# Patient Record
Sex: MALE | Race: WHITE | NOT HISPANIC OR LATINO | Employment: OTHER | URBAN - METROPOLITAN AREA
[De-identification: names, ages, dates, MRNs, and addresses within clinical notes are randomized per-mention and may not be internally consistent; named-entity substitution may affect disease eponyms.]

---

## 2018-01-31 ENCOUNTER — TRANSCRIBE ORDERS (OUTPATIENT)
Dept: LAB | Facility: CLINIC | Age: 83
End: 2018-01-31

## 2018-01-31 ENCOUNTER — APPOINTMENT (OUTPATIENT)
Dept: LAB | Facility: CLINIC | Age: 83
End: 2018-01-31
Payer: COMMERCIAL

## 2018-01-31 DIAGNOSIS — E11.9 TYPE 2 DIABETES MELLITUS WITHOUT COMPLICATION, WITHOUT LONG-TERM CURRENT USE OF INSULIN (HCC): ICD-10-CM

## 2018-01-31 DIAGNOSIS — I10 ESSENTIAL HYPERTENSION, MALIGNANT: ICD-10-CM

## 2018-01-31 DIAGNOSIS — E78.5 HYPERLIPIDEMIA, UNSPECIFIED HYPERLIPIDEMIA TYPE: ICD-10-CM

## 2018-01-31 DIAGNOSIS — E11.9 TYPE 2 DIABETES MELLITUS WITHOUT COMPLICATION, WITHOUT LONG-TERM CURRENT USE OF INSULIN (HCC): Primary | ICD-10-CM

## 2018-01-31 LAB
ALBUMIN SERPL BCP-MCNC: 4.1 G/DL (ref 3.5–5)
ALP SERPL-CCNC: 101 U/L (ref 46–116)
ALT SERPL W P-5'-P-CCNC: 30 U/L (ref 12–78)
ANION GAP SERPL CALCULATED.3IONS-SCNC: 7 MMOL/L (ref 4–13)
AST SERPL W P-5'-P-CCNC: 24 U/L (ref 5–45)
BILIRUB SERPL-MCNC: 0.8 MG/DL (ref 0.2–1)
BUN SERPL-MCNC: 21 MG/DL (ref 5–25)
CALCIUM SERPL-MCNC: 9.3 MG/DL (ref 8.3–10.1)
CHLORIDE SERPL-SCNC: 102 MMOL/L (ref 100–108)
CHOLEST SERPL-MCNC: 116 MG/DL (ref 50–200)
CO2 SERPL-SCNC: 26 MMOL/L (ref 21–32)
CREAT SERPL-MCNC: 1.28 MG/DL (ref 0.6–1.3)
CREAT UR-MCNC: 174 MG/DL
EST. AVERAGE GLUCOSE BLD GHB EST-MCNC: 157 MG/DL
GFR SERPL CREATININE-BSD FRML MDRD: 53 ML/MIN/1.73SQ M
GLUCOSE P FAST SERPL-MCNC: 107 MG/DL (ref 65–99)
HBA1C MFR BLD: 7.1 % (ref 4.2–6.3)
HDLC SERPL-MCNC: 60 MG/DL (ref 40–60)
LDLC SERPL CALC-MCNC: 40 MG/DL (ref 0–100)
MICROALBUMIN UR-MCNC: 79.2 MG/L (ref 0–20)
MICROALBUMIN/CREAT 24H UR: 46 MG/G CREATININE (ref 0–30)
POTASSIUM SERPL-SCNC: 4.1 MMOL/L (ref 3.5–5.3)
PROT SERPL-MCNC: 7.5 G/DL (ref 6.4–8.2)
SODIUM SERPL-SCNC: 135 MMOL/L (ref 136–145)
TRIGL SERPL-MCNC: 81 MG/DL

## 2018-01-31 PROCEDURE — 80061 LIPID PANEL: CPT

## 2018-01-31 PROCEDURE — 82043 UR ALBUMIN QUANTITATIVE: CPT

## 2018-01-31 PROCEDURE — 80053 COMPREHEN METABOLIC PANEL: CPT

## 2018-01-31 PROCEDURE — 83036 HEMOGLOBIN GLYCOSYLATED A1C: CPT

## 2018-01-31 PROCEDURE — 36415 COLL VENOUS BLD VENIPUNCTURE: CPT

## 2018-01-31 PROCEDURE — 82570 ASSAY OF URINE CREATININE: CPT

## 2019-10-07 ENCOUNTER — TELEPHONE (OUTPATIENT)
Dept: NEPHROLOGY | Facility: CLINIC | Age: 84
End: 2019-10-07

## 2019-10-07 NOTE — TELEPHONE ENCOUNTER
Called to remind him of his appt for 10/8  He said that he needs to cancel and is not interested in rescheduling  He stated that he is going back to the old place that he was at before  I informed him to give us a call if anything were to change or if he wanted to reschedule

## 2020-10-09 ENCOUNTER — LAB (OUTPATIENT)
Dept: LAB | Facility: CLINIC | Age: 85
End: 2020-10-09
Payer: COMMERCIAL

## 2020-10-09 ENCOUNTER — TRANSCRIBE ORDERS (OUTPATIENT)
Dept: LAB | Facility: CLINIC | Age: 85
End: 2020-10-09

## 2020-10-09 DIAGNOSIS — I50.33 DIASTOLIC CHF, ACUTE ON CHRONIC (HCC): Primary | ICD-10-CM

## 2020-10-09 DIAGNOSIS — I50.33 DIASTOLIC CHF, ACUTE ON CHRONIC (HCC): ICD-10-CM

## 2020-10-09 LAB
ANION GAP SERPL CALCULATED.3IONS-SCNC: 6 MMOL/L (ref 4–13)
BUN SERPL-MCNC: 24 MG/DL (ref 5–25)
CALCIUM SERPL-MCNC: 8.8 MG/DL (ref 8.3–10.1)
CHLORIDE SERPL-SCNC: 104 MMOL/L (ref 100–108)
CO2 SERPL-SCNC: 28 MMOL/L (ref 21–32)
CREAT SERPL-MCNC: 1.48 MG/DL (ref 0.6–1.3)
GFR SERPL CREATININE-BSD FRML MDRD: 43 ML/MIN/1.73SQ M
GLUCOSE P FAST SERPL-MCNC: 143 MG/DL (ref 65–99)
POTASSIUM SERPL-SCNC: 3.6 MMOL/L (ref 3.5–5.3)
SODIUM SERPL-SCNC: 138 MMOL/L (ref 136–145)

## 2020-10-09 PROCEDURE — 80048 BASIC METABOLIC PNL TOTAL CA: CPT

## 2020-10-09 PROCEDURE — 36415 COLL VENOUS BLD VENIPUNCTURE: CPT

## 2021-01-30 DIAGNOSIS — Z23 ENCOUNTER FOR IMMUNIZATION: ICD-10-CM

## 2021-02-12 ENCOUNTER — TRANSCRIBE ORDERS (OUTPATIENT)
Dept: LAB | Facility: CLINIC | Age: 86
End: 2021-02-12

## 2021-02-12 ENCOUNTER — LAB (OUTPATIENT)
Dept: LAB | Facility: CLINIC | Age: 86
End: 2021-02-12
Payer: COMMERCIAL

## 2021-02-12 DIAGNOSIS — E78.00 PURE HYPERCHOLESTEROLEMIA: ICD-10-CM

## 2021-02-12 DIAGNOSIS — E78.00 HYPERCHOLESTEROLEMIA: ICD-10-CM

## 2021-02-12 DIAGNOSIS — I50.33 ACUTE ON CHRONIC DIASTOLIC HEART FAILURE (HCC): Primary | ICD-10-CM

## 2021-02-12 DIAGNOSIS — I10 HYPERTENSION, UNSPECIFIED TYPE: ICD-10-CM

## 2021-02-12 DIAGNOSIS — I48.91 ATRIAL FIBRILLATION, UNSPECIFIED TYPE (HCC): ICD-10-CM

## 2021-02-12 DIAGNOSIS — I50.33 ACUTE ON CHRONIC DIASTOLIC HEART FAILURE (HCC): ICD-10-CM

## 2021-02-12 LAB
ANION GAP SERPL CALCULATED.3IONS-SCNC: 5 MMOL/L (ref 4–13)
BUN SERPL-MCNC: 22 MG/DL (ref 5–25)
CALCIUM SERPL-MCNC: 9 MG/DL (ref 8.3–10.1)
CHLORIDE SERPL-SCNC: 107 MMOL/L (ref 100–108)
CO2 SERPL-SCNC: 26 MMOL/L (ref 21–32)
CREAT SERPL-MCNC: 1.58 MG/DL (ref 0.6–1.3)
GFR SERPL CREATININE-BSD FRML MDRD: 40 ML/MIN/1.73SQ M
GLUCOSE SERPL-MCNC: 182 MG/DL (ref 65–140)
POTASSIUM SERPL-SCNC: 3.8 MMOL/L (ref 3.5–5.3)
SODIUM SERPL-SCNC: 138 MMOL/L (ref 136–145)

## 2021-02-12 PROCEDURE — 36415 COLL VENOUS BLD VENIPUNCTURE: CPT

## 2021-02-12 PROCEDURE — 80048 BASIC METABOLIC PNL TOTAL CA: CPT

## 2021-02-13 ENCOUNTER — IMMUNIZATIONS (OUTPATIENT)
Dept: FAMILY MEDICINE CLINIC | Facility: HOSPITAL | Age: 86
End: 2021-02-13

## 2021-02-13 DIAGNOSIS — Z23 ENCOUNTER FOR IMMUNIZATION: Primary | ICD-10-CM

## 2021-02-13 PROCEDURE — 91300 SARS-COV-2 / COVID-19 MRNA VACCINE (PFIZER-BIONTECH) 30 MCG: CPT

## 2021-02-13 PROCEDURE — 0001A SARS-COV-2 / COVID-19 MRNA VACCINE (PFIZER-BIONTECH) 30 MCG: CPT

## 2021-03-06 ENCOUNTER — IMMUNIZATIONS (OUTPATIENT)
Dept: FAMILY MEDICINE CLINIC | Facility: HOSPITAL | Age: 86
End: 2021-03-06

## 2021-03-06 DIAGNOSIS — Z23 ENCOUNTER FOR IMMUNIZATION: Primary | ICD-10-CM

## 2021-03-06 PROCEDURE — 91300 SARS-COV-2 / COVID-19 MRNA VACCINE (PFIZER-BIONTECH) 30 MCG: CPT

## 2021-03-06 PROCEDURE — 0002A SARS-COV-2 / COVID-19 MRNA VACCINE (PFIZER-BIONTECH) 30 MCG: CPT

## 2022-02-11 ENCOUNTER — APPOINTMENT (EMERGENCY)
Dept: CT IMAGING | Facility: HOSPITAL | Age: 87
DRG: 811 | End: 2022-02-11
Payer: COMMERCIAL

## 2022-02-11 ENCOUNTER — APPOINTMENT (EMERGENCY)
Dept: RADIOLOGY | Facility: HOSPITAL | Age: 87
DRG: 811 | End: 2022-02-11
Payer: COMMERCIAL

## 2022-02-11 ENCOUNTER — HOSPITAL ENCOUNTER (INPATIENT)
Facility: HOSPITAL | Age: 87
LOS: 8 days | Discharge: HOME HEALTH | DRG: 811 | End: 2022-02-19
Attending: SURGERY | Admitting: INTERNAL MEDICINE
Payer: COMMERCIAL

## 2022-02-11 ENCOUNTER — APPOINTMENT (OUTPATIENT)
Dept: CT IMAGING | Facility: HOSPITAL | Age: 87
DRG: 811 | End: 2022-02-11
Payer: COMMERCIAL

## 2022-02-11 DIAGNOSIS — E11.9 TYPE 2 DIABETES MELLITUS, WITHOUT LONG-TERM CURRENT USE OF INSULIN (HCC): ICD-10-CM
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
## 2022-02-11 DIAGNOSIS — R55 SYNCOPE AND COLLAPSE: ICD-10-CM

## 2022-02-11 DIAGNOSIS — K63.89 COLONIC MASS: ICD-10-CM

## 2022-02-11 DIAGNOSIS — N18.9 CHRONIC KIDNEY DISEASE: ICD-10-CM

## 2022-02-11 DIAGNOSIS — D50.8 OTHER IRON DEFICIENCY ANEMIA: ICD-10-CM

## 2022-02-11 DIAGNOSIS — K29.01 ACUTE GASTRITIS WITH HEMORRHAGE: ICD-10-CM

## 2022-02-11 DIAGNOSIS — D64.9 ANEMIA, UNSPECIFIED TYPE: ICD-10-CM

## 2022-02-11 PROBLEM — S01.112A LACERATION OF LEFT EYEBROW: Status: ACTIVE | Noted: 2022-02-11

## 2022-02-11 PROBLEM — I50.32 CHRONIC DIASTOLIC HEART FAILURE (HCC): Status: ACTIVE | Noted: 2022-02-11

## 2022-02-11 PROBLEM — I48.91 ATRIAL FIBRILLATION (HCC): Status: ACTIVE | Noted: 2022-02-11

## 2022-02-11 LAB
2HR DELTA HS TROPONIN: 0 NG/L
4HR DELTA HS TROPONIN: -2 NG/L
ABO GROUP BLD: NORMAL
ABO GROUP BLD: NORMAL
ANION GAP SERPL CALCULATED.3IONS-SCNC: 10 MMOL/L (ref 4–13)
ATRIAL RATE: 72 BPM
BASE EXCESS BLDA CALC-SCNC: 0 MMOL/L (ref -2–3)
BLD GP AB SCN SERPL QL: NEGATIVE
BUN SERPL-MCNC: 43 MG/DL (ref 5–25)
CALCIUM SERPL-MCNC: 8.6 MG/DL (ref 8.3–10.1)
CARDIAC TROPONIN I PNL SERPL HS: 33 NG/L
CARDIAC TROPONIN I PNL SERPL HS: 35 NG/L
CARDIAC TROPONIN I PNL SERPL HS: 35 NG/L
CHLORIDE SERPL-SCNC: 98 MMOL/L (ref 100–108)
CO2 SERPL-SCNC: 26 MMOL/L (ref 21–32)
CREAT SERPL-MCNC: 1.79 MG/DL (ref 0.6–1.3)
ERYTHROCYTE [DISTWIDTH] IN BLOOD BY AUTOMATED COUNT: 19.9 % (ref 11.6–15.1)
EST. AVERAGE GLUCOSE BLD GHB EST-MCNC: 186 MG/DL
FERRITIN SERPL-MCNC: 5 NG/ML (ref 8–388)
GFR SERPL CREATININE-BSD FRML MDRD: 34 ML/MIN/1.73SQ M
GLUCOSE SERPL-MCNC: 163 MG/DL (ref 65–140)
GLUCOSE SERPL-MCNC: 165 MG/DL (ref 65–140)
HBA1C MFR BLD: 8.1 %
HCO3 BLDA-SCNC: 25.3 MMOL/L (ref 24–30)
HCT VFR BLD AUTO: 19.3 % (ref 36.5–49.3)
HCT VFR BLD AUTO: 20.6 % (ref 36.5–49.3)
HCT VFR BLD AUTO: 22.8 % (ref 36.5–49.3)
HCT VFR BLD AUTO: 23.3 % (ref 36.5–49.3)
HCT VFR BLD CALC: 22 % (ref 36.5–49.3)
HGB BLD-MCNC: 5.4 G/DL (ref 12–17)
HGB BLD-MCNC: 5.9 G/DL (ref 12–17)
HGB BLD-MCNC: 6.6 G/DL (ref 12–17)
HGB BLD-MCNC: 6.9 G/DL (ref 12–17)
HGB BLDA-MCNC: 7.5 G/DL (ref 12–17)
IRON SATN MFR SERPL: 2 % (ref 20–50)
IRON SERPL-MCNC: 11 UG/DL (ref 65–175)
LACTATE SERPL-SCNC: 1.2 MMOL/L (ref 0.5–2)
MCH RBC QN AUTO: 17.2 PG (ref 26.8–34.3)
MCHC RBC AUTO-ENTMCNC: 28.6 G/DL (ref 31.4–37.4)
MCV RBC AUTO: 60 FL (ref 82–98)
PCO2 BLD: 27 MMOL/L (ref 21–32)
PCO2 BLD: 41 MM HG (ref 42–50)
PH BLD: 7.4 [PH] (ref 7.3–7.4)
PLATELET # BLD AUTO: 415 THOUSANDS/UL (ref 149–390)
PLATELET # BLD AUTO: 450 THOUSANDS/UL (ref 149–390)
PMV BLD AUTO: 10 FL (ref 8.9–12.7)
PMV BLD AUTO: 10.7 FL (ref 8.9–12.7)
PO2 BLD: 27 MM HG (ref 35–45)
POTASSIUM BLD-SCNC: 4 MMOL/L (ref 3.5–5.3)
POTASSIUM SERPL-SCNC: 3.9 MMOL/L (ref 3.5–5.3)
QRS AXIS: -71 DEGREES
QRSD INTERVAL: 102 MS
QT INTERVAL: 440 MS
QTC INTERVAL: 481 MS
RBC # BLD AUTO: 3.43 MILLION/UL (ref 3.88–5.62)
RH BLD: POSITIVE
RH BLD: POSITIVE
SAO2 % BLD FROM PO2: 50 % (ref 60–85)
SODIUM BLD-SCNC: 135 MMOL/L (ref 136–145)
SODIUM SERPL-SCNC: 134 MMOL/L (ref 136–145)
SPECIMEN EXPIRATION DATE: NORMAL
SPECIMEN SOURCE: ABNORMAL
T WAVE AXIS: 31 DEGREES
TIBC SERPL-MCNC: 581 UG/DL (ref 250–450)
VENTRICULAR RATE: 72 BPM
WBC # BLD AUTO: 6.62 THOUSAND/UL (ref 4.31–10.16)

## 2022-02-11 PROCEDURE — 82728 ASSAY OF FERRITIN: CPT | Performed by: CHIROPRACTOR

## 2022-02-11 PROCEDURE — 83540 ASSAY OF IRON: CPT | Performed by: CHIROPRACTOR

## 2022-02-11 PROCEDURE — 85027 COMPLETE CBC AUTOMATED: CPT | Performed by: PHYSICIAN ASSISTANT

## 2022-02-11 PROCEDURE — 93308 TTE F-UP OR LMTD: CPT | Performed by: SURGERY

## 2022-02-11 PROCEDURE — NC001 PR NO CHARGE: Performed by: PHYSICIAN ASSISTANT

## 2022-02-11 PROCEDURE — 86920 COMPATIBILITY TEST SPIN: CPT

## 2022-02-11 PROCEDURE — NC001 PR NO CHARGE: Performed by: SURGERY

## 2022-02-11 PROCEDURE — G1004 CDSM NDSC: HCPCS

## 2022-02-11 PROCEDURE — 80048 BASIC METABOLIC PNL TOTAL CA: CPT | Performed by: PHYSICIAN ASSISTANT

## 2022-02-11 PROCEDURE — 90715 TDAP VACCINE 7 YRS/> IM: CPT | Performed by: PHYSICIAN ASSISTANT

## 2022-02-11 PROCEDURE — 83036 HEMOGLOBIN GLYCOSYLATED A1C: CPT | Performed by: INTERNAL MEDICINE

## 2022-02-11 PROCEDURE — 83605 ASSAY OF LACTIC ACID: CPT | Performed by: PHYSICIAN ASSISTANT

## 2022-02-11 PROCEDURE — 99285 EMERGENCY DEPT VISIT HI MDM: CPT

## 2022-02-11 PROCEDURE — 85018 HEMOGLOBIN: CPT

## 2022-02-11 PROCEDURE — 36430 TRANSFUSION BLD/BLD COMPNT: CPT

## 2022-02-11 PROCEDURE — 85018 HEMOGLOBIN: CPT | Performed by: INTERNAL MEDICINE

## 2022-02-11 PROCEDURE — 84484 ASSAY OF TROPONIN QUANT: CPT | Performed by: PHYSICIAN ASSISTANT

## 2022-02-11 PROCEDURE — NC001 PR NO CHARGE: Performed by: EMERGENCY MEDICINE

## 2022-02-11 PROCEDURE — 93005 ELECTROCARDIOGRAM TRACING: CPT

## 2022-02-11 PROCEDURE — 82803 BLOOD GASES ANY COMBINATION: CPT

## 2022-02-11 PROCEDURE — 85014 HEMATOCRIT: CPT

## 2022-02-11 PROCEDURE — 71045 X-RAY EXAM CHEST 1 VIEW: CPT

## 2022-02-11 PROCEDURE — 76705 ECHO EXAM OF ABDOMEN: CPT | Performed by: SURGERY

## 2022-02-11 PROCEDURE — 71250 CT THORAX DX C-: CPT

## 2022-02-11 PROCEDURE — 90471 IMMUNIZATION ADMIN: CPT

## 2022-02-11 PROCEDURE — 86901 BLOOD TYPING SEROLOGIC RH(D): CPT | Performed by: INTERNAL MEDICINE

## 2022-02-11 PROCEDURE — 99284 EMERGENCY DEPT VISIT MOD MDM: CPT | Performed by: SURGERY

## 2022-02-11 PROCEDURE — 86900 BLOOD TYPING SEROLOGIC ABO: CPT | Performed by: INTERNAL MEDICINE

## 2022-02-11 PROCEDURE — P9016 RBC LEUKOCYTES REDUCED: HCPCS

## 2022-02-11 PROCEDURE — 93010 ELECTROCARDIOGRAM REPORT: CPT | Performed by: INTERNAL MEDICINE

## 2022-02-11 PROCEDURE — 84295 ASSAY OF SERUM SODIUM: CPT

## 2022-02-11 PROCEDURE — 12011 RPR F/E/E/N/L/M 2.5 CM/<: CPT | Performed by: SURGERY

## 2022-02-11 PROCEDURE — 0HQ1XZZ REPAIR FACE SKIN, EXTERNAL APPROACH: ICD-10-PCS | Performed by: SURGERY

## 2022-02-11 PROCEDURE — 83550 IRON BINDING TEST: CPT | Performed by: CHIROPRACTOR

## 2022-02-11 PROCEDURE — 85014 HEMATOCRIT: CPT | Performed by: INTERNAL MEDICINE

## 2022-02-11 PROCEDURE — 99223 1ST HOSP IP/OBS HIGH 75: CPT | Performed by: INTERNAL MEDICINE

## 2022-02-11 PROCEDURE — 36415 COLL VENOUS BLD VENIPUNCTURE: CPT | Performed by: PHYSICIAN ASSISTANT

## 2022-02-11 PROCEDURE — 86850 RBC ANTIBODY SCREEN: CPT | Performed by: INTERNAL MEDICINE

## 2022-02-11 PROCEDURE — C9113 INJ PANTOPRAZOLE SODIUM, VIA: HCPCS | Performed by: INTERNAL MEDICINE

## 2022-02-11 PROCEDURE — 30233N1 TRANSFUSION OF NONAUTOLOGOUS RED BLOOD CELLS INTO PERIPHERAL VEIN, PERCUTANEOUS APPROACH: ICD-10-PCS | Performed by: INTERNAL MEDICINE

## 2022-02-11 PROCEDURE — 72125 CT NECK SPINE W/O DYE: CPT

## 2022-02-11 PROCEDURE — 70450 CT HEAD/BRAIN W/O DYE: CPT

## 2022-02-11 PROCEDURE — 85049 AUTOMATED PLATELET COUNT: CPT | Performed by: CHIROPRACTOR

## 2022-02-11 PROCEDURE — 84132 ASSAY OF SERUM POTASSIUM: CPT

## 2022-02-11 PROCEDURE — 82947 ASSAY GLUCOSE BLOOD QUANT: CPT

## 2022-02-11 RX ORDER — TORSEMIDE 20 MG/1
40 TABLET ORAL
Status: DISCONTINUED | OUTPATIENT
Start: 2022-02-11 | End: 2022-02-11

## 2022-02-11 RX ORDER — PRAVASTATIN SODIUM 40 MG
40 TABLET ORAL DAILY
COMMUNITY
End: 2022-04-11 | Stop reason: SDUPTHER

## 2022-02-11 RX ORDER — DOXAZOSIN MESYLATE 4 MG/1
8 TABLET ORAL
Status: DISCONTINUED | OUTPATIENT
Start: 2022-02-11 | End: 2022-02-19 | Stop reason: HOSPADM

## 2022-02-11 RX ORDER — LOSARTAN POTASSIUM 50 MG/1
50 TABLET ORAL DAILY
Status: DISCONTINUED | OUTPATIENT
Start: 2022-02-11 | End: 2022-02-11

## 2022-02-11 RX ORDER — DOXAZOSIN 8 MG/1
8 TABLET ORAL
COMMUNITY
End: 2022-04-11 | Stop reason: SDUPTHER

## 2022-02-11 RX ORDER — LOSARTAN POTASSIUM 50 MG/1
50 TABLET ORAL DAILY
Status: DISCONTINUED | OUTPATIENT
Start: 2022-02-12 | End: 2022-02-11

## 2022-02-11 RX ORDER — SODIUM CHLORIDE, SODIUM GLUCONATE, SODIUM ACETATE, POTASSIUM CHLORIDE, MAGNESIUM CHLORIDE, SODIUM PHOSPHATE, DIBASIC, AND POTASSIUM PHOSPHATE .53; .5; .37; .037; .03; .012; .00082 G/100ML; G/100ML; G/100ML; G/100ML; G/100ML; G/100ML; G/100ML
75 INJECTION, SOLUTION INTRAVENOUS CONTINUOUS
Status: DISCONTINUED | OUTPATIENT
Start: 2022-02-11 | End: 2022-02-11

## 2022-02-11 RX ORDER — AMLODIPINE BESYLATE 5 MG/1
10 TABLET ORAL DAILY
Status: DISCONTINUED | OUTPATIENT
Start: 2022-02-11 | End: 2022-02-11

## 2022-02-11 RX ORDER — LOSARTAN POTASSIUM 50 MG/1
50 TABLET ORAL DAILY
COMMUNITY
End: 2022-03-18 | Stop reason: SDUPTHER

## 2022-02-11 RX ORDER — AMIODARONE HYDROCHLORIDE 200 MG/1
100 TABLET ORAL
Status: DISCONTINUED | OUTPATIENT
Start: 2022-02-11 | End: 2022-02-19 | Stop reason: HOSPADM

## 2022-02-11 RX ORDER — TORSEMIDE 20 MG/1
40 TABLET ORAL 2 TIMES DAILY
COMMUNITY
End: 2022-02-22

## 2022-02-11 RX ORDER — PRAVASTATIN SODIUM 40 MG
40 TABLET ORAL
Status: DISCONTINUED | OUTPATIENT
Start: 2022-02-11 | End: 2022-02-19 | Stop reason: HOSPADM

## 2022-02-11 RX ORDER — HEPARIN SODIUM 5000 [USP'U]/ML
5000 INJECTION, SOLUTION INTRAVENOUS; SUBCUTANEOUS EVERY 8 HOURS SCHEDULED
Status: DISCONTINUED | OUTPATIENT
Start: 2022-02-11 | End: 2022-02-11

## 2022-02-11 RX ORDER — PANTOPRAZOLE SODIUM 40 MG/1
40 INJECTION, POWDER, FOR SOLUTION INTRAVENOUS EVERY 12 HOURS SCHEDULED
Status: DISCONTINUED | OUTPATIENT
Start: 2022-02-11 | End: 2022-02-18

## 2022-02-11 RX ORDER — AMLODIPINE BESYLATE 10 MG/1
10 TABLET ORAL DAILY
COMMUNITY
End: 2022-02-19 | Stop reason: HOSPADM

## 2022-02-11 RX ORDER — AMIODARONE HYDROCHLORIDE 100 MG/1
100 TABLET ORAL DAILY
COMMUNITY
End: 2022-03-18 | Stop reason: SDUPTHER

## 2022-02-11 RX ORDER — AMLODIPINE BESYLATE 10 MG/1
10 TABLET ORAL DAILY
Status: DISCONTINUED | OUTPATIENT
Start: 2022-02-12 | End: 2022-02-17

## 2022-02-11 RX ADMIN — TETANUS TOXOID, REDUCED DIPHTHERIA TOXOID AND ACELLULAR PERTUSSIS VACCINE, ADSORBED 0.5 ML: 5; 2.5; 8; 8; 2.5 SUSPENSION INTRAMUSCULAR at 01:59

## 2022-02-11 RX ADMIN — IRON SUCROSE 100 MG: 20 INJECTION, SOLUTION INTRAVENOUS at 04:38

## 2022-02-11 RX ADMIN — DOXAZOSIN 8 MG: 4 TABLET ORAL at 21:02

## 2022-02-11 RX ADMIN — METOPROLOL TARTRATE 25 MG: 25 TABLET, FILM COATED ORAL at 21:02

## 2022-02-11 RX ADMIN — AMIODARONE HYDROCHLORIDE 100 MG: 200 TABLET ORAL at 09:09

## 2022-02-11 RX ADMIN — PANTOPRAZOLE SODIUM 40 MG: 40 INJECTION, POWDER, FOR SOLUTION INTRAVENOUS at 16:19

## 2022-02-11 RX ADMIN — METOPROLOL TARTRATE 25 MG: 25 TABLET, FILM COATED ORAL at 09:09

## 2022-02-11 RX ADMIN — SODIUM CHLORIDE, SODIUM GLUCONATE, SODIUM ACETATE, POTASSIUM CHLORIDE, MAGNESIUM CHLORIDE, SODIUM PHOSPHATE, DIBASIC, AND POTASSIUM PHOSPHATE 75 ML/HR: .53; .5; .37; .037; .03; .012; .00082 INJECTION, SOLUTION INTRAVENOUS at 09:08

## 2022-02-11 RX ADMIN — PANTOPRAZOLE SODIUM 40 MG: 40 INJECTION, POWDER, FOR SOLUTION INTRAVENOUS at 23:17

## 2022-02-11 RX ADMIN — IRON SUCROSE 100 MG: 20 INJECTION, SOLUTION INTRAVENOUS at 11:30

## 2022-02-11 RX ADMIN — SODIUM CHLORIDE, SODIUM GLUCONATE, SODIUM ACETATE, POTASSIUM CHLORIDE, MAGNESIUM CHLORIDE, SODIUM PHOSPHATE, DIBASIC, AND POTASSIUM PHOSPHATE 75 ML/HR: .53; .5; .37; .037; .03; .012; .00082 INJECTION, SOLUTION INTRAVENOUS at 03:19

## 2022-02-11 RX ADMIN — PRAVASTATIN SODIUM 40 MG: 40 TABLET ORAL at 21:04

## 2022-02-11 NOTE — H&P
Joseph 1935, 82 y o  male MRN: [id number]  Unit/Bed#: [facility]ERY[facility] Walker Encounter: [id number]  Primary Care Provider: No primary care provider on file  Date and time admitted to hospital: 2/11/2022 12:05 AM    * Syncope and collapse  Assessment & Plan  Patient reports he fell around midnight, but has no memory about the event  He does report some POLK with limited ambulation at home more recently   Hemoglobin 5 9 with microcytic hypochromic anemia which could be likely etiology  Imaging negative for acute intracranial process or C-spine fracture    Continue work up as appropriate throughout admission    Anemia  Assessment & Plan  Patient was found to have a microcytic anemia on admission, without acute blood loss  Patient denies any hematuria hematochezia or blood noted in BMs  Patient currently is refusing transfusion, and transfusion workup both from the trauma team and myself however is amenable to Venofer  * I verified refusal of transfusion with the patient on 3 separate occasions    Iron studies ordered from initial blood specimen still pending  FOBT ordered    Administered 100 of Venofer x1 approximately 4:00 a m  until ferritin levels are verified  Isolyte currently running at 75 mL per hour    Atrial fibrillation [facility])  Assessment & Plan  Patient has a known history of AFib on Eliquis 5 b i d   At home according to friend not yet verified by any written records    Heparin for thromboprophylaxis currently held pending FOBT  Status post cardioversion March of 2021   EKG demonstrated junctional rhythm without tachycardia    Will continue home medications    Chronic diastolic heart failure (HCC)  Assessment & Plan  Wt Readings from Last 3 Encounters:   02/11/22 91 1 kg (200 lb 13 4 oz)       Reports some SOB which does limit some of his activities  According to friend this is longstanding and worsening   Sates he follows with cardiology a regular basis Dr Travis Dinero at [facility] is cardiologist, records currently unavailable through [facility]  Please ask medical records to assist ASAP      Continue home meds      Laceration of left eyebrow  Assessment & Plan  Small laceration left eyebrow repaired bedside without incident  Local wound care and repeat observations      VTE Prophylaxis: Heparin  held after 1x pending FOBT  / sequential compression device   Code Status: DNR/DNI  POLST: POLST form is not discussed and not completed at this time  Anticipated Length of Stay:  Patient will be admitted on an Observation basis with an anticipated length of stay of  more than 2 midnights  Justification for Hospital Stay:  Medical evaluation and management    Chief Complaint:   Syncopal episode    History of Present Illness:    Delores Looney is a 82 y o  male who is pleasant, oriented, and conversant presents with a PMH  significant for AFib, HTN, diastolic HF who stated he became dizzy in the bathroom earlier today and fell  The patient states he lives in a [address] [place] [address] Street with a friend and the friend summoned EMS to transport him to the hospital   He sustained a minor laceration above his left eye  Patient does not recall or remember any events surrounding the fall  Of significance, he does note a relatively recent increase in SOB with activity  He also is having POLK more recently with ADLs  States he follows with cardiology on a regular basis, takes all medications prescribed however does not know what they are and does not carry medication list   The patient is fully oriented, he denies any pain, headache, shortness of breath at rest, abdominal pain nausea vomiting or diarrhea  *The patient does not have a home medication list with him  No medication list in chart  No list was obtainable through Care Everywhere    I phoned his contact at home, and she provided a vebal list however was unable to provide a pharmacy or any written list  Home meds except for Eliquis were ordered based upon this conversation however will need to be verified by day team when pharmacy information is obtained  *According to girlfriend, Patient's PCP is Dr Irwin Gomez, affiliated with [facility]  Dr Rosangela Hanna is his cardiologist also affiliated with [facility]    Review of Systems:    Review of Systems   Constitutional: Negative for chills, diaphoresis and fever  Respiratory: Negative for cough and shortness of breath  Cardiovascular: Negative for leg swelling  Gastrointestinal: Negative for diarrhea, nausea and vomiting  Neurological: Positive for syncope  Negative for speech difficulty and headaches  Psychiatric/Behavioral: Negative for confusion  Past Medical and Surgical History:     Past Medical History:   Diagnosis Date    A-fib ([facility] )     Diastolic heart failure ([facility] )     HTN (hypertension)        Past Surgical History:   Procedure Laterality Date    EXPLORATORY LAPAROTOMY      with gastric ulcer repair       Meds/Allergies:    Prior to Admission medications    Not on File     I have been unable to obtain / verify an up to date medication list despite all reasonable attempts  Allergies: Not on File    Social History:     Marital Status: /Civil Union   Occupation:  Retired   Patient Pre-hospital Living Situation:  With friend  condominium  Patient Pre-hospital Level of Mobility:  No assistive devices  Patient Pre-hospital Diet Restrictions:  None  Substance Use History: Former smoker, quit 30 years ago  Social History     Substance and Sexual Activity   Alcohol Use None     Social History     Tobacco Use   Smoking Status Not on file   Smokeless Tobacco Not on file     Social History     Substance and Sexual Activity   Drug Use Not on file       Family History:    No family history on file      Physical Exam:     Vitals:   Blood Pressure: 142/61 (02/11/22 0546)  Pulse: 84 (02/11/22 0546)  Temperature: (!) 97 2 °F (36 2 °C) (02/11/22 0007)  Temp Source: Tympanic (02/11/22 0007)  Respirations: 18 (02/11/22 0546)  Weight - Scale: 91 1 kg (200 lb 13 4 oz) (02/11/22 0007)  SpO2: 97 % (02/11/22 0546)    Physical Exam  Constitutional:       General: He is not in acute distress  Appearance: He is not ill-appearing or toxic-appearing  HENT:      Head: Normocephalic  Comments: Left eyebrow laceration     Nose: No rhinorrhea  Eyes:      General: No scleral icterus  Extraocular Movements: Extraocular movements intact  Cardiovascular:      Rate and Rhythm: Normal rate and regular rhythm  Heart sounds: No murmur heard  Pulmonary:      Effort: Pulmonary effort is normal  No respiratory distress  Breath sounds: No wheezing or rales  Abdominal:      Tenderness: There is no abdominal tenderness  There is no guarding  Musculoskeletal:      Right lower leg: No edema  Left lower leg: No edema  Skin:     General: Skin is warm and dry  Neurological:      General: No focal deficit present  Mental Status: He is oriented to person, place, and time  Psychiatric:         Mood and Affect: Mood normal          Thought Content: Thought content normal          Additional Data:     Lab Results: I have personally reviewed pertinent reports  Results from last 7 days   Lab Units 02/11/22  0310 02/11/22 0048 02/11/22 0048   WBC Thousand/uL  --   --  6 62   HEMOGLOBIN g/dL  --   --  5 9*   HEMATOCRIT %  --   --  20 6*   PLATELETS Thousands/uL 415*   < > 450*    < > = values in this interval not displayed  Results from last 7 days   Lab Units 02/11/22  0048 02/11/22  0014   POTASSIUM mmol/L 3 9  --    CHLORIDE mmol/L 98*  --    CO2 mmol/L 26  --    CO2, I-STAT mmol/L  --  27   BUN mg/dL 43*  --    CREATININE mg/dL 1 79*  --    CALCIUM mg/dL 8 6  --    GLUCOSE, ISTAT mg/dl  --  165*           Imaging: I have personally reviewed pertinent reports        TRAUMA - CT head wo contrast    Addendum Date: 2/11/2022 Addendum: ADDENDUM: I personally discussed this study with Sana Gutiérrez on 2/11/2022 at 1:31 AM      Result Date: 2/11/2022  Narrative: CT BRAIN - WITHOUT CONTRAST INDICATION:   TRAUMA  COMPARISON:  None  TECHNIQUE:  CT examination of the brain was performed  In addition to axial images, sagittal and coronal 2D reformatted images were created and submitted for interpretation  Radiation dose length product (DLP) for this visit:  865 mGy-cm   This examination, like all CT scans performed in the [facility], was performed utilizing techniques to minimize radiation dose exposure, including the use of iterative reconstruction and automated exposure control  IMAGE QUALITY:  Diagnostic  FINDINGS: PARENCHYMA: Decreased attenuation is noted in periventricular and subcortical white matter demonstrating an appearance that is statistically most likely to represent moderate microangiopathic change  No CT signs of acute infarction  No intracranial mass, mass effect or midline shift  No acute parenchymal hemorrhage  VENTRICLES AND EXTRA-AXIAL SPACES:  Normal for the patient's age  VISUALIZED ORBITS AND PARANASAL SINUSES:  Unremarkable  CALVARIUM AND EXTRACRANIAL SOFT TISSUES:  Normal      Impression: No acute intracranial abnormality  I personally discussed this study with Dr Ramón Hawkins on 2/11/2022 at 1:02 AM  Workstation performed: [id number]     TRAUMA - CT spine cervical wo contrast    Addendum Date: 2/11/2022 Addendum:    I personally discussed this study with Sana Gutiérrez on 2/11/2022 at 1:31 AM      Result Date: 2/11/2022  Narrative: CT CERVICAL SPINE - WITHOUT CONTRAST INDICATION:   TRAUMA  COMPARISON:  None  TECHNIQUE:  CT examination of the cervical spine was performed without intravenous contrast   Contiguous axial images were obtained  Sagittal and coronal reconstructions were performed  Radiation dose length product (DLP) for this visit:  328 mGy-cm     This examination, like all CT scans performed in the [facility], was performed utilizing techniques to minimize radiation dose exposure, including the use of iterative reconstruction and automated exposure control  IMAGE QUALITY:  Diagnostic  FINDINGS: ALIGNMENT:  There is straightening of normal cervical lordosis  No subluxation or compression deformity  VERTEBRAL BODIES:  No fracture  DEGENERATIVE CHANGES:  Moderate multilevel cervical degenerative changes are noted  No critical central canal stenosis  PREVERTEBRAL AND PARASPINAL SOFT TISSUES:  Unremarkable  THORACIC INLET:  Normal      Impression: Images are motion affected  No displaced cervical spine fracture or gross traumatic malalignment  I personally discussed this study with Dr Abbi Obrien on 2/11/2022 at 1:02 AM   Workstation performed: [id number]       EKG, Pathology, and Other Studies Reviewed on Admission:   · EKG:  Junctional rhythm without tachycardia    Epic / Care Everywhere Records Reviewed: Yes     ** Please Note: This note has been constructed using a voice recognition system   **

## 2022-02-11 NOTE — ASSESSMENT & PLAN NOTE
Patient was found to have a microcytic anemia on admission, without acute blood loss  Patient denies any hematuria hematochezia or blood noted in BMs  Patient currently is refusing transfusion, and transfusion workup both from the trauma team and myself however is amenable to Venofer      * I verified refusal of transfusion with the patient on 3 separate occasions    Iron studies ordered from initial blood specimen still pending  FOBT ordered    Administered 100 of Venofer x1 approximately 4:00 a m  until ferritin levels are verified  Isolyte currently running at 75 mL per hour

## 2022-02-11 NOTE — ASSESSMENT & PLAN NOTE
- Microcytic anemia without acute blood loss  - Patient denies hematuria, hematochezia, or bloody bowel movements  - Admit to medical service for anemia workup and monitoring

## 2022-02-11 NOTE — ASSESSMENT & PLAN NOTE
Wt Readings from Last 3 Encounters:   02/11/22 91 1 kg (200 lb 13 4 oz)       Reports some SOB which does limit some of his activities  According to friend this is longstanding and worsening   Sates he follows with cardiology a regular basis   Dr Melba Borrego at [facility] is cardiologist, records currently unavailable through [facility]     Please ask medical records to assist ASAP      Continue home meds

## 2022-02-11 NOTE — PROGRESS NOTES
[facility]  Progress Note Marianela Pascual 1935, 82 y o  male MRN: [id number]  Unit/Bed#: [facility] Walker Encounter: [id number]  Primary Care Provider: No primary care provider on file  Date and time admitted to hospital: 2/11/2022 12:05 AM    * Syncope and collapse  Assessment & Plan  - patient reports some dizziness prior to fall and then has no memory of event  - Recent malaise with POLK can only walk to bathroom prior to getting short of breath  - suspect secondary to anemia with hgb 5 9   - no acute traumatic injuries found on exam, no additional injuries found on tertiary exam  -Trauma will sign off please reconsult as needed    Laceration of left eyebrow  Assessment & Plan  - left eyebrow laceration  - s/p bedside repair   - Local wound care    Anemia  Assessment & Plan  - Microcytic anemia without acute blood loss  - Patient denies hematuria, hematochezia, or bloody bowel movements  - Admitted to medical service for anemia workup and monitoring  - patient is a [employer] and does not accept blood products    Chronic diastolic heart failure (HCC)  Assessment & Plan  Wt Readings from Last 3 Encounters:   02/11/22 91 1 kg (200 lb 13 4 oz)     - With POLK affecting his ADLs   - Continue home medications        TERTIARY TRAUMA SURVEY NOTE    Prophylaxis: Sequential compression device (Venodyne)     Disposition: plans per medical team, trauma team sign off    Code status:  Level 3 - DNAR and DNI    Consultants: trauma      SUBJECTIVE:     Transfer from: home  Mechanism of Injury:Fall    Chief Complaint:  No complaints    HPI/Last 24 hour events:  Reports he feels fine this morning  Denies pain  Denies dizziness, nausea, vomiting, shortness of breath      Active medications:           Current Facility-Administered Medications:     amiodarone tablet 100 mg, 100 mg, Oral, Daily With Breakfast    [START ON 2/12/2022] amLODIPine (NORVASC) tablet 10 mg, 10 mg, Oral, Daily    doxazosin (CARDURA) tablet 8 mg, 8 mg, Oral, HS    metoprolol tartrate (LOPRESSOR) tablet 25 mg, 25 mg, Oral, Q12H CHUY    multi-electrolyte (PLASMALYTE-A/ISOLYTE-S PH 7 4) IV solution, 75 mL/hr, Intravenous, Continuous, 75 mL/hr at 02/11/22 0319    pravastatin (PRAVACHOL) tablet 40 mg, 40 mg, Oral, Daily With Dinner  No current outpatient medications on file  OBJECTIVE:     Vitals:   Vitals:    02/11/22 0648   BP: 145/66   Pulse: 73   Resp: 18   Temp:    SpO2: 96%       Physical Exam:   GENERAL APPEARANCE:  No acute distress, resting comfortably supine in bed  NEURO:  AAOx3, GCS 15, no focal neuro deficits  HEENT:  PERRLA, EOMI, mucous membranes moist  CV:  RRR, S1, S2 without murmur or gallop  LUNGS:  Clear to auscultation bilaterally without wheezes rales or rhonchi  GI:  Soft, obese, nontender and nondistended  :  Voiding independently  MSK:  Nontender without deformity  SKIN:  Warm, dry, intact, left eyebrow laceration CDI with adhesive in place no erythema or drainage  I/O:   I/O     None          Invasive Devices: Invasive Devices  Report    Peripheral Intravenous Line            Peripheral IV 02/11/22 Left Antecubital <1 day                  Imaging:   TRAUMA - CT head wo contrast    Addendum Date: 2/11/2022    ADDENDUM: I personally discussed this study with Blayne Pearson on 2/11/2022 at 1:31 AM      Result Date: 2/11/2022  Impression: No acute intracranial abnormality  I personally discussed this study with Dr Alana Tarango on 2/11/2022 at 1:02 AM  Workstation performed: [id number]     TRAUMA - CT spine cervical wo contrast    Addendum Date: 2/11/2022     I personally discussed this study with Blayne Pearson on 2/11/2022 at 1:31 AM      Result Date: 2/11/2022  Impression: Images are motion affected  No displaced cervical spine fracture or gross traumatic malalignment    I personally discussed this study with Dr Alana Tarango on 2/11/2022 at 1:02 AM   Workstation performed: [id number]       Labs:   CBC: Lab Results   Component Value Date    WBC 6 62 02/11/2022    HGB 5 9 (LL) 02/11/2022    HCT 20 6 (L) 02/11/2022    MCV 60 (L) 02/11/2022     (H) 02/11/2022    MCH 17 2 (L) 02/11/2022    MCHC 28 6 (L) 02/11/2022    RDW 19 9 (H) 02/11/2022    MPV 10 0 02/11/2022     CMP:   Lab Results   Component Value Date    CL 98 (L) 02/11/2022    CO2 26 02/11/2022    CO2 27 02/11/2022    BUN 43 (H) 02/11/2022    CREATININE 1 79 (H) 02/11/2022    GLUCOSE 165 (H) 02/11/2022    CALCIUM 8 6 02/11/2022    EGFR 34 02/11/2022     Troponin: No results found for: Jil Coombs

## 2022-02-11 NOTE — ASSESSMENT & PLAN NOTE
Most likely syncope secondary to symptomatic anemia  Troponin negative, glucose within normal limits    Negative orthostatics  Imaging negative for acute intracranial process or C-spine fracture  Fall precautions  Treat anemia

## 2022-02-11 NOTE — ASSESSMENT & PLAN NOTE
Home medication:  Eliquis 5 mg b i d , amiodarone 100 mg daily, metoprolol 25 b i d  Status post cardioversion March of 2021   EKG:  AFib with controlled heart rate  Plan:  Continue amiodarone  Continue metoprolol  Continue to hold Eliquis for now  If the family will decline colonoscopy/EGD, might need to discuss about discontinuing Eliquis for high risk of bleeding

## 2022-02-11 NOTE — PROCEDURES
Laceration repair    Date/Time: 2/11/2022 12:59 AM  Performed by: Kelsi Tian PA-C  Authorized by: Kelsi Tian PA-C   Consent: Verbal consent obtained  Consent given by: patient  Patient understanding: patient states understanding of the procedure being performed  Patient identity confirmed: verbally with patient  Time out: Immediately prior to procedure a "time out" was called to verify the correct patient, procedure, equipment, support staff and site/side marked as required    Body area: head/neck  Location details: left eyebrow  Laceration length: 1 cm    Wound Dehiscence:  Superficial Wound Dehiscence: simple closure      Procedure Details:  Irrigation solution: saline  Irrigation method: syringe  Skin closure: glue  Patient tolerance: patient tolerated the procedure well with no immediate complications

## 2022-02-11 NOTE — ASSESSMENT & PLAN NOTE
Wt Readings from Last 3 Encounters:   02/11/22 91 1 kg (200 lb 13 4 oz)     - With POLK affecting his ADLs   - Continue home medications

## 2022-02-11 NOTE — H&P
Joseph 1935, 82 y o  male MRN: [id number]  Unit/Bed#: [facility] Walker Encounter: [id number]  Primary Care Provider: No primary care provider on file  Date and time admitted to hospital: 2/11/2022 12:05 AM    Syncope and collapse  Assessment & Plan  - patient reports some dizziness prior to fall and then has no memory of event  - Recent malaise with POLK can only walk to bathroom prior to getting short of breath  - suspect secondary to anemia with hgb 5 9   - no acute traumatic injuries found on exam  - Admit to medical service for anemia/syncope workup    Laceration of left eyebrow  Assessment & Plan  - left eyebrow laceration  - s/p bedside repair   - Local wound care    Anemia  Assessment & Plan  - Microcytic anemia without acute blood loss  - Patient denies hematuria, hematochezia, or bloody bowel movements  - Admit to medical service for anemia workup and monitoring    Atrial fibrillation (HCC)  Assessment & Plan  - Afib on eliquis  - Previous cardioversion into sinus rhythm in 3/21  - EKG with junctional rhythm without tachycardia  - continue home medications    Chronic diastolic heart failure (HCC)  Assessment & Plan  Wt Readings from Last 3 Encounters:   02/11/22 91 1 kg (200 lb 13 4 oz)     - With POLK affecting his ADLs   - Continue home medications    Trauma Alert: Level B   Model of Arrival: Ambulance    Trauma Team: Attending Lily Frank and PANDA Bejarano 49  Consultants:     None     History of Present Illness     Chief Complaint: Fall  Mechanism:Fall     HPI:    Marian Gordon is a 82 y o  male with PMH afib, HTN, Diastolic heart failure who reports he was in the bathroom this morning when he suddenly got dizzy and fell to the ground  He does not remember any other events leading up to the fall  He reports he has been feeling poorly at home with increased shortness of breath with activity   He states he can only ambulate short distances within his house without shortness of breath  He denies pain, headache, chest pain, palpitations, abdominal pain, nausea, vomiting  Review of Systems   Constitutional: Negative for activity change, appetite change, diaphoresis, fatigue and fever  HENT: Negative for congestion, ear discharge, ear pain, facial swelling, hearing loss, mouth sores, nosebleeds, postnasal drip, rhinorrhea, sinus pressure, sinus pain, sneezing and sore throat  Eyes: Negative for photophobia, pain and redness  Respiratory: Positive for shortness of breath (on exertion)  Negative for cough, chest tightness and wheezing  Cardiovascular: Negative for chest pain and palpitations  Gastrointestinal: Negative for abdominal distention, abdominal pain, blood in stool, constipation, diarrhea, nausea and vomiting  Genitourinary: Negative for dysuria, frequency, hematuria and urgency  Musculoskeletal: Negative for back pain and neck pain  Skin: Negative for wound  Neurological: Positive for syncope  Negative for dizziness, seizures, weakness, numbness and headaches  12-point, complete review of systems was reviewed and negative except as stated above  Historical Information     Past Medical History:   Diagnosis Date    A[facility]fib ([facility] [address] )     Diastolic heart failure (HCC)     HTN (hypertension)      Past Surgical History:   Procedure Laterality Date    EXPLORATORY LAPAROTOMY      with gastric ulcer repair        Social History     Tobacco Use    Smoking status: Not on file    Smokeless tobacco: Not on file   Substance Use Topics    Alcohol use: Not on file    Drug use: Not on file     There is no immunization history for the selected administration types on file for this patient  Last Tetanus: unknown  Family History: Non-contributory    1  Before the illness or injury that brought you to the Emergency, did you need someone to help you on a regular basis? 0=No   2   Since the illness or injury that brought you to the Emergency, have you needed more help than usual to take care of yourself? 0=No   3  Have you been hospitalized for one or more nights during the past 6 months (excluding a stay in the Emergency Department)? 0=No   4  In general, do you see well? 0=Yes   5  In general, do you have serious problems with your memory? 0=No   6  Do you take more than three different medications everyday? 1=Yes   TOTAL   1     Did you order a geriatric consult if the score was 2 or greater?: n/a     Meds/Allergies   all current active meds have been reviewed  Allergies have not been reviewed; Not on File    Objective   Initial Vitals:   Temperature: (!) 97 2 °F (36 2 °C) (02/11/22 0007)  Pulse: 75 (02/11/22 0007)  Respirations: 20 (02/11/22 0007)  Blood Pressure: 135/66 (02/11/22 0007)    Primary Survey:   Airway:        Status: patent;        Pre-hospital Interventions: none        Hospital Interventions: none  Breathing:        Pre-hospital Interventions: none       Effort: normal       Right breath sounds: normal       Left breath sounds: normal  Circulation:        Rhythm: regular       Rate: regular   Right Pulses Left Pulses    R radial: 2+  R femoral: 2+  R pedal: 2+  R carotid: 2+  R popliteal: 2+ L radial: 2+  L femoral: 2+  L pedal: 2+  L carotid: 2+  L popliteal: 2+   Disability:        GCS: Eye: 4; Verbal: 5 Motor: 6 Total: 15       Right Pupil: round;  reactive         Left Pupil:  round;  reactive      R Motor Strength L Motor Strength    R : 5/5  R dorsiflex: 5/5  R plantarflex: 5/5 L : 5/5  L dorsiflex: 5/5  L plantarflex: 5/5        Sensory:  No sensory deficit  Exposure:       Completed: Yes      Secondary Survey:  Physical Exam  Vitals and nursing note reviewed  Constitutional:       General: He is not in acute distress  Appearance: Normal appearance  He is obese  He is not ill-appearing or toxic-appearing  HENT:      Head: Normocephalic          Right Ear: Tympanic membrane normal       Left Ear: Tympanic membrane normal  Nose: Nose normal  No congestion or rhinorrhea  Mouth/Throat:      Mouth: Mucous membranes are moist       Pharynx: Oropharynx is clear  No oropharyngeal exudate  Eyes:      Extraocular Movements: Extraocular movements intact  Conjunctiva/sclera:      Right eye: Right conjunctiva is injected  Left eye: Left conjunctiva is injected  Pupils: Pupils are equal, round, and reactive to light  Cardiovascular:      Rate and Rhythm: Normal rate and regular rhythm  Heart sounds: No murmur heard  No friction rub  No gallop  Pulmonary:      Effort: Pulmonary effort is normal       Breath sounds: No wheezing, rhonchi or rales  Abdominal:      General: Abdomen is flat  There is no distension  Palpations: Abdomen is soft  Tenderness: There is no abdominal tenderness  There is no guarding or rebound  Musculoskeletal:      Right shoulder: Normal       Left shoulder: Normal       Right upper arm: Normal       Left upper arm: Normal       Right elbow: Normal       Left elbow: Normal       Right forearm: Normal       Left forearm: Normal       Right wrist: Normal       Left wrist: Normal       Right hand: Normal       Left hand: Normal       Cervical back: No deformity or tenderness  Thoracic back: No deformity or tenderness  Lumbar back: Normal  No swelling, deformity or tenderness  Right hip: Normal       Left hip: Normal       Right upper leg: Normal       Left upper leg: Normal       Right knee: Normal       Left knee: Normal       Right lower leg: Normal       Left lower leg: Normal       Right ankle: Normal       Left ankle: Normal       Right foot: Normal       Left foot: Normal    Skin:     General: Skin is warm and dry  Capillary Refill: Capillary refill takes less than 2 seconds  Comments: 1cm left lower eyebrow laceration without active bleeding   Neurological:      General: No focal deficit present        Mental Status: He is alert and oriented to person, place, and time  Sensory: No sensory deficit  Motor: No weakness           Invasive Devices  Report    Peripheral Intravenous Line            Peripheral IV 02/11/22 Left Antecubital <1 day              Lab Results: BMP/CMP:   Lab Results   Component Value Date    SODIUM 134 (L) 02/11/2022    K 3 9 02/11/2022    CL 98 (L) 02/11/2022    CO2 26 02/11/2022    CO2 27 02/11/2022    BUN 43 (H) 02/11/2022    CREATININE 1 79 (H) 02/11/2022    GLUCOSE 165 (H) 02/11/2022    CALCIUM 8 6 02/11/2022    EGFR 34 02/11/2022   , CBC:   Lab Results   Component Value Date    WBC 6 62 02/11/2022    HGB 5 9 (LL) 02/11/2022    HCT 20 6 (L) 02/11/2022    MCV 60 (L) 02/11/2022     (H) 02/11/2022    MCH 17 2 (L) 02/11/2022    MCHC 28 6 (L) 02/11/2022    RDW 19 9 (H) 02/11/2022    MPV 10 7 02/11/2022   , Coagulation: No results found for: PT, INR, APTT, Lactate: No results found for: LACTATE and Troponin: No results found for: TROPONINI    Imaging Results: I have personally reviewed pertinent films in PACS  Chest Xray(s): negative for acute traumatic findings   FAST exam(s): negative for acute traumatic findings   CT Scan(s): negative for acute traumatic findings   Additional Xray(s): N/A     Other Studies: none    Code Status: No Order

## 2022-02-11 NOTE — PROCEDURES
POC FAST US    Date/Time: 2/11/2022 12:41 AM  Performed by: Mago Chou PA-C  Authorized by: Mago Chou PA-C     Patient location:  Trauma  Procedure details:     Exam Type:  Diagnostic    Indications: blunt abdominal trauma and blunt chest trauma      Assess for:  Intra-abdominal fluid and pericardial effusion    Technique: FAST      Views obtained:  Heart - Pericardial sac, LUQ - Splenorenal space, Suprapubic - Pouch of Genaro and RUQ - Fontana's Pouch    Image quality: diagnostic      Image availability:  Images available in PACS and video obtained  FAST Findings:     RUQ (Hepatorenal) free fluid: absent      LUQ (Splenorenal) free fluid: absent      Suprapubic free fluid: absent      Cardiac wall motion: identified      Pericardial effusion: absent    Interpretation:     Impressions: negative

## 2022-02-11 NOTE — CONSULTS
Consultation - Neuropsychology/Psychology Department  Barron Sacks 82 y o  male MRN: [id number]  Unit/Bed#: [facility] Walker Encounter: [id number]        Reason for Consultation:  Barron Sacks is a 82y o  year old male who was referred for a Neuropsychological Exam to assess cognitive functioning and comment on capacity to make informed medical decisions  History of Present Illness  Fall at home;     Physician Requesting Consult: Artie Olivier MD    PROBLEM LIST:  Patient Active Problem List   Diagnosis    Syncope and collapse    Chronic diastolic heart failure (HCC)    Atrial fibrillation (HCC)    Laceration of left eyebrow    Anemia         Historical Information   Past Medical History:   Diagnosis Date    [facility] [address] )     Di[facility] heart failure ([facility] )     HTN (hypertension)      Past Surgical History:   Procedure Laterality Date    EXPLORATORY LAPAROTOMY      with gastric ulcer repair     Social History   Social History     Substance and Sexual Activity   Alcohol Use None     Social History     Substance and Sexual Activity   Drug Use Not on file     Social History     Tobacco Use   Smoking Status Not on file   Smokeless Tobacco Not on file     Family History: No family history on file      Meds/Allergies   current meds:   Current Facility-Administered Medications   Medication Dose Route Frequency    amiodarone tablet 100 mg  100 mg Oral Daily With Breakfast    [START ON 2/12/2022] amLODIPine (NORVASC) tablet 10 mg  10 mg Oral Daily    doxazosin (CARDURA) tablet 8 mg  8 mg Oral HS    [START ON 2/12/2022] iron sucrose (VENOFER) 300 mg in sodium chloride 0 9 % 250 mL IVPB  300 mg Intravenous Daily    metoprolol tartrate (LOPRESSOR) tablet 25 mg  25 mg Oral Q12H [facility]    pantoprazole (PROTONIX) injection 40 mg  40 mg Intravenous Q12H [facility]    pravastatin (PRAVACHOL) tablet 40 mg  40 mg Oral Daily With Dinner       No Known Allergies      Family and Social Support:   No data recorded    Behavioral Observations: Alert, UNABLE to accurately state the city and name of hospital; affect appeared pleasant and patient deneid depressed mood and anxiety;     Cognitive Examination    General Cognitive Functioning MMSE = Impaired 19/28; Attention/Concentration Auditory Selective Attention = Impaired; Auditory Vigilance = Within Normal Limits; Information Processing Speed = Within Normal Limits    Frontal Systems/Executive Functioning Mental Flexibility/Cognitive Control = Impaired; Working Memory = Impaired Abstract Reasoning = Impaired; Generative Ability = Impaired, Commonsense Reasoning and Judgement = Impaired    Language Functioning Confrontation naming = Within Normal Limits, Phonemic Fluency = Impaired; Semantic Retrieval = Impaired; Comprehension of Complex Ideational Material = Impaired;  Praxis = Within Normal Limits; Repetition = Within Normal Limits; Basic Reading = Within Normal Limits; Following Commands = Within Normal Limits    Memory Functioning Three word recall = Impaired    Visuo-Spatial Abilities Not Assessed    Functional Knowledge  Health & Safety Knowledge = Impaired;     Summary/Impression:  Results of Neuropsychological exam revealed diffuse cognitive dysfunction and on a measure assessing awareness of personal health status and ability to evaluate health problems, handle medical emergencies and take safety precautions, patient performed in the IMPAIRED range of functioning  At this time, patient does not appear to have capacity to make informed medical decisions  Unspecified Neurocognitive Disorder

## 2022-02-11 NOTE — QUICK NOTE
Cervical Collar Clearance: The patient had a CT scan of the cervical spine demonstrating no acute injury  On exam, the patient had no midline point tenderness or paresthesias/numbness/weakness in the extremities  The patient had full range of motion (was then able to flex, extend, and rotate head laterally) without pain  There were no distracting injuries and the patient was not intoxicated  The patient's cervical spine was cleared radiologically and clinically  Cervical collar removed at this time       Berny Rivas PA-C  2/11/2022 1:31 AM

## 2022-02-11 NOTE — ASSESSMENT & PLAN NOTE
Patient has a known history of AFib on Eliquis 5 b i d   At home according to friend not yet verified by any written records    Heparin for thromboprophylaxis currently  Status post cardioversion March of 2021   EKG demonstrated junctional rhythm without tachycardia    Will continue home medications

## 2022-02-11 NOTE — ASSESSMENT & PLAN NOTE
- patient reports some dizziness prior to fall and then has no memory of event  - Recent malaise with POLK can only walk to bathroom prior to getting short of breath  - suspect secondary to anemia with hgb 5 9   - no acute traumatic injuries found on exam  - Admit to medical service for anemia/syncope workup

## 2022-02-11 NOTE — ASSESSMENT & PLAN NOTE
Recent Labs     02/11/22  2232 02/12/22  0131 02/12/22  0434   HGB 6 9* 7 1* 7 3*     Patient was found to have a microcytic anemia on admission, without acute blood loss  Patient denied any active bleeding, but he had no bowel movement in the past to 3 days  Iron panel showed iron deficiency anemia  Patient is status post 2 units PRBC  Per neuro cognitive assessment patient does not appear to have capacity to make medical decisions  Spoke with his POA, his wife at bedside, explained to her that the patient needs colonoscopy/EGD to rule out active bleeding otherwise the patient will not be able to take Eliquis  Told her that without Eliquis he is at high risk of stroke  Wife understood as well she explained to her  and they both agreed with colonoscopy/EGD if needed         Plan:  Continue iron infusions  Monitor hemoglobin and transfuse if less than 7  Will place GI consult for colonoscopy/EGD

## 2022-02-11 NOTE — ASSESSMENT & PLAN NOTE
Small laceration left eyebrow repaired bedside without incident  Local wound care and repeat observations

## 2022-02-11 NOTE — ASSESSMENT & PLAN NOTE
- Afib on eliquis  - Previous cardioversion into sinus rhythm in 3/21  - EKG with junctional rhythm without tachycardia  - continue home medications

## 2022-02-11 NOTE — ASSESSMENT & PLAN NOTE
Wt Readings from Last 3 Encounters:   02/11/22 91 1 kg (200 lb 13 4 oz)     Patient follows with Cardiology as outpatient([address] [facility])  Last echo 2020 showed LVEF 50%, severe concentric hypertrophy, aortic sclerosis without stenosis, moderate mitral regurgitation  Per notes, patient was on torsemide 20 mg b i d  as home medication  Patient's torsemide currently on hold due to ANDREIA and suspicion for prerenal   Intake 1 3 L output 1 3 L yesterday  Plan:  Continue metoprolol 25 mg b i d  Continue to hold torsemide  Reassess tomorrow    Strict intake output  Monitor weight

## 2022-02-11 NOTE — ASSESSMENT & PLAN NOTE
Patient reports he fell around midnight, but has no memory about the event      He does report some POLK with limited ambulation at home more recently   Hemoglobin 5 9 with microcytic hypochromic anemia which could be likely etiology  Imaging negative for acute intracranial process or C-spine fracture    Continue work up as appropriate throughout admission

## 2022-02-11 NOTE — ASSESSMENT & PLAN NOTE
- Microcytic anemia without acute blood loss  - Patient denies hematuria, hematochezia, or bloody bowel movements  - Admitted to medical service for anemia workup and monitoring  - patient is a [employer] and does not accept blood products

## 2022-02-11 NOTE — QUICK NOTE
Updated Omaira Vásquez regarding patient information, she is POA of patient, all question answered, she reported that Mr Edgar Morton has good appetite but poor oral intake and had recent increase of torsemide, she did not notice any blood in stool/ tarry stool, abdominal pain, or nausea and vomiting of any coffee ground emesis   She is aware that patient is refusing certain test and consults

## 2022-02-11 NOTE — ASSESSMENT & PLAN NOTE
- patient reports some dizziness prior to fall and then has no memory of event  - Recent malaise with POLK can only walk to bathroom prior to getting short of breath  - suspect secondary to anemia with hgb 5 9   - no acute traumatic injuries found on exam, no additional injuries found on tertiary exam  -Trauma will sign off please reconsult as needed

## 2022-02-11 NOTE — ED NOTES
Spoke with spouse at this time     Radha Ramachandran [facility]  02/11/22 4219

## 2022-02-12 PROBLEM — E11.9 TYPE 2 DIABETES MELLITUS, WITHOUT LONG-TERM CURRENT USE OF INSULIN (HCC): Status: ACTIVE | Noted: 2022-02-12

## 2022-02-12 PROBLEM — N18.9 CHRONIC KIDNEY DISEASE: Status: ACTIVE | Noted: 2022-02-12

## 2022-02-12 LAB
ABO GROUP BLD BPU: NORMAL
ABO GROUP BLD BPU: NORMAL
ANION GAP SERPL CALCULATED.3IONS-SCNC: 10 MMOL/L (ref 4–13)
BPU ID: NORMAL
BPU ID: NORMAL
BUN SERPL-MCNC: 29 MG/DL (ref 5–25)
CALCIUM SERPL-MCNC: 8.5 MG/DL (ref 8.3–10.1)
CHLORIDE SERPL-SCNC: 99 MMOL/L (ref 100–108)
CO2 SERPL-SCNC: 24 MMOL/L (ref 21–32)
CREAT SERPL-MCNC: 1.43 MG/DL (ref 0.6–1.3)
CROSSMATCH: NORMAL
CROSSMATCH: NORMAL
ERYTHROCYTE [DISTWIDTH] IN BLOOD BY AUTOMATED COUNT: 23.6 % (ref 11.6–15.1)
GFR SERPL CREATININE-BSD FRML MDRD: 44 ML/MIN/1.73SQ M
GLUCOSE SERPL-MCNC: 161 MG/DL (ref 65–140)
HCT VFR BLD AUTO: 23.9 % (ref 36.5–49.3)
HCT VFR BLD AUTO: 25.1 % (ref 36.5–49.3)
HGB BLD-MCNC: 7.1 G/DL (ref 12–17)
HGB BLD-MCNC: 7.3 G/DL (ref 12–17)
MCH RBC QN AUTO: 18.7 PG (ref 26.8–34.3)
MCHC RBC AUTO-ENTMCNC: 29.1 G/DL (ref 31.4–37.4)
MCV RBC AUTO: 64 FL (ref 82–98)
PLATELET # BLD AUTO: 383 THOUSANDS/UL (ref 149–390)
PMV BLD AUTO: 10.8 FL (ref 8.9–12.7)
POTASSIUM SERPL-SCNC: 3.8 MMOL/L (ref 3.5–5.3)
RBC # BLD AUTO: 3.9 MILLION/UL (ref 3.88–5.62)
SODIUM SERPL-SCNC: 133 MMOL/L (ref 136–145)
UNIT DISPENSE STATUS: NORMAL
UNIT DISPENSE STATUS: NORMAL
UNIT PRODUCT CODE: NORMAL
UNIT PRODUCT CODE: NORMAL
UNIT PRODUCT VOLUME: 350 ML
UNIT PRODUCT VOLUME: 350 ML
UNIT RH: NORMAL
UNIT RH: NORMAL
WBC # BLD AUTO: 7.38 THOUSAND/UL (ref 4.31–10.16)

## 2022-02-12 PROCEDURE — 85014 HEMATOCRIT: CPT | Performed by: INTERNAL MEDICINE

## 2022-02-12 PROCEDURE — 99222 1ST HOSP IP/OBS MODERATE 55: CPT | Performed by: NURSE PRACTITIONER

## 2022-02-12 PROCEDURE — 99232 SBSQ HOSP IP/OBS MODERATE 35: CPT | Performed by: INTERNAL MEDICINE

## 2022-02-12 PROCEDURE — 85018 HEMOGLOBIN: CPT | Performed by: INTERNAL MEDICINE

## 2022-02-12 PROCEDURE — 85027 COMPLETE CBC AUTOMATED: CPT | Performed by: INTERNAL MEDICINE

## 2022-02-12 PROCEDURE — C9113 INJ PANTOPRAZOLE SODIUM, VIA: HCPCS | Performed by: INTERNAL MEDICINE

## 2022-02-12 PROCEDURE — 80048 BASIC METABOLIC PNL TOTAL CA: CPT | Performed by: INTERNAL MEDICINE

## 2022-02-12 RX ORDER — DOCUSATE SODIUM 100 MG/1
100 CAPSULE, LIQUID FILLED ORAL 2 TIMES DAILY
Status: DISCONTINUED | OUTPATIENT
Start: 2022-02-12 | End: 2022-02-17

## 2022-02-12 RX ORDER — POLYETHYLENE GLYCOL 3350 17 G/17G
17 POWDER, FOR SOLUTION ORAL DAILY
Status: DISCONTINUED | OUTPATIENT
Start: 2022-02-12 | End: 2022-02-17

## 2022-02-12 RX ADMIN — DOCUSATE SODIUM 100 MG: 100 CAPSULE ORAL at 10:40

## 2022-02-12 RX ADMIN — IRON SUCROSE 300 MG: 20 INJECTION, SOLUTION INTRAVENOUS at 09:13

## 2022-02-12 RX ADMIN — PANTOPRAZOLE SODIUM 40 MG: 40 INJECTION, POWDER, FOR SOLUTION INTRAVENOUS at 09:13

## 2022-02-12 RX ADMIN — PRAVASTATIN SODIUM 40 MG: 40 TABLET ORAL at 15:53

## 2022-02-12 RX ADMIN — AMIODARONE HYDROCHLORIDE 100 MG: 200 TABLET ORAL at 09:10

## 2022-02-12 RX ADMIN — AMLODIPINE BESYLATE 10 MG: 10 TABLET ORAL at 09:12

## 2022-02-12 RX ADMIN — POLYETHYLENE GLYCOL 3350 17 G: 17 POWDER, FOR SOLUTION ORAL at 10:40

## 2022-02-12 RX ADMIN — DOCUSATE SODIUM 100 MG: 100 CAPSULE ORAL at 15:53

## 2022-02-12 RX ADMIN — METOPROLOL TARTRATE 25 MG: 25 TABLET, FILM COATED ORAL at 09:12

## 2022-02-12 NOTE — ASSESSMENT & PLAN NOTE
Lab Results   Component Value Date    HGBA1C 8 1 (H) 02/11/2022       No results for input(s): POCGLU in the last 72 hours  Blood Sugar Average: Last 72 hrs:   not on any medication  Blood sugar in the hospital acceptable  Will monitor  Is to follow-up with PCP  Hemoglobin A1c is acceptable for his age

## 2022-02-12 NOTE — UTILIZATION REVIEW
Initial Clinical Review  OBSERVATION  2/11/22 @0221 CONVERTED TO INPATIENT ADMISSION 2/11/22 @1332 DUE TO CONTINUED STAY REQUIRED TO EVALUATE AND TREAT PATIENT WITH syncope and collapse, anemia with lab monitoring,further workup,  POSSIBLE GI CONSULT  Admission: Date/Time/Statement:   Admission Orders (From admission, onward)     Ordered        02/11/22 1332  Inpatient Admission  Once            02/11/22 0221  Place in Observation  Once                      Orders Placed This Encounter   Procedures    Inpatient Admission     Standing Status:   Standing     Number of Occurrences:   1     Order Specific Question:   Level of Care     Answer:   Med Surg [16]     Comments:   tele     Order Specific Question:   Estimated length of stay     Answer:   More than 2 Midnights     Order Specific Question:   Certification     Answer:   I certify that inpatient services are medically necessary for this patient for a duration of greater than two midnights  See H&P and MD Progress Notes for additional information about the patient's course of treatment  ED Arrival Information     Expected Arrival Acuity    - 2/11/2022 00:05 Emergent         Means of arrival Escorted by Service Admission type    Ambulance [facility] Emergency         Arrival complaint            Chief Complaint   Patient presents with    Fall     fall, + headstrike, + thinner (eliquis), lac        Initial Presentation: 82 y o  male with a PMH  significant for AFib on Eliquis, HTN, diastolic HF who presents as trauma B to ED from home via EMS after became dizzy , fell with head strike, no memory of event  On exam, pt alert, oriented, has minor laceration above L eye closed in ED with glue, GCS =15, no active bleeding Pt reports increased POLK with ADL's lately  CT head and c spine show no acute trauma  Labs - Hgb 5 9  Pt given IVF and IV Venofer as refusing blood transfusion and transfusion workup   Pt admitted as OBS to [facility] with syncope and collapse, microcytic anemia  Plan - continue IVF, f/u ferritin levels, IVF, , FOBT  Pt converted to IP 2/11 afternoon, continues with dizziness  Plan- medsurg telemetry  IV venofer x 3 days, hold AC/AP for now, offered GI consult but pt refusing at this time  Neuropsych-diffuse cognitive dysfunction and on a measure assessing awareness of personal health status and ability to evaluate health problems, handle medical emergencies and take safety precautions, patient performed in the IMPAIRED range of functioning  At this time, patient does not appear to have capacity to make informed medical decision  Date: 2/12   Day 2:   Pt  transfused 2 U PRBC 2/11   Hgb 7 3 this am Iron panel showed iron deficiency anemia  No BM x 3 days, unable to obtain FOBT  Pt needs colonoscopy/EGD to rule out active bleeding with Eliquis on hold    Pt and wife both agreed with colonoscopy/EGD if needed  GI consult placed  Continue iron infusions, monitoring H/H, transfuse hgb<7  Orthostatics neg   Torsemide on hold with elevated creat 1 79 on admission  Creat down to 1 43 today from baseline 11 1-1 7, continue to monitor creat           ED Triage Vitals   Temperature Pulse Respirations Blood Pressure SpO2   02/11/22 0007 02/11/22 0007 02/11/22 0007 02/11/22 0007 02/11/22 0007   (!) 97 2 °F (36 2 °C) 75 20 135/66 98 %      Temp Source Heart Rate Source Patient Position - Orthostatic VS BP Location FiO2 (%)   02/11/22 0007 02/11/22 0007 02/11/22 0007 02/11/22 0100 --   Tympanic Monitor Lying Right arm       Pain Score       02/11/22 0030       No Pain          Wt Readings from Last 1 Encounters:   02/11/22 90 7 kg (200 lb)     Additional Vital Signs:   Date/Time Temp Pulse Resp BP MAP (mmHg) SpO2   02/12/22 0706 98 3 °F (36 8 °C) 89 18 146/69 99 91 %   02/11/22 2325 97 8 °F (36 6 °C) 93 18 147/69 98 93 %   02/11/22 2315 98 6 °F (37 °C) 90 18 144/70 -- 93 %   02/11/22 2147 98 4 °F (36 9 °C) 90 18 138/60 -- 94 %   02/11/22 2108 98 4 °F (36 9 °C) 92 18 130/67 -- 92 %   02/11/22 2040 98 6 °F (37 °C) 86 18 136/60 -- 92 %   02/11/22 2035 98 5 °F (36 9 °C) 87 20 144/64 -- 91 %   02/11/22 1915 -- -- -- -- -- --   02/11/22 1908 98 4 °F (36 9 °C) 88 18 130/67 93 93 %   02/11/22 1842 -- 91 16 153/67 96 95 %   02/11/22 1705 98 4 °F (36 9 °C) 80 18 168/70 -- 98 %   02/11/22 1653 98 4 °F (36 9 °C) 75 18 137/65 -- --   02/11/22 1359 -- 92 18 154/70 -- --standing orthostatic VS   02/11/22 1357 -- 88 18 146/68 -- 98 % sitting orthostatic VS   02/11/22 1356 -- 91 18 152/70 -- 98 %lying orthostatic VS   02/11/22 1153 98 1 °F (36 7 °C) 90 18 148/68 -- 98 %   02/11/22 0856 97 9 °F (36 6 °C) 91 18 157/70 -- 97 %   02/11/22 0648 -- 73 18 145/66 -- 96 %   02/11/22 0546 -- 84 18 142/61 -- 97 %   02/11/22 0422 -- 83 18 129/63 -- 95 %   02/11/22 0245 -- 74 18 134/63 -- 95 %   02/11/22 0145 -- 79 18 120/60 -- 96 %   02/11/22 0130 -- 78 18 117/58 -- 95 %     Date and Time Eye Opening Best Verbal Response Best Motor Response [place] Coma Scale Score   02/12/22 0958 4 5 6 15   02/11/22 1915 4 5 6 15   02/11/22 1153 4 5 6 15   02/11/22 0915 4 5 6 15   02/11/22 0503 4 5 6 15   02/11/22 0245 4 5 6 15   02/11/22 0230 4 5 6 15   02/11/22 0215 4 5 6 15   02/11/22 0200 4 5 6 15   02/11/22 0145 4 5 6 15   02/11/22 0130 4 5 6 15   02/11/22 0115 4 5 6 15   02/11/22 0100 4 5 6 15   02/11/22 0045 4 5 6 15   02/11/22 0030 4 5 6 15   02/11/22 0015 4 5 6 15   02/11/22 0007 4 5 6 15     Pertinent Labs/Diagnostic Test Results:   2/11   XR mult trauma- CXR- No acute cardiopulmonary disease within limitations of supine imaging    Question 9 mm nodule adjacent to left heart border  Follow-up upright dual energy PA and lateral views when clinically feasible or CT chest advised for further evaluation  CT head-No acute intracranial abnormality  CT C spine-Images are motion affected    No displaced cervical spine fracture or gross traumatic malalignment  CT chest-1    Mildly prominent left lateral pericardial fat likely corresponds to suspected pulmonary nodule on recent plain film  No pulmonary nodule identified  2   Mild CHF with small bibasilar pleural effusions  3   Hepatomegaly  ECG-Atrial fibrillation  Left anterior fascicular block  Cannot rule out Anterior infarct , age undetermined        Results from last 7 days   Lab Units 02/12/22  0434 02/12/22  0131 02/11/22  2232 02/11/22  1922 02/11/22  1130 02/11/22  0310 02/11/22 0048 02/11/22 0048   WBC Thousand/uL 7 38  --   --   --   --   --   --  6 62   HEMOGLOBIN g/dL 7 3* 7 1* 6 9* 6 6* 5 4*  --    < > 5 9*   HEMATOCRIT % 25 1* 23 9* 22 8* 23 3* 19 3*  --    < > 20 6*   PLATELETS Thousands/uL 383  --   --   --   --  415*  --  450*    < > = values in this interval not displayed           Results from last 7 days   Lab Units 02/12/22  0434 02/11/22 0048 02/11/22  0014   SODIUM mmol/L 133* 134*  --    POTASSIUM mmol/L 3 8 3 9  --    CHLORIDE mmol/L 99* 98*  --    CO2 mmol/L 24 26  --    CO2, I-STAT mmol/L  --   --  27   ANION GAP mmol/L 10 10  --    BUN mg/dL 29* 43*  --    CREATININE mg/dL 1 43* 1 79*  --    EGFR ml/min/1 73sq m 44 34  --    CALCIUM mg/dL 8 5 8 6  --              Results from last 7 days   Lab Units 02/12/22 0434 02/11/22 0048   GLUCOSE RANDOM mg/dL 161* 163*         Results from last 7 days   Lab Units 02/11/22 0310   HEMOGLOBIN A1C % 8 1*   EAG mg/dl 186       Results from last 7 days   Lab Units 02/11/22  0014   PH, LIBRADO I-STAT  7 398   PCO2, LIBRADO ISTAT mm HG 41 0*   PO2, LIBRADO ISTAT mm HG 27 0*   HCO3, LIBRADO ISTAT mmol/L 25 3   I STAT BASE EXC mmol/L 0   I STAT O2 SAT % 50*         Results from last 7 days   Lab Units 02/11/22  0552 02/11/22 0310 02/11/22 0048   HS TNI 0HR ng/L  --   --  35   HS TNI 2HR ng/L  --  35  --    HSTNI D2 ng/L  --  0  --    HS TNI 4HR ng/L 33  --   --    HSTNI D4 ng/L -2  --   --                      Results from last 7 days   Lab Units 02/11/22  0048   LACTIC ACID mmol/L 1 2 Results from last 7 days   Lab Units 02/11/22  0048   FERRITIN ng/mL 5*         ED Treatment:   Medication Administration from 02/10/2022 2352 to 02/11/2022 1904       Date/Time Order Dose Route Action     02/11/2022 0159 tetanus-diphtheria-acellular pertussis (BOOSTRIX) IM injection 0 5 mL 0 5 mL Intramuscular Given     02/11/2022 0859 multi-electrolyte (PLASMALYTE-A/ISOLYTE-S PH 7 4) IV solution 0 mL/hr Intravenous Stopped     02/11/2022 0319 multi-electrolyte (PLASMALYTE-A/ISOLYTE-S PH 7 4) IV solution 75 mL/hr Intravenous New Bag     02/11/2022 0546 iron sucrose (VENOFER) 100 mg in sodium chloride 0 9 % 100 mL IVPB 0 mg Intravenous Stopped     02/11/2022 0438 iron sucrose (VENOFER) 100 mg in sodium chloride 0 9 % 100 mL IVPB 100 mg Intravenous New Bag     02/11/2022 0909 metoprolol tartrate (LOPRESSOR) tablet 25 mg 25 mg Oral Given     02/11/2022 0909 amiodarone tablet 100 mg 100 mg Oral Given     02/11/2022 1353 multi-electrolyte (PLASMALYTE-A/ISOLYTE-S PH 7 4) IV solution 0 mL/hr Intravenous Stopped     02/11/2022 0908 multi-electrolyte (PLASMALYTE-A/ISOLYTE-S PH 7 4) IV solution 75 mL/hr Intravenous New Bag     02/11/2022 1153 iron sucrose (VENOFER) 100 mg in sodium chloride 0 9 % 100 mL IVPB 0 mg Intravenous Stopped     02/11/2022 1130 iron sucrose (VENOFER) 100 mg in sodium chloride 0 9 % 100 mL IVPB 100 mg Intravenous New Bag     02/11/2022 1619 pantoprazole (PROTONIX) injection 40 mg 40 mg Intravenous Given        Past Medical History:   Diagnosis Date    A-fib ([facility] [address] )     Diastolic heart failure (HCC)     HTN (hypertension)      Present on Admission:  **None**      Admitting Diagnosis: Syncope and collapse [R55]  Head injury [S09 90XA]  Fall, initial encounter [W19  XXXA]  Anemia, unspecified type [D64 9]  Age/Sex: 82 y o  male  Admission Orders:  Scheduled Medications:  amiodarone, 100 mg, Oral, Daily With Breakfast  amLODIPine, 10 mg, Oral, Daily  docusate sodium, 100 mg, Oral, BID  doxazosin, 8 mg, Oral, HS  iron sucrose, 300 mg, Intravenous, Daily  metoprolol tartrate, 25 mg, Oral, Q12H CHUY  pantoprazole, 40 mg, Intravenous, Q12H CHUY  polyethylene glycol, 17 g, Oral, Daily  pravastatin, 40 mg, Oral, Daily With Dinner      Continuous IV Infusions:     PRN Meds:     SCD's   Up w/ assist   ortho BP x1  FOBT 1-3        IP CONSULT TO NEUROPSYCHOLOGY    Network Utilization Review Department  ATTENTION: Please call with any questions or concerns to [phone] and carefully listen to the prompts so that you are directed to the right person  All voicemails are confidential   [facility] all requests for admission clinical reviews, approved or denied determinations and any other requests to dedicated fax number below belonging to the campus where the patient is receiving treatment   List of dedicated fax numbers for the Facilities:  [address] DENIALS (Administrative/Medical Necessity) [phone]   [address] (Maternity/NICU/Pediatrics) [phone]   [address]  [address] [facility] [place] [address] [address] [place] [address] [place] [address] [facility] [phone]

## 2022-02-12 NOTE — CONSULTS
Consultation - [facility] Gastroenterology   Nela Castañeda 82 y o  male MRN: [id number]  Unit/Bed#: S -01 Encounter: [id number]        Inpatient consult to gastroenterology  Consult performed by: JESUS Cordoba  Consult ordered by: Griselda Ac MD          Reason for Consult / Principal Problem:     Iron deficiency anemia      ASSESSMENT AND PLAN:      1  Iron deficiency anemia in the setting of anticoagulation with Eliquis for history of atrial fibrillation, previous exploratory laparotomy with gastric ulcer repair in 1977, and colon polyps w/ post colonoscopy bleeding >10 years ago  Patient presented after falling in his bathroom and sustaining a minor laceration over his left eye, he was found to have a hemoglobin of 5 9 with unknown baseline  Pt originally from [place], doesn't follow regularly with a PCP per his wife  Troponin and orthostatics in the ED were negative, CT head, spine, chest unremarkable except for mild pulmonary edema/CHF  He has been hemodynamically stable and not requiring any oxygen  He was ordered 2 units of blood and started on Venofer infusions  Hgb stable at 7 1 this AM, per nursing had brown stool yesterday  Iron deficiency anemia may be secondary to occult GI bleeding from AVM, PUD, or lesion      -continue PPI b i d   -Eliquis has been on hold, last dose prior to admission  -monitor CBC  -transfuse blood products as needed  -obtain occult stool  -okay for diet today  Will place on clear liquid diet tomorrow  -Originally planned for EGD tomorrow, however pt now refusing EGD and only wants colonoscopy  Will readdress w/ pt tomorrow  Consent will have to be obtained by wife/TERA Louise at [phone] due to impaired medical decision making capabilities per neuropsych eval  However, wife does not want to go against 's wishes  Thank you for the consultation    Case will be discussed with   Rossy Angela    ______________________________________________________________________    HPI:  Warden Valentino is an 72-year-old male with history of AFib on Eliquis, hypertension, CHF, exploratory laparotomy with gastric ulcer repair, and colon polyps who presented to [address] [place] [address] on 2/11 due to fall after he became dizzy in his bathroom sustaining a minor laceration over his left eye  In the ED, he underwent trauma workup and was found to have significant anemia with Hgb 5 9 with unknown baseline and low iron levels  He was started on Venofer infusions and ordered 2 units of blood  GI workup discussed due to patient being on Eliquis, however he initially refused  Neuro psych consultation was obtained and patient was determined to not have capacity to make informed medical decisions  Patient's wife and POA agreeable to patient undergoing GI workup if needed  Patient reports history of gastric ulcer requiring laparoscopic surgery in 1977, denies any resection of his stomach  His last colonoscopy was greater than 10 years ago and he recalls having to go to the hospital afterwards due to post polypectomy bleeding  Since that time, he did not follow up for any repeated colonoscopies due to fear of bleeding  Reports rare NSAID use with Advil  Denies alcohol use or tobacco use  Denies any heartburn, nausea/vomiting, abdominal pain, unintended weight loss, diarrhea, or melena  He has occasional constipation requiring stool softener and intermittent rectal bleeding with wiping  Denies any family history of colon cancer  REVIEW OF SYSTEMS:    CONSTITUTIONAL: Denies any fever, chills, rigors, and weight loss  HEENT: No earache or tinnitus  Denies hearing loss or visual disturbances  CARDIOVASCULAR: No chest pain or palpitations  RESPIRATORY: Denies any cough, hemoptysis, shortness of breath or dyspnea on exertion  GASTROINTESTINAL: As noted in the History of Present Illness     GENITOURINARY: No problems with urination  Denies any hematuria or dysuria  NEUROLOGIC: No dizziness or vertigo, denies headaches  MUSCULOSKELETAL: Denies any muscle or joint pain  SKIN: Denies skin rashes or itching  ENDOCRINE: Denies excessive thirst  Denies intolerance to heat or cold  PSYCHOSOCIAL: Denies depression or anxiety  Denies any recent memory loss  Historical Information   Past Medical History:   Diagnosis Date    A-fib ([address] )     Diastolic heart failure (HCC)     HTN (hypertension)      Past Surgical History:   Procedure Laterality Date    EXPLORATORY LAPAROTOMY      with gastric ulcer repair     Social History   Social History     Substance and Sexual Activity   Alcohol Use None     Social History     Substance and Sexual Activity   Drug Use Not on file     Social History     Tobacco Use   Smoking Status Not on file   Smokeless Tobacco Not on file     No family history on file      Meds/Allergies     Medications Prior to Admission   Medication    amiodarone 100 mg tablet    amLODIPine (Norvasc) 10 mg tablet    apixaban (Eliquis) 5 mg    doxazosin (CARDURA) 8 MG tablet    losartan (COZAAR) 50 mg tablet    metoprolol tartrate (LOPRESSOR) 25 mg tablet    pravastatin (PRAVACHOL) 40 mg tablet    torsemide (DEMADEX) 20 mg tablet     Current Facility-Administered Medications   Medication Dose Route Frequency    amiodarone tablet 100 mg  100 mg Oral Daily With Breakfast    amLODIPine (NORVASC) tablet 10 mg  10 mg Oral Daily    docusate sodium (COLACE) capsule 100 mg  100 mg Oral BID    doxazosin (CARDURA) tablet 8 mg  8 mg Oral HS    iron sucrose (VENOFER) 300 mg in sodium chloride 0 9 % 250 mL IVPB  300 mg Intravenous Daily    metoprolol tartrate (LOPRESSOR) tablet 25 mg  25 mg Oral Q12H [facility]    pantoprazole (PROTONIX) injection 40 mg  40 mg Intravenous Q12H [facility]    polyethylene glycol (MIRALAX) packet 17 g  17 g Oral Daily    pravastatin (PRAVACHOL) tablet 40 mg  40 mg Oral Daily With oNoise No Known Allergies        Objective     Blood pressure 133/63, pulse 87, temperature 98 6 °F (37 °C), temperature source Oral, resp  rate 20, height 5' 6" (1 676 m), weight 90 7 kg (200 lb), SpO2 90 %  Body mass index is 32 28 kg/m²  Intake/Output Summary (Last 24 hours) at 2/12/2022 1600  Last data filed at 2/12/2022 1554  Gross per 24 hour   Intake 650 ml   Output 1675 ml   Net -1025 ml         PHYSICAL EXAM:      General Appearance:   Alert, cooperative, no distress   HEENT:   Normocephalic, atraumatic, anicteric  Neck:  Supple, symmetrical, trachea midline   Lungs:   Clear to auscultation bilaterally; no rales, rhonchi or wheezing; respirations unlabored    Heart[de-identified]   Regular rate and rhythm; no murmur, rub, or gallop  Abdomen:   Soft, non-tender, non-distended; normal bowel sounds; no masses, no organomegaly    Genitalia:   Deferred    Rectal:   Deferred    Extremities:  No cyanosis, clubbing or edema    Pulses:  2+ and symmetric all extremities    Skin:  No jaundice, rashes, or lesions    Lymph nodes:  No palpable cervical lymphadenopathy        Lab Results:   Admission on 02/11/2022   Component Date Value    Sodium 02/11/2022 134*    Potassium 02/11/2022 3 9     Chloride 02/11/2022 98*    CO2 02/11/2022 26     ANION GAP 02/11/2022 10     BUN 02/11/2022 43*    Creatinine 02/11/2022 1 79*    Glucose 02/11/2022 163*    Calcium 02/11/2022 8 6     eGFR 02/11/2022 34     WBC 02/11/2022 6 62     RBC 02/11/2022 3 43*    Hemoglobin 02/11/2022 5 9*    Hematocrit 02/11/2022 20 6*    MCV 02/11/2022 60*    MCH 02/11/2022 17 2*    MCHC 02/11/2022 28 6*    RDW 02/11/2022 19 9*    Platelets 80/61/7092 450*    MPV 02/11/2022 10 7     LACTIC ACID 02/11/2022 1 2     hs TnI 0hr 02/11/2022 35     ph, Nader ISTAT 02/11/2022 7  398     pCO2, Nader i-STAT 02/11/2022 41 0*    pO2, Nader i-STAT 02/11/2022 27 0*    BE, i-STAT 02/11/2022 0     HCO3, Nader i-STAT 02/11/2022 25 3     CO2, i-STAT 02/11/2022 27     O2 Sat, i-STAT 02/11/2022 50*    SODIUM, I-STAT 02/11/2022 135*    Potassium, i-STAT 02/11/2022 4 0     Hct, i-STAT 02/11/2022 22*    Hgb, i-STAT 02/11/2022 7 5*    Glucose, i-STAT 02/11/2022 165*    Specimen Type 02/11/2022 VENOUS     hs TnI 2hr 02/11/2022 35     Delta 2hr hsTnI 02/11/2022 0     hs TnI 4hr 02/11/2022 33     Delta 4hr hsTnI 02/11/2022 -2     Platelets 88/97/6648 415*    MPV 02/11/2022 10 0     Hemoglobin A1C 02/11/2022 8 1*    EAG 02/11/2022 186     Iron Saturation 02/11/2022 2*    TIBC 02/11/2022 581*    Iron 02/11/2022 11*    Ferritin 02/11/2022 5*    Hemoglobin 02/11/2022 5 4*    Hematocrit 02/11/2022 19 3*    ABO Grouping 02/11/2022 O     Rh Factor 02/11/2022 Positive     Antibody Screen 02/11/2022 Negative     Specimen Expiration Date 02/11/2022 52590803     Unit Product Code 02/12/2022 [id number]     Unit Number 02/12/2022 [id number]     Unit ABO 02/12/2022 O     Unit RH 02/12/2022 POS     Crossmatch 02/12/2022 Compatible     Unit Dispense Status 02/12/2022 Presumed Trans     Unit Product Volume 02/12/2022 350     ABO Grouping 02/11/2022 O     Rh Factor 02/11/2022 Positive     Ventricular Rate 02/11/2022 72     Atrial Rate 02/11/2022 72     QRSD Interval 02/11/2022 102     QT Interval 02/11/2022 440     QTC Interval 02/11/2022 481     QRS Axis 02/11/2022 -71     T Wave Axis 02/11/2022 31     Hemoglobin 02/11/2022 6 6*    Hematocrit 02/11/2022 23 3*    Unit Product Code 02/12/2022 [id number]     Unit Number 02/12/2022 [id number]     Unit ABO 02/12/2022 O     Unit RH 02/12/2022 POS     Crossmatch 02/12/2022 Compatible     Unit Dispense Status 02/12/2022 Presumed Trans     Unit Product Volume 02/12/2022 350     Hemoglobin 02/11/2022 6 9*    Hematocrit 02/11/2022 22 8*    Hemoglobin 02/12/2022 7 1*    Hematocrit 02/12/2022 23 9*    Sodium 02/12/2022 133*    Potassium 02/12/2022 3 8     Chloride 02/12/2022 99*    CO2 02/12/2022 24     ANION GAP 02/12/2022 10     BUN 02/12/2022 29*    Creatinine 02/12/2022 1 43*    Glucose 02/12/2022 161*    Calcium 02/12/2022 8 5     eGFR 02/12/2022 44     WBC 02/12/2022 7 38     RBC 02/12/2022 3 90     Hemoglobin 02/12/2022 7 3*    Hematocrit 02/12/2022 25 1*    MCV 02/12/2022 64*    MCH 02/12/2022 18 7*    MCHC 02/12/2022 29 1*    RDW 02/12/2022 23 6*    Platelets 75/13/4870 383     MPV 02/12/2022 10 8        Imaging Studies: I have personally reviewed pertinent imaging studies

## 2022-02-12 NOTE — ASSESSMENT & PLAN NOTE
Recent Labs     02/11/22  0048 02/12/22  0434   CREATININE 1 79* 1 43*   EGFR 34 44     Estimated Creatinine Clearance: 40 6 mL/min (A) (by C-G formula based on SCr of 1 43 mg/dL (H))       Baseline creatinine appears to be between 1 1 and 1 7  Avoid nephrotoxins, hypotension  Monitor

## 2022-02-12 NOTE — PLAN OF CARE
Problem: MOBILITY - ADULT  Goal: Maintain or return to baseline ADL function  Description: INTERVENTIONS:  -  Assess patient's ability to carry out ADLs; assess patient's baseline for ADL function and identify physical deficits which impact ability to perform ADLs (bathing, care of mouth/teeth, toileting, grooming, dressing, etc )  - Assess/evaluate cause of self-care deficits   - Assess range of motion  - Assess patient's mobility; develop plan if impaired  - Assess patient's need for assistive devices and provide as appropriate  - Encourage maximum independence but intervene and supervise when necessary  - Involve family in performance of ADLs  - Assess for home care needs following discharge   - Consider OT consult to assist with ADL evaluation and planning for discharge  - Provide patient education as appropriate  Outcome: Progressing  Goal: Maintains/Returns to pre admission functional level  Description: INTERVENTIONS:  - Perform BMAT or MOVE assessment daily    - Set and communicate daily mobility goal to care team and patient/family/caregiver  - Collaborate with rehabilitation services on mobility goals if consulted  - Perform Range of Motion 3 times a day  - Reposition patient every 2 hours    - Dangle patient 3 times a day  - Stand patient 3 times a day  - Ambulate patient 3 times a day  - Out of bed to chair 3 times a day   - Out of bed for meals 3 times a day  - Out of bed for toileting  - Record patient progress and toleration of activity level   Outcome: Progressing     Problem: Potential for Falls  Goal: Patient will remain free of falls  Description: INTERVENTIONS:  - Educate patient/family on patient safety including physical limitations  - Instruct patient to call for assistance with activity   - Consult OT/PT to assist with strengthening/mobility   - Keep Call bell within reach  - Keep bed low and locked with side rails adjusted as appropriate  - Keep care items and personal belongings within reach  - Initiate and maintain comfort rounds  - Make Fall Risk Sign visible to staff  - Offer Toileting every 2 Hours, in advance of need  - Initiate/Maintain bed  alarm  - Obtain necessary fall risk management equipment:   - Apply yellow socks and bracelet for high fall risk patients  - Consider moving patient to room near nurses station  Outcome: Progressing     Problem: Prexisting or High Potential for Compromised Skin Integrity  Goal: Skin integrity is maintained or improved  Description: INTERVENTIONS:  - Identify patients at risk for skin breakdown  - Assess and monitor skin integrity  - Assess and monitor nutrition and hydration status  - Monitor labs   - Assess for incontinence   - Turn and reposition patient  - Assist with mobility/ambulation  - Relieve pressure over bony prominences  - Avoid friction and shearing  - Provide appropriate hygiene as needed including keeping skin clean and dry  - Evaluate need for skin moisturizer/barrier cream  - Collaborate with interdisciplinary team   - Patient/family teaching  - Consider wound care consult   Outcome: Progressing     Problem: HEMATOLOGIC - ADULT  Goal: Maintains hematologic stability  Description: INTERVENTIONS  - Assess for signs and symptoms of bleeding or hemorrhage  - Monitor labs  - Administer supportive blood products/factors as ordered and appropriate  Outcome: Progressing     Problem: PAIN - ADULT  Goal: Verbalizes/displays adequate comfort level or baseline comfort level  Description: Interventions:  - Encourage patient to monitor pain and request assistance  - Assess pain using appropriate pain scale  - Administer analgesics based on type and severity of pain and evaluate response  - Implement non-pharmacological measures as appropriate and evaluate response  - Consider cultural and social influences on pain and pain management  - Notify physician/advanced practitioner if interventions unsuccessful or patient reports new pain  Outcome: Progressing     Problem: INFECTION - ADULT  Goal: Absence or prevention of progression during hospitalization  Description: INTERVENTIONS:  - Assess and monitor for signs and symptoms of infection  - Monitor lab/diagnostic results  - Monitor all insertion sites, i e  indwelling lines, tubes, and drains  - Monitor endotracheal if appropriate and nasal secretions for changes in amount and color  - [place] appropriate cooling/warming therapies per order  - Administer medications as ordered  - Instruct and encourage patient and family to use good hand hygiene technique  - Identify and instruct in appropriate isolation precautions for identified infection/condition  Outcome: Progressing     Problem: SAFETY ADULT  Goal: Patient will remain free of falls  Description: INTERVENTIONS:  - Educate patient/family on patient safety including physical limitations  - Instruct patient to call for assistance with activity   - Consult OT/PT to assist with strengthening/mobility   - Keep Call bell within reach  - Keep bed low and locked with side rails adjusted as appropriate  - Keep care items and personal belongings within reach  - Initiate and maintain comfort rounds  - Make Fall Risk Sign visible to staff  - Offer Toileting every 2 Hours, in advance of need  - Initiate/Maintain bed alarm  - Obtain necessary fall risk management equipment:   - Apply yellow socks and bracelet for high fall risk patients  - Consider moving patient to room near nurses station  Outcome: Progressing     Problem: DISCHARGE PLANNING  Goal: Discharge to home or other facility with appropriate resources  Description: INTERVENTIONS:  - Identify barriers to discharge w/patient and caregiver  - Arrange for needed discharge resources and transportation as appropriate  - Identify discharge learning needs (meds, wound care, etc )  - Arrange for interpretive services to assist at discharge as needed  - Refer to Case Management Department for coordinating discharge planning if the patient needs post-hospital services based on physician/advanced practitioner order or complex needs related to functional status, cognitive ability, or social support system  Outcome: Progressing

## 2022-02-12 NOTE — PROGRESS NOTES
[facility]  Progress Note Shelley Fernandez 1935, 82 y o  male MRN: [id number]  Unit/Bed#: S -01 Encounter: [id number]  Primary Care Provider: No primary care provider on file  Date and time admitted to hospital: 2/11/2022 12:05 AM    Anemia  Assessment & Plan  Recent Labs     02/11/22  2232 02/12/22  0131 02/12/22  0434   HGB 6 9* 7 1* 7 3*     Patient was found to have a microcytic anemia on admission, without acute blood loss  Patient denied any active bleeding, but he had no bowel movement in the past to 3 days  Iron panel showed iron deficiency anemia  Patient is status post 2 units PRBC  Per neuro cognitive assessment patient does not appear to have capacity to make medical decisions  Spoke with his POA, his wife at bedside, explained to her that the patient needs colonoscopy/EGD to rule out active bleeding otherwise the patient will not be able to take Eliquis  Told her that without Eliquis he is at high risk of stroke  Wife understood as well she explained to her  and they both agreed with colonoscopy/EGD if needed  Plan:  Continue iron infusions  Monitor hemoglobin and transfuse if less than 7  Will place GI consult for colonoscopy/EGD    * Syncope and collapse  Assessment & Plan  Most likely syncope secondary to symptomatic anemia  Troponin negative, glucose within normal limits  Negative orthostatics  Imaging negative for acute intracranial process or C-spine fracture  Fall precautions  Treat anemia          Atrial fibrillation [facility])  Assessment & Plan  Home medication:  Eliquis 5 mg b i d , amiodarone 100 mg daily, metoprolol 25 b i d  Status post cardioversion March of 2021   EKG:  AFib with controlled heart rate  Plan:  Continue amiodarone  Continue metoprolol  Continue to hold Eliquis for now  If the family will decline colonoscopy/EGD, might need to discuss about discontinuing Eliquis for high risk of bleeding         Chronic diastolic heart failure ([address] )  Assessment & Plan  Wt Readings from Last 3 Encounters:   02/11/22 91 1 kg (200 lb 13 4 oz)     Patient follows with Cardiology as outpatient([facility])  Last echo 2020 showed LVEF 50%, severe concentric hypertrophy, aortic sclerosis without stenosis, moderate mitral regurgitation  Per notes, patient was on torsemide 20 mg b i d  as home medication  Patient's torsemide currently on hold due to ANDREIA and suspicion for prerenal   Intake 1 3 L output 1 3 L yesterday  Plan:  Continue metoprolol 25 mg b i d  Continue to hold torsemide  Reassess tomorrow  Strict intake output  Monitor weight        Type 2 diabetes mellitus, without long-term current use of insulin (HCC)  Assessment & Plan  Lab Results   Component Value Date    HGBA1C 8 1 (H) 02/11/2022       No results for input(s): POCGLU in the last 72 hours  Blood Sugar Average: Last 72 hrs:   not on any medication  Blood sugar in the hospital acceptable  Will monitor  Is to follow-up with PCP  Hemoglobin A1c is acceptable for his age  Chronic kidney disease  Assessment & Plan  Recent Labs     02/11/22  0048 02/12/22  0434   CREATININE 1 79* 1 43*   EGFR 34 44     Estimated Creatinine Clearance: 40 6 mL/min (A) (by C-G formula based on SCr of 1 43 mg/dL (H))  Baseline creatinine appears to be between 1 1 and 1 7  Avoid nephrotoxins, hypotension  Monitor      Laceration of left eyebrow  Assessment & Plan  Small laceration left eyebrow repaired bedside without incident  Local wound care and repeat observations      VTE Pharmacologic Prophylaxis:   Moderate Risk (Score 3-4) - Pharmacological DVT Prophylaxis Contraindicated  Sequential Compression Devices Ordered  Patient Centered Rounds: I performed bedside rounds with nursing staff today  Discussions with Specialists or Other Care Team Provider:     Education and Discussions with Family / Patient: Updated  (wife) at bedside      Current Length of Stay: 1 day(s)  Current Patient Status: Inpatient   Discharge Plan: To be determined    Code Status: Level 3 - DNAR and DNI    Subjective:   Patient resting in his recliner no distress  Mentioned that he had no bowel movement in the past 2- 3 days but otherwise had no complaints  Objective:     Vitals:   Temp (24hrs), Av 4 °F (36 9 °C), Min:97 8 °F (36 6 °C), Max:98 6 °F (37 °C)    Temp:  [97 8 °F (36 6 °C)-98 6 °F (37 °C)] 98 3 °F (36 8 °C)  HR:  [75-93] 89  Resp:  [16-20] 18  BP: (130-168)/(60-70) 146/69  SpO2:  [91 %-98 %] 91 %  Body mass index is 32 28 kg/m²  Input and Output Summary (last 24 hours): Intake/Output Summary (Last 24 hours) at 2022 1242  Last data filed at 2022 0913  Gross per 24 hour   Intake 1006 25 ml   Output 1175 ml   Net -168 75 ml       Physical Exam:   Physical Exam  Vitals reviewed  Constitutional:       General: He is not in acute distress  Appearance: He is obese  He is not diaphoretic  Eyes:      General:         Right eye: No discharge  Left eye: No discharge  Cardiovascular:      Rate and Rhythm: Normal rate  Rhythm irregular  Pulmonary:      Effort: No respiratory distress  Breath sounds: No wheezing or rales  Abdominal:      General: There is distension  Palpations: Abdomen is soft  Tenderness: There is no abdominal tenderness  There is no guarding or rebound  Musculoskeletal:      Right lower leg: No edema  Left lower leg: No edema  Skin:     General: Skin is warm  Findings: Bruising (around the left eye ) present  Neurological:      Mental Status: He is alert  Psychiatric:         Mood and Affect: Mood normal          Behavior: Behavior normal          Thought Content:  Thought content normal          Judgment: Judgment normal           Additional Data:     Labs:  Results from last 7 days   Lab Units 22  0434   WBC Thousand/uL 7 38   HEMOGLOBIN g/dL 7 3*   HEMATOCRIT % 25 1*   PLATELETS Thousands/uL 383 Results from last 7 days   Lab Units 02/12/22  0434   SODIUM mmol/L 133*   POTASSIUM mmol/L 3 8   CHLORIDE mmol/L 99*   CO2 mmol/L 24   BUN mg/dL 29*   CREATININE mg/dL 1 43*   ANION GAP mmol/L 10   CALCIUM mg/dL 8 5   GLUCOSE RANDOM mg/dL 161*             Results from last 7 days   Lab Units 02/11/22  0310   HEMOGLOBIN A1C % 8 1*     Results from last 7 days   Lab Units 02/11/22  0048   LACTIC ACID mmol/L 1 2       Lines/Drains:  Invasive Devices  Report    Peripheral Intravenous Line            Peripheral IV 02/11/22 Left Antecubital 1 day                  Telemetry:  Telemetry Orders (From admission, onward)             24 Hour Telemetry Monitoring  Continuous x 24 Hours (Telem)        References:    Telemetry Guidelines   Question:  Reason for 24 Hour Telemetry  Answer:  Syncope suspected to be cardiac in origin                 Telemetry Reviewed: Atrial fibrillation  HR averaging 60  Indication for Continued Telemetry Use: No indication for continued use  Will discontinue                Imaging: Reviewed radiology reports from this admission including: chest xray    Recent Cultures (last 7 days):         Last 24 Hours Medication List:   Current Facility-Administered Medications   Medication Dose Route Frequency Provider Last Rate    amiodarone  100 mg Oral Daily With Breakfast An Sorenson MD      amLODIPine  10 mg Oral Daily Danish Quintana MD      docusate sodium  100 mg Oral BID Jarad Botello MD      doxazosin  8 mg Oral HS An Sorenson MD      iron sucrose  300 mg Intravenous Daily Danish Quintana  mg (02/12/22 0913)    metoprolol tartrate  25 mg Oral Q12H [facility] nA Sorenson MD      pantoprazole  40 mg Intravenous Q12H Eliezer Angulo MD      polyethylene glycol  17 g Oral Daily Jarad Botello MD      pravastatin  40 mg Oral Daily With Yue Bowie MD          Today, Patient Was Seen By: Jarad Botello MD    **Please Note: This note may have been constructed using a voice recognition system  **

## 2022-02-12 NOTE — PLAN OF CARE
Problem: MOBILITY - ADULT  Goal: Maintain or return to baseline ADL function  Description: INTERVENTIONS:  -  Assess patient's ability to carry out ADLs; assess patient's baseline for ADL function and identify physical deficits which impact ability to perform ADLs (bathing, care of mouth/teeth, toileting, grooming, dressing, etc )  - Assess/evaluate cause of self-care deficits   - Assess range of motion  - Assess patient's mobility; develop plan if impaired  - Assess patient's need for assistive devices and provide as appropriate  - Encourage maximum independence but intervene and supervise when necessary  - Involve family in performance of ADLs  - Assess for home care needs following discharge   - Consider OT consult to assist with ADL evaluation and planning for discharge  - Provide patient education as appropriate  Outcome: Progressing  Goal: Maintains/Returns to pre admission functional level  Description: INTERVENTIONS:  - Perform BMAT or MOVE assessment daily    - Set and communicate daily mobility goal to care team and patient/family/caregiver  - Collaborate with rehabilitation services on mobility goals if consulted  - Perform Range of Motion  times a day  - Reposition patient every  hours    - Dangle patient  times a day  - Stand patient  times a day  - Ambulate patient  times a day  - Out of bed to chair  times a day   - Out of bed for meals  times a day  - Out of bed for toileting  - Record patient progress and toleration of activity level   Outcome: Progressing     Problem: Potential for Falls  Goal: Patient will remain free of falls  Description: INTERVENTIONS:  - Educate patient/family on patient safety including physical limitations  - Instruct patient to call for assistance with activity   - Consult OT/PT to assist with strengthening/mobility   - Keep Call bell within reach  - Keep bed low and locked with side rails adjusted as appropriate  - Keep care items and personal belongings within reach  - Initiate and maintain comfort rounds  - Make Fall Risk Sign visible to staff  - Offer Toileting every  Hours, in advance of need  - Initiate/Maintain alarm  - Obtain necessary fall risk management equipment:   - Apply yellow socks and bracelet for high fall risk patients  - Consider moving patient to room near nurses station  Outcome: Progressing     Problem: Prexisting or High Potential for Compromised Skin Integrity  Goal: Skin integrity is maintained or improved  Description: INTERVENTIONS:  - Identify patients at risk for skin breakdown  - Assess and monitor skin integrity  - Assess and monitor nutrition and hydration status  - Monitor labs   - Assess for incontinence   - Turn and reposition patient  - Assist with mobility/ambulation  - Relieve pressure over bony prominences  - Avoid friction and shearing  - Provide appropriate hygiene as needed including keeping skin clean and dry  - Evaluate need for skin moisturizer/barrier cream  - Collaborate with interdisciplinary team   - Patient/family teaching  - Consider wound care consult   Outcome: Progressing     Problem: HEMATOLOGIC - ADULT  Goal: Maintains hematologic stability  Description: INTERVENTIONS  - Assess for signs and symptoms of bleeding or hemorrhage  - Monitor labs  - Administer supportive blood products/factors as ordered and appropriate  Outcome: Progressing     Problem: PAIN - ADULT  Goal: Verbalizes/displays adequate comfort level or baseline comfort level  Description: Interventions:  - Encourage patient to monitor pain and request assistance  - Assess pain using appropriate pain scale  - Administer analgesics based on type and severity of pain and evaluate response  - Implement non-pharmacological measures as appropriate and evaluate response  - Consider cultural and social influences on pain and pain management  - Notify physician/advanced practitioner if interventions unsuccessful or patient reports new pain  Outcome: Progressing Problem: INFECTION - ADULT  Goal: Absence or prevention of progression during hospitalization  Description: INTERVENTIONS:  - Assess and monitor for signs and symptoms of infection  - Monitor lab/diagnostic results  - Monitor all insertion sites, i e  indwelling lines, tubes, and drains  - Monitor endotracheal if appropriate and nasal secretions for changes in amount and color  - [place] appropriate cooling/warming therapies per order  - Administer medications as ordered  - Instruct and encourage patient and family to use good hand hygiene technique  - Identify and instruct in appropriate isolation precautions for identified infection/condition  Outcome: Progressing     Problem: SAFETY ADULT  Goal: Patient will remain free of falls  Description: INTERVENTIONS:  - Educate patient/family on patient safety including physical limitations  - Instruct patient to call for assistance with activity   - Consult OT/PT to assist with strengthening/mobility   - Keep Call bell within reach  - Keep bed low and locked with side rails adjusted as appropriate  - Keep care items and personal belongings within reach  - Initiate and maintain comfort rounds  - Make Fall Risk Sign visible to staff  - Offer Toileting every  Hours, in advance of need  - Initiate/Maintain alarm  - Obtain necessary fall risk management equipment:   - Apply yellow socks and bracelet for high fall risk patients  - Consider moving patient to room near nurses station  Outcome: Progressing     Problem: DISCHARGE PLANNING  Goal: Discharge to home or other facility with appropriate resources  Description: INTERVENTIONS:  - Identify barriers to discharge w/patient and caregiver  - Arrange for needed discharge resources and transportation as appropriate  - Identify discharge learning needs (meds, wound care, etc )  - Arrange for interpretive services to assist at discharge as needed  - Refer to Case Management Department for coordinating discharge planning if the patient needs post-hospital services based on physician/advanced practitioner order or complex needs related to functional status, cognitive ability, or social support system  Outcome: Progressing

## 2022-02-13 LAB
ALBUMIN SERPL BCP-MCNC: 3.2 G/DL (ref 3.5–5)
ALP SERPL-CCNC: 200 U/L (ref 46–116)
ALT SERPL W P-5'-P-CCNC: 21 U/L (ref 12–78)
ANION GAP SERPL CALCULATED.3IONS-SCNC: 8 MMOL/L (ref 4–13)
AST SERPL W P-5'-P-CCNC: 17 U/L (ref 5–45)
BASOPHILS # BLD AUTO: 0.03 THOUSANDS/ΜL (ref 0–0.1)
BASOPHILS NFR BLD AUTO: 0 % (ref 0–1)
BILIRUB SERPL-MCNC: 1.06 MG/DL (ref 0.2–1)
BUN SERPL-MCNC: 26 MG/DL (ref 5–25)
CALCIUM ALBUM COR SERPL-MCNC: 9.5 MG/DL (ref 8.3–10.1)
CALCIUM SERPL-MCNC: 8.9 MG/DL (ref 8.3–10.1)
CHLORIDE SERPL-SCNC: 99 MMOL/L (ref 100–108)
CO2 SERPL-SCNC: 25 MMOL/L (ref 21–32)
CREAT SERPL-MCNC: 1.4 MG/DL (ref 0.6–1.3)
EOSINOPHIL # BLD AUTO: 0.05 THOUSAND/ΜL (ref 0–0.61)
EOSINOPHIL NFR BLD AUTO: 1 % (ref 0–6)
ERYTHROCYTE [DISTWIDTH] IN BLOOD BY AUTOMATED COUNT: 23.9 % (ref 11.6–15.1)
GFR SERPL CREATININE-BSD FRML MDRD: 45 ML/MIN/1.73SQ M
GLUCOSE SERPL-MCNC: 125 MG/DL (ref 65–140)
GLUCOSE SERPL-MCNC: 127 MG/DL (ref 65–140)
GLUCOSE SERPL-MCNC: 147 MG/DL (ref 65–140)
GLUCOSE SERPL-MCNC: 160 MG/DL (ref 65–140)
HCT VFR BLD AUTO: 24.6 % (ref 36.5–49.3)
HCT VFR BLD AUTO: 25.4 % (ref 36.5–49.3)
HGB BLD-MCNC: 7.1 G/DL (ref 12–17)
HGB BLD-MCNC: 7.4 G/DL (ref 12–17)
IMM GRANULOCYTES # BLD AUTO: 0.05 THOUSAND/UL (ref 0–0.2)
IMM GRANULOCYTES NFR BLD AUTO: 1 % (ref 0–2)
LYMPHOCYTES # BLD AUTO: 0.76 THOUSANDS/ΜL (ref 0.6–4.47)
LYMPHOCYTES NFR BLD AUTO: 8 % (ref 14–44)
MAGNESIUM SERPL-MCNC: 2.4 MG/DL (ref 1.6–2.6)
MCH RBC QN AUTO: 18.7 PG (ref 26.8–34.3)
MCHC RBC AUTO-ENTMCNC: 28.9 G/DL (ref 31.4–37.4)
MCV RBC AUTO: 65 FL (ref 82–98)
MONOCYTES # BLD AUTO: 1.23 THOUSAND/ΜL (ref 0.17–1.22)
MONOCYTES NFR BLD AUTO: 13 % (ref 4–12)
NEUTROPHILS # BLD AUTO: 7.11 THOUSANDS/ΜL (ref 1.85–7.62)
NEUTS SEG NFR BLD AUTO: 77 % (ref 43–75)
NRBC BLD AUTO-RTO: 0 /100 WBCS
PLATELET # BLD AUTO: 369 THOUSANDS/UL (ref 149–390)
PMV BLD AUTO: 10.6 FL (ref 8.9–12.7)
POTASSIUM SERPL-SCNC: 4 MMOL/L (ref 3.5–5.3)
PROT SERPL-MCNC: 6.3 G/DL (ref 6.4–8.2)
RBC # BLD AUTO: 3.8 MILLION/UL (ref 3.88–5.62)
SODIUM SERPL-SCNC: 132 MMOL/L (ref 136–145)
WBC # BLD AUTO: 9.23 THOUSAND/UL (ref 4.31–10.16)

## 2022-02-13 PROCEDURE — 80053 COMPREHEN METABOLIC PANEL: CPT | Performed by: INTERNAL MEDICINE

## 2022-02-13 PROCEDURE — 83735 ASSAY OF MAGNESIUM: CPT | Performed by: INTERNAL MEDICINE

## 2022-02-13 PROCEDURE — 85025 COMPLETE CBC W/AUTO DIFF WBC: CPT | Performed by: INTERNAL MEDICINE

## 2022-02-13 PROCEDURE — 85014 HEMATOCRIT: CPT | Performed by: INTERNAL MEDICINE

## 2022-02-13 PROCEDURE — C9113 INJ PANTOPRAZOLE SODIUM, VIA: HCPCS | Performed by: INTERNAL MEDICINE

## 2022-02-13 PROCEDURE — 99232 SBSQ HOSP IP/OBS MODERATE 35: CPT | Performed by: INTERNAL MEDICINE

## 2022-02-13 PROCEDURE — 82948 REAGENT STRIP/BLOOD GLUCOSE: CPT

## 2022-02-13 PROCEDURE — 85018 HEMOGLOBIN: CPT | Performed by: INTERNAL MEDICINE

## 2022-02-13 RX ADMIN — IRON SUCROSE 300 MG: 20 INJECTION, SOLUTION INTRAVENOUS at 08:38

## 2022-02-13 RX ADMIN — AMLODIPINE BESYLATE 10 MG: 10 TABLET ORAL at 08:33

## 2022-02-13 RX ADMIN — DOXAZOSIN 8 MG: 4 TABLET ORAL at 21:40

## 2022-02-13 RX ADMIN — PRAVASTATIN SODIUM 40 MG: 40 TABLET ORAL at 16:32

## 2022-02-13 RX ADMIN — DOCUSATE SODIUM 100 MG: 100 CAPSULE ORAL at 08:41

## 2022-02-13 RX ADMIN — INSULIN LISPRO 1 UNITS: 100 INJECTION, SOLUTION INTRAVENOUS; SUBCUTANEOUS at 21:43

## 2022-02-13 RX ADMIN — DOXAZOSIN 8 MG: 4 TABLET ORAL at 00:45

## 2022-02-13 RX ADMIN — PANTOPRAZOLE SODIUM 40 MG: 40 INJECTION, POWDER, FOR SOLUTION INTRAVENOUS at 00:45

## 2022-02-13 RX ADMIN — DOCUSATE SODIUM 100 MG: 100 CAPSULE ORAL at 16:32

## 2022-02-13 RX ADMIN — AMIODARONE HYDROCHLORIDE 100 MG: 200 TABLET ORAL at 08:32

## 2022-02-13 RX ADMIN — METOPROLOL TARTRATE 25 MG: 25 TABLET, FILM COATED ORAL at 00:45

## 2022-02-13 RX ADMIN — METOPROLOL TARTRATE 25 MG: 25 TABLET, FILM COATED ORAL at 21:40

## 2022-02-13 RX ADMIN — POLYETHYLENE GLYCOL 3350 17 G: 17 POWDER, FOR SOLUTION ORAL at 08:34

## 2022-02-13 RX ADMIN — PANTOPRAZOLE SODIUM 40 MG: 40 INJECTION, POWDER, FOR SOLUTION INTRAVENOUS at 21:41

## 2022-02-13 RX ADMIN — METOPROLOL TARTRATE 25 MG: 25 TABLET, FILM COATED ORAL at 08:32

## 2022-02-13 RX ADMIN — PANTOPRAZOLE SODIUM 40 MG: 40 INJECTION, POWDER, FOR SOLUTION INTRAVENOUS at 08:32

## 2022-02-13 NOTE — PLAN OF CARE
Problem: MOBILITY - ADULT  Goal: Maintain or return to baseline ADL function  Description: INTERVENTIONS:  -  Assess patient's ability to carry out ADLs; assess patient's baseline for ADL function and identify physical deficits which impact ability to perform ADLs (bathing, care of mouth/teeth, toileting, grooming, dressing, etc )  - Assess/evaluate cause of self-care deficits   - Assess range of motion  - Assess patient's mobility; develop plan if impaired  - Assess patient's need for assistive devices and provide as appropriate  - Encourage maximum independence but intervene and supervise when necessary  - Involve family in performance of ADLs  - Assess for home care needs following discharge   - Consider OT consult to assist with ADL evaluation and planning for discharge  - Provide patient education as appropriate  Outcome: Progressing  Goal: Maintains/Returns to pre admission functional level  Description: INTERVENTIONS:  - Perform BMAT or MOVE assessment daily    - Set and communicate daily mobility goal to care team and patient/family/caregiver  - Collaborate with rehabilitation services on mobility goals if consulted  - Perform Range of Motion  times a day  - Reposition patient every  hours    - Dangle patient  times a day  - Stand patient  times a day  - Ambulate patient  times a day  - Out of bed to chair  times a day   - Out of bed for meals  times a day  - Out of bed for toileting  - Record patient progress and toleration of activity level   Outcome: Progressing     Problem: Potential for Falls  Goal: Patient will remain free of falls  Description: INTERVENTIONS:  - Educate patient/family on patient safety including physical limitations  - Instruct patient to call for assistance with activity   - Consult OT/PT to assist with strengthening/mobility   - Keep Call bell within reach  - Keep bed low and locked with side rails adjusted as appropriate  - Keep care items and personal belongings within reach  - Initiate and maintain comfort rounds  - Make Fall Risk Sign visible to staff  - Offer Toileting every  Hours, in advance of need  - Initiate/Maintain alarm  - Obtain necessary fall risk management equipment:   - Apply yellow socks and bracelet for high fall risk patients  - Consider moving patient to room near nurses station  Outcome: Progressing     Problem: Prexisting or High Potential for Compromised Skin Integrity  Goal: Skin integrity is maintained or improved  Description: INTERVENTIONS:  - Identify patients at risk for skin breakdown  - Assess and monitor skin integrity  - Assess and monitor nutrition and hydration status  - Monitor labs   - Assess for incontinence   - Turn and reposition patient  - Assist with mobility/ambulation  - Relieve pressure over bony prominences  - Avoid friction and shearing  - Provide appropriate hygiene as needed including keeping skin clean and dry  - Evaluate need for skin moisturizer/barrier cream  - Collaborate with interdisciplinary team   - Patient/family teaching  - Consider wound care consult   Outcome: Progressing     Problem: HEMATOLOGIC - ADULT  Goal: Maintains hematologic stability  Description: INTERVENTIONS  - Assess for signs and symptoms of bleeding or hemorrhage  - Monitor labs  - Administer supportive blood products/factors as ordered and appropriate  Outcome: Progressing     Problem: PAIN - ADULT  Goal: Verbalizes/displays adequate comfort level or baseline comfort level  Description: Interventions:  - Encourage patient to monitor pain and request assistance  - Assess pain using appropriate pain scale  - Administer analgesics based on type and severity of pain and evaluate response  - Implement non-pharmacological measures as appropriate and evaluate response  - Consider cultural and social influences on pain and pain management  - Notify physician/advanced practitioner if interventions unsuccessful or patient reports new pain  Outcome: Progressing Problem: INFECTION - ADULT  Goal: Absence or prevention of progression during hospitalization  Description: INTERVENTIONS:  - Assess and monitor for signs and symptoms of infection  - Monitor lab/diagnostic results  - Monitor all insertion sites, i e  indwelling lines, tubes, and drains  - Monitor endotracheal if appropriate and nasal secretions for changes in amount and color  - [place] appropriate cooling/warming therapies per order  - Administer medications as ordered  - Instruct and encourage patient and family to use good hand hygiene technique  - Identify and instruct in appropriate isolation precautions for identified infection/condition  Outcome: Progressing     Problem: SAFETY ADULT  Goal: Patient will remain free of falls  Description: INTERVENTIONS:  - Educate patient/family on patient safety including physical limitations  - Instruct patient to call for assistance with activity   - Consult OT/PT to assist with strengthening/mobility   - Keep Call bell within reach  - Keep bed low and locked with side rails adjusted as appropriate  - Keep care items and personal belongings within reach  - Initiate and maintain comfort rounds  - Make Fall Risk Sign visible to staff  - Offer Toileting every  Hours, in advance of need  - Initiate/Maintain alarm  - Obtain necessary fall risk management equipment: - Apply yellow socks and bracelet for high fall risk patients  - Consider moving patient to room near nurses station  Outcome: Progressing     Problem: DISCHARGE PLANNING  Goal: Discharge to home or other facility with appropriate resources  Description: INTERVENTIONS:  - Identify barriers to discharge w/patient and caregiver  - Arrange for needed discharge resources and transportation as appropriate  - Identify discharge learning needs (meds, wound care, etc )  - Arrange for interpretive services to assist at discharge as needed  - Refer to Case Management Department for coordinating discharge planning if the patient needs post-hospital services based on physician/advanced practitioner order or complex needs related to functional status, cognitive ability, or social support system  Outcome: Progressing     Problem: Nutrition/Hydration-ADULT  Goal: Nutrient/Hydration intake appropriate for improving, restoring or maintaining nutritional needs  Description: Monitor and assess patient's nutrition/hydration status for malnutrition  Collaborate with interdisciplinary team and initiate plan and interventions as ordered  Monitor patient's weight and dietary intake as ordered or per policy  Utilize nutrition screening tool and intervene as necessary  Determine patient's food preferences and provide high-protein, high-caloric foods as appropriate       INTERVENTIONS:  - Monitor oral intake, urinary output, labs, and treatment plans  - Assess nutrition and hydration status and recommend course of action  - Evaluate amount of meals eaten  - Assist patient with eating if necessary   - Allow adequate time for meals  - Recommend/ encourage appropriate diets, oral nutritional supplements, and vitamin/mineral supplements  - Order, calculate, and assess calorie counts as needed  - Recommend, monitor, and adjust tube feedings and TPN/PPN based on assessed needs  - Assess need for intravenous fluids  - Provide specific nutrition/hydration education as appropriate  - Include patient/family/caregiver in decisions related to nutrition  Outcome: Progressing

## 2022-02-13 NOTE — PROGRESS NOTES
[facility]  Progress Note Armilli Bean 1935, 82 y o  male MRN: [id number]  Unit/Bed#: S -01 Encounter: [id number]  Primary Care Provider: No primary care provider on file  Date and time admitted to hospital: 2/11/2022 12:05 AM    * Syncope and collapse  Assessment & Plan  Most likely syncope secondary to symptomatic anemia  Troponin negative, glucose within normal limits  Negative orthostatics  Imaging negative for acute intracranial process or C-spine fracture  Fall precautions    Plan:   Treat anemia          Anemia  Assessment & Plan  Recent Labs     02/12/22  0131 02/12/22  0434 02/13/22  0443   HGB 7 1* 7 3* 7 1*     Patient was found to have a microcytic anemia on admission, without acute blood loss  Patient denied any active bleeding, but he had no bowel movement in the past to 3 days  Iron panel showed iron deficiency anemia  Patient is status post 2 units PRBC  Per neuro cognitive assessment patient does not appear to have capacity to make medical decisions  Spoke with his POA, his wife at bedside, explained to her that the patient needs colonoscopy/EGD to rule out active bleeding otherwise the patient will not be able to take Eliquis  Told her that without Eliquis he is at high risk of stroke  Wife understood as well she explained to her  and they both agreed with colonoscopy/EGD if needed  Plan:  · Continue iron infusions 3/3 days completed  · Monitor hemoglobin and transfuse if less than 7  · CBC in am  · Awaiting GI recommendation    Type 2 diabetes mellitus, without long-term current use of insulin (HCC)  Assessment & Plan  Lab Results   Component Value Date    HGBA1C 8 1 (H) 02/11/2022       No results for input(s): POCGLU in the last 72 hours  Blood Sugar Average: Last 72 hrs:   not on any medication       sliding scale insulin  Will likely discharge on metformin 500 mg extended release at a lower dose based on EGFR 45, recommended A1c 7 5-8% based on his age      Chronic kidney disease  Assessment & Plan  Recent Labs     02/11/22  0048 02/12/22  0434 02/13/22  0443   CREATININE 1 79* 1 43* 1 40*   EGFR 34 44 45     Estimated Creatinine Clearance: 41 4 mL/min (A) (by C-G formula based on SCr of 1 4 mg/dL (H))  · Baseline creatinine appears to be between (1 3 -1 5) difficult to find a real baseline since patient rarely get blood work  · Avoid nephrotoxins, hypotension  · Holding torsemide and losartan      Laceration of left eyebrow  Assessment & Plan  Small laceration left eyebrow repaired bedside without incident  Local wound care and repeat observations    Atrial fibrillation [facility])  Assessment & Plan  Home medication:  Eliquis 5 mg b i d , amiodarone 100 mg daily, metoprolol 25 b i d  Status post cardioversion March of 2021   EKG:  AFib with controlled heart rate  Plan:  · Continue amiodarone  · Continue metoprolol  · Continue to hold Eliquis for now  Chronic diastolic heart failure (HCC)  Assessment & Plan  Wt Readings from Last 3 Encounters:   02/11/22 90 7 kg (200 lb)     Patient follows with Cardiology as outpatient([facility])  Last echo 2020 showed LVEF 50%, severe concentric hypertrophy, aortic sclerosis without stenosis, moderate mitral regurgitation  Per notes, patient was on torsemide 20 mg b i d  as home medication  Patient's torsemide currently on hold due to ANDREIA and suspicion for prerenal   Intake 1 3 L output 1 3 L yesterday  Plan:  Continue metoprolol 25 mg b i d  Continue to hold torsemide  Reassess tomorrow, will likely resume torsemide 20 mg daily 1st you for switching back to twice a day  Strict intake output  Monitor weight          VTE Pharmacologic Prophylaxis:   Moderate Risk (Score 3-4) - Pharmacological DVT Prophylaxis Contraindicated  Sequential Compression Devices Ordered  Patient Centered Rounds: I performed bedside rounds with nursing staff today    Discussions with Specialists or Other Care Team Provider:     Education and Discussions with Family / Patient: Updated  (wife) via phone  Xiao Carranza called over the phone    Current Length of Stay: 2 day(s)  Current Patient Status: Inpatient   Discharge Plan: To be determined    Code Status: Level 3 - DNAR and DNI    Subjective:   Patient reported had a bowel movement today, denies any chest pain, any bleeding  Discussed the results of A1c, discussed that will start on insulin while in the hospital    Objective:     Vitals:   Temp (24hrs), Av 1 °F (37 3 °C), Min:98 6 °F (37 °C), Max:100 °F (37 8 °C)    Temp:  [98 6 °F (37 °C)-100 °F (37 8 °C)] 100 °F (37 8 °C)  HR:  [87-99] 99  Resp:  [16-20] 16  BP: (133-137)/(60-67) 137/67  SpO2:  [90 %-96 %] 91 %  Body mass index is 32 28 kg/m²  Input and Output Summary (last 24 hours): Intake/Output Summary (Last 24 hours) at 2022 1101  Last data filed at 2022 0701  Gross per 24 hour   Intake --   Output 1500 ml   Net -1500 ml       Physical Exam:   Physical Exam  Vitals reviewed  Constitutional:       General: He is not in acute distress  Appearance: He is obese  He is not diaphoretic  HENT:      Head: Normocephalic  Mouth/Throat:      Mouth: Mucous membranes are moist    Eyes:      General:         Right eye: No discharge  Left eye: No discharge  Extraocular Movements: Extraocular movements intact  Conjunctiva/sclera: Conjunctivae normal       Pupils: Pupils are equal, round, and reactive to light  Cardiovascular:      Rate and Rhythm: Normal rate  Rhythm irregular  Pulses: Normal pulses  Heart sounds: Normal heart sounds  Pulmonary:      Effort: Pulmonary effort is normal  No respiratory distress  Breath sounds: Normal breath sounds  No wheezing or rales  Abdominal:      General: Abdomen is flat  Bowel sounds are normal  There is no distension  Palpations: Abdomen is soft  Tenderness: There is no abdominal tenderness  There is no guarding or rebound  Musculoskeletal:         General: Normal range of motion  Cervical back: Normal range of motion  Right lower leg: No edema  Left lower leg: No edema  Skin:     General: Skin is warm  Capillary Refill: Capillary refill takes less than 2 seconds  Findings: Bruising (around the left eye ) present  Neurological:      General: No focal deficit present  Mental Status: He is alert  Mental status is at baseline  Comments: Patient knows the place, he knows his in the [facility], he knows in [place],, the time, the year, and month   Psychiatric:         Mood and Affect: Mood normal          Behavior: Behavior normal          Thought Content:  Thought content normal          Judgment: Judgment normal           Additional Data:     Labs:  Results from last 7 days   Lab Units 02/13/22  0443   WBC Thousand/uL 9 23   HEMOGLOBIN g/dL 7 1*   HEMATOCRIT % 24 6*   PLATELETS Thousands/uL 369   NEUTROS PCT % 77*   LYMPHS PCT % 8*   MONOS PCT % 13*   EOS PCT % 1     Results from last 7 days   Lab Units 02/13/22  0443   SODIUM mmol/L 132*   POTASSIUM mmol/L 4 0   CHLORIDE mmol/L 99*   CO2 mmol/L 25   BUN mg/dL 26*   CREATININE mg/dL 1 40*   ANION GAP mmol/L 8   CALCIUM mg/dL 8 9   ALBUMIN g/dL 3 2*   TOTAL BILIRUBIN mg/dL 1 06*   ALK PHOS U/L 200*   ALT U/L 21   AST U/L 17   GLUCOSE RANDOM mg/dL 127             Results from last 7 days   Lab Units 02/11/22  0310   HEMOGLOBIN A1C % 8 1*     Results from last 7 days   Lab Units 02/11/22  0048   LACTIC ACID mmol/L 1 2       Lines/Drains:  Invasive Devices  Report    Peripheral Intravenous Line            Peripheral IV 02/11/22 Left Antecubital 2 days                  Telemetry:  Telemetry Orders (From admission, onward)             24 Hour Telemetry Monitoring  Continuous x 24 Hours (Telem)        References:    Telemetry Guidelines   Question:  Reason for 24 Hour Telemetry  Answer: Syncope suspected to be cardiac in origin                 Telemetry Reviewed: Atrial fibrillation  HR averaging 60  Indication for Continued Telemetry Use: No indication for continued use  Will discontinue  Imaging: Reviewed radiology reports from this admission including: chest xray    Recent Cultures (last 7 days):         Last 24 Hours Medication List:   Current Facility-Administered Medications   Medication Dose Route Frequency Provider Last Rate    amiodarone  100 mg Oral Daily With Breakfast Qian Turk MD      amLODIPine  10 mg Oral Daily Gemini Chavez MD      docusate sodium  100 mg Oral BID Ainsley Cogan, MD      doxazosin  8 mg Oral HS Qian Turk MD      insulin lispro  1-5 Units Subcutaneous TID [facility] Gemini Chavez MD      insulin lispro  1-5 Units Subcutaneous HS Gemini Chavez MD      metoprolol tartrate  25 mg Oral Q12H [address] Qain Turk MD      pantoprazole  40 mg Intravenous Q12H Eliezer Angulo MD      polyethylene glycol  17 g Oral Daily Ainsley Cogan, MD      pravastatin  40 mg Oral Daily With Diana Shepherd MD          Today, Patient Was Seen By: Gemini Chavez MD    **Please Note: This note may have been constructed using a voice recognition system  **

## 2022-02-13 NOTE — ASSESSMENT & PLAN NOTE
Recent Labs     02/12/22  0131 02/12/22  0434 02/13/22  0443   HGB 7 1* 7 3* 7 1*     Patient was found to have a microcytic anemia on admission, without acute blood loss  Patient denied any active bleeding, but he had no bowel movement in the past to 3 days  Iron panel showed iron deficiency anemia  Patient is status post 2 units PRBC  Per neuro cognitive assessment patient does not appear to have capacity to make medical decisions  Spoke with his POA, his wife at bedside, explained to her that the patient needs colonoscopy/EGD to rule out active bleeding otherwise the patient will not be able to take Eliquis  Told her that without Eliquis he is at high risk of stroke  Wife understood as well she explained to her  and they both agreed with colonoscopy/EGD if needed         Plan:  · Continue iron infusions 3/3 days completed  · Monitor hemoglobin and transfuse if less than 7  · CBC in am  · Awaiting GI recommendation

## 2022-02-13 NOTE — ASSESSMENT & PLAN NOTE
Home medication:  Eliquis 5 mg b i d , amiodarone 100 mg daily, metoprolol 25 b i d  Status post cardioversion March of 2021   EKG:  AFib with controlled heart rate  Plan:  · Continue amiodarone  · Continue metoprolol  · Continue to hold Eliquis for now

## 2022-02-13 NOTE — ASSESSMENT & PLAN NOTE
Wt Readings from Last 3 Encounters:   02/11/22 90 7 kg (200 lb)     Patient follows with Cardiology as outpatient([facility])  Last echo 2020 showed LVEF 50%, severe concentric hypertrophy, aortic sclerosis without stenosis, moderate mitral regurgitation  Per notes, patient was on torsemide 20 mg b i d  as home medication  Patient's torsemide currently on hold due to ANDREIA and suspicion for prerenal   Intake 1 3 L output 1 3 L yesterday  Plan:  Continue metoprolol 25 mg b i d  Continue to hold torsemide    Reassess tomorrow, will likely resume torsemide 20 mg daily 1st you for switching back to twice a day  Strict intake output  Monitor weight

## 2022-02-13 NOTE — ASSESSMENT & PLAN NOTE
Recent Labs     02/11/22  0048 02/12/22  0434 02/13/22  0443   CREATININE 1 79* 1 43* 1 40*   EGFR 34 44 45     Estimated Creatinine Clearance: 41 4 mL/min (A) (by C-G formula based on SCr of 1 4 mg/dL (H))       · Baseline creatinine appears to be between (1 3 -1 5) difficult to find a real baseline since patient rarely get blood work  · Avoid nephrotoxins, hypotension  · Holding torsemide and losartan

## 2022-02-13 NOTE — ASSESSMENT & PLAN NOTE
Lab Results   Component Value Date    HGBA1C 8 1 (H) 02/11/2022       No results for input(s): POCGLU in the last 72 hours  Blood Sugar Average: Last 72 hrs:   not on any medication       sliding scale insulin  Will likely discharge on metformin 500 mg extended release at a lower dose based on EGFR 45, recommended A1c 7 5-8% based on his age

## 2022-02-13 NOTE — ED PROVIDER NOTES
Emergency Department Airway Evaluation and Management Form    History  Obtained from: EMS, the patient  Patient has no known allergies  Chief Complaint   Patient presents with    Fall     fall, + headstrike, + thinner (eliquis), lac      HPI     83 y/o male anticoagulated on eliquis, presents after an episode of syncope with headstrike  Reports feeling weak for the last several weeks  Past Medical History:   Diagnosis Date    A-fib ([address] )     Diastolic heart failure (HCC)     HTN (hypertension)      Past Surgical History:   Procedure Laterality Date    EXPLORATORY LAPAROTOMY      with gastric ulcer repair     No family history on file  Social History     Tobacco Use    Smoking status: Not on file    Smokeless tobacco: Not on file   Substance Use Topics    Alcohol use: Not on file    Drug use: Not on file     I have reviewed and agree with the history as documented  Review of Systems   + for generalized weakness  Please see trauma team note for full ROS    Physical Exam  /67 (BP Location: Right arm)   Pulse 99   Temp 100 °F (37 8 °C) (Oral)   Resp 16   Ht 5' 6" (1 676 m) Comment: 02/12/21 documentation from chart review  Wt 90 7 kg (200 lb)   SpO2 91%   BMI 32 28 kg/m²     Physical Exam   Airway intact with no emergent airway intervention required     Please see trauma team note for full physical exam      ED Medications  Medications   metoprolol tartrate (LOPRESSOR) tablet 25 mg (25 mg Oral Given 2/13/22 0832)   pravastatin (PRAVACHOL) tablet 40 mg (40 mg Oral Given 2/12/22 9473)   doxazosin (CARDURA) tablet 8 mg (8 mg Oral Given 2/13/22 0045)   amiodarone tablet 100 mg (100 mg Oral Given 2/13/22 0832)   amLODIPine (NORVASC) tablet 10 mg (10 mg Oral Given 2/13/22 0833)   pantoprazole (PROTONIX) injection 40 mg (40 mg Intravenous Given 2/13/22 0832)   iron sucrose (VENOFER) 300 mg in sodium chloride 0 9 % 250 mL IVPB (300 mg Intravenous New Bag 2/13/22 0838)   polyethylene glycol (MIRALAX) packet 17 g (17 g Oral Given 2/13/22 0834)   docusate sodium (COLACE) capsule 100 mg (100 mg Oral Given 2/13/22 0841)   insulin lispro (HumaLOG) 100 units/mL subcutaneous injection 1-6 Units (1 Units Subcutaneous Not Given 2/13/22 0912)   insulin lispro (HumaLOG) 100 units/mL subcutaneous injection 1-5 Units (has no administration in time range)   tetanus-diphtheria-acellular pertussis (BOOSTRIX) IM injection 0 5 mL (0 5 mL Intramuscular Given 2/11/22 0159)   iron sucrose (VENOFER) 100 mg in sodium chloride 0 9 % 100 mL IVPB (0 mg Intravenous Stopped 2/11/22 0546)   iron sucrose (VENOFER) 100 mg in sodium chloride 0 9 % 100 mL IVPB (0 mg Intravenous Stopped 2/11/22 1153)       Intubation  Procedures    Notes  I performed a limited evaluation of this patient solely to ensure that the airway was intact prior to trauma surgery evaluation per trauma center ATLS protocol  My examination was focused solely on the airway exam, after which the patient was managed independently by the trauma team  Please see their documentation for full history, ROS, physical exam, and medical decision making         Final Diagnosis  Final diagnoses:   Fall, initial encounter   Syncope and collapse   Anemia, unspecified type   Other iron deficiency anemia       ED Provider  Electronically Signed by     Harry Hogan MD  02/13/22 5388

## 2022-02-13 NOTE — ASSESSMENT & PLAN NOTE
Most likely syncope secondary to symptomatic anemia  Troponin negative, glucose within normal limits    Negative orthostatics  Imaging negative for acute intracranial process or C-spine fracture  Fall precautions    Plan:   Treat anemia

## 2022-02-14 LAB
ANION GAP SERPL CALCULATED.3IONS-SCNC: 9 MMOL/L (ref 4–13)
BUN SERPL-MCNC: 22 MG/DL (ref 5–25)
CALCIUM SERPL-MCNC: 8.8 MG/DL (ref 8.3–10.1)
CHLORIDE SERPL-SCNC: 98 MMOL/L (ref 100–108)
CO2 SERPL-SCNC: 23 MMOL/L (ref 21–32)
CREAT SERPL-MCNC: 1.31 MG/DL (ref 0.6–1.3)
ERYTHROCYTE [DISTWIDTH] IN BLOOD BY AUTOMATED COUNT: 25.2 % (ref 11.6–15.1)
GFR SERPL CREATININE-BSD FRML MDRD: 49 ML/MIN/1.73SQ M
GLUCOSE SERPL-MCNC: 124 MG/DL (ref 65–140)
GLUCOSE SERPL-MCNC: 127 MG/DL (ref 65–140)
GLUCOSE SERPL-MCNC: 143 MG/DL (ref 65–140)
GLUCOSE SERPL-MCNC: 165 MG/DL (ref 65–140)
GLUCOSE SERPL-MCNC: 210 MG/DL (ref 65–140)
HCT VFR BLD AUTO: 25.5 % (ref 36.5–49.3)
HGB BLD-MCNC: 7.2 G/DL (ref 12–17)
MCH RBC QN AUTO: 19.1 PG (ref 26.8–34.3)
MCHC RBC AUTO-ENTMCNC: 28.2 G/DL (ref 31.4–37.4)
MCV RBC AUTO: 68 FL (ref 82–98)
PLATELET # BLD AUTO: 342 THOUSANDS/UL (ref 149–390)
PMV BLD AUTO: 10.2 FL (ref 8.9–12.7)
POTASSIUM SERPL-SCNC: 4.2 MMOL/L (ref 3.5–5.3)
RBC # BLD AUTO: 3.76 MILLION/UL (ref 3.88–5.62)
SODIUM SERPL-SCNC: 130 MMOL/L (ref 136–145)
WBC # BLD AUTO: 8.29 THOUSAND/UL (ref 4.31–10.16)

## 2022-02-14 PROCEDURE — 97163 PT EVAL HIGH COMPLEX 45 MIN: CPT

## 2022-02-14 PROCEDURE — 82948 REAGENT STRIP/BLOOD GLUCOSE: CPT

## 2022-02-14 PROCEDURE — C9113 INJ PANTOPRAZOLE SODIUM, VIA: HCPCS | Performed by: INTERNAL MEDICINE

## 2022-02-14 PROCEDURE — 85027 COMPLETE CBC AUTOMATED: CPT | Performed by: INTERNAL MEDICINE

## 2022-02-14 PROCEDURE — 97167 OT EVAL HIGH COMPLEX 60 MIN: CPT

## 2022-02-14 PROCEDURE — 99232 SBSQ HOSP IP/OBS MODERATE 35: CPT | Performed by: INTERNAL MEDICINE

## 2022-02-14 PROCEDURE — 80048 BASIC METABOLIC PNL TOTAL CA: CPT | Performed by: INTERNAL MEDICINE

## 2022-02-14 RX ORDER — FUROSEMIDE 10 MG/ML
40 INJECTION INTRAMUSCULAR; INTRAVENOUS ONCE
Status: COMPLETED | OUTPATIENT
Start: 2022-02-14 | End: 2022-02-14

## 2022-02-14 RX ORDER — POLYETHYLENE GLYCOL 3350 17 G/17G
238 POWDER, FOR SOLUTION ORAL ONCE
Status: COMPLETED | OUTPATIENT
Start: 2022-02-14 | End: 2022-02-14

## 2022-02-14 RX ORDER — FUROSEMIDE 10 MG/ML
20 INJECTION INTRAMUSCULAR; INTRAVENOUS ONCE
Status: DISCONTINUED | OUTPATIENT
Start: 2022-02-14 | End: 2022-02-14

## 2022-02-14 RX ORDER — TORSEMIDE 20 MG/1
20 TABLET ORAL 2 TIMES DAILY
Status: DISCONTINUED | OUTPATIENT
Start: 2022-02-14 | End: 2022-02-17

## 2022-02-14 RX ADMIN — METOPROLOL TARTRATE 25 MG: 25 TABLET, FILM COATED ORAL at 22:09

## 2022-02-14 RX ADMIN — PANTOPRAZOLE SODIUM 40 MG: 40 INJECTION, POWDER, FOR SOLUTION INTRAVENOUS at 09:25

## 2022-02-14 RX ADMIN — DOXAZOSIN 8 MG: 4 TABLET ORAL at 22:09

## 2022-02-14 RX ADMIN — POLYETHYLENE GLYCOL 3350 17 G: 17 POWDER, FOR SOLUTION ORAL at 09:25

## 2022-02-14 RX ADMIN — PANTOPRAZOLE SODIUM 40 MG: 40 INJECTION, POWDER, FOR SOLUTION INTRAVENOUS at 22:10

## 2022-02-14 RX ADMIN — DOCUSATE SODIUM 100 MG: 100 CAPSULE ORAL at 09:25

## 2022-02-14 RX ADMIN — BISACODYL 10 MG: 5 TABLET, COATED ORAL at 14:05

## 2022-02-14 RX ADMIN — INSULIN LISPRO 1 UNITS: 100 INJECTION, SOLUTION INTRAVENOUS; SUBCUTANEOUS at 11:48

## 2022-02-14 RX ADMIN — TORSEMIDE 20 MG: 20 TABLET ORAL at 22:10

## 2022-02-14 RX ADMIN — POLYETHYLENE GLYCOL 3350 238 G: 17 POWDER, FOR SOLUTION ORAL at 17:21

## 2022-02-14 RX ADMIN — AMIODARONE HYDROCHLORIDE 100 MG: 200 TABLET ORAL at 09:25

## 2022-02-14 RX ADMIN — PRAVASTATIN SODIUM 40 MG: 40 TABLET ORAL at 17:21

## 2022-02-14 RX ADMIN — METOPROLOL TARTRATE 25 MG: 25 TABLET, FILM COATED ORAL at 09:25

## 2022-02-14 RX ADMIN — INSULIN LISPRO 1 UNITS: 100 INJECTION, SOLUTION INTRAVENOUS; SUBCUTANEOUS at 22:10

## 2022-02-14 RX ADMIN — DOCUSATE SODIUM 100 MG: 100 CAPSULE ORAL at 17:21

## 2022-02-14 RX ADMIN — FUROSEMIDE 40 MG: 10 INJECTION, SOLUTION INTRAMUSCULAR; INTRAVENOUS at 12:22

## 2022-02-14 RX ADMIN — AMLODIPINE BESYLATE 10 MG: 10 TABLET ORAL at 09:24

## 2022-02-14 NOTE — PHYSICAL THERAPY NOTE
PHYSICAL THERAPY EVALUATION  NAME:  Rell Sender  DATE: 02/14/22    AGE:   82 y o  Mrn:   [id number]  ADMIT DX:  Syncope and collapse [R55]  Head injury [S09 90XA]  Anemia, unspecified type [D64 9]  Problem List:   Patient Active Problem List   Diagnosis    Syncope and collapse    Chronic diastolic heart failure (HCC)    Atrial fibrillation (HCC)    Laceration of left eyebrow    Anemia    Chronic kidney disease    Type 2 diabetes mellitus, without long-term current use of insulin ([facility] )         Length Of Stay: 3  Performed at least 2 patient identifiers during session: Name and Birthday  PHYSICAL THERAPY EVALUATION :    02/14/22 1640   PT Last Visit   PT Visit Date 02/14/22   Note Type   Note type Evaluation   Pain Assessment   Pain Assessment Tool 0-10   Pain Score   (0 at rest, fluctuates/increases w/ weight bearing)   Pain Location/Orientation   (R>L foot metatarsal base--plantar aspect of foot)   Effect of Pain on Daily Activities limits WB activity tolerance   Patient's Stated Pain Goal No pain   Hospital Pain Intervention(s) Repositioned; Ambulation/increased activity; Emotional support   Restrictions/Precautions   Weight Bearing Precautions Per Order No   Other Precautions Cognitive; Chair Alarm; Bed Alarm; Fall Risk;Pain   Home Living   Type of Home House  (condo)   Home Layout One level  (0 MELIDA)   Home Equipment   (none prior to admission)   Additional Comments no DME prior to admission   Prior Function   Level of [place] Independent with ADLs and functional mobility   Lives With Significant other   Receives Help From Family   ADL Assistance Independent   IADLs Independent   Falls in the last 6 months 1 to 4  (1 per pt )   Vocational Retired  ( )   Comments sleeps in a recliner for years due to a "bad back"   General   Additional Pertinent History nursing states concern for OOB mobility due to increased SOB and recently placed on NCO2      Family/Caregiver Present No   Cognition Overall Cognitive Status Impaired   Arousal/Participation Alert   Attention Attends with cues to redirect   Orientation Level Oriented to person;Oriented to place; Disoriented to time;Disoriented to situation   Memory Decreased recall of precautions;Decreased short term memory;Decreased recall of recent events   Following Commands Follows one step commands with increased time or repetition   RUE Strength   RUE Overall Strength Within Functional Limits - able to perform ADL tasks with strength   RLE Assessment   RLE Assessment WFL   Strength RLE   R Hip Flexion 4-/5   R Knee Extension 4-/5   R Ankle Dorsiflexion 4-/5   Strength LLE   L Hip Flexion 4-/5   L Knee Extension 4-/5   L Ankle Dorsiflexion 4-/5   Vision-Basic Assessment   Current Vision Wears glasses for distance only  (" I wear them when I want to" (however they are bifocals))   Coordination   Movements are Fluid and Coordinated 0   Coordination and Movement Description frequent B foot > hand movements at rest   Light Touch   RLE Light Touch Grossly intact   LLE Light Touch Impaired   LLE Light Touch Comments great toe   Bed Mobility   Supine to Sit 4  Minimal assistance  (partial roll for R egress)   Additional items Assist x 1; Increased time required;Verbal cues; Bedrails;HOB elevated   Transfers   Sit to Stand 5  Supervision   Additional items Assist x 1; Increased time required;Armrests; Verbal cues   Stand to Sit 5  Supervision   Additional items Assist x 1; Increased time required;Verbal cues;Armrests   Ambulation/Elevation   Gait pattern Antalgic;Decreased R stance; Excessively slow; Step to   Gait Assistance 4  Minimal assist   Additional items Assist x 1;Verbal cues   Assistive Device None  (reaching for bed railing)   Distance 1' sidelstepping toward head of bed   Stair Management Assistance Not tested   Balance   Static Sitting Good   Static Standing Fair -   Ambulatory Poor +   Endurance Deficit   Endurance Deficit Yes   Endurance Deficit Description  with just sitting EOB  Spo2 96% on 2l/m O2   Activity Tolerance   Activity Tolerance Patient limited by fatigue;Patient limited by pain   Medical Staff Made Aware spoke to [facility] [place] OT earlier in the day   Nurse Made Aware spoke to Sunday RN prior to session( who requested pt not to get to chair); notified RN after session due to pt's tachycardia, increased WOB despite Spo2 @ 91% once back to bed and on 2l/m O2   Assessment   Prognosis Fair   Problem List Decreased range of motion;Decreased strength;Decreased endurance; Impaired balance;Decreased mobility;Obesity; Decreased safety awareness; Impaired judgement;Decreased cognition;Decreased coordination;Pain  (gait deviations)   Assessment Pt is a 82 y o  male seen for PT evaluation s/p admit to [facility] on 2/11/2022 w/ Syncope and collapse  Order placed for PT  Prior to admission: Pt lives w/ w/o in one floor condo (in 55+community), no DME for mobility, no O2 , no MELIDA  Upon evaluation: Pt did not require A for bed mobility nor sit<>stand with UE support of bed rail, but min A for pregait activity     Pt's clinical presentation is currently unstable/unpredictable given the functional mobility deficits above, especially (but not limited to) weakness, gait deviations, decreased functional mobility tolerance and SOB upon exertion, coupled with fall risks including hx of falls, impaired balance and decreased cognition, and combined with medical complications of tachycardia, abnormal renal lab values, abnormal H&H and new onset O2 use  Recommend continued mobility trials w/rolling walker over next few sessions, therex for LE strengthening, balance activites   May benefit from further w/u of etiology abnormal movements/coordination (neurology?)   Barriers to Discharge Decreased caregiver support   Barriers to Discharge Comments Pt reports he is the one assisting his wife normally   Goals   Patient Goals to go home, have less foot pain when I walk   STG Expiration Date 02/24/22   Short Term Goal #1 Pt will: Perform bed mobility tasks with modified I to prepare for transfers and reposition in bed  Perform transfers with modified I to improve ease of transfers and with proper hand placement  Perform ambulation with LRAD for at least 48' with S to increase Indep in home environment, decrease risk for falls and promote proper use of assistive device  Perform modified 30 sec STS and improve baseline score to demonstrate less risk for falls and improve activity tolerance  Perform TUG w/ walker and improve baseline score to demonstrate less risk for falls      PT Treatment Day 0   Plan   Treatment/Interventions Functional transfer training;LE strengthening/ROM; Therapeutic exercise; Endurance training;Patient/family training;Equipment eval/education; Bed mobility;Gait training;Cognitive reorientation;Spoke to nursing   PT Frequency 3-5x/wk   Recommendation   PT Discharge Recommendation Post acute rehabilitation services   Equipment Recommended Walker  (AND SHOES--due to pt having foot pain and shoes may provide increased support)   Walker Package Recommended Wheeled walker   AM-PAC Basic Mobility Inpatient   Turning in Bed Without Bedrails 3   Lying on Back to Sitting on Edge of Flat Bed 1   Moving Bed to Chair 3   Standing Up From Chair 3   Walk in Room 1   Climb 3-5 Stairs 1   Basic Mobility Inpatient Raw Score 12   Basic Mobility Standardized Score 32 23   Highest Level Of Mobility   JH-HLM Goal 4: Move to chair/commode   JH-HLM Highest Level of Mobility 5: Stand (1 or more minutes)   JH-HLM Goal Achieved Yes   End of Consult   Patient Position at End of Consult Supine; All needs within reach;Bed/Chair alarm activated   (Please find full objective findings from PT assessment regarding body systems outlined above)  Pt to benefit from continued skilled PT tx while in hospital and upon DC to address deficits as defined above and maximize level of functional mobility  Co-morbidities affecting pt's physical performance at time of assessment include: A-fib, Diastolic heart failure, and HTN, Exploratory laparotomy    Personal factors affecting pt at time of IE include: unable to perform caregiver support/tasks, advanced age, inability to perform IADLs, inability to perform ADLs, inability to ambulate household distances, inability to navigate community distances and recent fall(s)    The patient's -[facility] Basic Mobility Inpatient Short Form Raw Score is 12, Standardized Score is 32 23  A standardized score less than 40 78 or Raw Score of 16 suggests the patient may benefit from discharge to post-acute rehabilitation services, which DOES coincide with CURRENT above PT recommendations  However please refer to therapist recommendation for discharge planning given other factors that may influence destination  Adapted from [employer] [facility] 6-Clicks Basic Mobility and Daily Activity Scores With Discharge Destination  Physical Therapy, 2021;101:1-9  DOI: [id number]    Portions of the record may have been created with voice recognition software  Occasional wrong word or "sound a like" substitutions may have occurred due to the inherent limitations of voice recognition software    Read the chart carefully and recognize, using context, where substitutions have occurred

## 2022-02-14 NOTE — ASSESSMENT & PLAN NOTE
Home medication:  Eliquis 5 mg b i d , amiodarone 100 mg daily, metoprolol 25 b i d  Status post cardioversion March of 2021   EKG:  AFib with controlled heart rate        Plan:  · Continue amiodarone  · Continue metoprolol  · Continue to hold Eliquis for now, will likely resume once source of bleeding is found out, discussed with the patient that holding Eliquis in increase the risk of stroke, patient and POA is aware

## 2022-02-14 NOTE — ASSESSMENT & PLAN NOTE
Recent Labs     02/13/22  0443 02/13/22  1608 02/14/22  0407   HGB 7 1* 7 4* 7 2*     Patient was found to have a microcytic anemia on admission, without acute blood loss  Patient denied any active bleeding, but he had no bowel movement in the past to 3 days  Iron panel showed iron deficiency anemia  Patient is status post 2 units PRBC    2/14 Per neuro cognitive assessment patient does not appear to have capacity to make medical decisions  I discussed the results with the patient and Negra Jameson  GI recommended EGD and colonoscopy, however patient himself does not want an EGD but agreeable with colonoscopy  Risks and benefits was discussed  Wally Serrano would like to have him have the procedure however does not wish to go against him even though he does not have capacity to make decisions  Kaycee First will follow what Mr Deejay Brady decides  They both decided that they are okay with colonoscopy but not EGD  I notified the GI team that patient does not want EGD only colonoscopy  GI try to talk to the patient however patient was still insistent of not getting EGD     02/15/2022 upon discussion with the patient and POA, they are now agreeable with EGD and colonoscopy, I Casper texted the GI team to let them know  Patient also reported clear stool, and was confirmed with nursing during the night    Plan:  · Completed iron infusions 3/3 days completed, Monitor hemoglobin and transfuse if less than 7, will start the patient on iron pills after colonoscopy  · CBC in am  · Patient is scheduled for colonoscopy today

## 2022-02-14 NOTE — OCCUPATIONAL THERAPY NOTE
Occupational Therapy Evaluation      Gonzalo Mengks    2/14/2022    Patient Active Problem List   Diagnosis    Syncope and collapse    Chronic diastolic heart failure (HCC)    Atrial fibrillation (HCC)    Laceration of left eyebrow    Anemia    Chronic kidney disease    Type 2 diabetes mellitus, without long-term current use of insulin (HCC)       Past Medical History:   Diagnosis Date    A-fib ([address] )     Diastolic heart failure (HCC)     HTN (hypertension)        Past Surgical History:   Procedure Laterality Date    EXPLORATORY LAPAROTOMY      with gastric ulcer repair        02/14/22 1126   OT Last Visit   OT Visit Date 02/14/22   Note Type   Note type Evaluation   Restrictions/Precautions   Weight Bearing Precautions Per Order No   Other Precautions Cognitive; Chair Alarm; Bed Alarm; Fall Risk;Pain   Pain Assessment   Pain Assessment Tool 0-10   Pain Score 9   Pain Location/Orientation Orientation: Bilateral;Location: Foot  (reporting pain in stance)   Effect of Pain on Daily Activities limits functional mobility   Patient's Stated Pain Goal No pain   Hospital Pain Intervention(s) Repositioned; Ambulation/increased activity; Emotional support   Home Living   Type of Home Other (Comment); House  (condo)   Home Layout One level;Performs ADLs on one level; Able to live on main level with bedroom/bathroom; Other (Comment)  (0 MELIDA)   Bathroom Shower/Tub Walk-in shower   Bathroom Toilet Standard   Bathroom Equipment Grab bars in [facility] Other (Comment)  (no AD)   Prior Function   Level of [place] Independent with ADLs and functional mobility   Lives With Significant other   Receives Help From [facility] [address] in the last 6 months 1 to 4  (1 per pt for recent admission)   Vocational Retired  ( )   Comments Pt reports (I) with ADL's/IADL's PTA  Pt reports + driving and no use of AD for mobility   Pt reports "friend" does most of the IADL's for pt  Lifestyle   Autonomy Pt reports (I) with ADL's/IADL's PTA living with "friend" in a 1 story condo    Reciprocal Relationships "friend" (sig other)   Service to Others retired     Intrinsic Gratification going out to eat   Subjective   Subjective "I guess my memory is bad"   ADL   Where Assessed Chair   Eating Assistance 5  Supervision/Setup   Eating Deficit Setup; Beverage management   LB Dressing Assistance 3  Moderate Assistance   LB Dressing Deficit Setup;Steadying;Verbal cueing;Supervision/safety; Increased time to complete; Don/doff R sock; Don/doff L sock   Additional Comments When donning socks in recliner, pt required VC to breathe during activity as pt face becoming exremely red with exertion  Pt responding "Oh yeah"  Pt required VC to pace self during activity as breahting became labored  Pt reporting breathing is normal    Bed Mobility   Additional Comments Pt received OOB in recliner; unable to assess    Transfers   Sit to Stand 4  Minimal assistance   Additional items Assist x 1; Increased time required;Verbal cues;Armrests   Stand to Sit 4  Minimal assistance   Additional items Assist x 1; Increased time required;Verbal cues;Armrests   Functional Mobility   Functional Mobility 4  Minimal assistance  (vs mod A x 1)   Additional Comments Pt to take few steps forward/backward from recliner  Pt with strong LOB anterior/posterior in stance and required + assist to regain balance  Pt with short shuffling steps and required + cueing to sequence steps  Further mobility declined due to report of dizziness     Additional items Hand hold assistance   Balance   Static Sitting Fair +   Dynamic Sitting Fair   Static Standing Poor +   Dynamic Standing Poor   Activity Tolerance   Activity Tolerance Patient limited by fatigue;Patient limited by pain   Medical Staff Made Aware yes, PT Oleksandr updated    Nurse Made Aware yes, RN Jordana Spears ok to see pt and updated on outcomes    RUE Assessment   RUE Assessment WFL  (grossly 4/5 MMT)   LUE Assessment   LUE Assessment WFL  (grossly 4/5 MMT)   Hand Function   Gross Motor Coordination Functional   Fine Motor Coordination Functional   Sensation   Light Touch No apparent deficits   Vision-Basic Assessment   Current Vision Wears glasses for distance only  ("I wear them when I drive")   Vision - Complex Assessment   Ocular Range of Motion WFL  (+ VC)   Head Position WDL   Tracking   (decreased smoothness of horizontal and vertical tracking)   Convergence Unable to test secondary to decreased visual attention  (despite VC pt unable to track stimuli to nose)   Acuity Able to read employee name badEndorse For A Cause without difficulty   Diplopia Assessment Other (Comment)  (pt reports no double vision)   Additional Comments Since fall, pt reports no changes in vision  Pt with visible bruising to L eye  Pt with poor ability to folow commands to track stimulus  Pt staring straight ahead and required VC to "find my finger"  Pt giggling throughout assessment and wiht poor visual attention  Pt able to find stimulus but only able to maintain tracking momentarily and returning to forward glance  Cognition   Overall Cognitive Status Impaired   Arousal/Participation Alert; Cooperative   Attention Attends with cues to redirect   Orientation Level Oriented to person;Oriented to place; Disoriented to time;Disoriented to situation   Memory Decreased recall of precautions;Decreased short term memory;Decreased recall of recent events   Following Commands Follows one step commands with increased time or repetition   Comments Pt pleasant and agreeable to OT session  Pt unable to report on fall saying "I don't remember anything, I must have passed out"  Pt required + VC during session for attention to task  Mini-Cog assessment performed today  Version 1 was given  Patient able to recall 0/3 words  Patient able to draw an abnormal clock with the incorrect time    Total score of test was 0/5 indicating  further screening of cognition is recommended  Pt was seen by Neuropsych for cognitive examination to determine capacity for medical decision making on 2/11  Pt scoring a 19/28 on the MMSE and determined to not be capable of having "capacity to make informed medical decisions"  If pt is to discharge home, recommending increased supervision/social support and a fitness to drive evaluation  Assessment   Limitation Decreased ADL status; Decreased Safe judgement during ADL;Decreased cognition;Decreased endurance;Decreased self-care trans;Decreased high-level ADLs; Visual deficit   Prognosis Good   Assessment Patient is a 82 y o  male seen for OT evaluation s/p admit to [address] [place] [address] on 2/11/2022 w/Syncope and collapse  Comorbidities affecting patient's functional performance at time of assessment include: Afib, anemia, CHF, CKD and diabetes  Orders received for OT evaluation and treatment  Patient identified during session through name and wristband  PTA, patient was independent with functional mobility without assistive device, independent with ADLS, independent with IADLS, living with sig other  in a 1 level home with 0 steps to enter and retired  Personal factors affecting patient at time of initial evaluation include: limited insight into deficits, decreased initiation and engagement, difficulty performing ADLs and difficulty performing IADLs  Patient is alert, oriented to name,, oriented to place,, disoriented to time, and disoriented to situation, and presents with limited ability to recall information after a brief period of time (short term memory) / working memory    The evaluation identifies the following performance deficits: impaired balance, decreased endurance, decreased coordination, increased fall risk, new onset of impairment of functional mobility, decreased ADLS, decreased IADLS, pain, decreased activity tolerance, decreased safety awareness, impaired judgement, SOB upon exertion, decreased cognition, decreased strength and visual deficits, that result in activity limitations  Based on the OT evaluation outcomes, functional performance deficits, and assessment findings, pt has been identified as high complexity, because the patient presents with comorbidites that affect occupational performance and required significant modification of tasks or assistance with consideration of multiple treatment options  The patient's raw score on the AM-PAC Daily Activity inpatient short form is 15, standardized score is 34 69, less than 39 4  Patient to benefit from continued Occupational Therapy treatment to address above deficits and maximize level of independence with ADLs and functional mobility  Occupational Performance areas to address include: grooming , bathing/ shower, dressing, toilet hygiene, transfer to all surfaces and functional ambulation  From OT standpoint, recommendation at time of d/c would be Post acute rehabilitation services  Goals   Patient Goals to go home   LTG Time Frame 10-14   Long Term Goal #1 see goals below    Plan   Treatment Interventions ADL retraining;Functional transfer training; Endurance training;Cognitive reorientation;Patient/family training;Equipment evaluation/education; Compensatory technique education;Continued evaluation; Energy conservation; Activityengagement   Goal Expiration Date 02/24/22   OT Treatment Day 0   OT Frequency 3-5x/wk   Recommendation   OT Discharge Recommendation Post acute rehabilitation services   OT - OK to Discharge Yes  (Once medically cleared)   Additional Comments  At end of session, pt returned to recliner with all needs met and call bell within reach    AM-PAC Daily Activity Inpatient   Lower Body Dressing 2   Bathing 2   Toileting 2   Upper Body Dressing 3   Grooming 3   Eating 3   Daily Activity Raw Score 15   Daily Activity Standardized Score (Calc for Raw Score >=11) 34 69   AM-PAC Applied Cognition Inpatient   Following a Speech/Presentation 2   Understanding Ordinary Conversation 3   Taking Medications 2   Remembering Where Things Are Placed or Put Away 2   Remembering List of 4-5 Errands 1   Taking Care of Complicated Tasks 1   Applied Cognition Raw Score 11   Applied Cognition Standardized Score 27 03     GOALS:    *Goals established to promote patient goal of going home:      *ADL transfers with (S) for inc'd independence with ADLs/purposeful tasks    *LB ADL with Min (A) using AE prn for inc'd independence with self cares    *Increase stand tolerance x2 m for inc'd tolerance with standing purposeful tasks    *Patient will verbalize 3 safety awareness/ principles to prevent falls in the home setting  *Patient will verbalize and demonstrate use of energy conservation/deep breathing techniques and work simplification skills during functional activities with no verbal cues  *Patient will increase OOB/sitting tolerance to 2-4 hours per day to increase participation in self-care and leisure tasks with no s/s of exertion  *Patient will engage in ongoing cognitive assessment to assist with safe discharge planning/recommendations  *Patient will improve functional activity tolerance to 10 minutes of sustained functional tasks to increase participation in basic self-care and decrease assistance level  *Patient will orient self x 4 with minimal verbal cues to increase overall awareness and promote safety with ADL/IADL tasks       Sabrina Cox, OTR/L

## 2022-02-14 NOTE — ASSESSMENT & PLAN NOTE
Most likely syncope secondary to symptomatic anemia  Troponin negative, glucose within normal limits    Negative orthostatics  Imaging negative for acute intracranial process or C-spine fracture  Fall precautions    Plan:   · Treat anemia  · Hemoglobin check at 6:00 p m , transfuse if hemoglobin less than 7  · CBC in the morning  · PT / OT recommending rehab

## 2022-02-14 NOTE — UTILIZATION REVIEW
Initial Clinical Review  OBSERVATION  2/11/22 @0221 CONVERTED TO INPATIENT ADMISSION 2/11/22 @1332 DUE TO CONTINUED STAY REQUIRED TO EVALUATE AND TREAT PATIENT WITH syncope and collapse, anemia with lab monitoring,further workup,  POSSIBLE GI CONSULT  Admission: Date/Time/Statement:   Admission Orders (From admission, onward)     Ordered        02/11/22 1332  Inpatient Admission  Once            02/11/22 0221  Place in Observation  Once                      Orders Placed This Encounter   Procedures    Inpatient Admission     Standing Status:   Standing     Number of Occurrences:   1     Order Specific Question:   Level of Care     Answer:   Med Surg [16]     Comments:   tele     Order Specific Question:   Estimated length of stay     Answer:   More than 2 Midnights     Order Specific Question:   Certification     Answer:   I certify that inpatient services are medically necessary for this patient for a duration of greater than two midnights  See H&P and MD Progress Notes for additional information about the patient's course of treatment  ED Arrival Information     Expected Arrival Acuity    - 2/11/2022 00:05 Emergent         Means of arrival Escorted by Service Admission type    Ambulance [facility] [employer] [facility] Emergency         Arrival complaint            Chief Complaint   Patient presents with    Fall     fall, + headstrike, + thinner (eliquis), lac        Initial Presentation: 82 y o  male with a PMH  significant for AFib on Eliquis, HTN, diastolic HF who presents as trauma B to ED from home via EMS after became dizzy , fell with head strike, no memory of event  On exam, pt alert, oriented, has minor laceration above L eye closed in ED with glue, GCS =15, no active bleeding Pt reports increased POLK with ADL's lately  CT head and c spine show no acute trauma  Labs - Hgb 5 9  Pt given IVF and IV Venofer as refusing blood transfusion and transfusion workup   Pt [facility] with syncope and collapse, microcytic anemia  Plan - continue IVF, f/u ferritin levels, IVF, , FOBT  Pt converted to IP 2/11 afternoon, continues with dizziness  Plan- medsurg telemetry  IV venofer x 3 days, hold AC/AP for now, offered GI consult but pt refusing at this time  Neuropsych-diffuse cognitive dysfunction and on a measure assessing awareness of personal health status and ability to evaluate health problems, handle medical emergencies and take safety precautions, patient performed in the IMPAIRED range of functioning  At this time, patient does not appear to have capacity to make informed medical decision  Date: 2/12   Day 2:   Pt  transfused 2 U PRBC 2/11   Hgb 7 3 this am Iron panel showed iron deficiency anemia  No BM x 3 days, unable to obtain FOBT  Pt needs colonoscopy/EGD to rule out active bleeding with Eliquis on hold    Pt and wife both agreed with colonoscopy/EGD if needed  GI consult placed  Continue iron infusions, monitoring H/H, transfuse hgb<7  Orthostatics neg   Torsemide on hold with elevated creat 1 79 on admission  Creat down to 1 43 today from baseline 11 1-1 7, continue to monitor creat           ED Triage Vitals   Temperature Pulse Respirations Blood Pressure SpO2   02/11/22 0007 02/11/22 0007 02/11/22 0007 02/11/22 0007 02/11/22 0007   (!) 97 2 °F (36 2 °C) 75 20 135/66 98 %      Temp Source Heart Rate Source Patient Position - Orthostatic VS BP Location FiO2 (%)   02/11/22 0007 02/11/22 0007 02/11/22 0007 02/11/22 0100 --   Tympanic Monitor Lying Right arm       Pain Score       02/11/22 0030       No Pain          Wt Readings from Last 1 Encounters:   02/14/22 86 9 kg (191 lb 9 3 oz)     Additional Vital Signs:   Date/Time Temp Pulse Resp BP MAP (mmHg) SpO2   02/12/22 0706 98 3 °F (36 8 °C) 89 18 146/69 99 91 %   02/11/22 2325 97 8 °F (36 6 °C) 93 18 147/69 98 93 %   02/11/22 2315 98 6 °F (37 °C) 90 18 144/70 -- 93 %   02/11/22 2147 98 4 °F (36 9 °C) 90 18 138/60 -- 94 %   02/11/22 1131 98 4 °F (36 9 °C) 92 18 130/67 -- 92 %   02/11/22 2040 98 6 °F (37 °C) 86 18 136/60 -- 92 %   02/11/22 2035 98 5 °F (36 9 °C) 87 20 144/64 -- 91 %   02/11/22 1915 -- -- -- -- -- --   02/11/22 1908 98 4 °F (36 9 °C) 88 18 130/67 93 93 %   02/11/22 1842 -- 91 16 153/67 96 95 %   02/11/22 1705 98 4 °F (36 9 °C) 80 18 168/70 -- 98 %   02/11/22 1653 98 4 °F (36 9 °C) 75 18 137/65 -- --   02/11/22 1359 -- 92 18 154/70 -- --standing orthostatic VS   02/11/22 1357 -- 88 18 146/68 -- 98 % sitting orthostatic VS   02/11/22 1356 -- 91 18 152/70 -- 98 %lying orthostatic VS   02/11/22 1153 98 1 °F (36 7 °C) 90 18 148/68 -- 98 %   02/11/22 0856 97 9 °F (36 6 °C) 91 18 157/70 -- 97 %   02/11/22 0648 -- 73 18 145/66 -- 96 %   02/11/22 0546 -- 84 18 142/61 -- 97 %   02/11/22 0422 -- 83 18 129/63 -- 95 %   02/11/22 0245 -- 74 18 134/63 -- 95 %   02/11/22 0145 -- 79 18 120/60 -- 96 %   02/11/22 0130 -- 78 18 117/58 -- 95 %     Date and Time Eye Opening Best Verbal Response Best Motor Response [place] Coma Scale Score   02/12/22 0958 4 5 6 15   02/11/22 1915 4 5 6 15   02/11/22 1153 4 5 6 15   02/11/22 0915 4 5 6 15   02/11/22 0503 4 5 6 15   02/11/22 0245 4 5 6 15   02/11/22 0230 4 5 6 15   02/11/22 0215 4 5 6 15   02/11/22 0200 4 5 6 15   02/11/22 0145 4 5 6 15   02/11/22 0130 4 5 6 15   02/11/22 0115 4 5 6 15   02/11/22 0100 4 5 6 15   02/11/22 0045 4 5 6 15   02/11/22 0030 4 5 6 15   02/11/22 0015 4 5 6 15   02/11/22 0007 4 5 6 15     Pertinent Labs/Diagnostic Test Results:   2/11   XR mult trauma- CXR- No acute cardiopulmonary disease within limitations of supine imaging    Question 9 mm nodule adjacent to left heart border  Follow-up upright dual energy PA and lateral views when clinically feasible or CT chest advised for further evaluation  CT head-No acute intracranial abnormality  CT C spine-Images are motion affected    No displaced cervical spine fracture or gross traumatic malalignment  CT chest-1    Mildly prominent left lateral pericardial fat likely corresponds to suspected pulmonary nodule on recent plain film  No pulmonary nodule identified  2   Mild CHF with small bibasilar pleural effusions  3   Hepatomegaly  ECG-Atrial fibrillation  Left anterior fascicular block  Cannot rule out Anterior infarct , age undetermined        Results from last 7 days   Lab Units 02/14/22  0407 02/13/22  1608 02/13/22  0443 02/12/22  0434 02/12/22  0131 02/11/22  0310   WBC Thousand/uL 8 29  --  9 23 7 38  --    < >   HEMOGLOBIN g/dL 7 2* 7 4* 7 1* 7 3* 7 1*  --    HEMATOCRIT % 25 5* 25 4* 24 6* 25 1* 23 9*  --    PLATELETS Thousands/uL 342  --  369 383  --    < >   NEUTROS ABS Thousands/µL  --   --  7 11  --   --   --     < > = values in this interval not displayed           Results from last 7 days   Lab Units 02/14/22  0406 02/13/22  0443 02/12/22  0434 02/11/22  0048 02/11/22  0014   SODIUM mmol/L 130* 132* 133* 134*  --    POTASSIUM mmol/L 4 2 4 0 3 8 3 9  --    CHLORIDE mmol/L 98* 99* 99* 98*  --    CO2 mmol/L 23 25 24 26  --    CO2, I-STAT mmol/L  --   --   --   --  27   ANION GAP mmol/L 9 8 10 10  --    BUN mg/dL 22 26* 29* 43*  --    CREATININE mg/dL 1 31* 1 40* 1 43* 1 79*  --    EGFR ml/min/1 73sq m 49 45 44 34  --    CALCIUM mg/dL 8 8 8 9 8 5 8 6  --    MAGNESIUM mg/dL  --  2 4  --   --   --      Results from last 7 days   Lab Units 02/13/22  0443   AST U/L 17   ALT U/L 21   ALK PHOS U/L 200*   TOTAL PROTEIN g/dL 6 3*   ALBUMIN g/dL 3 2*   TOTAL BILIRUBIN mg/dL 1 06*     Results from last 7 days   Lab Units 02/14/22  0708 02/13/22  2126 02/13/22  1613 02/13/22  1114   POC GLUCOSE mg/dl 127 160* 125 147*     Results from last 7 days   Lab Units 02/14/22  0406 02/13/22  0443 02/12/22  0434 02/11/22  0048   GLUCOSE RANDOM mg/dL 124 127 161* 163*         Results from last 7 days   Lab Units 02/11/22  0310   HEMOGLOBIN A1C % 8 1*   EAG mg/dl 186       Results from last 7 days   Lab Units 02/11/22  0014   PH, LIBRADO I-STAT  7 398 PCO2, LIBRADO ISTAT mm HG 41 0*   PO2, LIBRADO ISTAT mm HG 27 0*   HCO3, LIBRADO ISTAT mmol/L 25 3   I STAT BASE EXC mmol/L 0   I STAT O2 SAT % 50*         Results from last 7 days   Lab Units 02/11/22  0552 02/11/22  0310 02/11/22  0048   HS TNI 0HR ng/L  --   --  35   HS TNI 2HR ng/L  --  35  --    HSTNI D2 ng/L  --  0  --    HS TNI 4HR ng/L 33  --   --    HSTNI D4 ng/L -2  --   --                      Results from last 7 days   Lab Units 02/11/22  0048   LACTIC ACID mmol/L 1 2                 Results from last 7 days   Lab Units 02/11/22  0048   FERRITIN ng/mL 5*         ED Treatment:   Medication Administration from 02/10/2022 2352 to 02/11/2022 1904       Date/Time Order Dose Route Action     02/11/2022 0159 tetanus-diphtheria-acellular pertussis (BOOSTRIX) IM injection 0 5 mL 0 5 mL Intramuscular Given     02/11/2022 0859 multi-electrolyte (PLASMALYTE-A/ISOLYTE-S PH 7 4) IV solution 0 mL/hr Intravenous Stopped     02/11/2022 0319 multi-electrolyte (PLASMALYTE-A/ISOLYTE-S PH 7 4) IV solution 75 mL/hr Intravenous New Bag     02/11/2022 0546 iron sucrose (VENOFER) 100 mg in sodium chloride 0 9 % 100 mL IVPB 0 mg Intravenous Stopped     02/11/2022 0438 iron sucrose (VENOFER) 100 mg in sodium chloride 0 9 % 100 mL IVPB 100 mg Intravenous New Bag     02/11/2022 0909 metoprolol tartrate (LOPRESSOR) tablet 25 mg 25 mg Oral Given     02/11/2022 0909 amiodarone tablet 100 mg 100 mg Oral Given     02/11/2022 1353 multi-electrolyte (PLASMALYTE-A/ISOLYTE-S PH 7 4) IV solution 0 mL/hr Intravenous Stopped     02/11/2022 0908 multi-electrolyte (PLASMALYTE-A/ISOLYTE-S PH 7 4) IV solution 75 mL/hr Intravenous New Bag     02/11/2022 1153 iron sucrose (VENOFER) 100 mg in sodium chloride 0 9 % 100 mL IVPB 0 mg Intravenous Stopped     02/11/2022 1130 iron sucrose (VENOFER) 100 mg in sodium chloride 0 9 % 100 mL IVPB 100 mg Intravenous New Bag     02/11/2022 1619 pantoprazole (PROTONIX) injection 40 mg 40 mg Intravenous Given        Past Medical History:   Diagnosis Date    A-fib ([facility] [address] )     Diastolic heart failure (HCC)     HTN (hypertension)      Present on Admission:  **None**      Admitting Diagnosis: Syncope and collapse [R55]  Head injury [S09 90XA]  Anemia, unspecified type [D64 9]  Age/Sex: 82 y o  male  Admission Orders:  Scheduled Medications:  amiodarone, 100 mg, Oral, Daily With Breakfast  amLODIPine, 10 mg, Oral, Daily  docusate sodium, 100 mg, Oral, BID  doxazosin, 8 mg, Oral, HS  insulin lispro, 1-5 Units, Subcutaneous, TID AC  insulin lispro, 1-5 Units, Subcutaneous, HS  metoprolol tartrate, 25 mg, Oral, Q12H CHUY  pantoprazole, 40 mg, Intravenous, Q12H CHUY  polyethylene glycol, 17 g, Oral, Daily  pravastatin, 40 mg, Oral, Daily With Dinner      Continuous IV Infusions:     PRN Meds:     SCD's   Up w/ assist   ortho BP x1  FOBT 1-3        IP CONSULT TO NEUROPSYCHOLOGY  IP CONSULT TO GASTROENTEROLOGY    Network Utilization Review Department  ATTENTION: Please call with any questions or concerns to [phone] and carefully listen to the prompts so that you are directed to the right person  All voicemails are confidential   Irsaema Cruz all requests for admission clinical reviews, approved or denied determinations and any other requests to dedicated fax number below belonging to the campus where the patient is receiving treatment   List of dedicated fax numbers for the Facilities:  [address] DENIALS (Administrative/Medical Necessity) [phone]   [address] (Maternity/NICU/Pediatrics) [phone]   [address] [place] [address] [address] [address] [facility] Amna Moorea Redman [address] [phone]

## 2022-02-14 NOTE — ASSESSMENT & PLAN NOTE
Lab Results   Component Value Date    HGBA1C 8 1 (H) 02/11/2022       Recent Labs     02/13/22  1114 02/13/22  1613 02/13/22  2126 02/14/22  0708   POCGLU 147* 125 160* 127       Blood Sugar Average: Last 72 hrs:   not on any medication       sliding scale insulin, will continue discontinuing insulin coverage  and check if sugar remains stable  Will likely discharge on metformin 500 mg extended release at a lower dose based on EGFR 45, recommended A1c 7 5-8% based on his age

## 2022-02-14 NOTE — PROGRESS NOTES
[facility]  Progress Note Christian Coleman 1935, 82 y o  male MRN: [id number]  Unit/Bed#: S -01 Encounter: [id number]  Primary Care Provider: No primary care provider on file  Date and time admitted to hospital: 2/11/2022 12:05 AM    * Syncope and collapse  Assessment & Plan  Most likely syncope secondary to symptomatic anemia  Troponin negative, glucose within normal limits  Negative orthostatics  Imaging negative for acute intracranial process or C-spine fracture  Fall precautions    Plan:   Treat anemia          Anemia  Assessment & Plan  Recent Labs     02/13/22  0443 02/13/22  1608 02/14/22  0407   HGB 7 1* 7 4* 7 2*     Patient was found to have a microcytic anemia on admission, without acute blood loss  Patient denied any active bleeding, but he had no bowel movement in the past to 3 days  Iron panel showed iron deficiency anemia  Patient is status post 2 units PRBC    2/14 Per neuro cognitive assessment patient does not appear to have capacity to make medical decisions  I discussed the results with the patient and Nando Rosa  GI recommended EGD and colonoscopy, however patient himself does not want an EGD but agreeable with colonoscopy  Risks and benefits was discussed  Juan Thomas would like to have him have the procedure however does not wish to go against him even though he does not have capacity to make decisions  Brynntianna Louise will follow what Mr Malika Angulo decides  They both decided that they are okay with colonoscopy but not EGD  I notified the GI team that patient does not want EGD only colonoscopy  GI try to talk to the patient however patient was still insistent of not getting EGD      Plan:  · Completed iron infusions 3/3 days completed  · Monitor hemoglobin and transfuse if less than 7  · CBC in am  · Awaiting GI recommendation:started on clear liquids, continue IV PPI BID, pending colonoscopy, regarding EGD will readdress in am per GI team     Type 2 diabetes mellitus, without long-term current use of insulin [facility])  Assessment & Plan  Lab Results   Component Value Date    HGBA1C 8 1 (H) 02/11/2022       Recent Labs     02/13/22  1114 02/13/22  1613 02/13/22  2126 02/14/22  0708   POCGLU 147* 125 160* 127       Blood Sugar Average: Last 72 hrs:   not on any medication  sliding scale insulin, will continue discontinuing insulin coverage  and check if sugar remains stable  Will likely discharge on metformin 500 mg extended release at a lower dose based on EGFR 45, recommended A1c 7 5-8% based on his age      Chronic kidney disease  Assessment & Plan  Recent Labs     02/12/22  0434 02/13/22  0443 02/14/22  0406   CREATININE 1 43* 1 40* 1 31*   EGFR 44 45 49     Estimated Creatinine Clearance: 43 3 mL/min (A) (by C-G formula based on SCr of 1 31 mg/dL (H))  · Baseline creatinine appears to be between (1 3 -1 5) difficult to find a real baseline since patient rarely get blood work  · Patient is back to baseline  · Avoid nephrotoxins, hypotension  · Holding torsemide and losartan      Laceration of left eyebrow  Assessment & Plan  Small laceration left eyebrow repaired bedside without incident  Local wound care and repeat observations    Atrial fibrillation [facility])  Assessment & Plan  Home medication:  Eliquis 5 mg b i d , amiodarone 100 mg daily, metoprolol 25 b i d  Status post cardioversion March of 2021   EKG:  AFib with controlled heart rate  Plan:  · Continue amiodarone  · Continue metoprolol  · Continue to hold Eliquis for now  Chronic diastolic heart failure (HCC)  Assessment & Plan  Wt Readings from Last 3 Encounters:   02/14/22 86 9 kg (191 lb 9 3 oz)     Patient follows with Cardiology as outpatient([address])  Last echo 2020 showed LVEF 50%, severe concentric hypertrophy, aortic sclerosis without stenosis, moderate mitral regurgitation    Per notes, patient was on torsemide 20 mg b i d  as home medication  Patient's torsemide currently on hold due to ANDREIA and suspicion for prerenal   Intake 1 3 L output 1 3 L yesterday  Plan:  Continue metoprolol 25 mg b i d  Continue to hold torsemide for possible bowel prep , if no bowel prep today will consider resuming torsemide 20 mg twice a day, at a lower dose  Strict intake output  Monitor weight          VTE Pharmacologic Prophylaxis:   Moderate Risk (Score 3-4) - Pharmacological DVT Prophylaxis Contraindicated  Sequential Compression Devices Ordered  Patient Centered Rounds: I performed bedside rounds with nursing staff today  Discussions with Specialists or Other Care Team Provider:     Education and Discussions with Family / Patient: Will update Tisha Britany in the afternoon, usually visits in the afternoon  Current Length of Stay: 3 day(s)  Current Patient Status: Inpatient   Discharge Plan: To be determined    Code Status: Level 3 - DNAR and DNI    Subjective:   Patient reported no bowel movement today, denies any chest pain shortness of breath or any nausea vomiting, patient insistent of note EGD, but he is agreeable with colonoscopy  I told the patient if he starts having difficulty breathing to let me know since we are holding his torsemide for possible bowel prep today  Do not want him to be too dehydrated    Objective:     Vitals:   Temp (24hrs), Av 5 °F (36 9 °C), Min:98 2 °F (36 8 °C), Max:98 7 °F (37 1 °C)    Temp:  [98 2 °F (36 8 °C)-98 7 °F (37 1 °C)] 98 2 °F (36 8 °C)  HR:  [] 86  Resp:  [16-22] 18  BP: (117-169)/(62-93) 146/73  SpO2:  [88 %-90 %] 90 %  Body mass index is 30 92 kg/m²  Input and Output Summary (last 24 hours): Intake/Output Summary (Last 24 hours) at 2022 0818  Last data filed at 2022 0501  Gross per 24 hour   Intake --   Output 1150 ml   Net -1150 ml       Physical Exam:   Physical Exam  Vitals reviewed  Constitutional:       General: He is not in acute distress  Appearance: He is obese  He is not diaphoretic     HENT:      Head: Normocephalic  Mouth/Throat:      Mouth: Mucous membranes are moist    Eyes:      General:         Right eye: No discharge  Left eye: No discharge  Extraocular Movements: Extraocular movements intact  Conjunctiva/sclera: Conjunctivae normal       Pupils: Pupils are equal, round, and reactive to light  Cardiovascular:      Rate and Rhythm: Normal rate  Rhythm irregular  Pulses: Normal pulses  Heart sounds: Normal heart sounds  Pulmonary:      Effort: Pulmonary effort is normal  No respiratory distress  Breath sounds: Normal breath sounds  No wheezing or rales  Abdominal:      General: Abdomen is flat  Bowel sounds are normal  There is no distension  Palpations: Abdomen is soft  Tenderness: There is no abdominal tenderness  There is no guarding or rebound  Musculoskeletal:         General: Normal range of motion  Cervical back: Normal range of motion  Right lower leg: Edema (Trace) present  Left lower leg: Edema (Trace) present  Skin:     General: Skin is warm  Capillary Refill: Capillary refill takes less than 2 seconds  Findings: Bruising (around the left eye ) present  Neurological:      General: No focal deficit present  Mental Status: He is alert  Mental status is at baseline  Comments: Patient knows the place, he knows his in the hospital [facility], he knows in [place],, the time, the year, and month   Psychiatric:         Mood and Affect: Mood normal          Behavior: Behavior normal          Thought Content:  Thought content normal          Judgment: Judgment normal           Additional Data:     Labs:  Results from last 7 days   Lab Units 02/14/22  0407 02/13/22  1608 02/13/22  0443   WBC Thousand/uL 8 29  --  9 23   HEMOGLOBIN g/dL 7 2*   < > 7 1*   HEMATOCRIT % 25 5*   < > 24 6*   PLATELETS Thousands/uL 342  --  369   NEUTROS PCT %  --   --  77*   LYMPHS PCT %  --   --  8*   MONOS PCT %  -- --  13*   EOS PCT %  --   --  1    < > = values in this interval not displayed  Results from last 7 days   Lab Units 02/14/22  0406 02/13/22  0443 02/13/22  0443   SODIUM mmol/L 130*   < > 132*   POTASSIUM mmol/L 4 2   < > 4 0   CHLORIDE mmol/L 98*   < > 99*   CO2 mmol/L 23   < > 25   BUN mg/dL 22   < > 26*   CREATININE mg/dL 1 31*   < > 1 40*   ANION GAP mmol/L 9   < > 8   CALCIUM mg/dL 8 8   < > 8 9   ALBUMIN g/dL  --   --  3 2*   TOTAL BILIRUBIN mg/dL  --   --  1 06*   ALK PHOS U/L  --   --  200*   ALT U/L  --   --  21   AST U/L  --   --  17   GLUCOSE RANDOM mg/dL 124   < > 127    < > = values in this interval not displayed  Results from last 7 days   Lab Units 02/14/22  0708 02/13/22  2126 02/13/22  1613 02/13/22  1114   POC GLUCOSE mg/dl 127 160* 125 147*     Results from last 7 days   Lab Units 02/11/22  0310   HEMOGLOBIN A1C % 8 1*     Results from last 7 days   Lab Units 02/11/22  0048   LACTIC ACID mmol/L 1 2       Lines/Drains:  Invasive Devices  Report    Peripheral Intravenous Line            Peripheral IV 02/11/22 Left Antecubital 3 days                  Telemetry:  Telemetry Orders (From admission, onward)             24 Hour Telemetry Monitoring  Continuous x 24 Hours (Telem)        References:    Telemetry Guidelines   Question:  Reason for 24 Hour Telemetry  Answer:  Syncope suspected to be cardiac in origin                 Telemetry Reviewed: Atrial fibrillation  HR averaging 60  Indication for Continued Telemetry Use: No indication for continued use  Will discontinue                Imaging: Reviewed radiology reports from this admission including: chest xray    Recent Cultures (last 7 days):         Last 24 Hours Medication List:   Current Facility-Administered Medications   Medication Dose Route Frequency Provider Last Rate    amiodarone  100 mg Oral Daily With Breakfast Elyse Cunha MD      amLODIPine  10 mg Oral Daily Kaela Tam MD      docusate sodium  100 mg Oral BID Tiera Gutierres MD      doxazosin  8 mg Oral HS Yumi Hough MD      insulin lispro  1-5 Units Subcutaneous TID [facility] [place] Dangelo Burrows MD      insulin lispro  1-5 Units Subcutaneous HS Dangelo Burrows MD      metoprolol tartrate  25 mg Oral Q12H [address] Yumi Hough MD      pantoprazole  40 mg Intravenous Q12H Eliezer Angulo MD      polyethylene glycol  17 g Oral Daily Tiera Gutierres MD      pravastatin  40 mg Oral Daily With Mona Kay MD          Today, Patient Was Seen By: Dangelo Burrows MD    **Please Note: This note may have been constructed using a voice recognition system  **

## 2022-02-14 NOTE — PROGRESS NOTES
Progress Note - Ronna Essex 82 y o  male MRN: [id number]    Unit/Bed#: S -01 Encounter: [id number]    Assessment and Plan:     1  Iron deficiency anemia  Patient presented yesterday after a fall with hemoglobin noted to be 5 9  Denies any signs of GI bleeding  Hemoglobin now stable at 7 2  BUN normal   Patient has no complaints at this time  Reports last colonoscopy many years ago     -as noted below, EGD was planned for today however patient is refusing  He was evaluated by neuropsych and deemed unable to make medical decisions however patient's wife does not want to go against his wishes   -discussed in detail with patient the importance of EGD due to history of anemia  He reports he does not have this done due to history of gastric ulcer surgery in the past and does not want anyone touching that area  I discussed that we very commonly do procedures on patients with prior abdominal/gastric surgeries  -I called and spoke with patient's power of  and wife, Olinda Wing  I discussed how I would recommend both EGD and colonoscopy  She reports that she believes her  is able to make medical decisions  I discussed the evaluation performed by a neuropsychology which reports that he is unable to  She reports this is likely due to him being admitted right prior to this evaluation  I discussed it was performed later in the day of his admission  -patient's wife reports she will not go against patient's wishes at this time therefore EGD will not be performed     -patient is still okay with proceeding with colonoscopy  -continue clear liquid diet for today   -will place bowel prep orders for later today    -NPO after midnight     -continue monitor hemoglobin and bowel movements and transfuse as needed per primary team     -patient is on Eliquis with last dose prior to admission 2/11     ----------------------------------------------------------------------------------------------------------------    Subjective:     Patient reports he is doing well  He was scheduled for EGD today however appears to be refusing this  I discussed with patient at bedside  He reports he does not have EGD done due to his prior history of gastric ulcer repair and does not want anything touching that area  I discussed that is very common to do procedures on patients with prior abdominal/gastric surgeries  Per neuropsych note 02/11/2022 patient is not able to make informed medical decisions however patient is wife does not want to go against his wishes  Objective:     Vitals: Blood pressure 146/73, pulse 86, temperature 98 2 °F (36 8 °C), temperature source Oral, resp  rate 18, height 5' 6" (1 676 m), weight 86 9 kg (191 lb 9 3 oz), SpO2 90 %  ,Body mass index is 30 92 kg/m²  Intake/Output Summary (Last 24 hours) at 2/14/2022 1019  Last data filed at 2/14/2022 0501  Gross per 24 hour   Intake --   Output 1150 ml   Net -1150 ml       Physical Exam:     General Appearance: Alert, appears stated age and cooperative  Sitting comfortably in bed  Does not appear in distress     Lungs: Clear to auscultation bilaterally, no rales or rhonchi, no labored breathing/accessory muscle use  Heart: Regular rate and rhythm, S1, S2 normal, no murmur, click, rub or gallop  Abdomen: Soft, non-tender, non-distended; bowel sounds normal; no masses or no organomegaly  Extremities: No cyanosis, clubbing, or edema    Invasive Devices  Report    Peripheral Intravenous Line            Peripheral IV 02/11/22 Left Antecubital 3 days                Lab Results:  Results from last 7 days   Lab Units 02/14/22  0407 02/13/22  1608 02/13/22  0443   WBC Thousand/uL 8 29  --  9 23   HEMOGLOBIN g/dL 7 2*   < > 7 1*   HEMATOCRIT % 25 5*   < > 24 6*   PLATELETS Thousands/uL 342  --  369   NEUTROS PCT %  --   --  77*   LYMPHS PCT %  --   -- 8*   MONOS PCT %  --   --  13*   EOS PCT %  --   --  1    < > = values in this interval not displayed  Results from last 7 days   Lab Units 02/14/22  0406 02/13/22  0443 02/13/22  0443 02/11/22  0048 02/11/22  0014   POTASSIUM mmol/L 4 2   < > 4 0   < >  --    CHLORIDE mmol/L 98*   < > 99*   < >  --    CO2 mmol/L 23   < > 25   < >  --    CO2, I-STAT mmol/L  --   --   --   --  27   BUN mg/dL 22   < > 26*   < >  --    CREATININE mg/dL 1 31*   < > 1 40*   < >  --    CALCIUM mg/dL 8 8   < > 8 9   < >  --    ALK PHOS U/L  --   --  200*  --   --    ALT U/L  --   --  21  --   --    AST U/L  --   --  17  --   --    GLUCOSE, ISTAT mg/dl  --   --   --   --  165*    < > = values in this interval not displayed  Invalid input(s): BILI            Imaging Studies: I have personally reviewed pertinent imaging studies  TRAUMA - CT head wo contrast    Addendum Date: 2/11/2022    ADDENDUM: I personally discussed this study with Jovita Medina on 2/11/2022 at 1:31 AM      Result Date: 2/11/2022  Impression: No acute intracranial abnormality  I personally discussed this study with Dr Beena White on 2/11/2022 at 1:02 AM  Workstation performed: [id number]     CT chest wo contrast    Result Date: 2/11/2022  Impression: 1  Mildly prominent left lateral pericardial fat likely corresponds to suspected pulmonary nodule on recent plain film  No pulmonary nodule identified  2   Mild CHF with small bibasilar pleural effusions  3   Hepatomegaly  Workstation performed: [id number]     TRAUMA - CT spine cervical wo contrast    Addendum Date: 2/11/2022     I personally discussed this study with Jovita Medina on 2/11/2022 at 1:31 AM      Result Date: 2/11/2022  Impression: Images are motion affected  No displaced cervical spine fracture or gross traumatic malalignment    I personally discussed this study with Dr Beena White on 2/11/2022 at 1:02 AM   Workstation performed: [id number]     XR Trauma multiple ([facility] trauma bay ONLY)    Result Date: 2/11/2022  Impression: No acute cardiopulmonary disease within limitations of supine imaging  Question 9 mm nodule adjacent to left heart border  Follow-up upright dual energy PA and lateral views when clinically feasible or CT chest advised for further evaluation  The study was marked in [facility] for immediate notification and follow-up   Workstation performed: [id number]

## 2022-02-14 NOTE — PLAN OF CARE
Problem: OCCUPATIONAL THERAPY ADULT  Goal: Performs self-care activities at highest level of function for planned discharge setting  See evaluation for individualized goals  Description: Treatment Interventions: ADL retraining,Functional transfer training,Endurance training,Cognitive reorientation,Patient/family training,Equipment evaluation/education,Compensatory technique education,Continued evaluation,Energy conservation,Activityengagement          See flowsheet documentation for full assessment, interventions and recommendations  Note: Limitation: Decreased ADL status,Decreased Safe judgement during ADL,Decreased cognition,Decreased endurance,Decreased self-care trans,Decreased high-level ADLs,Visual deficit  Prognosis: Good  Assessment: Patient is a 82 y o  male seen for OT evaluation s/p admit to [address] [place] [address] on 2/11/2022 w/Syncope and collapse  Comorbidities affecting patient's functional performance at time of assessment include: Afib, anemia, CHF, CKD and diabetes  Orders received for OT evaluation and treatment  Patient identified during session through name and wristband  PTA, patient was independent with functional mobility without assistive device, independent with ADLS, independent with IADLS, living with sig other  in a 1 level home with 0 steps to enter and retired  Personal factors affecting patient at time of initial evaluation include: limited insight into deficits, decreased initiation and engagement, difficulty performing ADLs and difficulty performing IADLs  Patient is alert, oriented to name,, oriented to place,, disoriented to time, and disoriented to situation, and presents with limited ability to recall information after a brief period of time (short term memory) / working memory    The evaluation identifies the following performance deficits: impaired balance, decreased endurance, decreased coordination, increased fall risk, new onset of impairment of functional mobility, decreased ADLS, decreased IADLS, pain, decreased activity tolerance, decreased safety awareness, impaired judgement, SOB upon exertion, decreased cognition, decreased strength and visual deficits, that result in activity limitations  Based on the OT evaluation outcomes, functional performance deficits, and assessment findings, pt has been identified as high complexity, because the patient presents with comorbidites that affect occupational performance and required significant modification of tasks or assistance with consideration of multiple treatment options  The patient's raw score on the AM-PAC Daily Activity inpatient short form is 15, standardized score is 34 69, less than 39 4  Patient to benefit from continued Occupational Therapy treatment to address above deficits and maximize level of independence with ADLs and functional mobility  Occupational Performance areas to address include: grooming , bathing/ shower, dressing, toilet hygiene, transfer to all surfaces and functional ambulation  From OT standpoint, recommendation at time of d/c would be Post acute rehabilitation services         OT Discharge Recommendation: Post acute rehabilitation services  OT - OK to Discharge: Yes (Once medically cleared)     Annelise Chen, OT

## 2022-02-14 NOTE — ASSESSMENT & PLAN NOTE
Wt Readings from Last 3 Encounters:   02/14/22 86 9 kg (191 lb 9 3 oz)     Patient follows with Cardiology as outpatient([address] [facility])  Last echo 2020 showed LVEF 50%, severe concentric hypertrophy, aortic sclerosis without stenosis, moderate mitral regurgitation  Per notes, patient was on torsemide 20 mg b i d  as home medication  Patient's torsemide currently on hold due to ANDREIA and suspicion for prerenal   Intake 1 3 L output 1 3 L yesterday  Plan:  Continue metoprolol 25 mg b i d    Continue to hold torsemide for possible bowel prep , if no bowel prep today will consider resuming torsemide 20 mg twice a day, at a lower dose  Strict intake output  Monitor weight

## 2022-02-14 NOTE — ASSESSMENT & PLAN NOTE
Recent Labs     02/13/22  0443 02/13/22  1608 02/14/22  0407   HGB 7 1* 7 4* 7 2*     Patient was found to have a microcytic anemia on admission, without acute blood loss  Patient denied any active bleeding, but he had no bowel movement in the past to 3 days  Iron panel showed iron deficiency anemia  Patient is status post 2 units PRBC    2/14 Per neuro cognitive assessment patient does not appear to have capacity to make medical decisions  I discussed the results with the patient and Landon Rey  GI recommended EGD and colonoscopy, however patient himself does not want an EGD but agreeable with colonoscopy  Risks and benefits was discussed  Zulay Parker would like to have him have the procedure however does not wish to go against him even though he does not have capacity to make decisions  Jim Morton will follow what Mr Alva Salazar decides  They both decided that they are okay with colonoscopy but not EGD  I notified the GI team that patient does not want EGD only colonoscopy  GI try to talk to the patient however patient was still insistent of not getting EGD      Plan:  · Completed iron infusions 3/3 days completed  · Monitor hemoglobin and transfuse if less than 7  · CBC in am  · Awaiting GI recommendation:started on clear liquids, continue IV PPI BID, pending colonoscopy, regarding EGD will readdress in am per GI team

## 2022-02-14 NOTE — ASSESSMENT & PLAN NOTE
Lab Results   Component Value Date    HGBA1C 8 1 (H) 02/11/2022       Recent Labs     02/13/22  1613 02/13/22  2126 02/14/22  0708 02/14/22  1145   POCGLU 125 160* 127 210*       Blood Sugar Average: Last 72 hrs:   not on any medication       sliding scale insulin, will continue discontinuing insulin coverage  and check if sugar remains stable  Will likely discharge on metformin 500 mg extended release at a lower dose based on EGFR 45, recommended A1c 7 5-8% based on his age

## 2022-02-14 NOTE — QUICK NOTE
Was asked to evaluate patient coughing and tachypnea, upon assessment crackles in base and Positive JVD  Will give 1 dose lasix 40 mg IV x1   Will resume torsemide 20 mg BID

## 2022-02-14 NOTE — ASSESSMENT & PLAN NOTE
Wt Readings from Last 3 Encounters:   02/14/22 86 9 kg (191 lb 9 3 oz)     Patient follows with Cardiology as outpatient([facility])  Last echo 2020 showed LVEF 50%, severe concentric hypertrophy, aortic sclerosis without stenosis, moderate mitral regurgitation  Per notes, patient was on torsemide 20 mg b i d  as home medication  Patient's torsemide currently on hold due to ANDREIA and suspicion for prerenal   Intake 1 3 L output 1 3 L yesterday  Plan:  Continue metoprolol 25 mg b i d    Resume torsemide 20 mg twice a day  Strict intake output  Monitor weight

## 2022-02-14 NOTE — ASSESSMENT & PLAN NOTE
Recent Labs     02/12/22  0434 02/13/22  0443 02/14/22  0406   CREATININE 1 43* 1 40* 1 31*   EGFR 44 45 49     Estimated Creatinine Clearance: 43 3 mL/min (A) (by C-G formula based on SCr of 1 31 mg/dL (H))       · Baseline creatinine appears to be between (1 3 -1 5) difficult to find a real baseline since patient rarely get blood work  · Patient is back to baseline  · Avoid nephrotoxins, hypotension  · Holding torsemide and losartan

## 2022-02-14 NOTE — ASSESSMENT & PLAN NOTE
Recent Labs     02/13/22  0443 02/14/22  0406 02/15/22  0641   CREATININE 1 40* 1 31* 1 33*   EGFR 45 49 48     Estimated Creatinine Clearance: 42 7 mL/min (A) (by C-G formula based on SCr of 1 33 mg/dL (H))       · Baseline creatinine appears to be between (1 3 -1 5) difficult to find a real baseline since patient rarely get blood work  · Patient is back to baseline  · Avoid nephrotoxins, hypotension  · Holding losartan

## 2022-02-14 NOTE — PLAN OF CARE
Problem: PHYSICAL THERAPY ADULT  Goal: Performs mobility at highest level of function for planned discharge setting  See evaluation for individualized goals  Description: Treatment/Interventions: Functional transfer training,LE strengthening/ROM,Therapeutic exercise,Endurance training,Patient/family training,Equipment eval/education,Bed mobility,Gait training,Cognitive reorientation,Spoke to nursing  Equipment Recommended: (S) Walker (AND SHOES)       See flowsheet documentation for full assessment, interventions and recommendations  Note: Prognosis: Fair  Problem List: Decreased range of motion,Decreased strength,Decreased endurance,Impaired balance,Decreased mobility,Obesity,Decreased safety awareness,Impaired judgement,Decreased cognition,Decreased coordination,Pain (gait deviations)  Assessment: Pt is a 82 y o  male seen for PT evaluation s/p admit to [facility] on 2/11/2022 w/ Syncope and collapse  Order placed for PT  Prior to admission: Pt lives w/ w/o in one floor condo (in 55+community), no DME for mobility, no O2 , no MELIDA  Upon evaluation: Pt did not require A for bed mobility nor sit<>stand with UE support of bed rail, but min A for pregait activity     Pt's clinical presentation is currently unstable/unpredictable given the functional mobility deficits above, especially (but not limited to) weakness, gait deviations, decreased functional mobility tolerance and SOB upon exertion, coupled with fall risks including hx of falls, impaired balance and decreased cognition, and combined with medical complications of tachycardia, abnormal renal lab values, abnormal H&H and new onset O2 use  Recommend continued mobility trials w/rolling walker over next few sessions, therex for LE strengthening, balance activites   May benefit from further w/u of etiology abnormal movements/coordination (neurology?)  Barriers to Discharge: Decreased caregiver support  Barriers to Discharge Comments: (S) Pt reports he is the one assisting his wife normally     PT Discharge Recommendation: Post acute rehabilitation services          See flowsheet documentation for full assessment

## 2022-02-14 NOTE — DISCHARGE SUMMARY
[facility]  Discharge- Christopher Barnhart 1935, 82 y o  male MRN: [id number]  Unit/Bed#: S -01 Encounter: [id number]  Primary Care Provider: No primary care provider on file  Date and time admitted to hospital: 2/11/2022 12:05 AM    * Cecal ulcerated mass  Assessment & Plan  S/P colonoscopy 2/17/22: Cecum-3-4 cm ulcerated mass status post biopsies  On the ileocecal valve there is a 1 5 cm sessile polyp status post biopsiesMultiple diverticula in the sigmoid colon    Plan:  · GI recommendation: Repeat colonoscopy in 1 year due to a personal history of colon cancer  Consult Colorectal surgery and CT scan of chest, abdomen and pelvis with contrast  · Colorectal recommendation: Outpatient follow up for discussions regarding surgical resection of right sided colon mass, Would hold on further imaging given imaging already obtained and creatinine 1 21 this morning  Will require preoperative medical optimization, Continue to monitor Hb and transfuse as needed for Hb < 7, Rest of care per primary    Anemia  Assessment & Plan  Recent Labs     02/16/22  0409 02/17/22  0340 02/18/22  0509   HGB 8 1* 8 5* 8 5*     Patient was found to have a microcytic anemia on admission, without acute blood loss  Patient denied any active bleeding  Iron panel showed iron deficiency anemia  Patient is status post 3 units PRBC    2/14 Per neuro cognitive assessment patient does not appear to have capacity to make medical decisions  I discussed the results with the [facility]  GI recommended EGD and colonoscopy, however patient himself does not want an EGD but agreeable with colonoscopy  Risks and benefits was discussed  Johnny Tatum would like to have him have the procedure however does not wish to go against him even though he does not have capacity to make decisions  Omaira Vásquez will follow what Mr Edgar Morotn decides  They both decided that they are okay with colonoscopy but not EGD   I notified the GI team that patient does not want EGD only colonoscopy  GI try to talk to the patient however patient was still insistent of not getting EGD     02/15/2022 upon discussion with the patient and POA, they are now agreeable with EGD and colonoscopy, per patient and night nurse, bowel movement was clearing per them  patient was noticed to have a abdominal distension no tenderness, KUB and stat CT abdomen was done  2/15/22 CT abdomen:No bowel obstruction   Mild colonic air distention likely reflective of recent colonoscopy procedure    Colonic diverticulosis  Slightly enlarged small bilateral pleural effusions  Stable cardiomegaly  Stable hepatomegaly  2/15/22 EGD and colonoscopy: Moderate patchy hemorrhagic gastritis  No evidence of active bleeding  Moderate patchy hemorrhagic gastritis  No evidence of active bleeding  Colonoscopy: poor prep    2/17 Colonoscopy    Completed iron infusions 3/3 days completed  Recent Labs     02/17/22  0340 02/18/22  0509 02/19/22  0907   HGB 8 5* 8 5* 8 8*       Plan:  · Monitor hemoglobin and transfuse if less than 7, will start the patient on iron pills, at discharge  · Will resume Eliquis  · Iron deficiency anemia, was recommended to give additional 300 mg IV iron daily x3 to reach (1800mg daily), discussed with the patient and Venu Rosenberg that this will make his bowel movements block, and slightly constipated  · Will start laxative  · CBC within this week  · BMP within this week      Syncope and collapse  Assessment & Plan  POA: Most likely syncope secondary to symptomatic anemia  Troponin negative, glucose within normal limits    Negative orthostatics  Imaging negative for acute intracranial process or C-spine fracture  Fall precautions    Assessment:  Syncope likely secondary to anemia secondary to ulcerated cecal mass, and hemorrhagic gastritis  Plan:   · PT / OT recommending rehab, however patient opted for home PT  · Ambulatory pulse ox unremarkable        Acute gastritis with hemorrhage  Assessment & Plan  See plan above  · Protonix 40 mg b i d  Cognitive dysfunction  Assessment & Plan  2/11: Results of Neuropsychological exam revealed diffuse cognitive dysfunction and on a measure assessing awareness of personal health status and ability to evaluate health problems, handle medical emergencies and take safety precautions, patient performed in the IMPAIRED range of functioning  At this time, patient does not appear to have capacity to make informed medical decisions  Unspecified Neurocognitive Disorder  Patient seems to be answering appropriately person, place, time however forgetful    · 2nd evaluation per neuropsychology , patient has a medical capacity to make medical decision    Type 2 diabetes mellitus, without long-term current use of insulin [facility])  Assessment & Plan  Lab Results   Component Value Date    HGBA1C 8 1 (H) 02/11/2022       Recent Labs     02/17/22  1127 02/17/22  1753 02/17/22  2047 02/18/22  0650   POCGLU 124 136 168* 115       Blood Sugar Average: Last 72 hrs:  · Metformin 500 mg extended release daily  · With A1c Will is A1c less than 8      Chronic kidney disease  Assessment & Plan  Recent Labs     02/16/22  0409 02/17/22  0340 02/18/22  0607   CREATININE 1 30 1 21 1 16   EGFR 50 54 57     Estimated Creatinine Clearance: 47 9 mL/min (by C-G formula based on SCr of 1 16 mg/dL)  · Baseline creatinine appears to be between (1 3 -1 5) difficult to find a real baseline since patient rarely get blood work   · Will resume Ace/Arb  · discontinue amlodipine  · Avoid nephrotoxins, hypotension  · Holding losartan, will likely discontinue      Laceration of left eyebrow  Assessment & Plan  Small laceration left eyebrow repaired bedside without incident  Local wound care and repeat observations    Atrial fibrillation [facility])  Assessment & Plan  Home medication:  Eliquis 5 mg b i d , amiodarone 100 mg daily, metoprolol 25 b i d    Status post cardioversion March of 2021   EKG:  AFib with controlled heart rate  Plan:  · Continue amiodarone  · Continue metoprolol  · Name of a to resume Eliquis with GI    Chronic diastolic heart failure ([address] )  Assessment & Plan  Wt Readings from Last 3 Encounters:   02/18/22 82 8 kg (182 lb 8 7 oz)     Patient follows with Cardiology as outpatient([address])  Last echo 2020 showed LVEF 50%, severe concentric hypertrophy, aortic sclerosis without stenosis, moderate mitral regurgitation  Per notes, patient was on torsemide 20 mg b i d  as home medication    02/18/2022:  Upon confirmation with TERA Duval Book, patient reports to be taking  Torsemide 40 mg twice a day    Plan:  · Continue metoprolol 25 mg b i d  · Will continue torsemide 40 b i d will watch kidney function  · Strict intake output  · Monitor weight      Discharging Resident Physician: Adam Amador MD  Attending: Rosanne Palencia MD  PCP: No primary care provider on file  Admission Date: 2/11/2022  Discharge Date: 02/19/22    Disposition:     Home    Reason for Admission:  Syncope and collapse    Consultations During Hospital Stay:  · Gastroenterology  · Colorectal surgery    Procedures Performed:     · EGD:  Moderate patchy hemorrhagic gastritis  No evidence of active bleeding  No ulcers were seen in the gastric lumen  Large diverticulum noted in the duodenal bulb causing narrowing of the duodenal sweep, no luminal lesions seen  Bile noted within the 2nd portion of the duodenum  · Colonoscopy: See results above      Significant Findings / Test Results:     EGD    Result Date: 2/15/2022  Impression: Moderate patchy hemorrhagic gastritis  No evidence of active bleeding  No ulcers were seen in the gastric lumen  Large diverticulum noted in the duodenal bulb causing narrowing of the duodenal sweep, no luminal lesions seen  Bile noted within the 2nd portion of the duodenum  RECOMMENDATION: There is no recommended follow-up for this procedure   No etiology of anemia identified on the EGD, will proceed with colonoscopy  Would recommend b i d  PPI given erosions noted in the gastric body  Would recommend checking stool for H pylori  Avoid NSAIDs  Rm Daugherty MD     Colonoscopy    Result Date: 2/15/2022  Impression: 1  Incomplete exam secondary to poor prep  Exam terminated in the transverse colon  Midway into the exam, abdomen was noted to be distended therefore colon was slowly decompressed  We subsequently switched to CO2 from air  Vitals remained stable  After decompression, the abdominal exam was improved but still mildly distended  NPO for now  RECOMMENDATION: Would recommend stat upright KUB to rule out perforation  If this is negative, would recommend CT of abdomen and pelvis to rule out partial obstruction  Can consider repeat colonoscopy pending the results of the CT scan and abdominal exam   Rm Daugherty MD     CT abdomen pelvis wo contrast    Result Date: 2/15/2022  Impression: No bowel obstruction  Mild colonic air distention likely reflective of recent colonoscopy procedure  Colonic diverticulosis  Slightly enlarged small bilateral pleural effusions  Stable cardiomegaly  Stable hepatomegaly  Workstation performed: [id number]     XR chest portable    Result Date: 2/14/2022  Impression: No acute cardiopulmonary disease within limitations of supine imaging  Question 9 mm nodule adjacent to left heart border  Follow-up upright dual energy PA and lateral views when clinically feasible or CT chest advised for further evaluation  The study was marked in [facility] for immediate notification and follow-up  Workstation performed: [id number]     XR abdomen 1 view kub    Result Date: 2/16/2022  Impression: Air-filled loops of mildly dilated small bowel and colon  This could represent generalized ileus   CT correlation should BE considered Workstation performed: [id number]     TRAUMA - CT head wo contrast    Addendum Date: 2/11/2022    ADDENDUM: I personally discussed this study with Lisbeth Montilla on 2/11/2022 at 1:31 AM      Result Date: 2/11/2022  Impression: No acute intracranial abnormality  I personally discussed this study with Dr Blossom Ospina on 2/11/2022 at 1:02 AM  Workstation performed: [id number]     CT chest wo contrast    Result Date: 2/11/2022  Impression: 1  Mildly prominent left lateral pericardial fat likely corresponds to suspected pulmonary nodule on recent plain film  No pulmonary nodule identified  2   Mild CHF with small bibasilar pleural effusions  3   Hepatomegaly  Workstation performed: [id number]     TRAUMA - CT spine cervical wo contrast    Addendum Date: 2/11/2022     I personally discussed this study with Lisbeth Montilla on 2/11/2022 at 1:31 AM      Result Date: 2/11/2022  Impression: Images are motion affected  No displaced cervical spine fracture or gross traumatic malalignment  I personally discussed this study with Dr Blossom Ospina on 2/11/2022 at 1:02 AM   Workstation performed: [id number]     XR Trauma multiple ([facility] trauma bay ONLY)    Result Date: 2/11/2022  Impression: No acute cardiopulmonary disease within limitations of supine imaging  Question 9 mm nodule adjacent to left heart border  Follow-up upright dual energy PA and lateral views when clinically feasible or CT chest advised for further evaluation  The study was marked in [facility] for immediate notification and follow-up  Workstation performed: [id number]       No Chest XR results available for this patient  Incidental Findings:   None    Test Results Pending at Discharge (will require follow up):   · none     Outpatient Tests Requested:  · none    Complications:  none    Hospital Course:     Christopher Barnhart is a 82 y o  male patient who originally presented to the hospital on 2/11/2022 due to syncope and collapse  PMH  significant for AFib, HTN, diastolic HF who stated he became dizzy in the bathroom earlier today and fell   The patient states he lives in a one-story condo with a friend and the friend summoned EMS to transport him to the hospital   He sustained a minor laceration above his left eye  Patient does not recall or remember any events surrounding the fall  Of significance, he does note a relatively recent increase in SOB with activity  He also is having POLK more recently with ADLs  States he follows with cardiology on a regular basis, takes all medications prescribed however does not know what they are and does not carry medication list, this is taken care of by her [address]  The patient is fully oriented, he denies any pain, headache, shortness of breath at rest, abdominal pain nausea vomiting or diarrhea  On admission patient was found out to have low hemoglobin around 5 4  Trauma has seen the patient, no gross abnormality seen on imaging  They discuss transfusion however patient refused  Care was then transferred to internal medicine, tried to convince the patient to get blood transfusion however patient still refused, neuropsych evaluation was performed, after talking to the patient, patient reported he did not want blood transfusion due to a friend having blood transfusion reaction, discussed with the patient that in the past it was more common however now we test them prior to giving the blood transfusion, and the chances of getting side effects or reaction is like getting hit by lightening, patient was then agreeable for blood transfusion  When neuropsych saw the patient, she was deemed unable to make medical decisions at this time  POA is a sign is now the decision maker, however Venu Rosenberg will follow what Mr Wheat were decides  GI was consulted for unknown source of bleeding, they recommended EGD and colonoscopy  However patient is refusing EGD and is agreeable with colonoscopy  If colonoscopy is negative, patient will think about getting an EGD      The next day patient finally agreed with EGD and colonoscopy, patient had an EGD done see results above, however poor prep and colonoscopy  Patient was also found to have abdominal distension, start KUB and CT abdomen was performed  Showed gas in the belly which is expected after colonoscopy, however no obstruction was seen  Patient did get 1 unit of blood that night  Patient was decided to have a repeat bowel prep, for colonoscopy on 02/17/2022  During colonoscopy patient was found out to have cecal mass, colorectal surgeon was consulted for further management, they recommended outpatient management, hold  further imaging for now, will see as an outpatient  Discussed with GI can resume Eliquis with close monitoring  Full dose of torsemide was resumed and did not affect kidney function  CBC remains stable with Eliquis resumption aswell  Patient is stable for discharge from our standpoint  Condition at Discharge: stable     Discharge Day Visit / Exam:     Subjective:  No Subjective complain  Vitals: Blood Pressure: 151/82 (02/19/22 0701)  Pulse: 86 (02/19/22 0701)  Temperature: 97 8 °F (36 6 °C) (02/19/22 0701)  Temp Source: Oral (02/19/22 0701)  Respirations: 18 (02/19/22 0701)  Height: 5' 6" (167 6 cm) (02/12/21 documentation from chart review) (02/12/22 1159)  Weight - Scale: 82 8 kg (182 lb 8 7 oz) (02/18/22 0549)  SpO2: 92 % (02/19/22 0701)  Exam:   Physical Exam  Vitals reviewed  HENT:      Head: Normocephalic  Nose: Nose normal       Mouth/Throat:      Mouth: Mucous membranes are moist    Eyes:      Extraocular Movements: Extraocular movements intact  Conjunctiva/sclera: Conjunctivae normal       Pupils: Pupils are equal, round, and reactive to light  Cardiovascular:      Rate and Rhythm: Normal rate  Rhythm irregular  Pulses: Normal pulses  Heart sounds: Normal heart sounds  Pulmonary:      Effort: Pulmonary effort is normal       Breath sounds: Normal breath sounds  Abdominal:      General: Abdomen is flat   Bowel sounds are normal       Palpations: Abdomen is soft  Musculoskeletal:         General: Normal range of motion  Cervical back: Normal range of motion  Right lower leg: No edema  Left lower leg: No edema  Skin:     General: Skin is warm  Capillary Refill: Capillary refill takes less than 2 seconds  Neurological:      General: No focal deficit present  Mental Status: He is alert and oriented to person, place, and time  Mental status is at baseline  Psychiatric:         Mood and Affect: Mood normal          Behavior: Behavior normal          Thought Content: Thought content normal          Judgment: Judgment normal          Discussion with Family:  Discussed with Esperanza Rodríguez    Discharge instructions/Information to patient and family:   See after visit summary for information provided to patient and family  Provisions for Follow-Up Care:  See after visit summary for information related to follow-up care and any pertinent home health orders  Discharge Medications:  See after visit summary for reconciled discharge medications provided to patient and family        ** Please Note: This note has been constructed using a voice recognition system **

## 2022-02-15 ENCOUNTER — APPOINTMENT (INPATIENT)
Dept: RADIOLOGY | Facility: HOSPITAL | Age: 87
DRG: 811 | End: 2022-02-15
Payer: COMMERCIAL

## 2022-02-15 ENCOUNTER — APPOINTMENT (OUTPATIENT)
Dept: GASTROENTEROLOGY | Facility: HOSPITAL | Age: 87
DRG: 811 | End: 2022-02-15
Payer: COMMERCIAL

## 2022-02-15 ENCOUNTER — ANESTHESIA EVENT (INPATIENT)
Dept: GASTROENTEROLOGY | Facility: HOSPITAL | Age: 87
DRG: 811 | End: 2022-02-15
Payer: COMMERCIAL

## 2022-02-15 ENCOUNTER — APPOINTMENT (INPATIENT)
Dept: CT IMAGING | Facility: HOSPITAL | Age: 87
DRG: 811 | End: 2022-02-15
Payer: COMMERCIAL

## 2022-02-15 ENCOUNTER — ANESTHESIA (INPATIENT)
Dept: GASTROENTEROLOGY | Facility: HOSPITAL | Age: 87
DRG: 811 | End: 2022-02-15
Payer: COMMERCIAL

## 2022-02-15 LAB
ABO GROUP BLD: NORMAL
ANION GAP SERPL CALCULATED.3IONS-SCNC: 10 MMOL/L (ref 4–13)
BLD GP AB SCN SERPL QL: NEGATIVE
BUN SERPL-MCNC: 20 MG/DL (ref 5–25)
CALCIUM SERPL-MCNC: 8.5 MG/DL (ref 8.3–10.1)
CHLORIDE SERPL-SCNC: 96 MMOL/L (ref 100–108)
CO2 SERPL-SCNC: 24 MMOL/L (ref 21–32)
CREAT SERPL-MCNC: 1.33 MG/DL (ref 0.6–1.3)
ERYTHROCYTE [DISTWIDTH] IN BLOOD BY AUTOMATED COUNT: 26.5 % (ref 11.6–15.1)
GFR SERPL CREATININE-BSD FRML MDRD: 48 ML/MIN/1.73SQ M
GLUCOSE SERPL-MCNC: 121 MG/DL (ref 65–140)
GLUCOSE SERPL-MCNC: 128 MG/DL (ref 65–140)
GLUCOSE SERPL-MCNC: 131 MG/DL (ref 65–140)
GLUCOSE SERPL-MCNC: 137 MG/DL (ref 65–140)
GLUCOSE SERPL-MCNC: 140 MG/DL (ref 65–140)
HCT VFR BLD AUTO: 22.2 % (ref 36.5–49.3)
HCT VFR BLD AUTO: 24.6 % (ref 36.5–49.3)
HGB BLD-MCNC: 6.4 G/DL (ref 12–17)
HGB BLD-MCNC: 7 G/DL (ref 12–17)
MCH RBC QN AUTO: 19.1 PG (ref 26.8–34.3)
MCHC RBC AUTO-ENTMCNC: 28.5 G/DL (ref 31.4–37.4)
MCV RBC AUTO: 67 FL (ref 82–98)
PLATELET # BLD AUTO: 315 THOUSANDS/UL (ref 149–390)
PMV BLD AUTO: 10.5 FL (ref 8.9–12.7)
POTASSIUM SERPL-SCNC: 4 MMOL/L (ref 3.5–5.3)
RBC # BLD AUTO: 3.67 MILLION/UL (ref 3.88–5.62)
RH BLD: POSITIVE
SODIUM SERPL-SCNC: 130 MMOL/L (ref 136–145)
SPECIMEN EXPIRATION DATE: NORMAL
WBC # BLD AUTO: 7.8 THOUSAND/UL (ref 4.31–10.16)

## 2022-02-15 PROCEDURE — 82948 REAGENT STRIP/BLOOD GLUCOSE: CPT

## 2022-02-15 PROCEDURE — 86850 RBC ANTIBODY SCREEN: CPT | Performed by: INTERNAL MEDICINE

## 2022-02-15 PROCEDURE — C9113 INJ PANTOPRAZOLE SODIUM, VIA: HCPCS | Performed by: INTERNAL MEDICINE

## 2022-02-15 PROCEDURE — 0DJ08ZZ INSPECTION OF UPPER INTESTINAL TRACT, VIA NATURAL OR ARTIFICIAL OPENING ENDOSCOPIC: ICD-10-PCS | Performed by: INTERNAL MEDICINE

## 2022-02-15 PROCEDURE — 74018 RADEX ABDOMEN 1 VIEW: CPT

## 2022-02-15 PROCEDURE — 85014 HEMATOCRIT: CPT | Performed by: INTERNAL MEDICINE

## 2022-02-15 PROCEDURE — 43235 EGD DIAGNOSTIC BRUSH WASH: CPT | Performed by: INTERNAL MEDICINE

## 2022-02-15 PROCEDURE — 85027 COMPLETE CBC AUTOMATED: CPT | Performed by: INTERNAL MEDICINE

## 2022-02-15 PROCEDURE — 45393 COLONOSCOPY W/DECOMPRESSION: CPT | Performed by: INTERNAL MEDICINE

## 2022-02-15 PROCEDURE — 80048 BASIC METABOLIC PNL TOTAL CA: CPT | Performed by: INTERNAL MEDICINE

## 2022-02-15 PROCEDURE — 85018 HEMOGLOBIN: CPT | Performed by: INTERNAL MEDICINE

## 2022-02-15 PROCEDURE — 99232 SBSQ HOSP IP/OBS MODERATE 35: CPT | Performed by: INTERNAL MEDICINE

## 2022-02-15 PROCEDURE — 74176 CT ABD & PELVIS W/O CONTRAST: CPT

## 2022-02-15 PROCEDURE — 86900 BLOOD TYPING SEROLOGIC ABO: CPT | Performed by: INTERNAL MEDICINE

## 2022-02-15 PROCEDURE — P9016 RBC LEUKOCYTES REDUCED: HCPCS

## 2022-02-15 PROCEDURE — 0DJD8ZZ INSPECTION OF LOWER INTESTINAL TRACT, VIA NATURAL OR ARTIFICIAL OPENING ENDOSCOPIC: ICD-10-PCS | Performed by: INTERNAL MEDICINE

## 2022-02-15 PROCEDURE — 86901 BLOOD TYPING SEROLOGIC RH(D): CPT | Performed by: INTERNAL MEDICINE

## 2022-02-15 PROCEDURE — G1004 CDSM NDSC: HCPCS

## 2022-02-15 PROCEDURE — 86923 COMPATIBILITY TEST ELECTRIC: CPT

## 2022-02-15 RX ORDER — LIDOCAINE HYDROCHLORIDE 10 MG/ML
INJECTION, SOLUTION EPIDURAL; INFILTRATION; INTRACAUDAL; PERINEURAL AS NEEDED
Status: DISCONTINUED | OUTPATIENT
Start: 2022-02-15 | End: 2022-02-15

## 2022-02-15 RX ORDER — SODIUM CHLORIDE, SODIUM LACTATE, POTASSIUM CHLORIDE, CALCIUM CHLORIDE 600; 310; 30; 20 MG/100ML; MG/100ML; MG/100ML; MG/100ML
INJECTION, SOLUTION INTRAVENOUS CONTINUOUS PRN
Status: DISCONTINUED | OUTPATIENT
Start: 2022-02-15 | End: 2022-02-15

## 2022-02-15 RX ORDER — FENTANYL CITRATE 50 UG/ML
INJECTION, SOLUTION INTRAMUSCULAR; INTRAVENOUS AS NEEDED
Status: DISCONTINUED | OUTPATIENT
Start: 2022-02-15 | End: 2022-02-15

## 2022-02-15 RX ORDER — PROPOFOL 10 MG/ML
INJECTION, EMULSION INTRAVENOUS AS NEEDED
Status: DISCONTINUED | OUTPATIENT
Start: 2022-02-15 | End: 2022-02-15

## 2022-02-15 RX ADMIN — PRAVASTATIN SODIUM 40 MG: 40 TABLET ORAL at 18:05

## 2022-02-15 RX ADMIN — FENTANYL CITRATE 100 MCG: 50 INJECTION, SOLUTION INTRAMUSCULAR; INTRAVENOUS at 15:13

## 2022-02-15 RX ADMIN — METOPROLOL TARTRATE 25 MG: 25 TABLET, FILM COATED ORAL at 09:21

## 2022-02-15 RX ADMIN — LIDOCAINE HYDROCHLORIDE 50 MG: 10 INJECTION, SOLUTION EPIDURAL; INFILTRATION; INTRACAUDAL at 15:17

## 2022-02-15 RX ADMIN — TORSEMIDE 20 MG: 20 TABLET ORAL at 21:28

## 2022-02-15 RX ADMIN — TORSEMIDE 20 MG: 20 TABLET ORAL at 09:21

## 2022-02-15 RX ADMIN — PANTOPRAZOLE SODIUM 40 MG: 40 INJECTION, POWDER, FOR SOLUTION INTRAVENOUS at 23:28

## 2022-02-15 RX ADMIN — AMIODARONE HYDROCHLORIDE 100 MG: 200 TABLET ORAL at 09:20

## 2022-02-15 RX ADMIN — DOCUSATE SODIUM 100 MG: 100 CAPSULE ORAL at 18:05

## 2022-02-15 RX ADMIN — PROPOFOL 30 MG: 10 INJECTION, EMULSION INTRAVENOUS at 15:48

## 2022-02-15 RX ADMIN — PROPOFOL 30 MG: 10 INJECTION, EMULSION INTRAVENOUS at 15:42

## 2022-02-15 RX ADMIN — PANTOPRAZOLE SODIUM 40 MG: 40 INJECTION, POWDER, FOR SOLUTION INTRAVENOUS at 09:21

## 2022-02-15 RX ADMIN — PROPOFOL 30 MG: 10 INJECTION, EMULSION INTRAVENOUS at 15:19

## 2022-02-15 RX ADMIN — PROPOFOL 10 MG: 10 INJECTION, EMULSION INTRAVENOUS at 15:28

## 2022-02-15 RX ADMIN — PROPOFOL 30 MG: 10 INJECTION, EMULSION INTRAVENOUS at 15:38

## 2022-02-15 RX ADMIN — PROPOFOL 80 MG: 10 INJECTION, EMULSION INTRAVENOUS at 15:17

## 2022-02-15 RX ADMIN — METOPROLOL TARTRATE 25 MG: 25 TABLET, FILM COATED ORAL at 21:28

## 2022-02-15 RX ADMIN — DOXAZOSIN 8 MG: 4 TABLET ORAL at 21:28

## 2022-02-15 RX ADMIN — PROPOFOL 30 MG: 10 INJECTION, EMULSION INTRAVENOUS at 15:21

## 2022-02-15 RX ADMIN — DOCUSATE SODIUM 100 MG: 100 CAPSULE ORAL at 09:20

## 2022-02-15 RX ADMIN — PROPOFOL 20 MG: 10 INJECTION, EMULSION INTRAVENOUS at 15:35

## 2022-02-15 RX ADMIN — SODIUM CHLORIDE, SODIUM LACTATE, POTASSIUM CHLORIDE, AND CALCIUM CHLORIDE: .6; .31; .03; .02 INJECTION, SOLUTION INTRAVENOUS at 15:13

## 2022-02-15 RX ADMIN — PROPOFOL 30 MG: 10 INJECTION, EMULSION INTRAVENOUS at 15:30

## 2022-02-15 RX ADMIN — AMLODIPINE BESYLATE 10 MG: 10 TABLET ORAL at 09:20

## 2022-02-15 RX ADMIN — PROPOFOL 10 MG: 10 INJECTION, EMULSION INTRAVENOUS at 15:24

## 2022-02-15 RX ADMIN — PROPOFOL 30 MG: 10 INJECTION, EMULSION INTRAVENOUS at 15:26

## 2022-02-15 NOTE — ANESTHESIA PREPROCEDURE EVALUATION
Procedure:  COLONOSCOPY  EGD    Relevant Problems   CARDIO   (+) Atrial fibrillation (HCC)      ENDO   (+) Type 2 diabetes mellitus, without long-term current use of insulin (HCC)      /RENAL   (+) Chronic kidney disease      HEMATOLOGY   (+) Anemia      Last eliquis on 2/10    Physical Exam    Airway    Mallampati score: II  TM Distance: <3 FB  Neck ROM: full     Dental   Comment: noneloose,     Cardiovascular      Pulmonary      Other Findings  Due to overall universal COVID-19 precautions and pandemic, I did not auscultate heart, lungs and abdomen due to the low yield in an asymptomatic patient  I preferred not to introduce a stethoscope during my examination due to the potential of spread of COVID-19 from patient to patient  Results for Dima Valdez (MRN [id number]) as of 2/15/2022 14:26   Ref   Range 2/15/2022 06:41   Sodium Latest Ref Range: 136 - 145 mmol/L 130 (L)   Potassium Latest Ref Range: 3 5 - 5 3 mmol/L 4 0   Chloride Latest Ref Range: 100 - 108 mmol/L 96 (L)   CO2 Latest Ref Range: 21 - 32 mmol/L 24   Anion Gap Latest Ref Range: 4 - 13 mmol/L 10   BUN Latest Ref Range: 5 - 25 mg/dL 20   Creatinine Latest Ref Range: 0 60 - 1 30 mg/dL 1 33 (H)   Glucose, Random Latest Ref Range: 65 - 140 mg/dL 121   Calcium Latest Ref Range: 8 3 - 10 1 mg/dL 8 5   eGFR Latest Units: ml/min/1 73sq m 48   WBC Latest Ref Range: 4 31 - 10 16 Thousand/uL 7 80   Red Blood Cell Count Latest Ref Range: 3 88 - 5 62 Million/uL 3 67 (L)   Hemoglobin Latest Ref Range: 12 0 - 17 0 g/dL 7 0 (L)   HCT Latest Ref Range: 36 5 - 49 3 % 24 6 (L)   MCV Latest Ref Range: 82 - 98 fL 67 (L)   MCH Latest Ref Range: 26 8 - 34 3 pg 19 1 (L)   MCHC Latest Ref Range: 31 4 - 37 4 g/dL 28 5 (L)   RDW Latest Ref Range: 11 6 - 15 1 % 26 5 (H)   Platelet Count Latest Ref Range: 149 - 390 Thousands/uL 315   MPV Latest Ref Range: 8 9 - 12 7 fL 10 5     Anesthesia Plan  ASA Score- 3     Anesthesia Type- IV sedation with anesthesia with ASA Monitors  Additional Monitors:   Airway Plan:     Comment: NPO after prep at 03:30    Took beta-blocker this morning    Hyponatremia noted at 130  Will continue as minimal acceptable level is 130 to minimize M&M    Plan Factors-Exercise tolerance (METS): <4 METS  Chart reviewed  Patient summary reviewed  Patient is not a current smoker  Induction- intravenous  Postoperative Plan-     Informed Consent- Anesthetic plan and risks discussed with patient  I personally reviewed this patient with the CRNA  Discussed and agreed on the Anesthesia Plan with the CRNA  Allen Arcos

## 2022-02-15 NOTE — QUICK NOTE
Patient is agreeable for both EGD and colonoscopy today  Procedures being performed for evaluation of iron deficiency anemia  Consent obtained from [address] [place] [address] after having discussed risks/benefits/alternatives of the procedure  Patient and POA were explained about  the risks and benefits of the procedure  Risks including but not limited to bleeding, infection, perforation were explained in detail  Also explained about less than 100% sensitivity with the exam and other alternatives

## 2022-02-15 NOTE — ANESTHESIA POSTPROCEDURE EVALUATION
Post-Op Assessment Note    CV Status:  Stable    Pain management: adequate     Mental Status:  Lethargic and sleepy   Hydration Status:  Stable   PONV Controlled:  None   Airway Patency:  Patent      Post Op Vitals Reviewed: Yes      Staff: CRNA         No complications documented      /63 (02/15/22 1605)    Temp (!) 97 4 °F (36 3 °C) (02/15/22 1605)    Pulse 85 (02/15/22 1605)   Resp 18 (02/15/22 1605)    SpO2 98 % (02/15/22 1605)

## 2022-02-15 NOTE — CASE MANAGEMENT
Case Management Progress Note    Patient name Dunreith Sender  Location S /S [address] [id number] MRN [id number]  : 1935 Date 2/15/2022       LOS (days): 4  Geometric Mean LOS (GMLOS) (days): 2 70  Days to GMLOS:-1 4        OBJECTIVE:        Current admission status: Inpatient  Preferred Pharmacy: No Pharmacies Listed  Primary Care Provider: No primary care provider on file  Primary Insurance: [facility]  Secondary Insurance:     PROGRESS NOTE:  CM called wife Lucy Riley and left voicemail requesting callback  Per chart review, following neuropsych evaluation, patient  performed in the impaired range of functioning  Patient does not appear to have capacity to make informed medical decisions  Patient had EGD and colonoscop today  Patient is down at Xray right now  CM received callback from spouse Lucy Riley who states that she needs to talk with the neighbor and the patient's daughter right now and requests that CM please talk with her tomorrow instead of today about discharge planning   CM will follow up with spouse Lucy Casillassamara tomorrow per her request

## 2022-02-15 NOTE — PROGRESS NOTES
[facility]  Progress Note Cyn Thorpe 1935, 82 y o  male MRN: [id number]  Unit/Bed#: S -01 Encounter: [id number]  Primary Care Provider: No primary care provider on file  Date and time admitted to hospital: 2/11/2022 12:05 AM    * Syncope and collapse  Assessment & Plan  Most likely syncope secondary to symptomatic anemia  Troponin negative, glucose within normal limits  Negative orthostatics  Imaging negative for acute intracranial process or C-spine fracture  Fall precautions    Plan:   · Treat anemia  · Hemoglobin check at 6:00 p m , transfuse if hemoglobin less than 7  · CBC in the morning  · PT / OT recommending rehab          Anemia  Assessment & Plan  Recent Labs     02/13/22  0443 02/13/22  1608 02/14/22  0407   HGB 7 1* 7 4* 7 2*     Patient was found to have a microcytic anemia on admission, without acute blood loss  Patient denied any active bleeding, but he had no bowel movement in the past to 3 days  Iron panel showed iron deficiency anemia  Patient is status post 2 units PRBC    2/14 Per neuro cognitive assessment patient does not appear to have capacity to make medical decisions  I discussed the results with the patient and Nancy GRAVES recommended EGD and colonoscopy, however patient himself does not want an EGD but agreeable with colonoscopy  Risks and benefits was discussed  Lars Acevedo would like to have him have the procedure however does not wish to go against him even though he does not have capacity to make decisions  Evi Salazar will follow what Mr Alvarado Delaney decides  They both decided that they are okay with colonoscopy but not EGD  I notified the GI team that patient does not want EGD only colonoscopy  GI try to talk to the patient however patient was still insistent of not getting EGD      Plan:  · Completed iron infusions 3/3 days completed, Monitor hemoglobin and transfuse if less than 7, will start the patient on iron pills after colonoscopy  · CBC in am  · Patient is scheduled for colonoscopy today    Type 2 diabetes mellitus, without long-term current use of insulin [facility])  Assessment & Plan  Lab Results   Component Value Date    HGBA1C 8 1 (H) 02/11/2022       Recent Labs     02/13/22  1613 02/13/22  2126 02/14/22  0708 02/14/22  1145   POCGLU 125 160* 127 210*       Blood Sugar Average: Last 72 hrs:   not on any medication  sliding scale insulin, will continue discontinuing insulin coverage  and check if sugar remains stable  Will likely discharge on metformin 500 mg extended release at a lower dose based on EGFR 45, recommended A1c 7 5-8% based on his age      Chronic kidney disease  Assessment & Plan  Recent Labs     02/13/22  0443 02/14/22  0406 02/15/22  0641   CREATININE 1 40* 1 31* 1 33*   EGFR 45 49 48     Estimated Creatinine Clearance: 42 7 mL/min (A) (by C-G formula based on SCr of 1 33 mg/dL (H))  · Baseline creatinine appears to be between (1 3 -1 5) difficult to find a real baseline since patient rarely get blood work  · Patient is back to baseline  · Avoid nephrotoxins, hypotension  · Holding losartan      Laceration of left eyebrow  Assessment & Plan  Small laceration left eyebrow repaired bedside without incident  Local wound care and repeat observations    Atrial fibrillation [facility])  Assessment & Plan  Home medication:  Eliquis 5 mg b i d , amiodarone 100 mg daily, metoprolol 25 b i d  Status post cardioversion March of 2021   EKG:  AFib with controlled heart rate        Plan:  · Continue amiodarone  · Continue metoprolol  · Continue to hold Eliquis for now, will likely resume once source of bleeding is found out, discussed with the patient that holding Eliquis in increase the risk of stroke, patient and POA is aware    Chronic diastolic heart failure ([address] )  Assessment & Plan  Wt Readings from Last 3 Encounters:   02/14/22 86 9 kg (191 lb 9 3 oz)     Patient follows with Cardiology as outpatient(Ambrosio Silver)  Last echo  showed LVEF 50%, severe concentric hypertrophy, aortic sclerosis without stenosis, moderate mitral regurgitation  Per notes, patient was on torsemide 20 mg b i d  as home medication  Patient's torsemide currently on hold due to ANDREIA and suspicion for prerenal   Intake 1 3 L output 1 3 L yesterday  Plan:  Continue metoprolol 25 mg b i d  Resume torsemide 20 mg twice a day  Strict intake output  Monitor weight          VTE Pharmacologic Prophylaxis:   Moderate Risk (Score 3-4) - Pharmacological DVT Prophylaxis Contraindicated  Sequential Compression Devices Ordered  Patient Centered Rounds: I performed bedside rounds with nursing staff today  Discussions with Specialists or Other Care Team Provider:  Discussed with my attending and discussed with the GI team    Education and Discussions with Family / Patient: Updated  (Venecia Grimaldo) at bedside  Current Length of Stay: 4 day(s)  Current Patient Status: Inpatient   Discharge Plan: To be determined    Code Status: Level 3 - DNAR and DNI    Subjective:   Patient reported bowel movement and has been clearing, this was confirmed by nursing during the night that it was clearing  Objective:     Vitals:   Temp (24hrs), Av 3 °F (36 8 °C), Min:97 8 °F (36 6 °C), Max:98 7 °F (37 1 °C)    Temp:  [97 8 °F (36 6 °C)-98 7 °F (37 1 °C)] 97 8 °F (36 6 °C)  HR:  [] 85  Resp:  [16-20] 16  BP: (122-152)/(60-89) 146/89  SpO2:  [90 %-95 %] 94 %  Body mass index is 30 92 kg/m²  Input and Output Summary (last 24 hours): Intake/Output Summary (Last 24 hours) at 2/15/2022 1314  Last data filed at 2/15/2022 1024  Gross per 24 hour   Intake --   Output 1700 ml   Net -1700 ml       Physical Exam:   Physical Exam  Vitals reviewed  Constitutional:       General: He is not in acute distress  Appearance: He is obese  He is not diaphoretic  HENT:      Head: Normocephalic        Nose: Nose normal       Mouth/Throat:      Mouth: Mucous membranes are moist    Eyes:      General:         Right eye: No discharge  Left eye: No discharge  Extraocular Movements: Extraocular movements intact  Conjunctiva/sclera: Conjunctivae normal       Pupils: Pupils are equal, round, and reactive to light  Cardiovascular:      Rate and Rhythm: Normal rate  Rhythm irregular  Pulses: Normal pulses  Heart sounds: Normal heart sounds  Pulmonary:      Effort: Pulmonary effort is normal  No respiratory distress  Breath sounds: Normal breath sounds  No wheezing or rales  Abdominal:      General: Abdomen is flat  Bowel sounds are normal  There is no distension  Palpations: Abdomen is soft  Tenderness: There is no abdominal tenderness  There is no guarding or rebound  Musculoskeletal:         General: Normal range of motion  Cervical back: Normal range of motion  Right lower leg: Edema (Trace) present  Left lower leg: Edema (Trace) present  Comments: Laceration on the left eyebrow mild swelling   Skin:     General: Skin is warm  Capillary Refill: Capillary refill takes less than 2 seconds  Findings: Bruising (around the left eye ) present  Neurological:      General: No focal deficit present  Mental Status: He is alert  Mental status is at baseline  Comments: Patient knows the place, he knows his in the [address], he knows in [place],, the time, the year, and month   Psychiatric:         Mood and Affect: Mood normal          Behavior: Behavior normal          Thought Content:  Thought content normal          Judgment: Judgment normal           Additional Data:     Labs:  Results from last 7 days   Lab Units 02/15/22  0641 02/13/22  1608 02/13/22  0443   WBC Thousand/uL 7 80   < > 9 23   HEMOGLOBIN g/dL 7 0*   < > 7 1*   HEMATOCRIT % 24 6*   < > 24 6*   PLATELETS Thousands/uL 315   < > 369   NEUTROS PCT %  --   --  77*   LYMPHS PCT %  --   --  8* MONOS PCT %  --   --  13*   EOS PCT %  --   --  1    < > = values in this interval not displayed  Results from last 7 days   Lab Units 02/15/22  0641 02/14/22  0406 02/13/22  0443   SODIUM mmol/L 130*   < > 132*   POTASSIUM mmol/L 4 0   < > 4 0   CHLORIDE mmol/L 96*   < > 99*   CO2 mmol/L 24   < > 25   BUN mg/dL 20   < > 26*   CREATININE mg/dL 1 33*   < > 1 40*   ANION GAP mmol/L 10   < > 8   CALCIUM mg/dL 8 5   < > 8 9   ALBUMIN g/dL  --   --  3 2*   TOTAL BILIRUBIN mg/dL  --   --  1 06*   ALK PHOS U/L  --   --  200*   ALT U/L  --   --  21   AST U/L  --   --  17   GLUCOSE RANDOM mg/dL 121   < > 127    < > = values in this interval not displayed  Results from last 7 days   Lab Units 02/15/22  1023 02/15/22  0653 02/14/22  2048 02/14/22  1641 02/14/22  1145 02/14/22  0708 02/13/22  2126 02/13/22  1613 02/13/22  1114   POC GLUCOSE mg/dl 140 137 165* 143* 210* 127 160* 125 147*     Results from last 7 days   Lab Units 02/11/22  0310   HEMOGLOBIN A1C % 8 1*     Results from last 7 days   Lab Units 02/11/22  0048   LACTIC ACID mmol/L 1 2       Lines/Drains:  Invasive Devices  Report    Peripheral Intravenous Line            Peripheral IV 02/15/22 Right;Ventral (anterior) Forearm <1 day                  Telemetry:  Telemetry Orders (From admission, onward)             24 Hour Telemetry Monitoring  Continuous x 24 Hours (Telem)        References:    Telemetry Guidelines   Question:  Reason for 24 Hour Telemetry  Answer:  Syncope suspected to be cardiac in origin                 Telemetry Reviewed: Atrial fibrillation  HR averaging 60  Indication for Continued Telemetry Use: No indication for continued use  Will discontinue                Imaging: Reviewed radiology reports from this admission including: chest xray    Recent Cultures (last 7 days):         Last 24 Hours Medication List:   Current Facility-Administered Medications   Medication Dose Route Frequency Provider Last Rate    amiodarone  100 mg Oral Daily With Breakfast Ambar Bosch MD      amLODIPine  10 mg Oral Daily Stevan Torres MD      docusate sodium  100 mg Oral BID Paula Howard MD      doxazosin  8 mg Oral HS Ambar Bosch MD      insulin lispro  1-5 Units Subcutaneous TID [facility] Stevan Torres MD      insulin lispro  1-5 Units Subcutaneous HS Stevan Torres MD      metoprolol tartrate  25 mg Oral Q12H [address] Ambar Bosch MD      pantoprazole  40 mg Intravenous Q12H Eliezer Angulo MD      polyethylene glycol  17 g Oral Daily Paula Howard MD      pravastatin  40 mg Oral Daily With Annia Armstrong MD      torsemide  20 mg Oral BID Stevan Torres MD          Today, Patient Was Seen By: Stevan Torres MD    **Please Note: This note may have been constructed using a voice recognition system  **

## 2022-02-15 NOTE — QUICK NOTE
Received a text from nurse saying that the patient did not move his bowels despite bowel preparation  As per chart patient received Glycolax, colace and ducolax yesterday  Ordered a tap water enema  Patient is scheduled for colonoscopy today at 1430  Consider re-scheduling the procedure if patient does not have a BM by morning

## 2022-02-15 NOTE — PHYSICAL THERAPY NOTE
PHYSICAL THERAPY NOTE    Patient Name: Delores Looney  HDWD'I Date: 2/15/2022     02/15/22 1717   PT Last Visit   PT Visit Date 02/15/22   Note Type   Note type Cancelled Session   Cancel Reasons Patient off floor/test    Jana Lu, PT

## 2022-02-16 LAB
ABO GROUP BLD BPU: NORMAL
ANION GAP SERPL CALCULATED.3IONS-SCNC: 9 MMOL/L (ref 4–13)
BPU ID: NORMAL
BUN SERPL-MCNC: 19 MG/DL (ref 5–25)
CALCIUM SERPL-MCNC: 8.7 MG/DL (ref 8.3–10.1)
CHLORIDE SERPL-SCNC: 97 MMOL/L (ref 100–108)
CO2 SERPL-SCNC: 26 MMOL/L (ref 21–32)
CREAT SERPL-MCNC: 1.3 MG/DL (ref 0.6–1.3)
CROSSMATCH: NORMAL
ERYTHROCYTE [DISTWIDTH] IN BLOOD BY AUTOMATED COUNT: 28.9 % (ref 11.6–15.1)
GFR SERPL CREATININE-BSD FRML MDRD: 50 ML/MIN/1.73SQ M
GLUCOSE SERPL-MCNC: 102 MG/DL (ref 65–140)
GLUCOSE SERPL-MCNC: 110 MG/DL (ref 65–140)
GLUCOSE SERPL-MCNC: 133 MG/DL (ref 65–140)
GLUCOSE SERPL-MCNC: 145 MG/DL (ref 65–140)
GLUCOSE SERPL-MCNC: 190 MG/DL (ref 65–140)
HCT VFR BLD AUTO: 26.7 % (ref 36.5–49.3)
HCT VFR BLD AUTO: 28.1 % (ref 36.5–49.3)
HGB BLD-MCNC: 7.7 G/DL (ref 12–17)
HGB BLD-MCNC: 8.1 G/DL (ref 12–17)
MCH RBC QN AUTO: 19.9 PG (ref 26.8–34.3)
MCHC RBC AUTO-ENTMCNC: 28.8 G/DL (ref 31.4–37.4)
MCV RBC AUTO: 69 FL (ref 82–98)
PLATELET # BLD AUTO: 338 THOUSANDS/UL (ref 149–390)
PMV BLD AUTO: 10.7 FL (ref 8.9–12.7)
POTASSIUM SERPL-SCNC: 3.8 MMOL/L (ref 3.5–5.3)
RBC # BLD AUTO: 4.08 MILLION/UL (ref 3.88–5.62)
SODIUM SERPL-SCNC: 132 MMOL/L (ref 136–145)
UNIT DISPENSE STATUS: NORMAL
UNIT PRODUCT CODE: NORMAL
UNIT PRODUCT VOLUME: 350 ML
UNIT RH: NORMAL
WBC # BLD AUTO: 6.73 THOUSAND/UL (ref 4.31–10.16)

## 2022-02-16 PROCEDURE — 99232 SBSQ HOSP IP/OBS MODERATE 35: CPT | Performed by: INTERNAL MEDICINE

## 2022-02-16 PROCEDURE — 85027 COMPLETE CBC AUTOMATED: CPT | Performed by: INTERNAL MEDICINE

## 2022-02-16 PROCEDURE — 85014 HEMATOCRIT: CPT

## 2022-02-16 PROCEDURE — 80048 BASIC METABOLIC PNL TOTAL CA: CPT | Performed by: INTERNAL MEDICINE

## 2022-02-16 PROCEDURE — 82948 REAGENT STRIP/BLOOD GLUCOSE: CPT

## 2022-02-16 PROCEDURE — 97116 GAIT TRAINING THERAPY: CPT

## 2022-02-16 PROCEDURE — C9113 INJ PANTOPRAZOLE SODIUM, VIA: HCPCS | Performed by: INTERNAL MEDICINE

## 2022-02-16 PROCEDURE — 85018 HEMOGLOBIN: CPT

## 2022-02-16 PROCEDURE — 97110 THERAPEUTIC EXERCISES: CPT

## 2022-02-16 RX ORDER — SIMETHICONE 80 MG
80 TABLET,CHEWABLE ORAL EVERY 6 HOURS PRN
Status: DISCONTINUED | OUTPATIENT
Start: 2022-02-16 | End: 2022-02-19 | Stop reason: HOSPADM

## 2022-02-16 RX ORDER — HEPARIN SODIUM 5000 [USP'U]/ML
5000 INJECTION, SOLUTION INTRAVENOUS; SUBCUTANEOUS EVERY 8 HOURS SCHEDULED
Status: DISCONTINUED | OUTPATIENT
Start: 2022-02-16 | End: 2022-02-18

## 2022-02-16 RX ADMIN — DOCUSATE SODIUM 100 MG: 100 CAPSULE ORAL at 08:20

## 2022-02-16 RX ADMIN — PANTOPRAZOLE SODIUM 40 MG: 40 INJECTION, POWDER, FOR SOLUTION INTRAVENOUS at 08:19

## 2022-02-16 RX ADMIN — HEPARIN SODIUM 5000 UNITS: 5000 INJECTION INTRAVENOUS; SUBCUTANEOUS at 13:23

## 2022-02-16 RX ADMIN — PANTOPRAZOLE SODIUM 40 MG: 40 INJECTION, POWDER, FOR SOLUTION INTRAVENOUS at 21:21

## 2022-02-16 RX ADMIN — METOPROLOL TARTRATE 25 MG: 25 TABLET, FILM COATED ORAL at 21:20

## 2022-02-16 RX ADMIN — POLYETHYLENE GLYCOL 3350, SODIUM SULFATE ANHYDROUS, SODIUM BICARBONATE, SODIUM CHLORIDE, POTASSIUM CHLORIDE 4000 ML: 236; 22.74; 6.74; 5.86; 2.97 POWDER, FOR SOLUTION ORAL at 16:41

## 2022-02-16 RX ADMIN — PRAVASTATIN SODIUM 40 MG: 40 TABLET ORAL at 16:14

## 2022-02-16 RX ADMIN — INSULIN LISPRO 1 UNITS: 100 INJECTION, SOLUTION INTRAVENOUS; SUBCUTANEOUS at 11:35

## 2022-02-16 RX ADMIN — METOPROLOL TARTRATE 25 MG: 25 TABLET, FILM COATED ORAL at 08:19

## 2022-02-16 RX ADMIN — AMIODARONE HYDROCHLORIDE 100 MG: 200 TABLET ORAL at 08:19

## 2022-02-16 RX ADMIN — HEPARIN SODIUM 5000 UNITS: 5000 INJECTION INTRAVENOUS; SUBCUTANEOUS at 21:21

## 2022-02-16 RX ADMIN — TORSEMIDE 20 MG: 20 TABLET ORAL at 08:20

## 2022-02-16 RX ADMIN — DOXAZOSIN 8 MG: 4 TABLET ORAL at 21:20

## 2022-02-16 RX ADMIN — DOCUSATE SODIUM 100 MG: 100 CAPSULE ORAL at 18:22

## 2022-02-16 RX ADMIN — TORSEMIDE 20 MG: 20 TABLET ORAL at 21:20

## 2022-02-16 RX ADMIN — AMLODIPINE BESYLATE 10 MG: 10 TABLET ORAL at 08:19

## 2022-02-16 NOTE — PLAN OF CARE
Problem: PHYSICAL THERAPY ADULT  Goal: Performs mobility at highest level of function for planned discharge setting  See evaluation for individualized goals  Description: Treatment/Interventions: Functional transfer training,LE strengthening/ROM,Therapeutic exercise,Endurance training,Patient/family training,Equipment eval/education,Bed mobility,Gait training,Cognitive reorientation,Spoke to nursing  Equipment Recommended: (S) Walker (AND SHOES)       See flowsheet documentation for full assessment, interventions and recommendations  2/16/2022 1251 by Hailey Arias PT  Outcome: Progressing  Note: Prognosis: Fair  Problem List: Decreased range of motion,Decreased strength,Decreased endurance,Impaired balance,Decreased mobility,Obesity,Decreased safety awareness,Impaired judgement,Decreased cognition,Decreased coordination,Pain (gait deviations)  Assessment: Pt did make mobility progress since initial evaluation  Omid Chow needs less assistance for bed mobility, transfers, and was able to ambulate using rolling walker this session  Patient need gradually less assistance for support and less verbal instruction for technique as mobility trials progressed by end of session  However, patient demonstrates he is still at risk for falls especially with an elevated tug score over 32 5 seconds  Skilled PT recommended to progress patient towards treatment goals  Recommend continued mobility training with rolling walker, TherEx to left knee, higher level balance activities as able including TherEx while in standing using UE support of walker  Barriers to Discharge: Decreased caregiver support  Barriers to Discharge Comments: (S) Pt reports he is the one assisting his wife normally     PT Discharge Recommendation: Post acute rehabilitation services          See flowsheet documentation for full assessment

## 2022-02-16 NOTE — ASSESSMENT & PLAN NOTE
Wt Readings from Last 3 Encounters:   02/16/22 83 2 kg (183 lb 6 8 oz)     Patient follows with Cardiology as outpatient([address] [facility]  Last echo 2020 showed LVEF 50%, severe concentric hypertrophy, aortic sclerosis without stenosis, moderate mitral regurgitation  Per notes, patient was on torsemide 20 mg b i d  as home medication  Patient's torsemide currently on hold due to ANDREIA and suspicion for prerenal   Intake 1 3 L output 1 3 L yesterday  Plan:  Continue metoprolol 25 mg b i d    Continue torsemide 20 mg twice a day  Strict intake output  Monitor weight

## 2022-02-16 NOTE — ASSESSMENT & PLAN NOTE
Recent Labs     02/14/22  0406 02/15/22  0641 02/16/22  0409   CREATININE 1 31* 1 33* 1 30   EGFR 49 48 50     Estimated Creatinine Clearance: 42 8 mL/min (by C-G formula based on SCr of 1 3 mg/dL)       · Baseline creatinine appears to be between (1 3 -1 5) difficult to find a real baseline since patient rarely get blood work  · Patient is back to baseline  · Avoid nephrotoxins, hypotension  · Holding losartan

## 2022-02-16 NOTE — ASSESSMENT & PLAN NOTE
Lab Results   Component Value Date    HGBA1C 8 1 (H) 02/11/2022       Recent Labs     02/15/22  1023 02/15/22  1647 02/15/22  2052 02/16/22  0658   POCGLU 140 131 128 110       Blood Sugar Average: Last 72 hrs:   not on any medication       sliding scale insulin, will continue discontinuing insulin coverage  and check if sugar remains stable  Will likely discharge on metformin 500 mg extended release at a lower dose based on EGFR 45, recommended A1c 7 5-8% based on his age

## 2022-02-16 NOTE — CASE MANAGEMENT
Case Management Assessment & Discharge Planning Note    Patient name Kate Jorge  Location S /S -25 MRN [id number]  : 1935 Date 2022       Current Admission Date: 2022  Current Admission Diagnosis:Syncope and collapse   Patient Active Problem List    Diagnosis Date Noted    Chronic kidney disease 2022    Type 2 diabetes mellitus, without long-term current use of insulin ([facility] [address] ) 2022    Syncope and collapse 2022    Chronic diastolic heart failure ([facility] ) 2022    Atrial fibrillation ([facility] ) 2022    Laceration of left eyebrow 2022    Anemia 2022      LOS (days): 5  Geometric Mean LOS (GMLOS) (days): 2 70  Days to GMLOS:-2 4     OBJECTIVE:    Risk of Unplanned Readmission Score: 13         Current admission status: Inpatient       Preferred Pharmacy: No Pharmacies Listed  Primary Care Provider: No primary care provider on file  Primary Insurance: [employer] [facility]  Secondary Insurance:     ASSESSMENT:  Active Health Care Agents    There are no active Health Care Agents on file  Advance Directives  Does patient have a [address]?: Yes    Readmission Root Cause  30 Day Readmission: No    Patient Information  Admitted from[de-identified] Home  Mental Status: Confused  During Assessment patient was accompanied by: Spouse Myles Babb)  Assessment information provided by[de-identified] Spouse  Primary Caregiver: Self  Support Systems: Self,Spouse/significant other  [place] of Residence: [address] [place] [address] do you live in?: [place] entry access options   Select all that apply : No steps to enter home  Type of Current Residence: [facility]  Living Arrangements: Lives w/ Spouse/significant other    Activities of Daily Living Prior to Admission  Functional Status: Independent  Completes ADLs independently?: Yes  Ambulates independently?: Yes  Does patient use assisted devices?: No  Does patient currently own DME?: No  Does patient have a history of Outpatient Therapy (PT/OT)?: No  Does the patient have a history of Short-Term Rehab?: No  Does patient have a history of HHC?: No  Does patient currently have [address]?: No    Patient Information Continued  Income Source: Pension/longterm  Does patient receive dialysis treatments?: No  Does patient have a history of substance abuse?: No  Does patient have a history of Mental Health Diagnosis?: No    Means of Transportation  Means of Transport to Appts[de-identified] Drives Self  In the past 12 months, has lack of transportation kept you from medical appointments or from getting medications?: No  In the past 12 months, has lack of transportation kept you from meetings, work, or from getting things needed for daily living?: No  Was application for public transport provided?: N/A     DISCHARGE DETAILS:    Discharge planning discussed with[de-identified] wifeLisa of Choice: Yes  Comments - Freedom of Choice: STR - agreeable to blanket referrals  CM contacted family/caregiver?: Yes  Were Treatment Team discharge recommendations reviewed with patient/caregiver?: Yes  Did patient/caregiver verbalize understanding of patient care needs?: Yes    Contacts  Patient Contacts: Wife Charo Dougherty  Relationship to Patient[de-identified] Family  Contact Method: Phone  Phone Number: [phone]  Reason/Outcome: Continuity of [address]         Is the patient interested in [address] at discharge?: No    DME Referral Provided  Referral made for DME?: No    Other Referral/Resources/Interventions Provided:  Interventions: Short Term Rehab  Referral Comments: CM spoke with pt wife/POA, Charo Dougherty, via phone  Charo Ladonna aware of CM role with dcp  CM reviewed PT/OT recs and Charo Dougherty is agreeable to rehab at d/c  She is agreeable to blanket referrals but would like to start at the 10 mile radius as she wants something closer to home  She reports she just started driving again since pt is unable to   Charo Dougherty reports she is retired and able to assist him once he is d/c from rehab  Treatment Team Recommendation: Short Term Rehab  Discharge Destination Plan[de-identified] Short Term Rehab    As per pt wife he has both COVID vaccine shots and booster

## 2022-02-16 NOTE — ASSESSMENT & PLAN NOTE
Most likely syncope secondary to symptomatic anemia  Troponin negative, glucose within normal limits  Negative orthostatics  Imaging negative for acute intracranial process or C-spine fracture  Fall precautions    Plan:   · Treat anemia  · H&H Q 12 for now  · CBC in the morning  · PT / OT recommending rehab, however patient is adamant of refusal, although patient is not deemed able to make medical decisions for himself, Yeison Patrick usually follows what Mr Audi Jesus says, patient is adamant of refusing physical therapy

## 2022-02-16 NOTE — PROGRESS NOTES
[facility]  Progress Note Sonam Roche 1935, 82 y o  male MRN: [id number]  Unit/Bed#: S -01 Encounter: [id number]  Primary Care Provider: No primary care provider on file  Date and time admitted to hospital: 2/11/2022 12:05 AM    * Syncope and collapse  Assessment & Plan  Most likely syncope secondary to symptomatic anemia  Troponin negative, glucose within normal limits  Negative orthostatics  Imaging negative for acute intracranial process or C-spine fracture  Fall precautions    Plan:   · Treat anemia  · H&H Q 12 for now  · CBC in the morning  · PT / OT recommending rehab, however patient is adamant of refusal, although patient is not deemed able to make medical decisions for himself, Ritchie Teresa usually follows what Mr Earline Cr says, patient is adamant of refusing physical therapy          Anemia  Assessment & Plan  Recent Labs     02/15/22  1745 02/16/22  0039 02/16/22  0409   HGB 6 4* 7 7* 8 1*     Patient was found to have a microcytic anemia on admission, without acute blood loss  Patient denied any active bleeding  Iron panel showed iron deficiency anemia  Patient is status post 3 units PRBC    2/14 Per neuro cognitive assessment patient does not appear to have capacity to make medical decisions  I discussed the results with the patient and Winnie Barnes  GI recommended EGD and colonoscopy, however patient himself does not want an EGD but agreeable with colonoscopy  Risks and benefits was discussed  Ritchie Teresa would like to have him have the procedure however does not wish to go against him even though he does not have capacity to make decisions  Florence Arredondo will follow what Mr Tara Molina decides  They both decided that they are okay with colonoscopy but not EGD  I notified the GI team that patient does not want EGD only colonoscopy    GI try to talk to the patient however patient was still insistent of not getting EGD     02/15/2022 upon discussion with the patient and POA, they are now agreeable with EGD and colonoscopy, per patient and night nurse, bowel movement was clearing per them  patient was noticed to have a abdominal distension no tenderness, KUB and stat CT abdomen was done  2/15/22 CT abdomen:No bowel obstruction   Mild colonic air distention likely reflective of recent colonoscopy procedure    Colonic diverticulosis  Slightly enlarged small bilateral pleural effusions  Stable cardiomegaly  Stable hepatomegaly  2/15/22 EGD and colonoscopy: Moderate patchy hemorrhagic gastritis  No evidence of active bleeding  Moderate patchy hemorrhagic gastritis  No evidence of active bleeding  Colonoscopy: poor prep        Plan:  · Completed iron infusions 3/3 days completed, Monitor hemoglobin and transfuse if less than 7, will start the patient on iron pills, at discharge  · H&H Q 12  · Patient has adamant of wanting to have something to eat, patient was saying he will go against medical advice if not given any food, upon discussion with the GI team, can start on clear liquid diet  · Will start the patient on VTE prophylaxis since patient has not been mobile that much, risks of DVT is higher than risk of bleeding  Type 2 diabetes mellitus, without long-term current use of insulin [facility])  Assessment & Plan  Lab Results   Component Value Date    HGBA1C 8 1 (H) 02/11/2022       Recent Labs     02/15/22  1023 02/15/22  1647 02/15/22  2052 02/16/22  0658   POCGLU 140 131 128 110       Blood Sugar Average: Last 72 hrs:   not on any medication       sliding scale insulin, will continue discontinuing insulin coverage  and check if sugar remains stable  Will likely discharge on metformin 500 mg extended release at a lower dose based on EGFR 45, recommended A1c 7 5-8% based on his age      Chronic kidney disease  Assessment & Plan  Recent Labs     02/14/22  0406 02/15/22  0641 02/16/22  0409   CREATININE 1 31* 1 33* 1 30   EGFR 49 48 50     Estimated Creatinine Clearance: 42 8 mL/min (by C-G formula based on SCr of 1 3 mg/dL)  · Baseline creatinine appears to be between (1 3 -1 5) difficult to find a real baseline since patient rarely get blood work  · Patient is back to baseline  · Avoid nephrotoxins, hypotension  · Holding losartan      Laceration of left eyebrow  Assessment & Plan  Small laceration left eyebrow repaired bedside without incident  Local wound care and repeat observations    Atrial fibrillation [facility])  Assessment & Plan  Home medication:  Eliquis 5 mg b i d , amiodarone 100 mg daily, metoprolol 25 b i d  Status post cardioversion March of 2021   EKG:  AFib with controlled heart rate  Plan:  · Continue amiodarone  · Continue metoprolol  · Continue to hold Eliquis for now, will likely resume once source of bleeding is found out, discussed with the patient that holding Eliquis in increase the risk of stroke, patient and POA is aware    Chronic diastolic heart failure ([address] )  Assessment & Plan  Wt Readings from Last 3 Encounters:   02/16/22 83 2 kg (183 lb 6 8 oz)     Patient follows with Cardiology as outpatient([facility])  Last echo 2020 showed LVEF 50%, severe concentric hypertrophy, aortic sclerosis without stenosis, moderate mitral regurgitation  Per notes, patient was on torsemide 20 mg b i d  as home medication  Patient's torsemide currently on hold due to ANDREIA and suspicion for prerenal   Intake 1 3 L output 1 3 L yesterday  Plan:  Continue metoprolol 25 mg b i d  Continue torsemide 20 mg twice a day  Strict intake output  Monitor weight          VTE Pharmacologic Prophylaxis:   Moderate Risk (Score 3-4) - Pharmacological DVT Prophylaxis Ordered: heparin  Patient Centered Rounds: I performed bedside rounds with nursing staff today  Discussions with Specialists or Other Care Team Provider: Discussed with my attending and GI Team  Education and Discussions with Family / Patient: Updated  (TERA walker) at bedside      Current Length of Stay: 5 day(s)  Current Patient Status: Inpatient   Discharge Plan: To be determined    Code Status: Level 3 - DNAR and DNI    Subjective:   Patient reported no bowel movement today, denies abdominal pain, or tenderness or nausea and vomiting  Patient is aggravated this morning because he has not have anything to eat or drink  Patient reported passing stool last night  Objective:     Vitals:   Temp (24hrs), Av 3 °F (36 8 °C), Min:97 2 °F (36 2 °C), Max:99 °F (37 2 °C)    Temp:  [97 2 °F (36 2 °C)-99 °F (37 2 °C)] 98 4 °F (36 9 °C)  HR:  [] 98  Resp:  [17-20] 18  BP: (131-164)/(58-81) 134/79  SpO2:  [93 %-98 %] 95 %  Body mass index is 29 61 kg/m²  Input and Output Summary (last 24 hours): Intake/Output Summary (Last 24 hours) at 2022 0907  Last data filed at 2022 7144  Gross per 24 hour   Intake 850 ml   Output 2275 ml   Net -1425 ml       Physical Exam:   Physical Exam  Vitals reviewed  Constitutional:       General: He is not in acute distress  Appearance: He is obese  He is not diaphoretic  HENT:      Head: Normocephalic  Mouth/Throat:      Mouth: Mucous membranes are moist    Eyes:      General:         Right eye: No discharge  Left eye: No discharge  Extraocular Movements: Extraocular movements intact  Conjunctiva/sclera: Conjunctivae normal       Pupils: Pupils are equal, round, and reactive to light  Cardiovascular:      Rate and Rhythm: Normal rate  Rhythm irregular  Pulses: Normal pulses  Heart sounds: Normal heart sounds  Pulmonary:      Effort: Pulmonary effort is normal  No respiratory distress  Breath sounds: Normal breath sounds  No wheezing or rales  Abdominal:      General: Abdomen is flat  Bowel sounds are normal  There is distension  Palpations: Abdomen is soft  Tenderness: There is no right CVA tenderness, left CVA tenderness, guarding or rebound  Musculoskeletal:         General: Normal range of motion  Cervical back: Normal range of motion  Right lower leg: Edema (Trace) present  Left lower leg: Edema (Trace) present  Skin:     General: Skin is warm  Capillary Refill: Capillary refill takes less than 2 seconds  Findings: Bruising (around the left eye ) present  Neurological:      General: No focal deficit present  Mental Status: He is alert  Mental status is at baseline  Comments: Patient knows the place, he knows his in the hospital [address] [facility], he knows in [address] [place] [address] , knows the year, and month   Psychiatric:         Mood and Affect: Mood normal          Behavior: Behavior normal          Thought Content: Thought content normal          Judgment: Judgment normal           Additional Data:     Labs:  Results from last 7 days   Lab Units 02/16/22  0409 02/13/22  1608 02/13/22  0443   WBC Thousand/uL 6 73   < > 9 23   HEMOGLOBIN g/dL 8 1*   < > 7 1*   HEMATOCRIT % 28 1*   < > 24 6*   PLATELETS Thousands/uL 338   < > 369   NEUTROS PCT %  --   --  77*   LYMPHS PCT %  --   --  8*   MONOS PCT %  --   --  13*   EOS PCT %  --   --  1    < > = values in this interval not displayed  Results from last 7 days   Lab Units 02/16/22  0409 02/14/22  0406 02/13/22  0443   SODIUM mmol/L 132*   < > 132*   POTASSIUM mmol/L 3 8   < > 4 0   CHLORIDE mmol/L 97*   < > 99*   CO2 mmol/L 26   < > 25   BUN mg/dL 19   < > 26*   CREATININE mg/dL 1 30   < > 1 40*   ANION GAP mmol/L 9   < > 8   CALCIUM mg/dL 8 7   < > 8 9   ALBUMIN g/dL  --   --  3 2*   TOTAL BILIRUBIN mg/dL  --   --  1 06*   ALK PHOS U/L  --   --  200*   ALT U/L  --   --  21   AST U/L  --   --  17   GLUCOSE RANDOM mg/dL 102   < > 127    < > = values in this interval not displayed           Results from last 7 days   Lab Units 02/16/22  0658 02/15/22  2052 02/15/22  1647 02/15/22  1023 02/15/22  0653 02/14/22  2048 02/14/22  1641 02/14/22  1145 02/14/22  0708 02/13/22  2126 02/13/22  1613 02/13/22  1114   POC GLUCOSE mg/dl 110 128 131 140 137 165* 143* 210* 127 160* 125 147*     Results from last 7 days   Lab Units 02/11/22  0310   HEMOGLOBIN A1C % 8 1*     Results from last 7 days   Lab Units 02/11/22  0048   LACTIC ACID mmol/L 1 2       Lines/Drains:  Invasive Devices  Report    Peripheral Intravenous Line            Peripheral IV 02/15/22 Right;Ventral (anterior) Forearm 1 day                  Telemetry:  Telemetry Orders (From admission, onward)             24 Hour Telemetry Monitoring  Continuous x 24 Hours (Telem)        References:    Telemetry Guidelines   Question:  Reason for 24 Hour Telemetry  Answer:  Syncope suspected to be cardiac in origin                 Telemetry Reviewed: Atrial fibrillation  HR averaging 60  Indication for Continued Telemetry Use: No indication for continued use  Will discontinue                Imaging: Reviewed radiology reports from this admission including: chest xray    Recent Cultures (last 7 days):         Last 24 Hours Medication List:   Current Facility-Administered Medications   Medication Dose Route Frequency Provider Last Rate    amiodarone  100 mg Oral Daily With Breakfast Alden Green MD      amLODIPine  10 mg Oral Daily Rip Lizama MD      docusate sodium  100 mg Oral BID Laura Dominguez MD      doxazosin  8 mg Oral HS Alden Green MD      heparin (porcine)  5,000 Units Subcutaneous [facility] Rip Lizama MD      insulin lispro  1-5 Units Subcutaneous TID [facility] Rip Lizama MD      insulin lispro  1-5 Units Subcutaneous HS Rip Lizama MD      metoprolol tartrate  25 mg Oral Q12H [address] Alden Green MD      pantoprazole  40 mg Intravenous Q12H Eliezer Angulo MD      polyethylene glycol  17 g Oral Daily Laura Dominguez MD      pravastatin  40 mg Oral Daily With Vince Raymundo MD      simethicone  80 mg Oral Q6H PRN Rip Lizama MD      torsemide  20 mg Oral BID Rip Lizama MD          Today, Patient Was Seen By: Saritha Alvarado MD    **Please Note: This note may have been constructed using a voice recognition system  **

## 2022-02-16 NOTE — PHYSICAL THERAPY NOTE
PHYSICAL THERAPY TREATMENT NOTE  NAME:  Noah Telles  DATE: 02/16/22    Length Of Stay: 5  Performed at least 2 patient identifiers during session: Name and Birthday    TREATMENT:    02/16/22 0940   PT Last Visit   PT Visit Date 02/16/22   Note Type   Note Type Treatment   Pain Assessment   Pain Assessment Tool 0-10   Pain Score 2   Pain Location/Orientation Orientation: Left; Location: Knee   Effect of Pain on Daily Activities Limits range of motion, limits speed and independence of mobility   Patient's Stated Pain Goal No pain   Hospital Pain Intervention(s) Cold applied; Ambulation/increased activity; Emotional support;Repositioned  (Declined pain medication via nursing, agreeable to ice)   Restrictions/Precautions   Weight Bearing Precautions Per Order No   Other Precautions Bed Alarm; Chair Alarm;Cognitive;O2;Fall Risk;Pain   General   Chart Reviewed Yes   Response to Previous Treatment Patient reporting fatigue but able to participate  Family/Caregiver Present No   Cognition   Overall Cognitive Status Impaired   Attention Attends with cues to redirect   Orientation Level Oriented to person;Oriented to place; Disoriented to time;Disoriented to situation   Memory Decreased recall of precautions;Decreased short term memory;Decreased recall of recent events   Following Commands Follows one step commands with increased time or repetition   Subjective   Subjective "I want to walk more and get this knee loosened up"   Bed Mobility   Supine to Sit 5  Supervision   Additional items Assist x 1; Increased time required;Verbal cues; Bedrails   Transfers   Sit to Stand 5  Supervision   Additional items Assist x 1; Increased time required;Verbal cues;Armrests  (hand placcement)   Stand to Sit 5  Supervision   Additional items Assist x 1; Increased time required;Verbal cues  (hand placcement, completion of approach)   Ambulation/Elevation   Gait pattern Antalgic;Decreased L stance; Excessively slow; Inconsistent christina   Gait Assistance 4  Minimal assist  (50% of time (more during initial trial), supervision by end)   Additional items Assist x 1;Verbal cues   Assistive Device Rolling walker   Distance 78'+06'+10'   Stair Management Assistance Not tested   Balance   Static Sitting Good   Static Standing Fair   Ambulatory Fair -   Endurance Deficit   Endurance Deficit Yes   Endurance Deficit Description SpO2 91% @ lowest, but 96% after second gait trial  HR in 100's   Activity Tolerance   Activity Tolerance Patient limited by fatigue;Patient limited by pain   Nurse Made Aware Spoke to [facility] with additional time in room for care coordination   Exercises   Heelslides Supine;10 reps;AROM; Left   Knee AROM Long Arc Quad Supine;5 reps;AROM; Left   Ankle Pumps Supine;10 reps;Bilateral;AROM   Marching Supine;10 reps;AROM; Left   Balance training  TUG 54 seconds with UE support of walker   Assessment   Prognosis Fair   Problem List Decreased range of motion;Decreased strength;Decreased endurance; Impaired balance;Decreased mobility;Obesity; Decreased safety awareness; Impaired judgement;Decreased cognition;Decreased coordination;Pain  (gait deviations)   Assessment Pt did make mobility progress since initial evaluation  Alva Saalzar needs less assistance for bed mobility, transfers, and was able to ambulate using rolling walker this session  Patient need gradually less assistance for support and less verbal instruction for technique as mobility trials progressed by end of session  However, patient demonstrates he is still at risk for falls especially with an elevated tug score over 32 5 seconds  Skilled PT recommended to progress patient towards treatment goals    Recommend continued mobility training with rolling walker, TherEx to left knee, higher level balance activities as able including TherEx while in standing using UE support of walker   Barriers to Discharge Decreased caregiver support   Goals   Patient Goals to make sure my L knee is less stiff so I can walk without pain    STG Expiration Date 02/24/22   PT Treatment Day 1   Plan   Treatment/Interventions Functional transfer training;LE strengthening/ROM; Therapeutic exercise; Endurance training;Cognitive reorientation;Patient/family training;Equipment eval/education;Gait training;Bed mobility;Spoke to nursing   Progress Slow progress, decreased activity tolerance   PT Frequency 3-5x/wk   Recommendation   PT Discharge Recommendation Post acute rehabilitation services   Equipment Recommended Walker  (And shoes as available (patient declined wearing them))   Walker Package Recommended Wheeled walker   Joel Bailey 435   Turning in Bed Without Bedrails 3   Lying on Back to Sitting on Edge of Flat Bed 2   Moving Bed to Chair 3   Standing Up From Chair 3   Walk in Room 3   Climb 3-5 Stairs 1   Basic Mobility Inpatient Raw Score 15   Basic Mobility Standardized Score 36 97   Highest Level Of Mobility   -[facility] Goal 4: Move to chair/commode   -[facility] Highest Level of Mobility 7: Walk 25 feet or more   -[facility] Goal Achieved Yes   Education   Education Provided Mobility training;Assistive device;Home exercise program   Patient Explanation/teachback used; Reinforcement needed   End of Consult   Patient Position at End of Consult Bedside chair; All needs within reach;Bed/Chair alarm activated     The patient's [facility] Basic Mobility Inpatient Short Form Raw Score is 15, Standardized Score is 36 97  A standardized score less than 40 78 or Raw Score of 16 suggests the patient may benefit from discharge to post-acute rehabilitation services, however please refer to therapist recommendation for discharge planning given other factors that may influence destination  Adapted from [employer] [facility] 6-Clicks Basic Mobility and Daily Activity Scores With Discharge Destination  Physical Therapy, 2021;101:1-9   DOI: 10 [email or web address]      Luzmaria Roach PT, DPT

## 2022-02-16 NOTE — PROGRESS NOTES
Progress Note - Moreno Mac 82 y o  male MRN: [id number]    Unit/Bed#: S -01 Encounter: [id number]        Subjective:   Patient reports feeling well, tolerating liquid diet, denies any nausea vomiting, denies any abdominal pain, he denies any unusual distention of his abdomen, he says his abdomen feels like it always has  Reports passing flatus  Objective:     Vitals: Blood pressure 133/82, pulse 97, temperature 99 5 °F (37 5 °C), temperature source Oral, resp  rate 18, height 5' 6" (1 676 m), weight 83 2 kg (183 lb 6 8 oz), SpO2 96 %  ,Body mass index is 29 61 kg/m²  Intake/Output Summary (Last 24 hours) at 2/16/2022 1601  Last data filed at 2/16/2022 1434  Gross per 24 hour   Intake 350 ml   Output 2175 ml   Net -1825 ml       Physical Exam:   General appearance: alert, appears stated age and cooperative  Lungs: clear to auscultation bilaterally, no labored breathing/accessory muscle use  Heart: regular rate and rhythm, S1, S2 normal, no murmur, click, rub or gallop  Abdomen: soft, some distention and tympany but the abdomen is otherwise non-tender; bowel sounds normal; no masses,  no organomegaly  Extremities: no edema    Invasive Devices  Report    Peripheral Intravenous Line            Peripheral IV 02/15/22 Right;Ventral (anterior) Forearm 1 day                Lab, Imaging and other studies: I have personally reviewed pertinent reports  No results displayed because visit has over 200 results  Results for Joanne Conley (MRN [id number]) as of 2/16/2022 16:42   Ref   Range 2/14/2022 11:45 2/14/2022 16:41 2/14/2022 20:48 2/15/2022 06:41 2/15/2022 06:53 2/15/2022 10:23 2/15/2022 15:52 2/15/2022 16:47 2/15/2022 16:47 2/15/2022 17:45 2/15/2022 20:52 2/16/2022 00:39 2/16/2022 04:09 2/16/2022 05:47 2/16/2022 06:58 2/16/2022 11:02 2/16/2022 15:56   POC Glucose Latest Ref Range: 65 - 140 mg/dl 210 (H) 143 (H) 165 (H)  137 140  131   128    110 190 (H) 145 (H)   Sodium Latest Ref Range: 136 - 145 mmol/L    130 (L)         132 (L)       Potassium Latest Ref Range: 3 5 - 5 3 mmol/L    4 0         3 8       Chloride Latest Ref Range: 100 - 108 mmol/L    96 (L)         97 (L)       CO2 Latest Ref Range: 21 - 32 mmol/L    24         26       Anion Gap Latest Ref Range: 4 - 13 mmol/L    10         9       BUN Latest Ref Range: 5 - 25 mg/dL    20         19       Creatinine Latest Ref Range: 0 60 - 1 30 mg/dL    1 33 (H)         1 30       Glucose, Random Latest Ref Range: 65 - 140 mg/dL    121         102       Calcium Latest Ref Range: 8 3 - 10 1 mg/dL    8 5         8 7       eGFR Latest Units: ml/min/1 73sq m    48         50       WBC Latest Ref Range: 4 31 - 10 16 Thousand/uL    7 80         6 73       Red Blood Cell Count Latest Ref Range: 3 88 - 5 62 Million/uL    3 67 (L)         4 08       Hemoglobin Latest Ref Range: 12 0 - 17 0 g/dL    7 0 (L)      6 4 (LL)  7 7 (L) 8 1 (L)       HCT Latest Ref Range: 36 5 - 49 3 %    24 6 (L)      22 2 (L)  26 7 (L) 28 1 (L)       MCV Latest Ref Range: 82 - 98 fL    67 (L)         69 (L)       MCH Latest Ref Range: 26 8 - 34 3 pg    19 1 (L)         19 9 (L)       MCHC Latest Ref Range: 31 4 - 37 4 g/dL    28 5 (L)         28 8 (L)       RDW Latest Ref Range: 11 6 - 15 1 %    26 5 (H)         28 9 (H)       Platelet Count Latest Ref Range: 149 - 390 Thousands/uL    315         338       MPV Latest Ref Range: 8 9 - 12 7 fL    10 5         10 7       ABO Grouping Unknown          O          Rh Factor Unknown          Positive          Antibody Screen Unknown          Negative          Specimen Expiration Date Unknown          [id number]          Crossmatch Unknown              Compatible      Unit ABO Unknown              O      Unit RH Unknown              NEG      Unit Number Unknown              [id number]      Unit Dispense Status Unknown              Presumed Trans            Assessment/Plan:    1   Anemia with no gross GI bleeding, but rule out chronic occult GI blood loss from the colon, his EGD only showed some moderate hemorrhagic gastritis, but the colonoscopy was unfortunately poorly prepped and could not exclude underlying colorectal lesions    -continue clear liquid diet for today, will prep this afternoon for repeat colonoscopy tomorrow    -NPO after midnight    -monitor hemoglobin, transfuse as needed     - discussed plan with Internal Medicine team

## 2022-02-16 NOTE — ASSESSMENT & PLAN NOTE
Recent Labs     02/15/22  1745 02/16/22  0039 02/16/22  0409   HGB 6 4* 7 7* 8 1*     Patient was found to have a microcytic anemia on admission, without acute blood loss  Patient denied any active bleeding  Iron panel showed iron deficiency anemia  Patient is status post 3 units PRBC    2/14 Per neuro cognitive assessment patient does not appear to have capacity to make medical decisions  I discussed the results with the patient and [facility]  GI recommended EGD and colonoscopy, however patient himself does not want an EGD but agreeable with colonoscopy  Risks and benefits was discussed  Michele Mehta would like to have him have the procedure however does not wish to go against him even though he does not have capacity to make decisions  Lonza Lunch will follow what Mr Yee Colon decides  They both decided that they are okay with colonoscopy but not EGD  I notified the GI team that patient does not want EGD only colonoscopy  GI try to talk to the patient however patient was still insistent of not getting EGD     02/15/2022 upon discussion with the patient and POA, they are now agreeable with EGD and colonoscopy, per patient and night nurse, bowel movement was clearing per them  patient was noticed to have a abdominal distension no tenderness, KUB and stat CT abdomen was done  2/15/22 CT abdomen:No bowel obstruction   Mild colonic air distention likely reflective of recent colonoscopy procedure    Colonic diverticulosis  Slightly enlarged small bilateral pleural effusions  Stable cardiomegaly  Stable hepatomegaly  2/15/22 EGD and colonoscopy: Moderate patchy hemorrhagic gastritis  No evidence of active bleeding  Moderate patchy hemorrhagic gastritis  No evidence of active bleeding    Colonoscopy: poor prep        Plan:  · Completed iron infusions 3/3 days completed, Monitor hemoglobin and transfuse if less than 7, will start the patient on iron pills, at discharge  · H&H Q 12  · Patient has adamant of wanting to have something to eat, patient was saying he will go against medical advice if not given any food, upon discussion with the GI team, can start on clear liquid diet  · Will start the patient on VTE prophylaxis since patient has not been mobile that much, risks of DVT is higher than risk of bleeding

## 2022-02-17 ENCOUNTER — ANESTHESIA EVENT (INPATIENT)
Dept: GASTROENTEROLOGY | Facility: HOSPITAL | Age: 87
DRG: 811 | End: 2022-02-17
Payer: COMMERCIAL

## 2022-02-17 ENCOUNTER — ANESTHESIA (INPATIENT)
Dept: GASTROENTEROLOGY | Facility: HOSPITAL | Age: 87
DRG: 811 | End: 2022-02-17
Payer: COMMERCIAL

## 2022-02-17 ENCOUNTER — APPOINTMENT (INPATIENT)
Dept: GASTROENTEROLOGY | Facility: HOSPITAL | Age: 87
DRG: 811 | End: 2022-02-17
Payer: COMMERCIAL

## 2022-02-17 LAB
ANION GAP SERPL CALCULATED.3IONS-SCNC: 8 MMOL/L (ref 4–13)
BUN SERPL-MCNC: 16 MG/DL (ref 5–25)
CALCIUM SERPL-MCNC: 8.8 MG/DL (ref 8.3–10.1)
CHLORIDE SERPL-SCNC: 95 MMOL/L (ref 100–108)
CO2 SERPL-SCNC: 28 MMOL/L (ref 21–32)
CREAT SERPL-MCNC: 1.21 MG/DL (ref 0.6–1.3)
ERYTHROCYTE [DISTWIDTH] IN BLOOD BY AUTOMATED COUNT: 28.6 % (ref 11.6–15.1)
GFR SERPL CREATININE-BSD FRML MDRD: 54 ML/MIN/1.73SQ M
GLUCOSE SERPL-MCNC: 121 MG/DL (ref 65–140)
GLUCOSE SERPL-MCNC: 124 MG/DL (ref 65–140)
GLUCOSE SERPL-MCNC: 128 MG/DL (ref 65–140)
GLUCOSE SERPL-MCNC: 136 MG/DL (ref 65–140)
GLUCOSE SERPL-MCNC: 168 MG/DL (ref 65–140)
HCT VFR BLD AUTO: 28.6 % (ref 36.5–49.3)
HGB BLD-MCNC: 8.5 G/DL (ref 12–17)
MCH RBC QN AUTO: 20.3 PG (ref 26.8–34.3)
MCHC RBC AUTO-ENTMCNC: 29.7 G/DL (ref 31.4–37.4)
MCV RBC AUTO: 68 FL (ref 82–98)
PLATELET # BLD AUTO: 337 THOUSANDS/UL (ref 149–390)
PMV BLD AUTO: 10.6 FL (ref 8.9–12.7)
POTASSIUM SERPL-SCNC: 3.2 MMOL/L (ref 3.5–5.3)
RBC # BLD AUTO: 4.18 MILLION/UL (ref 3.88–5.62)
SODIUM SERPL-SCNC: 131 MMOL/L (ref 136–145)
WBC # BLD AUTO: 6.11 THOUSAND/UL (ref 4.31–10.16)

## 2022-02-17 PROCEDURE — 88305 TISSUE EXAM BY PATHOLOGIST: CPT | Performed by: PATHOLOGY

## 2022-02-17 PROCEDURE — 85027 COMPLETE CBC AUTOMATED: CPT | Performed by: INTERNAL MEDICINE

## 2022-02-17 PROCEDURE — 88342 IMHCHEM/IMCYTCHM 1ST ANTB: CPT | Performed by: PATHOLOGY

## 2022-02-17 PROCEDURE — C9113 INJ PANTOPRAZOLE SODIUM, VIA: HCPCS | Performed by: INTERNAL MEDICINE

## 2022-02-17 PROCEDURE — 0DBH8ZX EXCISION OF CECUM, VIA NATURAL OR ARTIFICIAL OPENING ENDOSCOPIC, DIAGNOSTIC: ICD-10-PCS | Performed by: INTERNAL MEDICINE

## 2022-02-17 PROCEDURE — 82948 REAGENT STRIP/BLOOD GLUCOSE: CPT

## 2022-02-17 PROCEDURE — 45380 COLONOSCOPY AND BIOPSY: CPT | Performed by: INTERNAL MEDICINE

## 2022-02-17 PROCEDURE — 0DBC8ZX EXCISION OF ILEOCECAL VALVE, VIA NATURAL OR ARTIFICIAL OPENING ENDOSCOPIC, DIAGNOSTIC: ICD-10-PCS | Performed by: INTERNAL MEDICINE

## 2022-02-17 PROCEDURE — 80048 BASIC METABOLIC PNL TOTAL CA: CPT | Performed by: INTERNAL MEDICINE

## 2022-02-17 PROCEDURE — 99223 1ST HOSP IP/OBS HIGH 75: CPT | Performed by: COLON & RECTAL SURGERY

## 2022-02-17 PROCEDURE — 99233 SBSQ HOSP IP/OBS HIGH 50: CPT | Performed by: INTERNAL MEDICINE

## 2022-02-17 PROCEDURE — 88341 IMHCHEM/IMCYTCHM EA ADD ANTB: CPT | Performed by: PATHOLOGY

## 2022-02-17 RX ORDER — POTASSIUM CHLORIDE 20 MEQ/1
40 TABLET, EXTENDED RELEASE ORAL EVERY 4 HOURS
Status: DISPENSED | OUTPATIENT
Start: 2022-02-17 | End: 2022-02-17

## 2022-02-17 RX ORDER — AMLODIPINE BESYLATE 10 MG/1
10 TABLET ORAL DAILY
Status: DISCONTINUED | OUTPATIENT
Start: 2022-02-18 | End: 2022-02-19 | Stop reason: HOSPADM

## 2022-02-17 RX ORDER — MAGNESIUM CARB/ALUMINUM HYDROX 105-160MG
148 TABLET,CHEWABLE ORAL ONCE
Status: COMPLETED | OUTPATIENT
Start: 2022-02-17 | End: 2022-02-17

## 2022-02-17 RX ORDER — SODIUM CHLORIDE, SODIUM LACTATE, POTASSIUM CHLORIDE, CALCIUM CHLORIDE 600; 310; 30; 20 MG/100ML; MG/100ML; MG/100ML; MG/100ML
INJECTION, SOLUTION INTRAVENOUS CONTINUOUS PRN
Status: DISCONTINUED | OUTPATIENT
Start: 2022-02-17 | End: 2022-02-17

## 2022-02-17 RX ORDER — TORSEMIDE 20 MG/1
20 TABLET ORAL 2 TIMES DAILY
Status: DISCONTINUED | OUTPATIENT
Start: 2022-02-17 | End: 2022-02-18

## 2022-02-17 RX ORDER — PROPOFOL 10 MG/ML
INJECTION, EMULSION INTRAVENOUS AS NEEDED
Status: DISCONTINUED | OUTPATIENT
Start: 2022-02-17 | End: 2022-02-17

## 2022-02-17 RX ORDER — POLYETHYLENE GLYCOL 3350 17 G/17G
17 POWDER, FOR SOLUTION ORAL DAILY
Status: DISCONTINUED | OUTPATIENT
Start: 2022-02-18 | End: 2022-02-19 | Stop reason: HOSPADM

## 2022-02-17 RX ORDER — DOCUSATE SODIUM 100 MG/1
100 CAPSULE, LIQUID FILLED ORAL 2 TIMES DAILY
Status: DISCONTINUED | OUTPATIENT
Start: 2022-02-17 | End: 2022-02-19 | Stop reason: HOSPADM

## 2022-02-17 RX ORDER — DOCUSATE SODIUM 100 MG/1
100 CAPSULE, LIQUID FILLED ORAL 2 TIMES DAILY
Qty: 28 CAPSULE | Refills: 0 | Status: CANCELLED | OUTPATIENT
Start: 2022-02-17 | End: 2022-03-03

## 2022-02-17 RX ORDER — LIDOCAINE HYDROCHLORIDE 10 MG/ML
INJECTION, SOLUTION EPIDURAL; INFILTRATION; INTRACAUDAL; PERINEURAL AS NEEDED
Status: DISCONTINUED | OUTPATIENT
Start: 2022-02-17 | End: 2022-02-17

## 2022-02-17 RX ADMIN — MAGNESIUM CITRATE 148 ML: 1.75 LIQUID ORAL at 09:46

## 2022-02-17 RX ADMIN — PROPOFOL 20 MG: 10 INJECTION, EMULSION INTRAVENOUS at 16:12

## 2022-02-17 RX ADMIN — POTASSIUM CHLORIDE 40 MEQ: 1500 TABLET, EXTENDED RELEASE ORAL at 07:00

## 2022-02-17 RX ADMIN — LIDOCAINE HYDROCHLORIDE 50 MG: 10 INJECTION, SOLUTION EPIDURAL; INFILTRATION; INTRACAUDAL at 16:03

## 2022-02-17 RX ADMIN — METOPROLOL TARTRATE 25 MG: 25 TABLET, FILM COATED ORAL at 20:38

## 2022-02-17 RX ADMIN — INSULIN LISPRO 1 UNITS: 100 INJECTION, SOLUTION INTRAVENOUS; SUBCUTANEOUS at 21:44

## 2022-02-17 RX ADMIN — PROPOFOL 30 MG: 10 INJECTION, EMULSION INTRAVENOUS at 16:06

## 2022-02-17 RX ADMIN — PROPOFOL 20 MG: 10 INJECTION, EMULSION INTRAVENOUS at 16:09

## 2022-02-17 RX ADMIN — TORSEMIDE 20 MG: 20 TABLET ORAL at 20:38

## 2022-02-17 RX ADMIN — PROPOFOL 20 MG: 10 INJECTION, EMULSION INTRAVENOUS at 16:15

## 2022-02-17 RX ADMIN — DOCUSATE SODIUM 100 MG: 100 CAPSULE ORAL at 20:37

## 2022-02-17 RX ADMIN — PANTOPRAZOLE SODIUM 40 MG: 40 INJECTION, POWDER, FOR SOLUTION INTRAVENOUS at 20:38

## 2022-02-17 RX ADMIN — PANTOPRAZOLE SODIUM 40 MG: 40 INJECTION, POWDER, FOR SOLUTION INTRAVENOUS at 08:28

## 2022-02-17 RX ADMIN — BISACODYL 10 MG: 5 TABLET, COATED ORAL at 08:28

## 2022-02-17 RX ADMIN — METOPROLOL TARTRATE 25 MG: 25 TABLET, FILM COATED ORAL at 08:28

## 2022-02-17 RX ADMIN — DOXAZOSIN 8 MG: 4 TABLET ORAL at 20:37

## 2022-02-17 RX ADMIN — SODIUM CHLORIDE, SODIUM LACTATE, POTASSIUM CHLORIDE, AND CALCIUM CHLORIDE: .6; .31; .03; .02 INJECTION, SOLUTION INTRAVENOUS at 15:59

## 2022-02-17 RX ADMIN — PROPOFOL 70 MG: 10 INJECTION, EMULSION INTRAVENOUS at 16:04

## 2022-02-17 RX ADMIN — AMIODARONE HYDROCHLORIDE 100 MG: 200 TABLET ORAL at 08:28

## 2022-02-17 NOTE — ASSESSMENT & PLAN NOTE
Wt Readings from Last 3 Encounters:   02/17/22 85 5 kg (188 lb 7 9 oz)     Patient follows with Cardiology as outpatient([facility])  Last echo 2020 showed LVEF 50%, severe concentric hypertrophy, aortic sclerosis without stenosis, moderate mitral regurgitation  Per notes, patient was on torsemide 20 mg b i d  as home medication  Patient's torsemide currently on hold due to ANDREIA and suspicion for prerenal   Intake 1 3 L output 1 3 L yesterday  Plan:  · Continue metoprolol 25 mg b i d  · Will give Lasix 40 mg x 1 IV then resume torsemide 20 b i d    · Strict intake output  · Monitor weight

## 2022-02-17 NOTE — PROGRESS NOTES
[facility]  Progress Note Jani Frazier 1935, 82 y o  male MRN: [id number]  Unit/Bed#: S -01 Encounter: [id number]  Primary Care Provider: No primary care provider on file  Date and time admitted to hospital: 2/11/2022 12:05 AM    * Syncope and collapse  Assessment & Plan  Most likely syncope secondary to symptomatic anemia  Troponin negative, glucose within normal limits  Negative orthostatics  Imaging negative for acute intracranial process or C-spine fracture  Fall precautions    Plan:   · Treat anemia  · CBC in am  · For colonoscopy today  · PT / OT recommending rehab        Anemia  Assessment & Plan  Recent Labs     02/16/22  0039 02/16/22  0409 02/17/22  0340   HGB 7 7* 8 1* 8 5*     Patient was found to have a microcytic anemia on admission, without acute blood loss  Patient denied any active bleeding  Iron panel showed iron deficiency anemia  Patient is status post 3 units PRBC    2/14 Per neuro cognitive assessment patient does not appear to have capacity to make medical decisions  I discussed the results with the patient and Hayden Sharp  GI recommended EGD and colonoscopy, however patient himself does not want an EGD but agreeable with colonoscopy  Risks and benefits was discussed  Yeison Patrick would like to have him have the procedure however does not wish to go against him even though he does not have capacity to make decisions  Racheal Correa will follow what Mr Cirilo Estrada decides  They both decided that they are okay with colonoscopy but not EGD  I notified the GI team that patient does not want EGD only colonoscopy  GI try to talk to the patient however patient was still insistent of not getting EGD     02/15/2022 upon discussion with the patient and POA, they are now agreeable with EGD and colonoscopy, per patient and night nurse, bowel movement was clearing per them    patient was noticed to have a abdominal distension no tenderness, KUB and stat CT abdomen was done     2/15/22 CT abdomen:No bowel obstruction   Mild colonic air distention likely reflective of recent colonoscopy procedure    Colonic diverticulosis  Slightly enlarged small bilateral pleural effusions  Stable cardiomegaly  Stable hepatomegaly  2/15/22 EGD and colonoscopy: Moderate patchy hemorrhagic gastritis  No evidence of active bleeding  Moderate patchy hemorrhagic gastritis  No evidence of active bleeding  Colonoscopy: poor prep    Completed iron infusions 3/3 days completed    Plan:  · Monitor hemoglobin and transfuse if less than 7, will start the patient on iron pills, at discharge  · Will start the patient on VTE prophylaxis since patient has not been mobile that much, risks of DVT is higher than risk of bleeding  · For colonoscopy NPO since midnight, was sign out by dayteam poor prep, per nurse still liquid brown, was given water enema and dulcolax 10 mg x1, will discuss with GI team if still not clear    Type 2 diabetes mellitus, without long-term current use of insulin [facility])  Assessment & Plan  Lab Results   Component Value Date    HGBA1C 8 1 (H) 02/11/2022       Recent Labs     02/16/22  1102 02/16/22  1556 02/16/22  2044 02/17/22  0649   POCGLU 190* 145* 133 128       Blood Sugar Average: Last 72 hrs:  not on any medication  sliding scale insulin, will continue discontinuing insulin coverage  and check if sugar remains stable  Will likely discharge on metformin 500 mg extended release at a lower dose based on EGFR 45, recommended A1c 7 5-8% based on his age      Chronic kidney disease  Assessment & Plan  Recent Labs     02/15/22  0641 02/16/22  0409 02/17/22  0340   CREATININE 1 33* 1 30 1 21   EGFR 48 50 54     Estimated Creatinine Clearance: 46 6 mL/min (by C-G formula based on SCr of 1 21 mg/dL)       · Baseline creatinine appears to be between (1 3 -1 5) difficult to find a real baseline since patient rarely get blood work  · Patient is back to baseline  · Avoid nephrotoxins, hypotension  · Holding losartan      Laceration of left eyebrow  Assessment & Plan  Small laceration left eyebrow repaired bedside without incident  Local wound care and repeat observations    Atrial fibrillation [facility])  Assessment & Plan  Home medication:  Eliquis 5 mg b i d , amiodarone 100 mg daily, metoprolol 25 b i d  Status post cardioversion March of 2021   EKG:  AFib with controlled heart rate  Plan:  · Continue amiodarone  · Continue metoprolol  · Continue to hold Eliquis for now, will likely resume once source of bleeding is found out, discussed with the patient that holding Eliquis in increase the risk of stroke, patient and POA is aware    Chronic diastolic heart failure ([facility] [address] )  Assessment & Plan  Wt Readings from Last 3 Encounters:   02/17/22 85 5 kg (188 lb 7 9 oz)     Patient follows with Cardiology as outpatient([facility])  Last echo 2020 showed LVEF 50%, severe concentric hypertrophy, aortic sclerosis without stenosis, moderate mitral regurgitation  Per notes, patient was on torsemide 20 mg b i d  as home medication  Patient's torsemide currently on hold due to ANDREIA and suspicion for prerenal   Intake 1 3 L output 1 3 L yesterday  Plan:  Continue metoprolol 25 mg b i d  Continue torsemide 20 mg twice a day  Strict intake output  Monitor weight          VTE Pharmacologic Prophylaxis:   Moderate Risk (Score 3-4) - Pharmacological DVT Prophylaxis Ordered: heparin  Patient Centered Rounds: I performed bedside rounds with nursing staff today  Discussions with Specialists or Other Care Team Provider: Discussed with my attending, tony abreu GI team in am  Education and Discussions with Family / Patient: will update in afternoon at bedside  Current Length of Stay: 6 day(s)  Current Patient Status: Inpatient   Discharge Plan:  To be determined    Code Status: Level 3 - DNAR and DNI    Subjective:   Patient reported BM still liquid brown, but no pain or tenderness    Objective: Vitals:   Temp (24hrs), Av 1 °F (37 3 °C), Min:98 5 °F (36 9 °C), Max:99 5 °F (37 5 °C)    Temp:  [98 5 °F (36 9 °C)-99 5 °F (37 5 °C)] 98 5 °F (36 9 °C)  HR:  [93-97] 93  Resp:  [18] 18  BP: (132-135)/(61-82) 132/61  SpO2:  [94 %-96 %] 94 %  Body mass index is 30 42 kg/m²  Input and Output Summary (last 24 hours): Intake/Output Summary (Last 24 hours) at 2022 0742  Last data filed at 2022 0445  Gross per 24 hour   Intake --   Output 1900 ml   Net -1900 ml       Physical Exam:   Physical Exam  Vitals reviewed  Constitutional:       General: He is not in acute distress  Appearance: He is obese  He is not diaphoretic  HENT:      Head: Normocephalic  Mouth/Throat:      Mouth: Mucous membranes are moist    Eyes:      General:         Right eye: No discharge  Left eye: No discharge  Extraocular Movements: Extraocular movements intact  Conjunctiva/sclera: Conjunctivae normal       Pupils: Pupils are equal, round, and reactive to light  Cardiovascular:      Rate and Rhythm: Normal rate  Rhythm irregular  Pulses: Normal pulses  Heart sounds: Normal heart sounds  Pulmonary:      Effort: Pulmonary effort is normal  No respiratory distress  Breath sounds: Normal breath sounds  No wheezing or rales  Abdominal:      General: Abdomen is flat  Bowel sounds are normal  There is distension  Palpations: Abdomen is soft  Tenderness: There is no right CVA tenderness, left CVA tenderness, guarding or rebound  Comments: Baseline per patient, no tenderness, has BM, brown liquid   Musculoskeletal:         General: Normal range of motion  Cervical back: Normal range of motion  Right lower leg: Edema (Trace) present  Left lower leg: Edema (Trace) present  Skin:     General: Skin is warm  Capillary Refill: Capillary refill takes less than 2 seconds  Findings: Bruising (around the left eye ) present     Neurological: General: No focal deficit present  Mental Status: He is alert  Mental status is at baseline  Comments: Patient knows the place, he knows his in the hospital [facility], he knows in [place], knows the year, and month   Psychiatric:         Mood and Affect: Mood normal          Behavior: Behavior normal          Thought Content: Thought content normal          Judgment: Judgment normal           Additional Data:     Labs:  Results from last 7 days   Lab Units 02/17/22  0340 02/13/22  1608 02/13/22  0443   WBC Thousand/uL 6 11   < > 9 23   HEMOGLOBIN g/dL 8 5*   < > 7 1*   HEMATOCRIT % 28 6*   < > 24 6*   PLATELETS Thousands/uL 337   < > 369   NEUTROS PCT %  --   --  77*   LYMPHS PCT %  --   --  8*   MONOS PCT %  --   --  13*   EOS PCT %  --   --  1    < > = values in this interval not displayed  Results from last 7 days   Lab Units 02/17/22  0340 02/14/22  0406 02/13/22  0443   SODIUM mmol/L 131*   < > 132*   POTASSIUM mmol/L 3 2*   < > 4 0   CHLORIDE mmol/L 95*   < > 99*   CO2 mmol/L 28   < > 25   BUN mg/dL 16   < > 26*   CREATININE mg/dL 1 21   < > 1 40*   ANION GAP mmol/L 8   < > 8   CALCIUM mg/dL 8 8   < > 8 9   ALBUMIN g/dL  --   --  3 2*   TOTAL BILIRUBIN mg/dL  --   --  1 06*   ALK PHOS U/L  --   --  200*   ALT U/L  --   --  21   AST U/L  --   --  17   GLUCOSE RANDOM mg/dL 121   < > 127    < > = values in this interval not displayed           Results from last 7 days   Lab Units 02/17/22  0649 02/16/22 2044 02/16/22  1556 02/16/22  1102 02/16/22  0658 02/15/22  2052 02/15/22  1647 02/15/22  1023 02/15/22  0653 02/14/22  2048 02/14/22  1641 02/14/22  1145   POC GLUCOSE mg/dl 128 133 145* 190* 110 128 131 140 137 165* 143* 210*     Results from last 7 days   Lab Units 02/11/22  0310   HEMOGLOBIN A1C % 8 1*     Results from last 7 days   Lab Units 02/11/22  0048   LACTIC ACID mmol/L 1 2       Lines/Drains:  Invasive Devices  Report    Peripheral Intravenous Line Peripheral IV 02/15/22 Right;Ventral (anterior) Forearm 2 days                  Telemetry:  Telemetry Orders (From admission, onward)             24 Hour Telemetry Monitoring  Continuous x 24 Hours (Telem)        References:    Telemetry Guidelines   Question:  Reason for 24 Hour Telemetry  Answer:  Syncope suspected to be cardiac in origin                 Telemetry Reviewed: Atrial fibrillation  HR averaging 60  Indication for Continued Telemetry Use: No indication for continued use  Will discontinue                Imaging: Reviewed radiology reports from this admission including: chest xray    Recent Cultures (last 7 days):         Last 24 Hours Medication List:   Current Facility-Administered Medications   Medication Dose Route Frequency Provider Last Rate    amiodarone  100 mg Oral Daily With Breakfast Yumi Hough MD      [START ON 2/18/2022] amLODIPine  10 mg Oral Daily Dangelo Burrows MD      bisacodyl  10 mg Oral Once Dangelo Burrows MD      docusate sodium  100 mg Oral BID Dangelo Burrows MD      doxazosin  8 mg Oral HS Yumi Hough MD      heparin (porcine)  5,000 Units Subcutaneous [facility] Dangelo Burrows MD      insulin lispro  1-5 Units Subcutaneous TID [facility] Dangelo Burrows MD      insulin lispro  1-5 Units Subcutaneous HS Dangelo Burrows MD      metoprolol tartrate  25 mg Oral Q12H [address] Yumi Hough MD      pantoprazole  40 mg Intravenous Q12H [address] Ileana Emerson MD      [START ON 2/18/2022] polyethylene glycol  17 g Oral Daily Dangelo Burrows MD      potassium chloride  40 mEq Oral Q4H Dangelo Burrows MD      pravastatin  40 mg Oral Daily With Mona Kay MD      simethicone  80 mg Oral Q6H PRN Dangelo Burrows MD      torsemide  20 mg Oral BID Dangelo Burrows MD          Today, Patient Was Seen By: Dangelo Burrows MD    **Please Note: This note may have been constructed using a voice recognition system  **

## 2022-02-17 NOTE — ANESTHESIA PREPROCEDURE EVALUATION
Procedure:  COLONOSCOPY    Relevant Problems   CARDIO   (+) Atrial fibrillation (HCC)      ENDO   (+) Type 2 diabetes mellitus, without long-term current use of insulin (HCC)      /RENAL   (+) Chronic kidney disease      HEMATOLOGY   (+) Anemia      Iron deficiency anemia s/p 3UpRBC since admission, EGD unrevealing of source of bleed  Hyponatremia - Na stable at 131  TTE 2020 [facility] outpatient): LEVF 50%, severe concentric hypertrophy, moderate MR - on torsemide as outpatient    Lab Results   Component Value Date    WBC 6 11 02/17/2022    HGB 8 5 (L) 02/17/2022    HCT 28 6 (L) 02/17/2022    MCV 68 (L) 02/17/2022     02/17/2022     Lab Results   Component Value Date    K 3 2 (L) 02/17/2022    CO2 28 02/17/2022    CL 95 (L) 02/17/2022    BUN 16 02/17/2022    CREATININE 1 21 02/17/2022     Lab Results   Component Value Date    GLUCOSE 165 (H) 02/11/2022       Lab Results   Component Value Date    HGBA1C 8 1 (H) 02/11/2022       Type and Screen:  O    Physical Exam    Airway    Mallampati score: II  TM Distance: <3 FB  Neck ROM: full     Dental   No notable dental hx     Cardiovascular      Pulmonary      Other Findings        Anesthesia Plan  ASA Score- 3     Anesthesia Type- IV sedation with anesthesia with ASA Monitors  Additional Monitors:   Airway Plan:           Plan Factors-Exercise tolerance (METS): <4 METS  Chart reviewed  Existing labs reviewed  Patient summary reviewed  Induction- intravenous  Postoperative Plan-     Informed Consent- Anesthetic plan and risks discussed with patient  I personally reviewed this patient with the CRNA  Discussed and agreed on the Anesthesia Plan with the CRNA  Juana Bey

## 2022-02-17 NOTE — ASSESSMENT & PLAN NOTE
Lab Results   Component Value Date    HGBA1C 8 1 (H) 02/11/2022       Recent Labs     02/16/22  1102 02/16/22  1556 02/16/22 2044 02/17/22  0649   POCGLU 190* 145* 133 128       Blood Sugar Average: Last 72 hrs:  not on any medication       sliding scale insulin, will continue discontinuing insulin coverage  and check if sugar remains stable  Will likely discharge on metformin 500 mg extended release at a lower dose based on EGFR 45, recommended A1c 7 5-8% based on his age

## 2022-02-17 NOTE — ASSESSMENT & PLAN NOTE
Home medication:  Eliquis 5 mg b i d , amiodarone 100 mg daily, metoprolol 25 b i d  Status post cardioversion March of 2021   EKG:  AFib with controlled heart rate        Plan:  · Continue amiodarone  · Continue metoprolol  · Will discuss with GI if okay to resume Eliquis

## 2022-02-17 NOTE — ASSESSMENT & PLAN NOTE
Wt Readings from Last 3 Encounters:   02/17/22 85 5 kg (188 lb 7 9 oz)     Patient [facility] with Cardiology as outpatient([address] [facility]  Last echo 2020 showed LVEF 50%, severe concentric hypertrophy, aortic sclerosis without stenosis, moderate mitral regurgitation  Per notes, patient was on torsemide 20 mg b i d  as home medication  Patient's torsemide currently on hold due to ANDREIA and suspicion for prerenal   Intake 1 3 L output 1 3 L yesterday  Plan:  Continue metoprolol 25 mg b i d    Continue torsemide 20 mg twice a day  Strict intake output  Monitor weight

## 2022-02-17 NOTE — ASSESSMENT & PLAN NOTE
Recent Labs     02/16/22  0039 02/16/22  0409 02/17/22  0340   HGB 7 7* 8 1* 8 5*     Patient was found to have a microcytic anemia on admission, without acute blood loss  Patient denied any active bleeding  Iron panel showed iron deficiency anemia  Patient is status post 3 units PRBC    2/14 Per neuro cognitive assessment patient does not appear to have capacity to make medical decisions  I discussed the results with the patient and Malik Shah  GI recommended EGD and colonoscopy, however patient himself does not want an EGD but agreeable with colonoscopy  Risks and benefits was discussed  Padmini Sears would like to have him have the procedure however does not wish to go against him even though he does not have capacity to make decisions  Olinda Wing will follow what Mr Yamileth Michael decides  They both decided that they are okay with colonoscopy but not EGD  I notified the GI team that patient does not want EGD only colonoscopy  GI try to talk to the patient however patient was still insistent of not getting EGD     02/15/2022 upon discussion with the patient and POA, they are now agreeable with EGD and colonoscopy, per patient and night nurse, bowel movement was clearing per them  patient was noticed to have a abdominal distension no tenderness, KUB and stat CT abdomen was done  2/15/22 CT abdomen:No bowel obstruction   Mild colonic air distention likely reflective of recent colonoscopy procedure    Colonic diverticulosis  Slightly enlarged small bilateral pleural effusions  Stable cardiomegaly  Stable hepatomegaly  2/15/22 EGD and colonoscopy: Moderate patchy hemorrhagic gastritis  No evidence of active bleeding  Moderate patchy hemorrhagic gastritis  No evidence of active bleeding    Colonoscopy: poor prep    2/17 Colonoscopy    Completed iron infusions 3/3 days completed    Plan:  · Monitor hemoglobin and transfuse if less than 7, will start the patient on iron pills, at discharge  · Will resume Eliquis  · CBC in the morning

## 2022-02-17 NOTE — PLAN OF CARE
Problem: MOBILITY - ADULT  Goal: Maintain or return to baseline ADL function  Description: INTERVENTIONS:  -  Assess patient's ability to carry out ADLs; assess patient's baseline for ADL function and identify physical deficits which impact ability to perform ADLs (bathing, care of mouth/teeth, toileting, grooming, dressing, etc )  - Assess/evaluate cause of self-care deficits   - Assess range of motion  - Assess patient's mobility; develop plan if impaired  - Assess patient's need for assistive devices and provide as appropriate  - Encourage maximum independence but intervene and supervise when necessary  - Involve family in performance of ADLs  - Assess for home care needs following discharge   - Consider OT consult to assist with ADL evaluation and planning for discharge  - Provide patient education as appropriate  Outcome: Progressing  Goal: Maintains/Returns to pre admission functional level  Description: INTERVENTIONS:  - Perform BMAT or MOVE assessment daily    - Set and communicate daily mobility goal to care team and patient/family/caregiver  - Collaborate with rehabilitation services on mobility goals if consulted  - Perform Range of Motion  times a day  - Reposition patient every  hours    - Dangle patient  times a day  - Stand patient  times a day  - Ambulate patient  times a day  - Out of bed to chair  times a day   - Out of bed for meals  times a day  - Out of bed for toileting  - Record patient progress and toleration of activity level   Outcome: Progressing     Problem: Potential for Falls  Goal: Patient will remain free of falls  Description: INTERVENTIONS:  - Educate patient/family on patient safety including physical limitations  - Instruct patient to call for assistance with activity   - Consult OT/PT to assist with strengthening/mobility   - Keep Call bell within reach  - Keep bed low and locked with side rails adjusted as appropriate  - Keep care items and personal belongings within reach  - Initiate and maintain comfort rounds  - Make Fall Risk Sign visible to staff  - Offer Toileting every  Hours, in advance of need  - Initiate/Maintain alarm  - Obtain necessary fall risk management equipment:   - Apply yellow socks and bracelet for high fall risk patients  - Consider moving patient to room near nurses station  Outcome: Progressing     Problem: Prexisting or High Potential for Compromised Skin Integrity  Goal: Skin integrity is maintained or improved  Description: INTERVENTIONS:  - Identify patients at risk for skin breakdown  - Assess and monitor skin integrity  - Assess and monitor nutrition and hydration status  - Monitor labs   - Assess for incontinence   - Turn and reposition patient  - Assist with mobility/ambulation  - Relieve pressure over bony prominences  - Avoid friction and shearing  - Provide appropriate hygiene as needed including keeping skin clean and dry  - Evaluate need for skin moisturizer/barrier cream  - Collaborate with interdisciplinary team   - Patient/family teaching  - Consider wound care consult   Outcome: Progressing     Problem: HEMATOLOGIC - ADULT  Goal: Maintains hematologic stability  Description: INTERVENTIONS  - Assess for signs and symptoms of bleeding or hemorrhage  - Monitor labs  - Administer supportive blood products/factors as ordered and appropriate  Outcome: Progressing     Problem: PAIN - ADULT  Goal: Verbalizes/displays adequate comfort level or baseline comfort level  Description: Interventions:  - Encourage patient to monitor pain and request assistance  - Assess pain using appropriate pain scale  - Administer analgesics based on type and severity of pain and evaluate response  - Implement non-pharmacological measures as appropriate and evaluate response  - Consider cultural and social influences on pain and pain management  - Notify physician/advanced practitioner if interventions unsuccessful or patient reports new pain  Outcome: Progressing Problem: INFECTION - ADULT  Goal: Absence or prevention of progression during hospitalization  Description: INTERVENTIONS:  - Assess and monitor for signs and symptoms of infection  - Monitor lab/diagnostic results  - Monitor all insertion sites, i e  indwelling lines, tubes, and drains  - Monitor endotracheal if appropriate and nasal secretions for changes in amount and color  - [place] appropriate cooling/warming therapies per order  - Administer medications as ordered  - Instruct and encourage patient and family to use good hand hygiene technique  - Identify and instruct in appropriate isolation precautions for identified infection/condition  Outcome: Progressing     Problem: SAFETY ADULT  Goal: Patient will remain free of falls  Description: INTERVENTIONS:  - Educate patient/family on patient safety including physical limitations  - Instruct patient to call for assistance with activity   - Consult OT/PT to assist with strengthening/mobility   - Keep Call bell within reach  - Keep bed low and locked with side rails adjusted as appropriate  - Keep care items and personal belongings within reach  - Initiate and maintain comfort rounds  - Make Fall Risk Sign visible to staff  - Offer Toileting every  Hours, in advance of need  - Initiate/Maintain alarm  - Obtain necessary fall risk management equipment  - Apply yellow socks and bracelet for high fall risk patients  - Consider moving patient to room near nurses station  Outcome: Progressing     Problem: DISCHARGE PLANNING  Goal: Discharge to home or other facility with appropriate resources  Description: INTERVENTIONS:  - Identify barriers to discharge w/patient and caregiver  - Arrange for needed discharge resources and transportation as appropriate  - Identify discharge learning needs (meds, wound care, etc )  - Arrange for interpretive services to assist at discharge as needed  - Refer to Case Management Department for coordinating discharge planning if the patient needs post-hospital services based on physician/advanced practitioner order or complex needs related to functional status, cognitive ability, or social support system  Outcome: Progressing     Problem: Nutrition/Hydration-ADULT  Goal: Nutrient/Hydration intake appropriate for improving, restoring or maintaining nutritional needs  Description: Monitor and assess patient's nutrition/hydration status for malnutrition  Collaborate with interdisciplinary team and initiate plan and interventions as ordered  Monitor patient's weight and dietary intake as ordered or per policy  Utilize nutrition screening tool and intervene as necessary  Determine patient's food preferences and provide high-protein, high-caloric foods as appropriate       INTERVENTIONS:  - Monitor oral intake, urinary output, labs, and treatment plans  - Assess nutrition and hydration status and recommend course of action  - Evaluate amount of meals eaten  - Assist patient with eating if necessary   - Allow adequate time for meals  - Recommend/ encourage appropriate diets, oral nutritional supplements, and vitamin/mineral supplements  - Order, calculate, and assess calorie counts as needed  - Recommend, monitor, and adjust tube feedings and TPN/PPN based on assessed needs  - Assess need for intravenous fluids  - Provide specific nutrition/hydration education as appropriate  - Include patient/family/caregiver in decisions related to nutrition  Outcome: Progressing

## 2022-02-17 NOTE — ASSESSMENT & PLAN NOTE
POA: Most likely syncope secondary to symptomatic anemia  Troponin negative, glucose within normal limits    Negative orthostatics  Imaging negative for acute intracranial process or C-spine fracture  Fall precautions    Assessment:  Syncope likely secondary to anemia secondary to ulcerated cecal mass, and hemorrhagic gastritis  Plan:   · Treat anemia  · CBC in am  · PT / OT recommending rehab  · Ambulatory pulse ox

## 2022-02-17 NOTE — CONSULTS
Consultation - [facility] 82 y o  male MRN: [id number]  Unit/Bed#: S -01 Encounter: [id number]    Assessment/Plan     Assessment:  81 yo M with PMH of afib on eliquis, CHF, HTN and previous exploratory laparotomy with partial gastrectomy for a gastric ulcer who presents with syncope and anemia from newly diagnosed cecal mass  Plan:  Outpatient follow up for discussions regarding surgical resection of right sided colon mass  Would hold on further imaging given imaging already obtained and creatinine 1 21 this morning  Will require preoperative medical optimization  Continue to monitor Hb and transfuse as needed for Hb < 7  Rest of care per primary    History of Present Illness     HPI:  Yanna Marie is a 82 y o  male who presents with a cecal mass  Patient initially presented as a trauma on 2/11 after a syncopal event at home  Trauma evaluation was negative for traumatic injury but he was found to be anemic with Hb of 5 9  He received 2 u PRBC and GI was consulted for upper and lower endoscopy  EGD was completed on 2/14 that showed no gastric ulcer but moderate patchy hemorrhagic gastritis with no active bleeding  Colonoscopy was completed on 2/15 but aborted due to poor prep  Repeat colonoscopy was completed 2/16 and a 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  This was biopsied and colorectal surgery was consulted for consideration for surgical resection  Since his initial transfusion, patient has required one more unit of pRBC on 2/15 for low Hb  He denies any GI bleeding symptoms prior to his presentation and also denies other abdominal complaints including nausea, vomiting, abdominal pain or change in bowel habit  He has a history of afib on eliquis but this has been held for the past 6 days while in the hospital  His only previous surgical history was an exploratory laparotomy with partial gastrectomy for a gastric ulcer      Inpatient consult to Colorectal Surgery  Consult performed by: Brent Bell MD  Consult ordered by: Adolfo Bellamy PA-C           Review of Systems   Constitutional: Negative  HENT: Negative  Eyes: Negative  Respiratory: Negative  Cardiovascular: Negative  Gastrointestinal: Negative  Endocrine: Negative  Genitourinary: Negative  Musculoskeletal: Negative  Skin: Negative  Neurological: Negative  Psychiatric/Behavioral: Negative  Historical Information   Past Medical History:   Diagnosis Date    A-fib ([address] )     Diastolic heart failure (HCC)     HTN (hypertension)      Past Surgical History:   Procedure Laterality Date    EXPLORATORY LAPAROTOMY      with gastric ulcer repair     Social History   Social History     Substance and Sexual Activity   Alcohol Use None     Social History     Substance and Sexual Activity   Drug Use Not on file     E-Cigarette/Vaping     E-Cigarette/Vaping Substances     Social History     Tobacco Use   Smoking Status Not on file   Smokeless Tobacco Not on file     Family History: No family history on file      Meds/Allergies   current meds:   Current Facility-Administered Medications   Medication Dose Route Frequency    amiodarone tablet 100 mg  100 mg Oral Daily With Breakfast    [START ON 2/18/2022] amLODIPine (NORVASC) tablet 10 mg  10 mg Oral Daily    docusate sodium (COLACE) capsule 100 mg  100 mg Oral BID    doxazosin (CARDURA) tablet 8 mg  8 mg Oral HS    heparin (porcine) subcutaneous injection 5,000 Units  5,000 Units Subcutaneous Q8H [facility]    insulin lispro (HumaLOG) 100 units/mL subcutaneous injection 1-5 Units  1-5 Units Subcutaneous TID AC    insulin lispro (HumaLOG) 100 units/mL subcutaneous injection 1-5 Units  1-5 Units Subcutaneous HS    metoprolol tartrate (LOPRESSOR) tablet 25 mg  25 mg Oral Q12H [facility]    pantoprazole (PROTONIX) injection 40 mg  40 mg Intravenous Q12H [facility]    [START ON 2/18/2022] polyethylene glycol (MIRALAX) packet 17 g  17 g Oral Daily    pravastatin (PRAVACHOL) tablet 40 mg  40 mg Oral Daily With Dinner    simethicone (MYLICON) chewable tablet 80 mg  80 mg Oral Q6H PRN    torsemide (DEMADEX) tablet 20 mg  20 mg Oral BID    and PTA meds:   Prior to Admission Medications   Prescriptions Last Dose Informant Patient Reported? Taking?    amLODIPine (Norvasc) 10 mg tablet   Yes Yes   Sig: Take 10 mg by mouth daily   amiodarone 100 mg tablet   Yes Yes   Sig: Take 100 mg by mouth daily   apixaban (Eliquis) 5 mg   Yes Yes   Sig: Take 5 mg by mouth 2 (two) times a day   doxazosin (CARDURA) 8 MG tablet   Yes Yes   Sig: Take 8 mg by mouth daily at bedtime   losartan (COZAAR) 50 mg tablet   Yes Yes   Sig: Take 50 mg by mouth daily   metoprolol tartrate (LOPRESSOR) 25 mg tablet   Yes Yes   Sig: Take 25 mg by mouth every 12 (twelve) hours   pravastatin (PRAVACHOL) 40 mg tablet   Yes Yes   Sig: Take 40 mg by mouth daily   torsemide (DEMADEX) 20 mg tablet   Yes Yes   Sig: Take 40 mg by mouth 2 (two) times a day      Facility-Administered Medications: None     No Known Allergies    Objective   First Vitals:   Blood Pressure: 135/66 (02/11/22 0007)  Pulse: 75 (02/11/22 0007)  Temperature: (!) 97 2 °F (36 2 °C) (02/11/22 0007)  Temp Source: Tympanic (02/11/22 0007)  Respirations: 20 (02/11/22 0007)  Height: 5' 6" (167 6 cm) (02/12/21 documentation from chart review) (02/12/22 1159)  Weight - Scale: 91 1 kg (200 lb 13 4 oz) (02/11/22 0007)  SpO2: 98 % (02/11/22 0007)    Current Vitals:   Blood Pressure: 164/67 (02/17/22 1643)  Pulse: 91 (02/17/22 1643)  Temperature: 98 2 °F (36 8 °C) (02/17/22 1623)  Temp Source: Temporal (02/17/22 1623)  Respirations: 20 (02/17/22 1643)  Height: 5' 6" (167 6 cm) (02/12/21 documentation from chart review) (02/12/22 1159)  Weight - Scale: 85 5 kg (188 lb 7 9 oz) (02/17/22 0600)  SpO2: 96 % (02/17/22 1643)      Intake/Output Summary (Last 24 hours) at 2/17/2022 1856  Last data filed at 2/17/2022 1851  Gross per 24 hour   Intake 500 ml   Output 1350 ml   Net -850 ml       Invasive Devices  Report    Peripheral Intravenous Line            Peripheral IV 02/17/22 Right Antecubital <1 day                Physical Exam  Constitutional:       Appearance: Normal appearance  HENT:      Head: Normocephalic and atraumatic  Right Ear: External ear normal       Left Ear: External ear normal       Nose: Nose normal       Mouth/Throat:      Mouth: Mucous membranes are moist       Pharynx: Oropharynx is clear  Eyes:      Extraocular Movements: Extraocular movements intact  Conjunctiva/sclera: Conjunctivae normal       Pupils: Pupils are equal, round, and reactive to light  Cardiovascular:      Rate and Rhythm: Normal rate and regular rhythm  Pulses: Normal pulses  Pulmonary:      Effort: Pulmonary effort is normal    Abdominal:      General: Abdomen is flat  There is distension (Mildly distended)  Palpations: Abdomen is soft  Tenderness: There is no abdominal tenderness  Musculoskeletal:         General: Normal range of motion  Cervical back: Normal range of motion  Skin:     General: Skin is warm and dry  Neurological:      General: No focal deficit present  Mental Status: He is alert and oriented to person, place, and time  Psychiatric:         Mood and Affect: Mood normal          Behavior: Behavior normal             Lab Results: I have personally reviewed pertinent lab results  Imaging: I have personally reviewed pertinent reports  EKG, Pathology, and Other Studies: I have personally reviewed pertinent reports

## 2022-02-17 NOTE — ANESTHESIA POSTPROCEDURE EVALUATION
Post-Op Assessment Note    CV Status:  Stable    Pain management: adequate     Mental Status:  Sleepy   Hydration Status:  Euvolemic   PONV Controlled:  Controlled   Airway Patency:  Patent      Post Op Vitals Reviewed: Yes      Staff: CRNA         No complications documented      BP      Temp      Pulse     Resp      SpO2

## 2022-02-17 NOTE — ASSESSMENT & PLAN NOTE
Most likely syncope secondary to symptomatic anemia  Troponin negative, glucose within normal limits    Negative orthostatics  Imaging negative for acute intracranial process or C-spine fracture  Fall precautions    Plan:   · Treat anemia  · CBC in am  · For colonoscopy today  · PT / OT recommending rehab

## 2022-02-17 NOTE — ASSESSMENT & PLAN NOTE
Recent Labs     02/16/22  0039 02/16/22  0409 02/17/22  0340   HGB 7 7* 8 1* 8 5*     Patient was found to have a microcytic anemia on admission, without acute blood loss  Patient denied any active bleeding  Iron panel showed iron deficiency anemia  Patient is status post 3 units PRBC    2/14 Per neuro cognitive assessment patient does not appear to have capacity to make medical decisions  I discussed the results with the patient and Suzette Anne  GI recommended EGD and colonoscopy, however patient himself does not want an EGD but agreeable with colonoscopy  Risks and benefits was discussed  Conner Conroy would like to have him have the procedure however does not wish to go against him even though he does not have capacity to make decisions  Halle Vera will follow what Mr Graham Guzman decides  They both decided that they are okay with colonoscopy but not EGD  I notified the GI team that patient does not want EGD only colonoscopy  GI try to talk to the patient however patient was still insistent of not getting EGD     02/15/2022 upon discussion with the patient and POA, they are now agreeable with EGD and colonoscopy, per patient and night nurse, bowel movement was clearing per them  patient was noticed to have a abdominal distension no tenderness, KUB and stat CT abdomen was done  2/15/22 CT abdomen:No bowel obstruction   Mild colonic air distention likely reflective of recent colonoscopy procedure    Colonic diverticulosis  Slightly enlarged small bilateral pleural effusions  Stable cardiomegaly  Stable hepatomegaly  2/15/22 EGD and colonoscopy: Moderate patchy hemorrhagic gastritis  No evidence of active bleeding  Moderate patchy hemorrhagic gastritis  No evidence of active bleeding    Colonoscopy: poor prep    Completed iron infusions 3/3 days completed    Plan:  · Monitor hemoglobin and transfuse if less than 7, will start the patient on iron pills, at discharge  · Will start the patient on VTE prophylaxis since patient has not been mobile that much, risks of DVT is higher than risk of bleeding  · For colonoscopy NPO since midnight, was sign out by dayteam poor prep, per nurse still liquid brown, was given water enema and dulcolax 10 mg x1, will discuss with GI team if still not clear

## 2022-02-17 NOTE — ASSESSMENT & PLAN NOTE
Lab Results   Component Value Date    HGBA1C 8 1 (H) 02/11/2022       Recent Labs     02/16/22  1556 02/16/22  2044 02/17/22  0649 02/17/22  1127   POCGLU 145* 133 128 124       Blood Sugar Average: Last 72 hrs:  not on any medication       sliding scale insulin, will continue discontinuing insulin coverage  and check if sugar remains stable  Will likely discharge on metformin 500 mg extended release at a lower dose based on EGFR 45, recommended A1c 7 5-8% based on his age

## 2022-02-17 NOTE — PLAN OF CARE
Problem: MOBILITY - ADULT  Goal: Maintain or return to baseline ADL function  Description: INTERVENTIONS:  -  Assess patient's ability to carry out ADLs; assess patient's baseline for ADL function and identify physical deficits which impact ability to perform ADLs (bathing, care of mouth/teeth, toileting, grooming, dressing, etc )  - Assess/evaluate cause of self-care deficits   - Assess range of motion  - Assess patient's mobility; develop plan if impaired  - Assess patient's need for assistive devices and provide as appropriate  - Encourage maximum independence but intervene and supervise when necessary  - Involve family in performance of ADLs  - Assess for home care needs following discharge   - Consider OT consult to assist with ADL evaluation and planning for discharge  - Provide patient education as appropriate  Outcome: Progressing  Goal: Maintains/Returns to pre admission functional level  Description: INTERVENTIONS:  - Perform BMAT or MOVE assessment daily    - Set and communicate daily mobility goal to care team and patient/family/caregiver  - Collaborate with rehabilitation services on mobility goals if consulted  - Perform Range of Motion 2 times a day  - Reposition patient every 2 hours    - Dangle patient 2 times a day  - Stand patient 2 times a day  - Ambulate patient 2 times a day  - Out of bed to chair 2 times a day   - Out of bed for meals 2 times a day  - Out of bed for toileting  - Record patient progress and toleration of activity level   Outcome: Progressing     Problem: Potential for Falls  Goal: Patient will remain free of falls  Description: INTERVENTIONS:  - Educate patient/family on patient safety including physical limitations  - Instruct patient to call for assistance with activity   - Consult OT/PT to assist with strengthening/mobility   - Keep Call bell within reach  - Keep bed low and locked with side rails adjusted as appropriate  - Keep care items and personal belongings within reach  - Initiate and maintain comfort rounds  - Make Fall Risk Sign visible to staff  - Offer Toileting every 2 Hours, in advance of need  - Initiate/Maintain bed/chair alarm  - Obtain necessary fall risk management equipment: bed alarm  - Apply yellow socks and bracelet for high fall risk patients  - Consider moving patient to room near nurses station  Outcome: Progressing     Problem: Prexisting or High Potential for Compromised Skin Integrity  Goal: Skin integrity is maintained or improved  Description: INTERVENTIONS:  - Identify patients at risk for skin breakdown  - Assess and monitor skin integrity  - Assess and monitor nutrition and hydration status  - Monitor labs   - Assess for incontinence   - Turn and reposition patient  - Assist with mobility/ambulation  - Relieve pressure over bony prominences  - Avoid friction and shearing  - Provide appropriate hygiene as needed including keeping skin clean and dry  - Evaluate need for skin moisturizer/barrier cream  - Collaborate with interdisciplinary team   - Patient/family teaching  - Consider wound care consult   Outcome: Progressing     Problem: HEMATOLOGIC - ADULT  Goal: Maintains hematologic stability  Description: INTERVENTIONS  - Assess for signs and symptoms of bleeding or hemorrhage  - Monitor labs  - Administer supportive blood products/factors as ordered and appropriate  Outcome: Progressing     Problem: PAIN - ADULT  Goal: Verbalizes/displays adequate comfort level or baseline comfort level  Description: Interventions:  - Encourage patient to monitor pain and request assistance  - Assess pain using appropriate pain scale  - Administer analgesics based on type and severity of pain and evaluate response  - Implement non-pharmacological measures as appropriate and evaluate response  - Consider cultural and social influences on pain and pain management  - Notify physician/advanced practitioner if interventions unsuccessful or patient reports new pain  Outcome: Progressing     Problem: INFECTION - ADULT  Goal: Absence or prevention of progression during hospitalization  Description: INTERVENTIONS:  - Assess and monitor for signs and symptoms of infection  - Monitor lab/diagnostic results  - Monitor all insertion sites, i e  indwelling lines, tubes, and drains  - Monitor endotracheal if appropriate and nasal secretions for changes in amount and color  - [place] appropriate cooling/warming therapies per order  - Administer medications as ordered  - Instruct and encourage patient and family to use good hand hygiene technique  - Identify and instruct in appropriate isolation precautions for identified infection/condition  Outcome: Progressing     Problem: SAFETY ADULT  Goal: Patient will remain free of falls  Description: INTERVENTIONS:  - Educate patient/family on patient safety including physical limitations  - Instruct patient to call for assistance with activity   - Consult OT/PT to assist with strengthening/mobility   - Keep Call bell within reach  - Keep bed low and locked with side rails adjusted as appropriate  - Keep care items and personal belongings within reach  - Initiate and maintain comfort rounds  - Make Fall Risk Sign visible to staff  - Offer Toileting every 2 Hours, in advance of need  - Initiate/Maintain bed/chair alarm  - Obtain necessary fall risk management equipment: bed alarm  - Apply yellow socks and bracelet for high fall risk patients  - Consider moving patient to room near nurses station  Outcome: Progressing     Problem: DISCHARGE PLANNING  Goal: Discharge to home or other facility with appropriate resources  Description: INTERVENTIONS:  - Identify barriers to discharge w/patient and caregiver  - Arrange for needed discharge resources and transportation as appropriate  - Identify discharge learning needs (meds, wound care, etc )  - Arrange for interpretive services to assist at discharge as needed  - Refer to Case Management Department for coordinating discharge planning if the patient needs post-hospital services based on physician/advanced practitioner order or complex needs related to functional status, cognitive ability, or social support system  Outcome: Progressing     Problem: Nutrition/Hydration-ADULT  Goal: Nutrient/Hydration intake appropriate for improving, restoring or maintaining nutritional needs  Description: Monitor and assess patient's nutrition/hydration status for malnutrition  Collaborate with interdisciplinary team and initiate plan and interventions as ordered  Monitor patient's weight and dietary intake as ordered or per policy  Utilize nutrition screening tool and intervene as necessary  Determine patient's food preferences and provide high-protein, high-caloric foods as appropriate       INTERVENTIONS:  - Monitor oral intake, urinary output, labs, and treatment plans  - Assess nutrition and hydration status and recommend course of action  - Evaluate amount of meals eaten  - Assist patient with eating if necessary   - Allow adequate time for meals  - Recommend/ encourage appropriate diets, oral nutritional supplements, and vitamin/mineral supplements  - Order, calculate, and assess calorie counts as needed  - Recommend, monitor, and adjust tube feedings and TPN/PPN based on assessed needs  - Assess need for intravenous fluids  - Provide specific nutrition/hydration education as appropriate  - Include patient/family/caregiver in decisions related to nutrition  Outcome: Progressing

## 2022-02-17 NOTE — ASSESSMENT & PLAN NOTE
Recent Labs     02/16/22  0409 02/17/22  0340 02/18/22  0607   CREATININE 1 30 1 21 1 16   EGFR 50 54 57     Estimated Creatinine Clearance: 47 9 mL/min (by C-G formula based on SCr of 1 16 mg/dL)       · Baseline creatinine appears to be between (1 3 -1 5) difficult to find a real baseline since patient rarely get blood work   · Avoid nephrotoxins, hypotension  · Holding losartan, will likely discontinue

## 2022-02-17 NOTE — ASSESSMENT & PLAN NOTE
Recent Labs     02/15/22  0641 02/16/22  0409 02/17/22  0340   CREATININE 1 33* 1 30 1 21   EGFR 48 50 54     Estimated Creatinine Clearance: 46 6 mL/min (by C-G formula based on SCr of 1 21 mg/dL)       · Baseline creatinine appears to be between (1 3 -1 5) difficult to find a real baseline since patient rarely get blood work  · Patient is back to baseline  · Avoid nephrotoxins, hypotension  · Holding losartan

## 2022-02-18 PROBLEM — K63.9 CECAL LESION: Status: ACTIVE | Noted: 2022-02-18

## 2022-02-18 PROBLEM — F09 COGNITIVE DYSFUNCTION: Status: ACTIVE | Noted: 2022-02-18

## 2022-02-18 LAB
ANION GAP SERPL CALCULATED.3IONS-SCNC: 7 MMOL/L (ref 4–13)
BUN SERPL-MCNC: 15 MG/DL (ref 5–25)
CALCIUM SERPL-MCNC: 8.6 MG/DL (ref 8.3–10.1)
CEA SERPL-MCNC: 2.6 NG/ML (ref 0–3)
CHLORIDE SERPL-SCNC: 98 MMOL/L (ref 100–108)
CO2 SERPL-SCNC: 28 MMOL/L (ref 21–32)
CREAT SERPL-MCNC: 1.16 MG/DL (ref 0.6–1.3)
ERYTHROCYTE [DISTWIDTH] IN BLOOD BY AUTOMATED COUNT: 29.6 % (ref 11.6–15.1)
GFR SERPL CREATININE-BSD FRML MDRD: 57 ML/MIN/1.73SQ M
GLUCOSE SERPL-MCNC: 104 MG/DL (ref 65–140)
GLUCOSE SERPL-MCNC: 115 MG/DL (ref 65–140)
GLUCOSE SERPL-MCNC: 140 MG/DL (ref 65–140)
GLUCOSE SERPL-MCNC: 143 MG/DL (ref 65–140)
GLUCOSE SERPL-MCNC: 151 MG/DL (ref 65–140)
HCT VFR BLD AUTO: 29 % (ref 36.5–49.3)
HGB BLD-MCNC: 8.5 G/DL (ref 12–17)
MCH RBC QN AUTO: 20.7 PG (ref 26.8–34.3)
MCHC RBC AUTO-ENTMCNC: 29.3 G/DL (ref 31.4–37.4)
MCV RBC AUTO: 71 FL (ref 82–98)
PLATELET # BLD AUTO: 285 THOUSANDS/UL (ref 149–390)
PMV BLD AUTO: 10.3 FL (ref 8.9–12.7)
POTASSIUM SERPL-SCNC: 3.3 MMOL/L (ref 3.5–5.3)
RBC # BLD AUTO: 4.11 MILLION/UL (ref 3.88–5.62)
SODIUM SERPL-SCNC: 133 MMOL/L (ref 136–145)
WBC # BLD AUTO: 5.85 THOUSAND/UL (ref 4.31–10.16)

## 2022-02-18 PROCEDURE — 82948 REAGENT STRIP/BLOOD GLUCOSE: CPT

## 2022-02-18 PROCEDURE — 97530 THERAPEUTIC ACTIVITIES: CPT

## 2022-02-18 PROCEDURE — 99232 SBSQ HOSP IP/OBS MODERATE 35: CPT | Performed by: INTERNAL MEDICINE

## 2022-02-18 PROCEDURE — 97116 GAIT TRAINING THERAPY: CPT

## 2022-02-18 PROCEDURE — 80048 BASIC METABOLIC PNL TOTAL CA: CPT | Performed by: INTERNAL MEDICINE

## 2022-02-18 PROCEDURE — C9113 INJ PANTOPRAZOLE SODIUM, VIA: HCPCS | Performed by: INTERNAL MEDICINE

## 2022-02-18 PROCEDURE — 85027 COMPLETE CBC AUTOMATED: CPT | Performed by: INTERNAL MEDICINE

## 2022-02-18 PROCEDURE — 82378 CARCINOEMBRYONIC ANTIGEN: CPT | Performed by: PHYSICIAN ASSISTANT

## 2022-02-18 RX ORDER — TORSEMIDE 20 MG/1
20 TABLET ORAL 2 TIMES DAILY
Status: DISCONTINUED | OUTPATIENT
Start: 2022-02-18 | End: 2022-02-18

## 2022-02-18 RX ORDER — TORSEMIDE 20 MG/1
20 TABLET ORAL 2 TIMES DAILY
Refills: 0 | Status: CANCELLED
Start: 2022-02-18

## 2022-02-18 RX ORDER — TORSEMIDE 20 MG/1
40 TABLET ORAL
Status: DISCONTINUED | OUTPATIENT
Start: 2022-02-18 | End: 2022-02-19 | Stop reason: HOSPADM

## 2022-02-18 RX ORDER — POTASSIUM CHLORIDE 20 MEQ/1
40 TABLET, EXTENDED RELEASE ORAL 2 TIMES DAILY
Status: COMPLETED | OUTPATIENT
Start: 2022-02-18 | End: 2022-02-18

## 2022-02-18 RX ORDER — PANTOPRAZOLE SODIUM 40 MG/1
40 TABLET, DELAYED RELEASE ORAL
Status: DISCONTINUED | OUTPATIENT
Start: 2022-02-18 | End: 2022-02-19 | Stop reason: HOSPADM

## 2022-02-18 RX ORDER — FUROSEMIDE 10 MG/ML
40 INJECTION INTRAMUSCULAR; INTRAVENOUS ONCE
Status: COMPLETED | OUTPATIENT
Start: 2022-02-18 | End: 2022-02-18

## 2022-02-18 RX ADMIN — INSULIN LISPRO 1 UNITS: 100 INJECTION, SOLUTION INTRAVENOUS; SUBCUTANEOUS at 16:37

## 2022-02-18 RX ADMIN — POTASSIUM CHLORIDE 40 MEQ: 1500 TABLET, EXTENDED RELEASE ORAL at 17:37

## 2022-02-18 RX ADMIN — DOXAZOSIN 8 MG: 4 TABLET ORAL at 21:49

## 2022-02-18 RX ADMIN — TORSEMIDE 40 MG: 20 TABLET ORAL at 16:35

## 2022-02-18 RX ADMIN — POTASSIUM CHLORIDE 40 MEQ: 1500 TABLET, EXTENDED RELEASE ORAL at 08:19

## 2022-02-18 RX ADMIN — APIXABAN 5 MG: 5 TABLET, FILM COATED ORAL at 17:37

## 2022-02-18 RX ADMIN — DOCUSATE SODIUM 100 MG: 100 CAPSULE ORAL at 08:18

## 2022-02-18 RX ADMIN — AMIODARONE HYDROCHLORIDE 100 MG: 200 TABLET ORAL at 07:37

## 2022-02-18 RX ADMIN — FUROSEMIDE 40 MG: 10 INJECTION, SOLUTION INTRAMUSCULAR; INTRAVENOUS at 07:36

## 2022-02-18 RX ADMIN — POLYETHYLENE GLYCOL 3350 17 G: 17 POWDER, FOR SOLUTION ORAL at 08:19

## 2022-02-18 RX ADMIN — PRAVASTATIN SODIUM 40 MG: 40 TABLET ORAL at 16:35

## 2022-02-18 RX ADMIN — DOCUSATE SODIUM 100 MG: 100 CAPSULE ORAL at 17:37

## 2022-02-18 RX ADMIN — METOPROLOL TARTRATE 25 MG: 25 TABLET, FILM COATED ORAL at 08:18

## 2022-02-18 RX ADMIN — IRON SUCROSE 300 MG: 20 INJECTION, SOLUTION INTRAVENOUS at 12:06

## 2022-02-18 RX ADMIN — AMLODIPINE BESYLATE 10 MG: 10 TABLET ORAL at 08:19

## 2022-02-18 RX ADMIN — APIXABAN 5 MG: 5 TABLET, FILM COATED ORAL at 12:05

## 2022-02-18 RX ADMIN — PANTOPRAZOLE SODIUM 40 MG: 40 INJECTION, POWDER, FOR SOLUTION INTRAVENOUS at 08:18

## 2022-02-18 RX ADMIN — METOPROLOL TARTRATE 25 MG: 25 TABLET, FILM COATED ORAL at 21:49

## 2022-02-18 RX ADMIN — PANTOPRAZOLE SODIUM 40 MG: 40 TABLET, DELAYED RELEASE ORAL at 16:35

## 2022-02-18 NOTE — ASSESSMENT & PLAN NOTE
Wt Readings from Last 3 Encounters:   02/18/22 82 8 kg (182 lb 8 7 oz)     Patient follows with Cardiology as outpatient([address])  Last echo 2020 showed LVEF 50%, severe concentric hypertrophy, aortic sclerosis without stenosis, moderate mitral regurgitation  Per notes, patient was on torsemide 20 mg b i d  as home medication    02/18/2022:  Upon confirmation with TERA Venegas, patient reports to be taking  Torsemide 40 mg twice a day    Plan:  · Continue metoprolol 25 mg b i d    · Will continue torsemide 40 b i d will watch kidney function  · Strict intake output  · Monitor weight

## 2022-02-18 NOTE — CASE MANAGEMENT
Case Management Discharge Planning Note    Patient name Delores Looney  Location S /S -72 MRN [id number]  : 1935 Date 2022       Current Admission Date: 2022  Current Admission Diagnosis:Cecal ulcerated mass   Patient Active Problem List    Diagnosis Date Noted    Cecal ulcerated mass 2022    Cognitive dysfunction 2022    Chronic kidney disease 2022    Type 2 diabetes mellitus, without long-term current use of insulin ([facility] [address] ) 2022    Syncope and collapse 2022    Chronic diastolic heart failure ([facility] [address] ) 2022    Atrial fibrillation ([facility] [address] ) 2022    Laceration of left eyebrow 2022    Anemia 2022      LOS (days): 7  Geometric Mean LOS (GMLOS) (days): 2 70  Days to GMLOS:-4 4     OBJECTIVE:  Risk of Unplanned Readmission Score: 13         Current admission status: Inpatient   Preferred Pharmacy: No Pharmacies Listed  Primary Care Provider: No primary care provider on file  [facility]  Secondary Insurance:     DISCHARGE DETAILS:    Discharge planning discussed with[de-identified] pt  left VM for pt wife, Baudilio Cater of Choice: Yes  Comments - Freedom of Choice: declining STR and wants [facility] - no preference in agency   Also needs [address]         Is the patient interested in [address] at discharge?: Yes  [address] requested[de-identified] Physical [address] Name[de-identified] Other  [facility] [address] Provider[de-identified] PCP  Home Health Services Needed[de-identified] Evaluate Functional Status and Safety,Gait/ADL Training,Strengthening/Theraputic Exercises to Improve Function  Homebound Criteria Met[de-identified] Requires the Assistance of Another Person for Safe Ambulation or to Leave the Home,Uses an Assist Device (i e  cane, walker, etc)  Supporting Clincal Findings[de-identified] Limited Endurance    Other Referral/Resources/Interventions Provided:  Interventions: HHC  Referral Comments: CM informed by resident, Dr Alhaji Salinas, that pt and wife now want pt to return home with [address]  CM left VM for Evi Salazar to confirm  CM met with pt at bedside who is declining STR and only wants [facility]  Pt has no preference in agency  Pt also reports he will need a RW  Referrals in [address] for [address] and DME order placed via parachute      Treatment Team Recommendation: Short Term Rehab  Discharge Destination Plan[de-identified] Home with [facility]

## 2022-02-18 NOTE — PROGRESS NOTES
[facility]  Progress Note Dianna Tsai 1935, 82 y o  male MRN: [id number]  Unit/Bed#: S -01 Encounter: [id number]  Primary Care Provider: No primary care provider on file  Date and time admitted to hospital: 2/11/2022 12:05 AM    * Cecal ulcerated mass  Assessment & Plan  S/P colonoscopy 2/17/22: Cecum-3-4 cm ulcerated mass status post biopsies  On the ileocecal valve there is a 1 5 cm sessile polyp status post biopsiesMultiple diverticula in the sigmoid colon    Plan:  · GI recommendation: Repeat colonoscopy in 1 year due to a personal history of colon cancer  Consult Colorectal surgery and CT scan of chest, abdomen and pelvis with contrast  · Colorectal recommendation: Outpatient follow up for discussions regarding surgical resection of right sided colon mass, Would hold on further imaging given imaging already obtained and creatinine 1 21 this morning  Will require preoperative medical optimization, Continue to monitor Hb and transfuse as needed for Hb < 7, Rest of care per primary    Anemia  Assessment & Plan  Recent Labs     02/16/22  0409 02/17/22  0340 02/18/22  0509   HGB 8 1* 8 5* 8 5*     Patient was found to have a microcytic anemia on admission, without acute blood loss  Patient denied any active bleeding  Iron panel showed iron deficiency anemia  Patient is status post 3 units PRBC    2/14 Per neuro cognitive assessment patient does not appear to have capacity to make medical decisions  I discussed the results with the patient and Starlette Ast  GI recommended EGD and colonoscopy, however patient himself does not want an EGD but agreeable with colonoscopy  Risks and benefits was discussed  Marla Quinonez would like to have him have the procedure however does not wish to go against him even though he does not have capacity to make decisions  Mary Rochawicho will follow what Mr Huitron Points decides  They both decided that they are okay with colonoscopy but not EGD   I notified the GI team that patient does not want EGD only colonoscopy  GI try to talk to the patient however patient was still insistent of not getting EGD     02/15/2022 upon discussion with the patient and POA, they are now agreeable with EGD and colonoscopy, per patient and night nurse, bowel movement was clearing per them  patient was noticed to have a abdominal distension no tenderness, KUB and stat CT abdomen was done  2/15/22 CT abdomen:No bowel obstruction   Mild colonic air distention likely reflective of recent colonoscopy procedure    Colonic diverticulosis  Slightly enlarged small bilateral pleural effusions  Stable cardiomegaly  Stable hepatomegaly  2/15/22 EGD and colonoscopy: Moderate patchy hemorrhagic gastritis  No evidence of active bleeding  Moderate patchy hemorrhagic gastritis  No evidence of active bleeding  Colonoscopy: poor prep    2/17 Colonoscopy    Completed iron infusions 3/3 days completed    Plan:  · Monitor hemoglobin and transfuse if less than 7, will start the patient on iron pills, at discharge  · Will resume Eliquis  · Iron deficiency anemia, was recommended to give additional 300 mg IV iron daily x3 to reach (1800mg daily)  · CBC in the morning      Syncope and collapse  Assessment & Plan  POA: Most likely syncope secondary to symptomatic anemia  Troponin negative, glucose within normal limits    Negative orthostatics  Imaging negative for acute intracranial process or C-spine fracture  Fall precautions    Assessment:  Syncope likely secondary to anemia secondary to ulcerated cecal mass, and hemorrhagic gastritis  Plan:   · Treat anemia  · CBC in am  · PT / OT recommending rehab  · Ambulatory pulse ox        Cognitive dysfunction  Assessment & Plan  2/11: Results of Neuropsychological exam revealed diffuse cognitive dysfunction and on a measure assessing awareness of personal health status and ability to evaluate health problems, handle medical emergencies and take safety precautions, patient performed in the IMPAIRED range of functioning  At this time, patient does not appear to have capacity to make informed medical decisions  Unspecified Neurocognitive Disorder  Patient seems to be answering appropriately person, place, time however forgetful  · Requested neuro psychological evaluation if possible to get another examination since patient seems to be better, hemoglobin is on acceptable range, Tiger texted, and left a voice message if possible to do another evaluation for cognitive function, since patient is disease agreeable to going to rehab, however Willa Elks and family  would like him to go to rehab, I discussed with the patient and POA that I will try to get another evaluation of possible       Type 2 diabetes mellitus, without long-term current use of insulin [facility])  Assessment & Plan  Lab Results   Component Value Date    HGBA1C 8 1 (H) 02/11/2022       Recent Labs     02/17/22  1127 02/17/22  1753 02/17/22  2047 02/18/22  0650   POCGLU 124 136 168* 115       Blood Sugar Average: Last 72 hrs:  not on any medication  sliding scale insulin, will continue discontinuing insulin coverage  and check if sugar remains stable  Will likely discharge on metformin 500 mg extended release at a lower dose based on EGFR 45, recommended A1c 7 5-8% based on his age      Chronic kidney disease  Assessment & Plan  Recent Labs     02/16/22  0409 02/17/22  0340 02/18/22  0607   CREATININE 1 30 1 21 1 16   EGFR 50 54 57     Estimated Creatinine Clearance: 47 9 mL/min (by C-G formula based on SCr of 1 16 mg/dL)       · Baseline creatinine appears to be between (1 3 -1 5) difficult to find a real baseline since patient rarely get blood work   · Avoid nephrotoxins, hypotension  · Holding losartan, will likely discontinue      Laceration of left eyebrow  Assessment & Plan  Small laceration left eyebrow repaired bedside without incident  Local wound care and repeat observations    Atrial fibrillation [facility])  Assessment & Plan  Home medication:  Eliquis 5 mg b i d , amiodarone 100 mg daily, metoprolol 25 b i d  Status post cardioversion 2021   EKG:  AFib with controlled heart rate  Plan:  · Continue amiodarone  · Continue metoprolol  · Will discuss with GI if okay to resume Eliquis    Chronic diastolic heart failure ([address] )  Assessment & Plan  Wt Readings from Last 3 Encounters:   22 82 8 kg (182 lb 8 7 oz)     Patient follows with Cardiology as outpatient([facility])  Last echo  showed LVEF 50%, severe concentric hypertrophy, aortic sclerosis without stenosis, moderate mitral regurgitation  Per notes, patient was on torsemide 20 mg b i d  as home medication    2022:  Upon confirmation with TERA Aden, patient reports to be taking  Torsemide 40 mg twice a day    Plan:  · Continue metoprolol 25 mg b i d  · Will give Lasix 40 mg x 1 IV then resume torsemide 40 b i d will watch kidney function  · Strict intake output  · Monitor weight          VTE Pharmacologic Prophylaxis:   Moderate Risk (Score 3-4) - Pharmacological DVT Prophylaxis Ordered: heparin  Patient Centered Rounds: I performed bedside rounds with nursing staff today  Discussions with Specialists or Other Care Team Provider: Discussed with my attending, will message GI team in am, regarding resumption of eliquis  Education and Discussions with Family / Patient: Updated  (Sabiha jimenez bring paper work) via phone  Current Length of Stay: 7 day(s)  Current Patient Status: Inpatient   Discharge Plan:  To be determined    Code Status: Level 3 - DNAR and DNI    Subjective:   Mild shortness of breath    Objective:     Vitals:   Temp (24hrs), Av 7 °F (37 1 °C), Min:98 1 °F (36 7 °C), Max:99 9 °F (37 7 °C)    Temp:  [98 1 °F (36 7 °C)-99 9 °F (37 7 °C)] 98 7 °F (37 1 °C)  HR:  [] 116  Resp:  [16-20] 19  BP: (121-169)/(58-84) 169/73  SpO2:  [85 %-99 %] 92 %  Body mass index is 29 46 kg/m²  Input and Output Summary (last 24 hours): Intake/Output Summary (Last 24 hours) at 2/18/2022 1301  Last data filed at 2/18/2022 1101  Gross per 24 hour   Intake 600 ml   Output 2260 ml   Net -1660 ml       Physical Exam:   Physical Exam  Vitals reviewed  Constitutional:       General: He is not in acute distress  Appearance: He is obese  He is not diaphoretic  HENT:      Head: Normocephalic  Mouth/Throat:      Mouth: Mucous membranes are moist    Eyes:      General:         Right eye: No discharge  Left eye: No discharge  Extraocular Movements: Extraocular movements intact  Conjunctiva/sclera: Conjunctivae normal       Pupils: Pupils are equal, round, and reactive to light  Cardiovascular:      Rate and Rhythm: Normal rate  Rhythm irregular  Pulses: Normal pulses  Heart sounds: Normal heart sounds  Pulmonary:      Effort: Pulmonary effort is normal  No respiratory distress  Breath sounds: Rales present  No wheezing  Abdominal:      General: Abdomen is flat  Bowel sounds are normal  There is distension  Palpations: Abdomen is soft  Tenderness: There is no right CVA tenderness, left CVA tenderness, guarding or rebound  Comments: Baseline per patient, no tenderness, has BM, brown liquid   Musculoskeletal:         General: Normal range of motion  Cervical back: Normal range of motion  Right lower leg: Edema (Trace) present  Left lower leg: Edema (Trace) present  Skin:     General: Skin is warm  Capillary Refill: Capillary refill takes less than 2 seconds  Findings: Bruising (around the left eye ) present  Neurological:      General: No focal deficit present  Mental Status: He is alert  Mental status is at baseline  Comments: Able to answer person, place, time   Psychiatric:         Mood and Affect: Mood normal          Behavior: Behavior normal          Thought Content:  Thought content normal  Judgment: Judgment normal           Additional Data:     Labs:  Results from last 7 days   Lab Units 02/18/22  0509 02/13/22  1608 02/13/22  0443   WBC Thousand/uL 5 85   < > 9 23   HEMOGLOBIN g/dL 8 5*   < > 7 1*   HEMATOCRIT % 29 0*   < > 24 6*   PLATELETS Thousands/uL 285   < > 369   NEUTROS PCT %  --   --  77*   LYMPHS PCT %  --   --  8*   MONOS PCT %  --   --  13*   EOS PCT %  --   --  1    < > = values in this interval not displayed  Results from last 7 days   Lab Units 02/18/22  0607 02/14/22  0406 02/13/22  0443   SODIUM mmol/L 133*   < > 132*   POTASSIUM mmol/L 3 3*   < > 4 0   CHLORIDE mmol/L 98*   < > 99*   CO2 mmol/L 28   < > 25   BUN mg/dL 15   < > 26*   CREATININE mg/dL 1 16   < > 1 40*   ANION GAP mmol/L 7   < > 8   CALCIUM mg/dL 8 6   < > 8 9   ALBUMIN g/dL  --   --  3 2*   TOTAL BILIRUBIN mg/dL  --   --  1 06*   ALK PHOS U/L  --   --  200*   ALT U/L  --   --  21   AST U/L  --   --  17   GLUCOSE RANDOM mg/dL 104   < > 127    < > = values in this interval not displayed  Results from last 7 days   Lab Units 02/18/22  1102 02/18/22  0650 02/17/22  2047 02/17/22  1753 02/17/22  1127 02/17/22  0649 02/16/22  2044 02/16/22  1556 02/16/22  1102 02/16/22  0658 02/15/22  2052 02/15/22  1647   POC GLUCOSE mg/dl 143* 115 168* 136 124 128 133 145* 190* 110 128 131               Lines/Drains:  Invasive Devices  Report    Peripheral Intravenous Line            Peripheral IV 02/17/22 Right Antecubital <1 day                  Telemetry:  Telemetry Orders (From admission, onward)             24 Hour Telemetry Monitoring  Continuous x 24 Hours (Telem)        References:    Telemetry Guidelines   Question:  Reason for 24 Hour Telemetry  Answer:  Syncope suspected to be cardiac in origin                 Telemetry Reviewed: Atrial fibrillation  HR averaging 60  Indication for Continued Telemetry Use: No indication for continued use  Will discontinue                Imaging: Reviewed radiology reports from this admission including: chest xray    Recent Cultures (last 7 days):         Last 24 Hours Medication List:   Current Facility-Administered Medications   Medication Dose Route Frequency Provider Last Rate    amiodarone  100 mg Oral Daily With Breakfast Alton Castillo MD      amLODIPine  10 mg Oral Daily Alton Castillo MD      apixaban  5 mg Oral BID Erick Al MD      docusate sodium  100 mg Oral BID Alton Castillo MD      doxazosin  8 mg Oral HS Alton Castillo MD      insulin lispro  1-5 Units Subcutaneous TID AC Alton Castillo MD      insulin lispro  1-5 Units Subcutaneous HS Alton Castillo MD      iron sucrose  300 mg Intravenous Daily Erick Al MD      metoprolol tartrate  25 mg Oral Q12H [place] MD Yaa      pantoprazole  40 mg Oral BID AC Erick Al MD      polyethylene glycol  17 g Oral Daily Alton Castillo MD      potassium chloride  40 mEq Oral BID Erick Al MD      pravastatin  40 mg Oral Daily With Janelle Fine MD      simethicone  80 mg Oral Q6H PRN Alton Castillo MD      torsemide  40 mg Oral BID (diuretic) Erick Al MD          Today, Patient Was Seen By: Erick Al MD    **Please Note: This note may have been constructed using a voice recognition system  **

## 2022-02-18 NOTE — CASE MANAGEMENT
Case Management Progress Note    Patient name Samanta Kirby  Location S /S [address] [id number] MRN [id number]  : 1935 Date 2022       LOS (days): 7  Geometric Mean LOS (GMLOS) (days): 2 70  Days to GMLOS:-4 4        OBJECTIVE:        Current admission status: Inpatient  Preferred Pharmacy: No Pharmacies Listed  Primary Care Provider: No primary care provider on file  Primary Insurance: [facility]  Secondary Insurance:     [facility]GR[facility] NOTE[facility] for [facility] with [facility] on    F/U providers updated

## 2022-02-18 NOTE — ASSESSMENT & PLAN NOTE
Lab Results   Component Value Date    HGBA1C 8 1 (H) 02/11/2022       Recent Labs     02/17/22  1127 02/17/22  1753 02/17/22 2047 02/18/22  0650   POCGLU 124 136 168* 115       Blood Sugar Average: Last 72 hrs:  · Metformin 500 mg extended release daily  · With A1c Will is A1c less than 8

## 2022-02-18 NOTE — ASSESSMENT & PLAN NOTE
Recent Labs     02/16/22  0409 02/17/22  0340 02/18/22  0607   CREATININE 1 30 1 21 1 16   EGFR 50 54 57     Estimated Creatinine Clearance: 47 9 mL/min (by C-G formula based on SCr of 1 16 mg/dL)       · Baseline creatinine appears to be between (1 3 -1 5) difficult to find a real baseline since patient rarely get blood work   · Will resume Ace/Arb  · discontinue amlodipine  · Avoid nephrotoxins, hypotension  · Holding losartan, will likely discontinue

## 2022-02-18 NOTE — ASSESSMENT & PLAN NOTE
2/11: Results of Neuropsychological exam revealed diffuse cognitive dysfunction and on a measure assessing awareness of personal health status and ability to evaluate health problems, handle medical emergencies and take safety precautions, patient performed in the IMPAIRED range of functioning  At this time, patient does not appear to have capacity to make informed medical decisions  Unspecified Neurocognitive Disorder      Patient seems to be answering appropriately person, place, time however forgetful    · 2nd evaluation per neuropsychology , patient has a medical capacity to make medical decision

## 2022-02-18 NOTE — ASSESSMENT & PLAN NOTE
Home medication:  Eliquis 5 mg b i d , amiodarone 100 mg daily, metoprolol 25 b i d  Status post cardioversion March of 2021   EKG:  AFib with controlled heart rate        Plan:  · Continue amiodarone  · Continue metoprolol  · Name of a to resume Eliquis with GI

## 2022-02-18 NOTE — PHYSICAL THERAPY NOTE
PHYSICAL THERAPY NOTE    Patient Name: Nela Castañeda  XXYEI'O Date: 22 1333   PT Last Visit   PT Visit Date 22   Note Type   Note Type Treatment   Pain Assessment   Pain Assessment Tool 0-10   Pain Score No Pain   Restrictions/Precautions   Weight Bearing Precautions Per Order No   Other Precautions Bed Alarm; Chair Alarm;Cognitive; Fall Risk   General   Family/Caregiver Present Yes  (spouse for part of session)   Subjective   Subjective Patient seated OOB in recliner and is agreeable to participate in therapy session  Pt identifers obtained from name &   Bed Mobility   Supine to Sit Unable to assess   Sit to Supine Unable to assess   Additional Comments Patient seated OOB in recliner pre and post session with chair alarm engaged, call bell and belongings in reach  Transfers   Sit to Stand 5  Supervision   Additional items Assist x 1; Armrests; Increased time required;Verbal cues   Stand to Sit 5  Supervision   Additional items Assist x 1; Armrests; Increased time required;Verbal cues   Additional Comments Standing tolerance at sink with unilateral UE support x 5 minutes with small dynamic movements and CGA   Ambulation/Elevation   Gait pattern Excessively slow; Step through pattern;Decreased R stance; Antalgic   Gait Assistance 4  Minimal assist  (min ax1 to CGA)   Additional items Assist x 1;Verbal cues   Assistive Device Rolling walker   Distance 170' x1   Balance   Static Sitting Good   Dynamic Sitting Fair   Static Standing Fair   Dynamic Standing Poor +   Ambulatory Fair -   Endurance Deficit   Endurance Deficit Yes   Endurance Deficit Description SpO2 91-94% on room air   Activity Tolerance   Activity Tolerance Patient limited by fatigue   Nurse Made Aware Spoke to Claudell Loyal, RN    Assessment   Prognosis Fair   Problem List Decreased range of motion; Impaired balance;Decreased mobility; Decreased coordination;Decreased cognition; Impaired judgement;Decreased safety awareness   Assessment Patient agreeable and motivated to participate in therapy session  Patient remains consistent throughout session with supervision for sit<>stand transfers  Requires verbal instruction and provided visual demonstration of walker placement and approach to tiki prior to descend  Standing tolerance at sink with unilateral UE support x 5 minutes with small dynamic movements and CGA, Pt able to ambulate increased gait distance with roller walker and min ax1  Requires steadying balance assist with directional changes and obstacle avoidance  SpO2 ranged between 91-94% on room air throughout session  Recommend PT to see next session for mobility reassessment as appropriate and able  Barriers to Discharge Decreased caregiver support   Goals   Patient Goals to walk more   STG Expiration Date 02/24/22   PT Treatment Day 2   Plan   Treatment/Interventions Functional transfer training;LE strengthening/ROM; Cognitive reorientation;Patient/family training;Equipment eval/education; Bed mobility;Gait training;Spoke to nursing   Progress Progressing toward goals   PT Frequency 3-5x/wk   Recommendation   PT Discharge Recommendation Post acute rehabilitation services   AM-PAC Basic Mobility Inpatient   Turning in Bed Without Bedrails 3   Lying on Back to Sitting on Edge of Flat Bed 3   Moving Bed to Chair 3   Standing Up From Chair 3   Walk in Room 3   Climb 3-5 Stairs 2   Basic Mobility Inpatient Raw Score 17   Basic Mobility Standardized Score 39 67   Highest Level Of Mobility   JH-HLM Goal 5: Stand one or more mins   JH-HLM Highest Level of Mobility 7: Walk 25 feet or more   JH-HLM Goal Achieved Yes   Education   Education Provided Mobility training;Assistive device   End of Consult   Patient Position at End of Consult Bedside chair;Bed/Chair alarm activated; All needs within reach       The patient's AM-PAC Basic Mobility Inpatient Short Form Raw Score is 17  A Raw score of greater than 16 suggests the patient may benefit from discharge to home  Please also refer to the recommendation of the Physical Therapist for safe discharge planning          Amanda Lion PTA

## 2022-02-18 NOTE — DISCHARGE INSTR - AVS FIRST PAGE
Dear Lenice Spurling,     It was our pleasure to care for you here at [address]  It is our hope that we were always able to exceed the expected standards for your care during your stay  You were hospitalized due to anemia secondary to hemorrhagic gastritis/cecal mass  You were cared for on the 4th floor by Ivan Vuong MD under the service of Gordon Theodore MD with the [facility] who covers for your primary care physician (PCP), No primary care provider on file  , while you were hospitalized  If you have any questions or concerns related to this hospitalization, you may contact us at [phone]  For follow up as well as any medication refills, we recommend that you follow up with your primary care physician  A registered nurse will reach out to you by phone within a few days after your discharge to answer any additional questions that you may have after going home  However, at this time we provide for you here, the most important instructions / recommendations at discharge:     Notable Medication Adjustments -   Metformin 500 mg daily elevated A1c/diabetes  Stop taking amlodipine  Protonix 40 mg twice a day for hemorrhagic gastritis  Continue torsemide 40 mg twice a day  Start ferrous sulfate 324 mg daily, this can cause constipation, and can make your stool dark/black  Testing Required after Discharge -   CBC within this week  BMP within this week  Important follow up information -   repeat colonoscopy in 1 year  Follow-up biopsy results with Gastroenterology  Other Instructions -   Follow-up with our clinic in 1 week  Follow-up with Colorectal surgery  Please review this entire after visit summary as additional general instructions including medication list, appointments, activity, diet, any pertinent wound care, and other additional recommendations from your care team that may be provided for you        Sincerely,     Barbara Chin Ruthie Hutton MD

## 2022-02-18 NOTE — PROGRESS NOTES
Progress Note - Phu Michael 82 y o  male MRN: [id number]    Unit/Bed#: S -01 Encounter: [id number]        Subjective:   Patient denies any abdominal pain, nausea or vomiting, no episodes of rectal bleeding reported or any melena last night or this morning  Objective:     Vitals: Blood pressure 169/73, pulse 98, temperature 98 7 °F (37 1 °C), temperature source Oral, resp  rate 19, height 5' 6" (1 676 m), weight 82 8 kg (182 lb 8 7 oz), SpO2 95 %  ,Body mass index is 29 46 kg/m²  Intake/Output Summary (Last 24 hours) at 2/18/2022 1141  Last data filed at 2/18/2022 1101  Gross per 24 hour   Intake 600 ml   Output 2260 ml   Net -1660 ml       Physical Exam:   General appearance: alert, appears stated age and cooperative  Lungs: clear to auscultation bilaterally, no labored breathing/accessory muscle use  Heart: regular rate and rhythm, S1, S2 normal, no murmur, click, rub or gallop  Abdomen: soft, non-tender; bowel sounds normal; no masses,  no organomegaly  Extremities: no edema    Invasive Devices  Report    Peripheral Intravenous Line            Peripheral IV 02/17/22 Right Antecubital <1 day                Lab, Imaging and other studies: I have personally reviewed pertinent reports  No results displayed because visit has over 200 results  Results for Timmy Vargas (MRN [id number]) as of 2/18/2022 11:41   Ref   Range 2/18/2022 05:09 2/18/2022 06:07 2/18/2022 06:50 2/18/2022 09:42 2/18/2022 11:02   POC Glucose Latest Ref Range: 65 - 140 mg/dl   115  143 (H)   Sodium Latest Ref Range: 136 - 145 mmol/L  133 (L)      Potassium Latest Ref Range: 3 5 - 5 3 mmol/L  3 3 (L)      Chloride Latest Ref Range: 100 - 108 mmol/L  98 (L)      CO2 Latest Ref Range: 21 - 32 mmol/L  28      Anion Gap Latest Ref Range: 4 - 13 mmol/L  7      BUN Latest Ref Range: 5 - 25 mg/dL  15      Creatinine Latest Ref Range: 0 60 - 1 30 mg/dL  1 16      Glucose, Random Latest Ref Range: 65 - 140 mg/dL  104      Calcium Latest Ref Range: 8 3 - 10 1 mg/dL  8 6      eGFR Latest Units: ml/min/1 73sq m  57      WBC Latest Ref Range: 4 31 - 10 16 Thousand/uL 5 85       Red Blood Cell Count Latest Ref Range: 3 88 - 5 62 Million/uL 4 11       Hemoglobin Latest Ref Range: 12 0 - 17 0 g/dL 8 5 (L)       HCT Latest Ref Range: 36 5 - 49 3 % 29 0 (L)       MCV Latest Ref Range: 82 - 98 fL 71 (L)       MCH Latest Ref Range: 26 8 - 34 3 pg 20 7 (L)       MCHC Latest Ref Range: 31 4 - 37 4 g/dL 29 3 (L)       RDW Latest Ref Range: 11 6 - 15 1 % 29 6 (H)       Platelet Count Latest Ref Range: 149 - 390 Thousands/uL 285       MPV Latest Ref Range: 8 9 - 12 7 fL 10 3       CARCINOEMBRYONIC ANTIGEN Latest Ref Range: 0 0 - 3 0 ng/mL    2 6        Assessment/Plan:    1  Anemia which appears to be secondary to cecal mass, visualized on colonoscopy yesterday    No evidence of active GI bleeding currently    - colorectal surgery input appreciated, will follow-up as outpatient and evaluate for potential surgical approaches if patient and family agree    - I did discuss colonoscopy findings with patient's significant other Wong Carnes yesterday via telephone, and answered her questions    - follow-up on CEA level    -monitor hemoglobin and stool output, observe for any evidence of GI bleeding when anticoagulation with Eliquis is reintroduced

## 2022-02-18 NOTE — ASSESSMENT & PLAN NOTE
S/P colonoscopy 2/17/22: Cecum-3-4 cm ulcerated mass status post biopsies  On the ileocecal valve there is a 1 5 cm sessile polyp status post biopsiesMultiple diverticula in the sigmoid colon    Plan:  · GI recommendation: Repeat colonoscopy in 1 year due to a personal history of colon cancer  Consult Colorectal surgery and CT scan of chest, abdomen and pelvis with contrast  · Colorectal recommendation: Outpatient follow up for discussions regarding surgical resection of right sided colon mass, Would hold on further imaging given imaging already obtained and creatinine 1 21 this morning   Will require preoperative medical optimization, Continue to monitor Hb and transfuse as needed for Hb < 7, Rest of care per primary

## 2022-02-18 NOTE — QUICK NOTE
Patient participated in Neuropsychological Exam  AT this time, patient appears to have capacity to make informed medical decisions   Full consult to follow

## 2022-02-18 NOTE — PLAN OF CARE
Problem: PHYSICAL THERAPY ADULT  Goal: Performs mobility at highest level of function for planned discharge setting  See evaluation for individualized goals  Description: Treatment/Interventions: Functional transfer training,LE strengthening/ROM,Therapeutic exercise,Endurance training,Patient/family training,Equipment eval/education,Bed mobility,Gait training,Cognitive reorientation,Spoke to nursing  Equipment Recommended: (S) Walker (AND SHOES)       See flowsheet documentation for full assessment, interventions and recommendations  Outcome: Progressing  Note: Prognosis: Fair  Problem List: Decreased range of motion,Impaired balance,Decreased mobility,Decreased coordination,Decreased cognition,Impaired judgement,Decreased safety awareness  Assessment: Patient agreeable and motivated to participate in therapy session  Patient remains consistent throughout session with supervision for sit<>stand transfers  Requires verbal instruction and provided visual demonstration of walker placement and approach to tiki prior to descend  Standing tolerance at sink with unilateral UE support x 5 minutes with small dynamic movements and CGA, Pt able to ambulate increased gait distance with roller walker and min ax1  Requires steadying balance assist with directional changes and obstacle avoidance  SpO2 ranged between 91-94% on room air throughout session  Recommend PT to see next session for mobility reassessment as appropriate and able  Barriers to Discharge: Decreased caregiver support  Barriers to Discharge Comments: (S) Pt reports he is the one assisting his wife normally     PT Discharge Recommendation: Post acute rehabilitation services          See flowsheet documentation for full assessment

## 2022-02-18 NOTE — ASSESSMENT & PLAN NOTE
Recent Labs     02/16/22  0409 02/17/22  0340 02/18/22  0509   HGB 8 1* 8 5* 8 5*     Patient was found to have a microcytic anemia on admission, without acute blood loss  Patient denied any active bleeding  Iron panel showed iron deficiency anemia  Patient is status post 3 units PRBC    2/14 Per neuro cognitive assessment patient does not appear to have capacity to make medical decisions  I discussed the results with the patient and Tisha Garg  GI recommended EGD and colonoscopy, however patient himself does not want an EGD but agreeable with colonoscopy  Risks and benefits was discussed  Baldo Holter would like to have him have the procedure however does not wish to go against him even though he does not have capacity to make decisions  Jasmyn Hameed will follow what Mr Alejandra Granado decides  They both decided that they are okay with colonoscopy but not EGD  I notified the GI team that patient does not want EGD only colonoscopy  GI try to talk to the patient however patient was still insistent of not getting EGD     02/15/2022 upon discussion with the patient and POA, they are now agreeable with EGD and colonoscopy, per patient and night nurse, bowel movement was clearing per them  patient was noticed to have a abdominal distension no tenderness, KUB and stat CT abdomen was done  2/15/22 CT abdomen:No bowel obstruction   Mild colonic air distention likely reflective of recent colonoscopy procedure    Colonic diverticulosis  Slightly enlarged small bilateral pleural effusions  Stable cardiomegaly  Stable hepatomegaly  2/15/22 EGD and colonoscopy: Moderate patchy hemorrhagic gastritis  No evidence of active bleeding  Moderate patchy hemorrhagic gastritis  No evidence of active bleeding    Colonoscopy: poor prep    2/17 Colonoscopy    Completed iron infusions 3/3 days completed  Recent Labs     02/17/22  0340 02/18/22  0509 02/19/22  0907   HGB 8 5* 8 5* 8 8*       Plan:  · Monitor hemoglobin and transfuse if less than 7, will start the patient on iron pills, at discharge  · Will resume Eliquis  · Iron deficiency anemia, was recommended to give additional 300 mg IV iron daily x3 to reach (1800mg daily), discussed with the patient and Evelin De La Vega that this will make his bowel movements block, and slightly constipated  · Will start laxative  · CBC within this week  · BMP within this week

## 2022-02-18 NOTE — ASSESSMENT & PLAN NOTE
POA: Most likely syncope secondary to symptomatic anemia  Troponin negative, glucose within normal limits    Negative orthostatics  Imaging negative for acute intracranial process or C-spine fracture  Fall precautions    Assessment:  Syncope likely secondary to anemia secondary to ulcerated cecal mass, and hemorrhagic gastritis  Plan:   · PT / OT recommending rehab, however patient opted for home PT  · Ambulatory pulse ox unremarkable

## 2022-02-19 VITALS
SYSTOLIC BLOOD PRESSURE: 151 MMHG | TEMPERATURE: 97.8 F | DIASTOLIC BLOOD PRESSURE: 82 MMHG | HEART RATE: 86 BPM | OXYGEN SATURATION: 92 % | RESPIRATION RATE: 18 BRPM | BODY MASS INDEX: 29.34 KG/M2 | HEIGHT: 66 IN | WEIGHT: 182.54 LBS

## 2022-02-19 PROBLEM — K29.01 ACUTE GASTRITIS WITH HEMORRHAGE: Status: ACTIVE | Noted: 2022-02-19

## 2022-02-19 LAB
ANION GAP SERPL CALCULATED.3IONS-SCNC: 13 MMOL/L (ref 4–13)
BUN SERPL-MCNC: 16 MG/DL (ref 5–25)
CALCIUM SERPL-MCNC: 8.7 MG/DL (ref 8.3–10.1)
CHLORIDE SERPL-SCNC: 99 MMOL/L (ref 100–108)
CO2 SERPL-SCNC: 20 MMOL/L (ref 21–32)
CREAT SERPL-MCNC: 1.23 MG/DL (ref 0.6–1.3)
ERYTHROCYTE [DISTWIDTH] IN BLOOD BY AUTOMATED COUNT: 30.1 % (ref 11.6–15.1)
GFR SERPL CREATININE-BSD FRML MDRD: 53 ML/MIN/1.73SQ M
GLUCOSE SERPL-MCNC: 117 MG/DL (ref 65–140)
GLUCOSE SERPL-MCNC: 130 MG/DL (ref 65–140)
GLUCOSE SERPL-MCNC: 162 MG/DL (ref 65–140)
HCT VFR BLD AUTO: 30.6 % (ref 36.5–49.3)
HGB BLD-MCNC: 8.8 G/DL (ref 12–17)
MAGNESIUM SERPL-MCNC: 2.1 MG/DL (ref 1.6–2.6)
MCH RBC QN AUTO: 20 PG (ref 26.8–34.3)
MCHC RBC AUTO-ENTMCNC: 28.8 G/DL (ref 31.4–37.4)
MCV RBC AUTO: 70 FL (ref 82–98)
PLATELET # BLD AUTO: 311 THOUSANDS/UL (ref 149–390)
PMV BLD AUTO: 9.9 FL (ref 8.9–12.7)
POTASSIUM SERPL-SCNC: 4.4 MMOL/L (ref 3.5–5.3)
RBC # BLD AUTO: 4.39 MILLION/UL (ref 3.88–5.62)
SODIUM SERPL-SCNC: 132 MMOL/L (ref 136–145)
WBC # BLD AUTO: 6.38 THOUSAND/UL (ref 4.31–10.16)

## 2022-02-19 PROCEDURE — 82948 REAGENT STRIP/BLOOD GLUCOSE: CPT

## 2022-02-19 PROCEDURE — 99239 HOSP IP/OBS DSCHRG MGMT >30: CPT | Performed by: INTERNAL MEDICINE

## 2022-02-19 PROCEDURE — 85027 COMPLETE CBC AUTOMATED: CPT | Performed by: INTERNAL MEDICINE

## 2022-02-19 PROCEDURE — 83735 ASSAY OF MAGNESIUM: CPT | Performed by: INTERNAL MEDICINE

## 2022-02-19 PROCEDURE — 80048 BASIC METABOLIC PNL TOTAL CA: CPT | Performed by: INTERNAL MEDICINE

## 2022-02-19 RX ORDER — FERROUS SULFATE TAB EC 324 MG (65 MG FE EQUIVALENT) 324 (65 FE) MG
324 TABLET DELAYED RESPONSE ORAL DAILY
Qty: 30 TABLET | Refills: 0 | Status: SHIPPED | OUTPATIENT
Start: 2022-02-19 | End: 2022-03-15 | Stop reason: SDUPTHER

## 2022-02-19 RX ORDER — PANTOPRAZOLE SODIUM 40 MG/1
40 TABLET, DELAYED RELEASE ORAL 2 TIMES DAILY
Qty: 60 TABLET | Refills: 0 | Status: CANCELLED | OUTPATIENT
Start: 2022-02-19 | End: 2022-03-21

## 2022-02-19 RX ORDER — PANTOPRAZOLE SODIUM 40 MG/1
40 TABLET, DELAYED RELEASE ORAL
Qty: 60 TABLET | Refills: 0 | Status: SHIPPED | OUTPATIENT
Start: 2022-02-19 | End: 2022-03-18 | Stop reason: SDUPTHER

## 2022-02-19 RX ORDER — DOCUSATE SODIUM 100 MG/1
100 CAPSULE, LIQUID FILLED ORAL 2 TIMES DAILY
Qty: 60 CAPSULE | Refills: 0 | Status: SHIPPED | OUTPATIENT
Start: 2022-02-19 | End: 2022-04-05 | Stop reason: SDUPTHER

## 2022-02-19 RX ADMIN — DOCUSATE SODIUM 100 MG: 100 CAPSULE ORAL at 08:33

## 2022-02-19 RX ADMIN — POLYETHYLENE GLYCOL 3350 17 G: 17 POWDER, FOR SOLUTION ORAL at 08:32

## 2022-02-19 RX ADMIN — IRON SUCROSE 300 MG: 20 INJECTION, SOLUTION INTRAVENOUS at 08:46

## 2022-02-19 RX ADMIN — APIXABAN 5 MG: 5 TABLET, FILM COATED ORAL at 08:34

## 2022-02-19 RX ADMIN — AMLODIPINE BESYLATE 10 MG: 10 TABLET ORAL at 08:34

## 2022-02-19 RX ADMIN — PANTOPRAZOLE SODIUM 40 MG: 40 TABLET, DELAYED RELEASE ORAL at 06:43

## 2022-02-19 RX ADMIN — METOPROLOL TARTRATE 25 MG: 25 TABLET, FILM COATED ORAL at 08:34

## 2022-02-19 RX ADMIN — TORSEMIDE 40 MG: 20 TABLET ORAL at 08:33

## 2022-02-19 RX ADMIN — AMIODARONE HYDROCHLORIDE 100 MG: 200 TABLET ORAL at 08:33

## 2022-02-19 RX ADMIN — INSULIN LISPRO 1 UNITS: 100 INJECTION, SOLUTION INTRAVENOUS; SUBCUTANEOUS at 11:37

## 2022-02-19 NOTE — CASE MANAGEMENT
Case Management Discharge Planning Note    Patient name [facility] S /S -83 MRN [id number]  : 1935 Date 2022       Current Admission Date: 2022  Current Admission Diagnosis:Cecal ulcerated mass   Patient Active Problem List    Diagnosis Date Noted    Acute gastritis with hemorrhage 2022    Cecal ulcerated mass 2022    Cognitive dysfunction 2022    Chronic kidney disease 2022    Type 2 diabetes mellitus, without long-term current use of insulin ([facility] [address] ) 2022    Syncope and collapse 2022    Chronic diastolic heart failure ([facility] [address] [facility] ) 2022    Atrial fibrillation ([facility] [address] ) 2022    Laceration of left eyebrow 2022    Anemia 2022      LOS (days): 8  Geometric Mean LOS (GMLOS) (days): 2 70  Days to GMLOS:-5 3     OBJECTIVE:  Risk of Unplanned Readmission Score: 14         Current admission status: Inpatient   Preferred Pharmacy:   [place], [place] [facility] [address] [place] [address]  Phone: [phone] Fax: [phone]    Primary Care Provider: No primary care provider on file  Primary Insurance: [employer] [facility]  Secondary Insurance:     DISCHARGE DETAILS:      Comments - Freedom of Choice: CM delivered a roller walker to the Pt's room prior to his d/c today  [facility] was notifed of the delivery

## 2022-02-21 ENCOUNTER — APPOINTMENT (OUTPATIENT)
Dept: LAB | Facility: CLINIC | Age: 87
End: 2022-02-21
Payer: COMMERCIAL

## 2022-02-21 DIAGNOSIS — N18.9 CHRONIC KIDNEY DISEASE: ICD-10-CM

## 2022-02-21 DIAGNOSIS — D64.9 ANEMIA, UNSPECIFIED TYPE: ICD-10-CM

## 2022-02-21 LAB
ANION GAP SERPL CALCULATED.3IONS-SCNC: 11 MMOL/L (ref 4–13)
BUN SERPL-MCNC: 18 MG/DL (ref 5–25)
CALCIUM SERPL-MCNC: 9.8 MG/DL (ref 8.3–10.1)
CHLORIDE SERPL-SCNC: 98 MMOL/L (ref 100–108)
CO2 SERPL-SCNC: 26 MMOL/L (ref 21–32)
CREAT SERPL-MCNC: 1.35 MG/DL (ref 0.6–1.3)
ERYTHROCYTE [DISTWIDTH] IN BLOOD BY AUTOMATED COUNT: 30.5 % (ref 11.6–15.1)
GFR SERPL CREATININE-BSD FRML MDRD: 47 ML/MIN/1.73SQ M
GLUCOSE P FAST SERPL-MCNC: 146 MG/DL (ref 65–99)
HCT VFR BLD AUTO: 33.4 % (ref 36.5–49.3)
HGB BLD-MCNC: 9.7 G/DL (ref 12–17)
MCH RBC QN AUTO: 20.6 PG (ref 26.8–34.3)
MCHC RBC AUTO-ENTMCNC: 29 G/DL (ref 31.4–37.4)
MCV RBC AUTO: 71 FL (ref 82–98)
PLATELET # BLD AUTO: 395 THOUSANDS/UL (ref 149–390)
PMV BLD AUTO: 11 FL (ref 8.9–12.7)
POTASSIUM SERPL-SCNC: 4 MMOL/L (ref 3.5–5.3)
RBC # BLD AUTO: 4.72 MILLION/UL (ref 3.88–5.62)
SODIUM SERPL-SCNC: 135 MMOL/L (ref 136–145)
WBC # BLD AUTO: 6.45 THOUSAND/UL (ref 4.31–10.16)

## 2022-02-21 PROCEDURE — 36415 COLL VENOUS BLD VENIPUNCTURE: CPT

## 2022-02-21 PROCEDURE — 80048 BASIC METABOLIC PNL TOTAL CA: CPT

## 2022-02-21 PROCEDURE — 85027 COMPLETE CBC AUTOMATED: CPT

## 2022-02-21 NOTE — UTILIZATION REVIEW
Notification of Discharge   This is a Notification of Discharge from our facility [address]  Please be advised that this patient has been discharge from our facility  Below you will find the admission and discharge date and time including the patients disposition  UTILIZATION REVIEW CONTACT:  Roxann Carl MA  Utilization   Network Utilization Review Department  Phone: [phone] x carefully listen to the prompts  All voicemails are confidential   Email: [email or web address]     PHYSICIAN ADVISORY SERVICES:  FOR ACML-DR-YIHY REVIEW - MEDICAL NECESSITY DENIAL  Phone: [phone]  Fax: [phone]  Email: [email or web address]     PRESENTATION DATE: 2/11/2022 12:05 AM  OBERVATION ADMISSION DATE:   INPATIENT ADMISSION DATE: 2/11/22  1:32 PM   DISCHARGE DATE: 2/19/2022  1:03 PM  DISPOSITION: Home with [place] with [address] [place] [address] Road INFORMATION:  Send all requests for admission clinical reviews, approved or denied determinations and any other requests to dedicated fax number below belonging to the campus where the patient is receiving treatment   List of dedicated fax numbers:  [address] DENIALS (Administrative/Medical Necessity) [phone]   [address] (Maternity/NICU/Pediatrics) [phone]   Parris Belcher [phone]   Abdullahi Faust [phone]   Alma Stallings [phone]   [address] [phone]   [facility]  [phone]   [address] [place] [address], [facility]  [address] [facility] [address] [facility] [phone]

## 2022-02-22 ENCOUNTER — TELEPHONE (OUTPATIENT)
Dept: INTERNAL MEDICINE CLINIC | Facility: CLINIC | Age: 87
End: 2022-02-22

## 2022-02-22 ENCOUNTER — OFFICE VISIT (OUTPATIENT)
Dept: INTERNAL MEDICINE CLINIC | Facility: CLINIC | Age: 87
End: 2022-02-22
Payer: COMMERCIAL

## 2022-02-22 ENCOUNTER — TRANSITIONAL CARE MANAGEMENT (OUTPATIENT)
Dept: INTERNAL MEDICINE CLINIC | Facility: CLINIC | Age: 87
End: 2022-02-22

## 2022-02-22 VITALS
SYSTOLIC BLOOD PRESSURE: 160 MMHG | HEIGHT: 66 IN | WEIGHT: 180 LBS | OXYGEN SATURATION: 97 % | TEMPERATURE: 97.9 F | DIASTOLIC BLOOD PRESSURE: 78 MMHG | HEART RATE: 107 BPM | BODY MASS INDEX: 28.93 KG/M2

## 2022-02-22 DIAGNOSIS — E11.9 TYPE 2 DIABETES MELLITUS WITHOUT COMPLICATION, WITHOUT LONG-TERM CURRENT USE OF INSULIN (HCC): ICD-10-CM

## 2022-02-22 DIAGNOSIS — E78.00 HYPERCHOLESTEROLEMIA: ICD-10-CM

## 2022-02-22 DIAGNOSIS — E11.9 TYPE 2 DIABETES MELLITUS, WITHOUT LONG-TERM CURRENT USE OF INSULIN (HCC): ICD-10-CM

## 2022-02-22 DIAGNOSIS — D64.9 ANEMIA, UNSPECIFIED TYPE: ICD-10-CM

## 2022-02-22 DIAGNOSIS — D50.0 IRON DEFICIENCY ANEMIA DUE TO CHRONIC BLOOD LOSS: Primary | ICD-10-CM

## 2022-02-22 DIAGNOSIS — K63.9 CECAL LESION: ICD-10-CM

## 2022-02-22 DIAGNOSIS — N18.30 STAGE 3 CHRONIC KIDNEY DISEASE, UNSPECIFIED WHETHER STAGE 3A OR 3B CKD (HCC): ICD-10-CM

## 2022-02-22 DIAGNOSIS — I48.19 PERSISTENT ATRIAL FIBRILLATION (HCC): ICD-10-CM

## 2022-02-22 DIAGNOSIS — I10 HYPERTENSION, UNSPECIFIED TYPE: ICD-10-CM

## 2022-02-22 DIAGNOSIS — Z76.89 ENCOUNTER FOR SUPPORT AND COORDINATION OF TRANSITION OF CARE: Primary | ICD-10-CM

## 2022-02-22 DIAGNOSIS — K29.50 CHRONIC GASTRITIS, PRESENCE OF BLEEDING UNSPECIFIED, UNSPECIFIED GASTRITIS TYPE: ICD-10-CM

## 2022-02-22 DIAGNOSIS — I50.32 CHRONIC DIASTOLIC CONGESTIVE HEART FAILURE (HCC): ICD-10-CM

## 2022-02-22 PROBLEM — I50.33 ACUTE ON CHRONIC DIASTOLIC CONGESTIVE HEART FAILURE (HCC): Status: ACTIVE | Noted: 2020-03-11

## 2022-02-22 PROBLEM — K29.70 GASTRITIS: Status: ACTIVE | Noted: 2022-02-19

## 2022-02-22 LAB
DME PARACHUTE DELIVERY DATE ACTUAL: NORMAL
DME PARACHUTE DELIVERY DATE REQUESTED: NORMAL
DME PARACHUTE ITEM DESCRIPTION: NORMAL
DME PARACHUTE ORDER STATUS: NORMAL
DME PARACHUTE SUPPLIER NAME: NORMAL
DME PARACHUTE SUPPLIER PHONE: NORMAL

## 2022-02-22 PROCEDURE — 1111F DSCHRG MED/CURRENT MED MERGE: CPT | Performed by: INTERNAL MEDICINE

## 2022-02-22 PROCEDURE — 99496 TRANSJ CARE MGMT HIGH F2F 7D: CPT | Performed by: INTERNAL MEDICINE

## 2022-02-22 RX ORDER — TORSEMIDE 20 MG/1
20 TABLET ORAL DAILY
Status: CANCELLED | OUTPATIENT
Start: 2022-02-22

## 2022-02-22 RX ORDER — TORSEMIDE 20 MG/1
40 TABLET ORAL 2 TIMES DAILY
Qty: 120 TABLET | Refills: 0 | Status: SHIPPED | OUTPATIENT
Start: 2022-02-22 | End: 2022-03-29 | Stop reason: SDUPTHER

## 2022-02-22 RX ORDER — METOPROLOL TARTRATE 50 MG/1
50 TABLET, FILM COATED ORAL EVERY 12 HOURS SCHEDULED
Qty: 60 TABLET | Refills: 0 | Status: SHIPPED | OUTPATIENT
Start: 2022-02-22 | End: 2022-04-05 | Stop reason: SDUPTHER

## 2022-02-22 RX ORDER — METFORMIN HYDROCHLORIDE 500 MG/1
500 TABLET, EXTENDED RELEASE ORAL 2 TIMES DAILY WITH MEALS
Qty: 60 TABLET | Refills: 0 | Status: SHIPPED | OUTPATIENT
Start: 2022-02-22 | End: 2022-03-18 | Stop reason: SDUPTHER

## 2022-02-22 NOTE — ASSESSMENT & PLAN NOTE
Lab Results   Component Value Date    EGFR 47 02/21/2022    EGFR 53 02/19/2022    EGFR 57 02/18/2022    CREATININE 1 35 (H) 02/21/2022    CREATININE 1 23 02/19/2022    CREATININE 1 16 02/18/2022     · Repeat BMP in 1 week

## 2022-02-22 NOTE — TELEPHONE ENCOUNTER
[facility] has called the [facility] office in regards to [employer]  The pharmacy called for the metFORMIN (GLUCOPHAGE-XR) 500 mg 24 hr tablet  Catia stated they had just refilled the metFORMIN three days ago, on 02/19/2022  Wished to know if a new order had been placed  Informed a new prescription has been placed for metFORMIN for the sig to increase from Take 1 tablet (500 mg total) by mouth daily with breakfest to Take 1 tablet (500 mg total) by mouth 2 (two) times a day with meals  [facility] ended the call soon after

## 2022-02-22 NOTE — ASSESSMENT & PLAN NOTE
Lab Results   Component Value Date    HGBA1C 8 1 (H) 02/11/2022     · Patient was started on Metformin on discharge  Now with diarrhea  There is a concern that the diarrhea could be secondary to Metformin  · Prescription has been changed to Metformin  mg BID  · Will recheck A1C in 3-6 months  · If diarrhea continues, can transition to a different type of diabetic medication

## 2022-02-22 NOTE — PROGRESS NOTES
INTERNAL MEDICINE TRANSITION OF CARE OFFICE VISIT  [facility] - [facility] INTERNAL MEDICINE [place]    NAME: Carmen Davis  AGE: 82 y o  SEX: male  : 1935     DATE: 2022     Assessment and Plan:     Problem List Items Addressed This Visit        Digestive    Cecal ulcerated mass     · S/P Colonoscopy on 02/15 with findings of 3-4 cm ulcerated mass in the cecum  · Biopsies are still pending  · Patient evaluated by colorectal surgery while inpatient with recommendations for outpatient follow up  · Plan:  · Follow up on biopsy results  · Follow up with Colorectal surgery  Appointment is scheduled for   Gastritis     · S/P EGD on 02/15 with evidence of moderate, patchy hemorrhagic mucosa in the fundus and body of the stomach  · Plan:  · Continue Protonix BID            Endocrine    Type 2 diabetes mellitus without complication, without long-term current use of insulin (HCC)       Lab Results   Component Value Date    HGBA1C 8 1 (H) 2022     · Patient was started on Metformin on discharge  Now with diarrhea  There is a concern that the diarrhea could be secondary to Metformin  · Prescription has been changed to Metformin  mg BID  · Will recheck A1C in 3-6 months  · If diarrhea continues, can transition to a different type of diabetic medication  Relevant Medications    metFORMIN (GLUCOPHAGE-XR) 500 mg 24 hr tablet       Cardiovascular and Mediastinum    Chronic diastolic congestive heart failure (HCC)     Wt Readings from Last 3 Encounters:   22 81 6 kg (180 lb)   22 82 8 kg (182 lb 8 7 oz)   10/08/15 86 6 kg (191 lb)     · TTE from  with evidence of LVEF 50%, moderate MR  · Patient follows with [facility] Cardiology    · Patient has chronic bilateral lower extremity edema, however weight appears stable, he denies shortness of breath and there is no evidence of rales on exam    · Plan:  · Currently on Torsemide 40 mg BID   · Increase metoprolol to 50 mg BID due to tachycardia and hypertension  · Follow up with Cardiology  Appointment scheduled for tomorrow  · Monitor weight  · Repeat BMP in 1 week             Relevant Medications    metoprolol tartrate (LOPRESSOR) 50 mg tablet    Persistent atrial fibrillation (HCC)     · S/P Cardioversion in March 2021  · Currently slightly tachycardic at 104  · Plan:  · Increase Metoprolol to 50 mg BID  · Continue Eliquis 5 mg BID  · Continue Amiodarone 100 mg daily  Relevant Medications    metoprolol tartrate (LOPRESSOR) 50 mg tablet    Hypertension     · BP in the office is 160/78 mmHg  · Patient reports being compliant to home medications and he admits to taking his AM pills today  · Continue Losartan 50 mg daily  · Continue Torsemide 40 mg BID  · Increase Metoprolol to 50 mg BID           Relevant Medications    torsemide (DEMADEX) 20 mg tablet    metoprolol tartrate (LOPRESSOR) 50 mg tablet       Genitourinary    Stage 3 chronic kidney disease (HCC)     Lab Results   Component Value Date    EGFR 47 02/21/2022    EGFR 53 02/19/2022    EGFR 57 02/18/2022    CREATININE 1 35 (H) 02/21/2022    CREATININE 1 23 02/19/2022    CREATININE 1 16 02/18/2022     · Repeat BMP in 1 week         Relevant Medications    torsemide (DEMADEX) 20 mg tablet    Other Relevant Orders    Basic metabolic panel       Other    Anemia     · Patient admitted recently and found to have a hemoglobin of 5 4  · S/P 3 units PRBC and treatment with Venofer  · Hemoglobin from yesterday stable at 9 7  · Patient denies any acute bleeding  He reports he did not have a BM since being discharged from the hospital until this morning when he noticed a scant amount of red blood in the toilet paper  He denies any other symptoms of anemia  · Plan:  · Will recheck CBC in 1 week  · Patient was advised to call the office if he experiences any further bleeding or symptoms of anemia  · Follow up in 4 weeks  Relevant Orders    CBC and Platelet    Hypercholesterolemia     · Continue statin  · Repeat lipid panel         Relevant Orders    Lipid Panel with Direct LDL reflex      Other Visit Diagnoses     Encounter for support and coordination of transition of care    -  Primary    Type 2 diabetes mellitus, without long-term current use of insulin (HCC)        Relevant Medications    metFORMIN (GLUCOPHAGE-XR) 500 mg 24 hr tablet    Other Relevant Orders    Microalbumin / creatinine urine ratio    Basic metabolic panel          BMI Counseling: Body mass index is 29 05 kg/m²  The BMI is above normal  Nutrition recommendations include reducing portion sizes, 3-5 servings of fruits/vegetables daily, reducing fast food intake, decreasing soda and/or juice intake, moderation in carbohydrate intake and increasing intake of lean protein  Transitional Care Management Review:     Ellie Villalba is a 82 y o  male here for TCM follow-up    During the TCM phone call patient stated:    TCM Call (since 1/22/2022)     Date and time call was made  2/22/2022  9:10 AM    Hospital care reviewed  Records reviewed        Patient was hospitialized at  [facility]        Date of Admission  02/11/22    Date of discharge  02/19/22    Diagnosis  Cecal ulcersted mass    Disposition  Home    Were the patients medications reviewed and updated  Yes    Current Symptoms  None      TCM Call (since 1/22/2022)     Post hospital issues  None    Should patient be enrolled in anticoag monitoring? No    Scheduled for follow up?   Yes    Patients specialists  Cardiologist; Other (comment)    Other specialists names  Colon Rectal Surgery    Did you obtain your prescribed medications  Yes    Do you need help managing your prescriptions or medications  Yes    Why type of assitance do you need  Has help from spouse managing medications    Is transportation to your appointment needed  No    Living Arrangements  Spouse or Significiant other    Support System  Neighboors; Family; Friends    The type of support provided  Physical; Emotional; Financial    Do you have social support  Yes, as much as I need    Are you recieving any outpatient services  No    Are you recieving home care services  No    Are you using any community resources  No    Current waiver services  No    Have you fallen in the last 12 months  No    Interperter language line needed  No    Counseling  Patient           HPI:   Patient is a pleasant 82 y o  male with a PMHx of A-fib (On Eliquis, S/P cardioversion), T2DM, CKD stage 3, HFpEF, and recently diagnosed cecal mass and anemia, who comes to the office for a transition of care visit after recent hospitalization from 02/11-02/19  Patient states on 02/11 he suffered fall in the bathroom after feeling dizzy, which lead to a minor laceration above his left eye  For this reason, EMS were called and patient was taken to [facility]  On arrival to the ED, patient was evaluated by trauma service and the laceration was repaired  He was found to have a hemoglobin level of 5 4 which required a total of 3 units of PRBC and Venofer treatment throughout hospitalization  Additionally, GI was consulted and patient underwent EGD and colonoscopy with findings of moderate patchy hemorrhagic gastritis and a 3-4 cm ulcerated mass in the cecum  Colorectal surgery was consulted with recommendations for outpatient follow up  Patient was discharged on Protonix BID, iron supplements and colace  Today, patient comes to the office with his [address]  Patient denies any more episodes of near-syncope, dizziness, lightheadedness  He had not had a BM until today and he states he did notice scant amount of blood in the toilet paper  Denies any radha bleeding  Patient is being complaint with his medications  His VS are overall stable, however his BP is elevated at 160/78 and    Patient has a cardiology appointment tomorrow and will follow up with colorectal surgery on 03/08  Patient has no other complaints or concerns at this time  The following portions of the patient's history were reviewed and updated as appropriate: allergies, current medications, past family history, past medical history, past social history, past surgical history and problem list      Review of Systems:     Review of Systems   Constitutional: Negative for appetite change, chills, fatigue, fever and unexpected weight change  HENT: Negative  Respiratory: Negative for cough, shortness of breath and wheezing  Cardiovascular: Positive for leg swelling (chronic)  Negative for chest pain and palpitations  Gastrointestinal: Positive for abdominal distention and diarrhea (started today)  Negative for abdominal pain, nausea and vomiting  Genitourinary: Negative  Skin: Positive for pallor  Neurological: Negative for dizziness, syncope, speech difficulty, weakness, light-headedness, numbness and headaches  Psychiatric/Behavioral: Negative  Problem List:     Patient Active Problem List   Diagnosis    Syncope and collapse    Acute on chronic diastolic congestive heart failure (HCC)    Persistent atrial fibrillation (HCC)    Laceration of left eyebrow    Anemia    Stage 3 chronic kidney disease (HCC)    Type 2 diabetes mellitus without complication, without long-term current use of insulin (HCC)    Cecal ulcerated mass    Cognitive dysfunction    Acute gastritis with hemorrhage    Hypertension    Esophageal reflux    Hypercalcemia    Hypercholesterolemia    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        Objective:     /78 (BP Location: Left arm, Patient Position: Sitting, Cuff Size: Standard)   Pulse (!) 107   Temp 97 9 °F (36 6 °C) (Tympanic)   Ht 5' 6" (1 676 m)   Wt 81 6 kg (180 lb)   SpO2 97%   BMI 29 05 kg/m²     Physical Exam  Vitals and nursing note reviewed     Constitutional:       General: He is not in acute distress  Appearance: He is not toxic-appearing  HENT:      Head: Normocephalic  Eyes:      General: No scleral icterus  Pupils: Pupils are equal, round, and reactive to light  Cardiovascular:      Rate and Rhythm: Regular rhythm  Tachycardia present  Pulses: no weak pulses     Heart sounds: No murmur heard  No gallop  Pulmonary:      Effort: Pulmonary effort is normal  No respiratory distress  Breath sounds: Normal breath sounds  No wheezing, rhonchi or rales  Abdominal:      General: Bowel sounds are normal  There is distension  Palpations: Abdomen is soft  Tenderness: There is no abdominal tenderness  There is no guarding  Musculoskeletal:      Right lower leg: Edema present  Left lower leg: Edema present  Feet:      Right foot:      Skin integrity: Erythema present  No ulcer, skin breakdown, warmth, callus or dry skin  Left foot:      Skin integrity: Erythema present  No ulcer, skin breakdown, warmth, callus or dry skin  Skin:     General: Skin is warm  Capillary Refill: Capillary refill takes less than 2 seconds  Neurological:      General: No focal deficit present  Mental Status: He is alert and oriented to person, place, and time  Cranial Nerves: No cranial nerve deficit  Motor: No weakness  Psychiatric:         Mood and Affect: Mood normal         Patient's shoes and socks removed  Right Foot/Ankle   Right Foot Inspection  Skin Exam: skin normal, skin intact and erythema  No dry skin, no warmth, no callus, no maceration, no abnormal color, no pre-ulcer, no ulcer and no callus  Toe Exam: ROM and strength within normal limits  No swelling and no tenderness    Sensory   Vibration: intact  Monofilament testing: intact    Vascular  Capillary refills: < 3 seconds          Left Foot/Ankle  Left Foot Inspection  Skin Exam: skin normal, skin intact and erythema   No dry skin, no warmth, no maceration, normal color, no pre-ulcer, no ulcer and no callus  Toe Exam: ROM and strength within normal limits  No swelling and no tenderness  Sensory   Vibration: intact  Monofilament testing: intact    Vascular  Capillary refills: < 3 seconds        Assign Risk Category  No deformity present  No loss of protective sensation  No weak pulses  Risk: 0      Laboratory Results: I have personally reviewed the pertinent laboratory results/reports     Radiology/Other Diagnostic Testing Results: I have personally reviewed pertinent reports  XR chest portable    Result Date: 2/14/2022  TRAUMA SERIES INDICATION:  TRAUMA  COMPARISON:  None VIEWS:  XR TRAUMA MULTIPLE Images: 2 (duplicate image interrogated with AI software algorithm designed for detection of pneumothorax)  FINDINGS: CHEST: Please note the extreme thoracic inlet was excluded from the field-of-view  Supine frontal view of the chest is obtained  Cardiomediastinal silhouette is within normal limits accounting for technique and patient positioning  Linear atelectasis or scarring left base  Question 9 mm nodule adjacent to the left heart border  No layering pleural effusions detected  No pneumothorax is seen on this supine film  Upright images are more sensitive to detect anterior pneumothoraces if relevant  No displaced fractures  Surgical clips epigastric region  No acute cardiopulmonary disease within limitations of supine imaging  Question 9 mm nodule adjacent to left heart border  Follow-up upright dual energy PA and lateral views when clinically feasible or CT chest advised for further evaluation  The study was marked in [facility] for immediate notification and follow-up  Workstation performed: [id number]     TRAUMA - CT head wo contrast    Addendum Date: 2/11/2022    ADDENDUM: I personally discussed this study with Adrian Monroe on 2/11/2022 at 1:31 AM      Result Date: 2/11/2022  CT BRAIN - WITHOUT CONTRAST INDICATION:   TRAUMA  COMPARISON:  None   TECHNIQUE:  CT examination of the brain was performed  In addition to axial images, sagittal and coronal 2D reformatted images were created and submitted for interpretation  Radiation dose length product (DLP) for this visit:  865 mGy-cm   This examination, like all CT scans performed in the [facility], was performed utilizing techniques to minimize radiation dose exposure, including the use of iterative reconstruction and automated exposure control  IMAGE QUALITY:  Diagnostic  FINDINGS: PARENCHYMA: Decreased attenuation is noted in periventricular and subcortical white matter demonstrating an appearance that is statistically most likely to represent moderate microangiopathic change  No CT signs of acute infarction  No intracranial mass, mass effect or midline shift  No acute parenchymal hemorrhage  VENTRICLES AND EXTRA-AXIAL SPACES:  Normal for the patient's age  VISUALIZED ORBITS AND PARANASAL SINUSES:  Unremarkable  CALVARIUM AND EXTRACRANIAL SOFT TISSUES:  Normal      No acute intracranial abnormality  I personally discussed this study with Dr Ramón Hawkins on 2/11/2022 at 1:02 AM  Workstation performed: [id number]     CT chest wo contrast    Result Date: 2/11/2022  CT CHEST WITHOUT IV CONTRAST INDICATION:   Lung nodule, > 8mm 9mm nodule Left heart border, recommendation of radiologist  COMPARISON:  Chest x-ray dated 2/11/2022  TECHNIQUE: CT examination of the chest was performed without intravenous contrast   Axial, sagittal, and coronal 2D reformatted images were created from the source data and submitted for interpretation  Radiation dose length product (DLP) for this visit:  290 mGy-cm   This examination, like all CT scans performed in the [facility], was performed utilizing techniques to minimize radiation dose exposure, including the use of iterative reconstruction and automated exposure control   FINDINGS: LUNGS:  Mild interstitial prominence and small basilar effusions suggestive of pulmonary edema/CHF  Scattered calcified granulomas and calcified right hilar nodes  PLEURA:  As above  HEART/GREAT VESSELS: Heart is unremarkable for patient's age  Mildly prominent lateral pericardial fat likely corresponds to suspected pulmonary nodule on recent plain film  No thoracic aortic aneurysm  MEDIASTINUM AND NAVNEET:  Unremarkable  CHEST WALL AND LOWER NECK:   Unremarkable  VISUALIZED STRUCTURES IN THE UPPER ABDOMEN:  Prominently enlarged liver  Incomplete evaluation of calcified granulomas in right upper renal pole nonobstructive calculi    OSSEOUS STRUCTURES:  No acute fracture or destructive osseous lesion  1   Mildly prominent left lateral pericardial fat likely corresponds to suspected pulmonary nodule on recent plain film  No pulmonary nodule identified  2   Mild CHF with small bibasilar pleural effusions  3   Hepatomegaly  Workstation performed: [id number]     TRAUMA - CT spine cervical wo contrast    Addendum Date: 2/11/2022     I personally discussed this study with Romulo Jacobson on 2/11/2022 at 1:31 AM      Result Date: 2/11/2022  CT CERVICAL SPINE - WITHOUT CONTRAST INDICATION:   TRAUMA  COMPARISON:  None  TECHNIQUE:  CT examination of the cervical spine was performed without intravenous contrast   Contiguous axial images were obtained  Sagittal and coronal reconstructions were performed  Radiation dose length product (DLP) for this visit:  328 mGy-cm   This examination, like all CT scans performed in the [facility], was performed utilizing techniques to minimize radiation dose exposure, including the use of iterative reconstruction and automated exposure control  IMAGE QUALITY:  Diagnostic  FINDINGS: ALIGNMENT:  There is straightening of normal cervical lordosis  No subluxation or compression deformity  VERTEBRAL BODIES:  No fracture  DEGENERATIVE CHANGES:  Moderate multilevel cervical degenerative changes are noted  No critical central canal stenosis   PREVERTEBRAL AND PARASPINAL SOFT TISSUES:  Unremarkable  THORACIC INLET:  Normal      Images are motion affected  No displaced cervical spine fracture or gross traumatic malalignment  I personally discussed this study with Dr Ari Jeff on 2/11/2022 at 1:02 AM   Workstation performed: [id number]     XR Trauma multiple ([facility] trauma bay ONLY)    Result Date: 2/11/2022  TRAUMA SERIES INDICATION:  TRAUMA  COMPARISON:  None VIEWS:  XR TRAUMA MULTIPLE Images: 2 (duplicate image interrogated with AI software algorithm designed for detection of pneumothorax)  FINDINGS: CHEST: Please note the extreme thoracic inlet was excluded from the field-of-view  Supine frontal view of the chest is obtained  Cardiomediastinal silhouette is within normal limits accounting for technique and patient positioning  Linear atelectasis or scarring left base  Question 9 mm nodule adjacent to the left heart border  No layering pleural effusions detected  No pneumothorax is seen on this supine film  Upright images are more sensitive to detect anterior pneumothoraces if relevant  No displaced fractures  Surgical clips epigastric region  No acute cardiopulmonary disease within limitations of supine imaging  Question 9 mm nodule adjacent to left heart border  Follow-up upright dual energy PA and lateral views when clinically feasible or CT chest advised for further evaluation  The study was marked in [facility] for immediate notification and follow-up  Workstation performed: [id number]     [address] [facility] bedside procedure    Result Date: 2/11/2022  1 2 [id number]  0 [id number]  1 [id number]       Current Medications:     Outpatient Medications Prior to Visit   Medication Sig Dispense Refill    amiodarone 100 mg tablet Take 100 mg by mouth daily      apixaban (Eliquis) 5 mg Take 5 mg by mouth 2 (two) times a day      docusate sodium (COLACE) 100 mg capsule Take 1 capsule (100 mg total) by mouth 2 (two) times a day 60 capsule 0    doxazosin (CARDURA) 8 MG tablet Take 8 mg by mouth daily at bedtime      ferrous sulfate 324 (65 Fe) mg Take 1 tablet (324 mg total) by mouth in the morning 30 tablet 0    losartan (COZAAR) 50 mg tablet Take 50 mg by mouth daily      pantoprazole (PROTONIX) 40 mg tablet Take 1 tablet (40 mg total) by mouth 2 (two) times a day before meals 60 tablet 0    pravastatin (PRAVACHOL) 40 mg tablet Take 40 mg by mouth daily      metFORMIN (GLUCOPHAGE) 500 mg tablet Take 1 tablet (500 mg total) by mouth daily with breakfast 30 tablet 0    metoprolol tartrate (LOPRESSOR) 25 mg tablet Take 50 mg by mouth every 12 (twelve) hours       torsemide (DEMADEX) 20 mg tablet Take 40 mg by mouth 2 (two) times a day       No facility-administered medications prior to visit         Dwight Graves MD  [facility] INTERNAL MEDICINE [place]

## 2022-02-22 NOTE — ASSESSMENT & PLAN NOTE
· S/P Cardioversion in March 2021  · Currently slightly tachycardic at 104  · Plan:  · Increase Metoprolol to 50 mg BID  · Continue Eliquis 5 mg BID  · Continue Amiodarone 100 mg daily

## 2022-02-22 NOTE — ASSESSMENT & PLAN NOTE
· BP in the office is 160/78 mmHg  · Patient reports being compliant to home medications and he admits to taking his AM pills today    · Continue Losartan 50 mg daily  · Continue Torsemide 40 mg BID  · Increase Metoprolol to 50 mg BID

## 2022-02-22 NOTE — PATIENT INSTRUCTIONS
Dear Preston Zapata    It was a pleasure to see you in our office today  Here is a summary of what we discussed:    · Repeat blood work in one week (CBC, BMP, Lipid Panel)  We will call you with the results  · Follow up with Colorectal surgery and Cardiology  · Increase the dose of Metoprolol to 50 mg twice daily  · We changed your Metformin prescription to the extended release form  The dose will be 500 mg twice daily with meals  · Continue taking iron pills and Protonix  · Continue Colace twice daily  · Call the office if you experience any bleeding, dizziness, lightheadedness, shortness of breath       Sincerely,  Heather Curiel MD

## 2022-02-22 NOTE — ASSESSMENT & PLAN NOTE
Wt Readings from Last 3 Encounters:   02/22/22 81 6 kg (180 lb)   02/18/22 82 8 kg (182 lb 8 7 oz)   10/08/15 86 6 kg (191 lb)     · TTE from 2020 with evidence of LVEF 50%, moderate MR  · Patient follows with [facility]  · Patient has chronic bilateral lower extremity edema, however weight appears stable, he denies shortness of breath and there is no evidence of rales on exam    · Plan:  · Currently on Torsemide 40 mg BID  · Increase metoprolol to 50 mg BID due to tachycardia and hypertension  · Follow up with Cardiology  Appointment scheduled for tomorrow  · Monitor weight    · Repeat BMP in 1 week

## 2022-02-22 NOTE — ASSESSMENT & PLAN NOTE
· Patient admitted recently and found to have a hemoglobin of 5 4  · S/P 3 units PRBC and treatment with Venofer  · Hemoglobin from yesterday stable at 9 7  · Patient denies any acute bleeding  He reports he did not have a BM since being discharged from the hospital until this morning when he noticed a scant amount of red blood in the toilet paper  He denies any other symptoms of anemia  · Plan:  · Will recheck CBC in 1 week  · Patient was advised to call the office if he experiences any further bleeding or symptoms of anemia  · Follow up in 4 weeks

## 2022-02-22 NOTE — ASSESSMENT & PLAN NOTE
· S/P Colonoscopy on 02/15 with findings of 3-4 cm ulcerated mass in the cecum  · Biopsies are still pending  · Patient evaluated by colorectal surgery while inpatient with recommendations for outpatient follow up  · Plan:  · Follow up on biopsy results  · Follow up with Colorectal surgery  Appointment is scheduled for 03/08

## 2022-02-22 NOTE — ASSESSMENT & PLAN NOTE
· S/P EGD on 02/15 with evidence of moderate, patchy hemorrhagic mucosa in the fundus and body of the stomach     · Plan:  · Continue Protonix BID

## 2022-03-04 ENCOUNTER — APPOINTMENT (OUTPATIENT)
Dept: LAB | Facility: CLINIC | Age: 87
End: 2022-03-04
Payer: COMMERCIAL

## 2022-03-04 DIAGNOSIS — E11.9 TYPE 2 DIABETES MELLITUS, WITHOUT LONG-TERM CURRENT USE OF INSULIN (HCC): ICD-10-CM

## 2022-03-04 DIAGNOSIS — N18.30 STAGE 3 CHRONIC KIDNEY DISEASE, UNSPECIFIED WHETHER STAGE 3A OR 3B CKD (HCC): ICD-10-CM

## 2022-03-04 DIAGNOSIS — E78.00 HYPERCHOLESTEROLEMIA: ICD-10-CM

## 2022-03-04 DIAGNOSIS — D64.9 ANEMIA, UNSPECIFIED TYPE: ICD-10-CM

## 2022-03-04 LAB
ANION GAP SERPL CALCULATED.3IONS-SCNC: 6 MMOL/L (ref 4–13)
BUN SERPL-MCNC: 38 MG/DL (ref 5–25)
CALCIUM SERPL-MCNC: 9.7 MG/DL (ref 8.3–10.1)
CHLORIDE SERPL-SCNC: 98 MMOL/L (ref 100–108)
CHOLEST SERPL-MCNC: 131 MG/DL
CO2 SERPL-SCNC: 29 MMOL/L (ref 21–32)
CREAT SERPL-MCNC: 1.58 MG/DL (ref 0.6–1.3)
ERYTHROCYTE [DISTWIDTH] IN BLOOD BY AUTOMATED COUNT: 33.4 % (ref 11.6–15.1)
GFR SERPL CREATININE-BSD FRML MDRD: 39 ML/MIN/1.73SQ M
GLUCOSE P FAST SERPL-MCNC: 171 MG/DL (ref 65–99)
HCT VFR BLD AUTO: 36.4 % (ref 36.5–49.3)
HDLC SERPL-MCNC: 63 MG/DL
HGB BLD-MCNC: 11.2 G/DL (ref 12–17)
LDLC SERPL CALC-MCNC: 54 MG/DL (ref 0–100)
MCH RBC QN AUTO: 21.7 PG (ref 26.8–34.3)
MCHC RBC AUTO-ENTMCNC: 30.8 G/DL (ref 31.4–37.4)
MCV RBC AUTO: 71 FL (ref 82–98)
PLATELET # BLD AUTO: 599 THOUSANDS/UL (ref 149–390)
PMV BLD AUTO: 11.1 FL (ref 8.9–12.7)
POTASSIUM SERPL-SCNC: 4 MMOL/L (ref 3.5–5.3)
RBC # BLD AUTO: 5.15 MILLION/UL (ref 3.88–5.62)
SODIUM SERPL-SCNC: 133 MMOL/L (ref 136–145)
TRIGL SERPL-MCNC: 69 MG/DL
WBC # BLD AUTO: 8.6 THOUSAND/UL (ref 4.31–10.16)

## 2022-03-04 PROCEDURE — 36415 COLL VENOUS BLD VENIPUNCTURE: CPT

## 2022-03-04 PROCEDURE — 85027 COMPLETE CBC AUTOMATED: CPT

## 2022-03-04 PROCEDURE — 80061 LIPID PANEL: CPT

## 2022-03-04 PROCEDURE — 80048 BASIC METABOLIC PNL TOTAL CA: CPT

## 2022-03-07 ENCOUNTER — TELEPHONE (OUTPATIENT)
Dept: INTERNAL MEDICINE CLINIC | Facility: CLINIC | Age: 87
End: 2022-03-07

## 2022-03-08 ENCOUNTER — TELEPHONE (OUTPATIENT)
Dept: INTERNAL MEDICINE CLINIC | Facility: CLINIC | Age: 87
End: 2022-03-08

## 2022-03-08 NOTE — TELEPHONE ENCOUNTER
[address] and updated him and his wife on his lab work  Informed that Hb is 11 2, improved from 9 7 on 2/21  Informed that this is an improvement from when he was hospitalized  BMP shows mild hyponatremia with a Na of 133, appears to be his baseline, and creatinine of 1 58 with a baseline Cr of 1 2-1 5  informed that these values are within his baseline values  We will need to repeat lab work again to ensure stable renal functions  He has an appointment with colorectal surgery today, and another appointment with Dr Christy Ba on 3/29

## 2022-03-10 ENCOUNTER — TELEPHONE (OUTPATIENT)
Dept: NEPHROLOGY | Facility: CLINIC | Age: 87
End: 2022-03-10

## 2022-03-10 ENCOUNTER — TELEPHONE (OUTPATIENT)
Dept: ANESTHESIOLOGY | Facility: CLINIC | Age: 87
End: 2022-03-10

## 2022-03-10 NOTE — TELEPHONE ENCOUNTER
Called patient to schedule his consult for surgery clearance  Patient states he is undecided on whether or not he wants to do the surgery  He will call us back if they choose to schedule

## 2022-03-15 DIAGNOSIS — K29.01 ACUTE GASTRITIS WITH HEMORRHAGE: ICD-10-CM

## 2022-03-15 DIAGNOSIS — D64.9 ANEMIA, UNSPECIFIED TYPE: ICD-10-CM

## 2022-03-15 RX ORDER — FERROUS SULFATE TAB EC 324 MG (65 MG FE EQUIVALENT) 324 (65 FE) MG
324 TABLET DELAYED RESPONSE ORAL DAILY
Qty: 30 TABLET | Refills: 2 | Status: SHIPPED | OUTPATIENT
Start: 2022-03-15 | End: 2022-04-11 | Stop reason: SDUPTHER

## 2022-03-18 DIAGNOSIS — I50.32 CHRONIC DIASTOLIC CONGESTIVE HEART FAILURE (HCC): Primary | ICD-10-CM

## 2022-03-18 DIAGNOSIS — E11.9 TYPE 2 DIABETES MELLITUS, WITHOUT LONG-TERM CURRENT USE OF INSULIN (HCC): ICD-10-CM

## 2022-03-18 DIAGNOSIS — K29.01 ACUTE GASTRITIS WITH HEMORRHAGE: ICD-10-CM

## 2022-03-18 RX ORDER — AMIODARONE HYDROCHLORIDE 100 MG/1
100 TABLET ORAL DAILY
Qty: 30 TABLET | Refills: 2 | Status: SHIPPED | OUTPATIENT
Start: 2022-03-18 | End: 2022-04-11 | Stop reason: SDUPTHER

## 2022-03-18 RX ORDER — METFORMIN HYDROCHLORIDE 500 MG/1
500 TABLET, EXTENDED RELEASE ORAL 2 TIMES DAILY WITH MEALS
Qty: 60 TABLET | Refills: 2 | Status: SHIPPED | OUTPATIENT
Start: 2022-03-18 | End: 2022-06-27 | Stop reason: SDUPTHER

## 2022-03-18 RX ORDER — LOSARTAN POTASSIUM 50 MG/1
50 TABLET ORAL DAILY
Qty: 30 TABLET | Refills: 2 | Status: SHIPPED | OUTPATIENT
Start: 2022-03-18 | End: 2022-04-11 | Stop reason: SDUPTHER

## 2022-03-18 RX ORDER — PANTOPRAZOLE SODIUM 40 MG/1
40 TABLET, DELAYED RELEASE ORAL
Qty: 60 TABLET | Refills: 2 | Status: SHIPPED | OUTPATIENT
Start: 2022-03-18 | End: 2022-06-27 | Stop reason: SDUPTHER

## 2022-03-21 ENCOUNTER — LAB REQUISITION (OUTPATIENT)
Dept: LAB | Facility: HOSPITAL | Age: 87
End: 2022-03-21
Payer: COMMERCIAL

## 2022-03-21 ENCOUNTER — APPOINTMENT (OUTPATIENT)
Dept: LAB | Facility: CLINIC | Age: 87
End: 2022-03-21
Payer: COMMERCIAL

## 2022-03-21 DIAGNOSIS — C18.0 CECUM CANCER (HCC): ICD-10-CM

## 2022-03-21 DIAGNOSIS — C18.0 MALIGNANT NEOPLASM OF CECUM (HCC): ICD-10-CM

## 2022-03-21 LAB
ABO GROUP BLD: NORMAL
APTT PPP: 35 SECONDS (ref 23–37)
BLD GP AB SCN SERPL QL: NEGATIVE
INR PPP: 0.97 (ref 0.84–1.19)
PROTHROMBIN TIME: 12.5 SECONDS (ref 11.6–14.5)
RH BLD: POSITIVE
SPECIMEN EXPIRATION DATE: NORMAL

## 2022-03-21 PROCEDURE — 36415 COLL VENOUS BLD VENIPUNCTURE: CPT

## 2022-03-21 PROCEDURE — 86850 RBC ANTIBODY SCREEN: CPT | Performed by: COLON & RECTAL SURGERY

## 2022-03-21 PROCEDURE — 85610 PROTHROMBIN TIME: CPT

## 2022-03-21 PROCEDURE — 86900 BLOOD TYPING SEROLOGIC ABO: CPT | Performed by: COLON & RECTAL SURGERY

## 2022-03-21 PROCEDURE — 86901 BLOOD TYPING SEROLOGIC RH(D): CPT | Performed by: COLON & RECTAL SURGERY

## 2022-03-21 PROCEDURE — 85730 THROMBOPLASTIN TIME PARTIAL: CPT

## 2022-03-23 ENCOUNTER — RA CDI HCC (OUTPATIENT)
Dept: OTHER | Facility: HOSPITAL | Age: 87
End: 2022-03-23

## 2022-03-23 ENCOUNTER — TELEPHONE (OUTPATIENT)
Dept: INTERNAL MEDICINE CLINIC | Facility: CLINIC | Age: 87
End: 2022-03-23

## 2022-03-23 NOTE — PROGRESS NOTES
[facility]  coding opportunities          Chart Reviewed number of suggestions sent to Provider: 3  [id number], [id number] [phone]  I13 [phone]     Patients Insurance     Medicare Insurance: [employer]age

## 2022-03-29 ENCOUNTER — OFFICE VISIT (OUTPATIENT)
Dept: INTERNAL MEDICINE CLINIC | Facility: CLINIC | Age: 87
End: 2022-03-29
Payer: COMMERCIAL

## 2022-03-29 VITALS
TEMPERATURE: 98 F | HEART RATE: 82 BPM | BODY MASS INDEX: 29.8 KG/M2 | HEIGHT: 66 IN | OXYGEN SATURATION: 98 % | WEIGHT: 185.4 LBS | DIASTOLIC BLOOD PRESSURE: 82 MMHG | SYSTOLIC BLOOD PRESSURE: 142 MMHG

## 2022-03-29 DIAGNOSIS — N18.30 STAGE 3 CHRONIC KIDNEY DISEASE, UNSPECIFIED WHETHER STAGE 3A OR 3B CKD (HCC): ICD-10-CM

## 2022-03-29 DIAGNOSIS — I10 HYPERTENSION, UNSPECIFIED TYPE: ICD-10-CM

## 2022-03-29 DIAGNOSIS — D64.9 ANEMIA, UNSPECIFIED TYPE: ICD-10-CM

## 2022-03-29 DIAGNOSIS — K63.9 CECAL LESION: ICD-10-CM

## 2022-03-29 DIAGNOSIS — K29.50 CHRONIC GASTRITIS, PRESENCE OF BLEEDING UNSPECIFIED, UNSPECIFIED GASTRITIS TYPE: ICD-10-CM

## 2022-03-29 DIAGNOSIS — E11.9 TYPE 2 DIABETES MELLITUS WITHOUT COMPLICATION, WITHOUT LONG-TERM CURRENT USE OF INSULIN (HCC): ICD-10-CM

## 2022-03-29 DIAGNOSIS — Z00.00 MEDICARE ANNUAL WELLNESS VISIT, INITIAL: Primary | ICD-10-CM

## 2022-03-29 PROCEDURE — 3725F SCREEN DEPRESSION PERFORMED: CPT | Performed by: INTERNAL MEDICINE

## 2022-03-29 PROCEDURE — 1003F LEVEL OF ACTIVITY ASSESS: CPT | Performed by: INTERNAL MEDICINE

## 2022-03-29 PROCEDURE — G0439 PPPS, SUBSEQ VISIT: HCPCS | Performed by: INTERNAL MEDICINE

## 2022-03-29 PROCEDURE — 1170F FXNL STATUS ASSESSED: CPT | Performed by: INTERNAL MEDICINE

## 2022-03-29 PROCEDURE — 1036F TOBACCO NON-USER: CPT | Performed by: INTERNAL MEDICINE

## 2022-03-29 PROCEDURE — 99213 OFFICE O/P EST LOW 20 MIN: CPT | Performed by: INTERNAL MEDICINE

## 2022-03-29 PROCEDURE — 1160F RVW MEDS BY RX/DR IN RCRD: CPT | Performed by: INTERNAL MEDICINE

## 2022-03-29 PROCEDURE — 1125F AMNT PAIN NOTED PAIN PRSNT: CPT | Performed by: INTERNAL MEDICINE

## 2022-03-29 PROCEDURE — 3288F FALL RISK ASSESSMENT DOCD: CPT | Performed by: INTERNAL MEDICINE

## 2022-03-29 RX ORDER — TORSEMIDE 20 MG/1
40 TABLET ORAL 2 TIMES DAILY
Qty: 120 TABLET | Refills: 0 | Status: SHIPPED | OUTPATIENT
Start: 2022-03-29 | End: 2022-04-05 | Stop reason: SDUPTHER

## 2022-03-29 NOTE — ASSESSMENT & PLAN NOTE
· Latest A1C at 8, however patient was anemic at the time which could have affected the real result  · Will plan to recheck A1C in May    · Continue Metformin 500 mg BID for now  · Ambulatory referral for ophthalmology

## 2022-03-29 NOTE — PATIENT INSTRUCTIONS
Medicare Preventive Visit Patient Instructions  Thank you for completing your Welcome to Medicare Visit or Medicare Annual Wellness Visit today  Your next wellness visit will be due in one year (3/30/2023)  The screening/preventive services that you may require over the next 5-10 years are detailed below  Some tests may not apply to you based off risk factors and/or age  Screening tests ordered at today's visit but not completed yet may show as past due  Also, please note that scanned in results may not display below  Preventive Screenings:  Service Recommendations Previous Testing/Comments   Colorectal Cancer Screening  · Colonoscopy    · Fecal Occult Blood Test (FOBT)/Fecal Immunochemical Test (FIT)  · Fecal DNA/Cologuard Test  · Flexible Sigmoidoscopy Age: 54-65 years old   Colonoscopy: every 10 years (May be performed more frequently if at higher risk)  OR  FOBT/FIT: every 1 year  OR  Cologuard: every 3 years  OR  Sigmoidoscopy: every 5 years  Screening may be recommended earlier than age 48 if at higher risk for colorectal cancer  Also, an individualized decision between you and your healthcare provider will decide whether screening between the ages of 74-80 would be appropriate   Colonoscopy: 02/17/2022  FOBT/FIT: Not on file  Cologuard: Not on file  Sigmoidoscopy: Not on file    History Colorectal Cancer     Prostate Cancer Screening Individualized decision between patient and health care provider in men between ages of 53-78   Medicare will cover every 12 months beginning on the day after your 50th birthday PSA: No results in last 5 years     Screening Not Indicated     Hepatitis C Screening Once for adults born between [birth year] and 1965  More frequently in patients at high risk for Hepatitis C Hep C Antibody: Not on file        Diabetes Screening 1-2 times per year if you're at risk for diabetes or have pre-diabetes Fasting glucose: 171 mg/dL   A1C: 8 1 %    Screening Not Indicated  History Diabetes Cholesterol Screening Once every 5 years if you don't have a lipid disorder  May order more often based on risk factors  Lipid panel: 03/04/2022    Screening Not Indicated  History Lipid Disorder      Other Preventive Screenings Covered by Medicare:  1  Abdominal Aortic Aneurysm (AAA) Screening: covered once if your at risk  You're considered to be at risk if you have a family history of AAA or a male between the age of 73-68 who smoking at least 100 cigarettes in your lifetime  2  Lung Cancer Screening: covers low dose CT scan once per year if you meet all of the following conditions: (1) Age 50-69; (2) No signs or symptoms of lung cancer; (3) Current smoker or have quit smoking within the last 15 years; (4) You have a tobacco smoking history of at least 30 pack years (packs per day x number of years you smoked); (5) You get a written order from a healthcare provider  3  Glaucoma Screening: covered annually if you're considered high risk: (1) You have diabetes OR (2) Family history of glaucoma OR (3)  aged 48 and older OR (3)  American aged 72 and older  3  Osteoporosis Screening: covered every 2 years if you meet one of the following conditions: (1) Have a vertebral abnormality; (2) On glucocorticoid therapy for more than 3 months; (3) Have primary hyperparathyroidism; (4) On osteoporosis medications and need to assess response to drug therapy  5  HIV Screening: covered annually if you're between the age of 12-76  Also covered annually if you are younger than 13 and older than 72 with risk factors for HIV infection  For pregnant patients, it is covered up to 3 times per pregnancy      Immunizations:  Immunization Recommendations   Influenza Vaccine Annual influenza vaccination during flu season is recommended for all persons aged >= 6 months who do not have contraindications   Pneumococcal Vaccine (Prevnar and Pneumovax)  * Prevnar = PCV13  * Pneumovax = PPSV23 Adults 25-60 years old: 1-3 doses may be recommended based on certain risk factors  Adults 72 years old: Prevnar (PCV13) vaccine recommended followed by Pneumovax (PPSV23) vaccine  If already received PPSV23 since turning 65, then PCV13 recommended at least one year after PPSV23 dose  Hepatitis B Vaccine 3 dose series if at intermediate or high risk (ex: diabetes, end stage renal disease, liver disease)   Tetanus (Td) Vaccine - COST NOT COVERED BY MEDICARE PART B Following completion of primary series, a booster dose should be given every 10 years to maintain immunity against tetanus  Td may also be given as tetanus wound prophylaxis  Tdap Vaccine - COST NOT COVERED BY MEDICARE PART B Recommended at least once for all adults  For pregnant patients, recommended with each pregnancy  Shingles Vaccine (Shingrix) - COST NOT COVERED BY MEDICARE PART B  2 shot series recommended in those aged 48 and above     Health Maintenance Due:  There are no preventive care reminders to display for this patient  Immunizations Due:      Topic Date Due    Pneumococcal Vaccine: 65+ Years (1 of 2 - PPSV23) Never done    COVID-19 Vaccine (3 - Booster for Pfizer series) 08/06/2021    Influenza Vaccine (1) 09/01/2021     Advance Directives   What are advance directives? Advance directives are legal documents that state your wishes and plans for medical care  These plans are made ahead of time in case you lose your ability to make decisions for yourself  Advance directives can apply to any medical decision, such as the treatments you want, and if you want to donate organs  What are the types of advance directives? There are many types of advance directives, and each state has rules about how to use them  You may choose a combination of any of the following:  · Living will: This is a written record of the treatment you want  You can also choose which treatments you do not want, which to limit, and which to stop at a certain time   This includes surgery, medicine, IV fluid, and tube feedings  · Durable power of  for healthcare [facility]): This is a written record that states who you want to make healthcare choices for you when you are unable to make them for yourself  This person, called a proxy, is usually a family member or a friend  You may choose more than 1 proxy  · Do not resuscitate (DNR) order:  A DNR order is used in case your heart stops beating or you stop breathing  It is a request not to have certain forms of treatment, such as CPR  A DNR order may be included in other types of advance directives  · Medical directive: This covers the care that you want if you are in a coma, near death, or unable to make decisions for yourself  You can list the treatments you want for each condition  Treatment may include pain medicine, surgery, blood transfusions, dialysis, IV or tube feedings, and a ventilator (breathing machine)  · Values history: This document has questions about your views, beliefs, and how you feel and think about life  This information can help others choose the care that you would choose  Why are advance directives important? An advance directive helps you control your care  Although spoken wishes may be used, it is better to have your wishes written down  Spoken wishes can be misunderstood, or not followed  Treatments may be given even if you do not want them  An advance directive may make it easier for your family to make difficult choices about your care  Depression   Depression  is a medical condition that causes feelings of sadness or hopelessness that do not go away  Depression may cause you to lose interest in things you used to enjoy  These feelings may interfere with your daily life  Call your local emergency number (911 in the [address]) if:   · You think about harming yourself or someone else  · You have done something on purpose to hurt yourself    The following resources are available at any time to help you, if needed:   · [address]: 5-206.998.9630 (7-721-950-GJWJ)   · Suicide Hotline: 0-245.456.9562 (8-766-ILODAHA)   · For a list of international numbers: https://save org/find-help/international-resources/  Treatment for depression may include medicine to relieve depression  Medicine is often used together with therapy  Therapy is a way for you to talk about your feelings and anything that may be causing depression  Therapy can be done alone or in a group  It may also be done with family members or a significant other  · Get regular physical activity  · Create a regular sleep schedule  · Eat a variety of healthy foods  · Do not drink alcohol or use drugs  Fall Prevention    Fall prevention  includes ways to make your home and other areas safer  It also includes ways you can move more carefully to prevent a fall  Health conditions that cause changes in your blood pressure, vision, or muscle strength and coordination may increase your risk for falls  Medicines may also increase your risk for falls if they make you dizzy, weak, or sleepy  Fall prevention tips:   · Stand or sit up slowly  · Use assistive devices as directed  · Wear shoes that fit well and have soles that   · Wear a personal alarm  · Stay active  · Manage your medical conditions  Home Safety Tips:  · Add items to prevent falls in the bathroom  · Keep paths clear  · Install bright lights in your home  · Keep items you use often on shelves within reach  · Paint or place reflective tape on the edges of your stairs  Weight Management   Why it is important to manage your weight:  Being overweight increases your risk of health conditions such as heart disease, high blood pressure, type 2 diabetes, and certain types of cancer  It can also increase your risk for osteoarthritis, sleep apnea, and other respiratory problems  Aim for a slow, steady weight loss   Even a small amount of weight loss can lower your risk of health problems  How to lose weight safely:  A safe and healthy way to lose weight is to eat fewer calories and get regular exercise  You can lose up about 1 pound a week by decreasing the number of calories you eat by 500 calories each day  Healthy meal plan for weight management:  A healthy meal plan includes a variety of foods, contains fewer calories, and helps you stay healthy  A healthy meal plan includes the following:  · Eat whole-grain foods more often  A healthy meal plan should contain fiber  Fiber is the part of grains, fruits, and vegetables that is not broken down by your body  Whole-grain foods are healthy and provide extra fiber in your diet  Some examples of whole-grain foods are whole-wheat breads and pastas, oatmeal, brown rice, and bulgur  · Eat a variety of vegetables every day  Include dark, leafy greens such as spinach, kale, phu greens, and mustard greens  Eat yellow and orange vegetables such as carrots, sweet potatoes, and winter squash  · Eat a variety of fruits every day  Choose fresh or canned fruit (canned in its own juice or light syrup) instead of juice  Fruit juice has very little or no fiber  · Eat low-fat dairy foods  Drink fat-free (skim) milk or 1% milk  Eat fat-free yogurt and low-fat cottage cheese  Try low-fat cheeses such as mozzarella and other reduced-fat cheeses  · Choose meat and other protein foods that are low in fat  Choose beans or other legumes such as split peas or lentils  Choose fish, skinless poultry (chicken or turkey), or lean cuts of red meat (beef or pork)  Before you cook meat or poultry, cut off any visible fat  · Use less fat and oil  Try baking foods instead of frying them  Add less fat, such as margarine, sour cream, regular salad dressing and mayonnaise to foods  Eat fewer high-fat foods  Some examples of high-fat foods include french fries, doughnuts, ice cream, and cakes  · Eat fewer sweets    Limit foods and drinks that are high in sugar  This includes candy, cookies, regular soda, and sweetened drinks  Exercise:  Exercise at least 30 minutes per day on most days of the week  Some examples of exercise include walking, biking, dancing, and swimming  You can also fit in more physical activity by taking the stairs instead of the elevator or parking farther away from stores  Ask your healthcare provider about the best exercise plan for you  © Copyright [employer] 2018 Information is for End User's use only and may not be sold, redistributed or otherwise used for commercial purposes   All illustrations and images included in CareNotes® are the copyrighted property of [employer]  or [address] [place] [address]

## 2022-03-29 NOTE — ASSESSMENT & PLAN NOTE
· Incidental finding on colonoscopy  Biopsy confirms adenocarcinoma  · Patient was evaluated by colorectal surgery with recommendation for surgical resection, however patient and wife have decided to postpone the surgery for now and monitor hemoglobin count instead  · Plan:  · CBC q3 weeks to monitor hemoglobin    · Continue to follow with colorectal surgery

## 2022-03-29 NOTE — PROGRESS NOTES
[facility] - [facility] INTERNAL MEDICINE [place]    NAME: Anshul Davila  AGE: 82 y o  SEX: male  : 1935     DATE: 3/29/2022     Assessment and Plan:     Problem List Items Addressed This Visit        Digestive    Cecal ulcerated mass     · Incidental finding on colonoscopy  Biopsy confirms adenocarcinoma  · Patient was evaluated by colorectal surgery with recommendation for surgical resection, however patient and wife have decided to postpone the surgery for now and monitor hemoglobin count instead  · Plan:  · CBC q3 weeks to monitor hemoglobin  · Continue to follow with colorectal surgery         Gastritis     · EGD suggestive of erosive gastritis  · Plan:  · Continue Protonix  · Check H  pylori         Relevant Orders    H  pylori antigen, stool       Endocrine    Type 2 diabetes mellitus without complication, without long-term current use of insulin ([facility])     · Latest A1C at 8, however patient was anemic at the time which could have affected the real result  · Will plan to recheck A1C in May  · Continue Metformin 500 mg BID for now  · Ambulatory referral for ophthalmology         Relevant Orders    Ambulatory Referral to Ophthalmology       Genitourinary    Stage 3 chronic kidney disease ([facility] [address] )     · Repeat BMP ordered  · Continue Demadex at current dose for now  Relevant Orders    Basic metabolic panel       Other    Anemia - Primary     · Latest CBC with hemoglobin at 11  · Patient denies any recent blood in stool  · Continue iron supplements and colace  · CBC q3 weeks  · Patient advised to call the office if he notices any blood in the stool  Relevant Orders    H  pylori antigen, stool    CBC and Platelet        Depression Screening Follow-up Plan: Patient's depression screening was positive with a PHQ-2 score of 6  Their PHQ-9 score was 19  Clinically patient does not have depression  No treatment is required         HPI:     Patient is a 82year old male with a PMHx of A-fib, on Eliquis, S/P Cardioversion, T2DM, CKD stage III, GERD, HFpEF, HTN, HLD, who comes to the office today for follow up visit  He is accompanied by his wife, Camden Hawk  Patient was last seen in this clinic in 02/22 as a TCM visit after being admitted at [address] [place] [address] due to syncope and found to have anemia  GI was consulted and patient underwent EGD and colonoscopy with findings of erosive gastritis and a cecal mass which was biopsy  Pathology reports suggest adenocarcinoma  Patient has been following with colorectal surgery with recommendations of undergoing surgical resection, however patient and wife have decided to wait on the surgery for now  He has no complaints as of now  Per wife, he is eating well, however he does not have many interests in doing much else  Patient denies feeling depressed  He is fully vaccinated and boosted against COVID-19  The following portions of the patient's history were reviewed and updated as appropriate: allergies, current medications, past family history, past medical history, past social history, past surgical history and problem list      Review of Systems:     Review of Systems   Constitutional: Negative for appetite change, chills, fever and unexpected weight change  HENT: Negative  Respiratory: Negative for cough, shortness of breath, wheezing and stridor  Cardiovascular: Negative for chest pain, palpitations and leg swelling  Gastrointestinal: Negative for abdominal pain, blood in stool, constipation, diarrhea, nausea and vomiting  Genitourinary: Negative for difficulty urinating  Neurological: Negative for dizziness, tremors and light-headedness  Psychiatric/Behavioral: Negative              Problem List:     Patient Active Problem List   Diagnosis    Syncope and collapse    Chronic diastolic congestive heart failure (HCC)    Persistent atrial fibrillation (HCC)    Laceration of left eyebrow    Anemia    Stage 3 chronic kidney disease ([facility] [address] )  Type 2 diabetes mellitus without complication, without long-term current use of insulin (HCC)    Cecal ulcerated mass    Cognitive dysfunction    Gastritis    Hypertension    Esophageal reflux    Hypercalcemia    Hypercholesterolemia    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        Objective:     /82 (BP Location: Left arm, Patient Position: Sitting, Cuff Size: Standard)   Pulse 82   Temp 98 °F (36 7 °C) (Tympanic)   Ht 5' 6" (1 676 m)   Wt 84 1 kg (185 lb 6 4 oz)   SpO2 98%   BMI 29 92 kg/m²     Physical Exam  Vitals reviewed  Constitutional:       General: He is not in acute distress  Appearance: He is obese  He is not toxic-appearing  HENT:      Head: Normocephalic  Eyes:      General: No scleral icterus  Pupils: Pupils are equal, round, and reactive to light  Cardiovascular:      Rate and Rhythm: Normal rate and regular rhythm  Heart sounds: No murmur heard  No gallop  Pulmonary:      Effort: No respiratory distress  Breath sounds: Normal breath sounds  No wheezing, rhonchi or rales  Abdominal:      General: Abdomen is protuberant  Bowel sounds are normal  There is no distension  Palpations: Abdomen is soft  Tenderness: There is no abdominal tenderness  There is no guarding  Musculoskeletal:      Right lower leg: Edema present  Left lower leg: Edema present  Skin:     General: Skin is warm  Capillary Refill: Capillary refill takes less than 2 seconds  Neurological:      General: No focal deficit present  Mental Status: He is alert and oriented to person, place, and time  Mental status is at baseline  Cranial Nerves: No cranial nerve deficit  Psychiatric:         Mood and Affect: Mood normal  Mood is not depressed  Laboratory Results: I have personally reviewed the pertinent laboratory results/reports     Radiology/Other Diagnostic Testing Results: I have personally reviewed pertinent reports  XR chest portable    Result Date: 2/14/2022  TRAUMA SERIES INDICATION:  TRAUMA  COMPARISON:  None VIEWS:  XR TRAUMA MULTIPLE Images: 2 (duplicate image interrogated with AI software algorithm designed for detection of pneumothorax)  FINDINGS: CHEST: Please note the extreme thoracic inlet was excluded from the field-of-view  Supine frontal view of the chest is obtained  Cardiomediastinal silhouette is within normal limits accounting for technique and patient positioning  Linear atelectasis or scarring left base  Question 9 mm nodule adjacent to the left heart border  No layering pleural effusions detected  No pneumothorax is seen on this supine film  Upright images are more sensitive to detect anterior pneumothoraces if relevant  No displaced fractures  Surgical clips epigastric region  No acute cardiopulmonary disease within limitations of supine imaging  Question 9 mm nodule adjacent to left heart border  Follow-up upright dual energy PA and lateral views when clinically feasible or CT chest advised for further evaluation  The study was marked in [facility] for immediate notification and follow-up  Workstation performed: [id number]     TRAUMA - CT head wo contrast    Addendum Date: 2/11/2022    ADDENDUM: I personally discussed this study with Aria Boland on 2/11/2022 at 1:31 AM      Result Date: 2/11/2022  CT BRAIN - WITHOUT CONTRAST INDICATION:   TRAUMA  COMPARISON:  None  TECHNIQUE:  CT examination of the brain was performed  In addition to axial images, sagittal and coronal 2D reformatted images were created and submitted for interpretation  Radiation dose length product (DLP) for this visit:  865 mGy-cm   This examination, like all CT scans performed in the [facility], was performed utilizing techniques to minimize radiation dose exposure, including the use of iterative reconstruction and automated exposure control  IMAGE QUALITY:  Diagnostic   FINDINGS: PARENCHYMA: Decreased attenuation is noted in periventricular and subcortical white matter demonstrating an appearance that is statistically most likely to represent moderate microangiopathic change  No CT signs of acute infarction  No intracranial mass, mass effect or midline shift  No acute parenchymal hemorrhage  VENTRICLES AND EXTRA-AXIAL SPACES:  Normal for the patient's age  VISUALIZED ORBITS AND PARANASAL SINUSES:  Unremarkable  CALVARIUM AND EXTRACRANIAL SOFT TISSUES:  Normal      No acute intracranial abnormality  I personally discussed this study with Dr Benjamín Fernandez on 2/11/2022 at 1:02 AM  Workstation performed: [id number]     CT chest wo contrast    Result Date: 2/11/2022  CT CHEST WITHOUT IV CONTRAST INDICATION:   Lung nodule, > 8mm 9mm nodule Left heart border, recommendation of radiologist  COMPARISON:  Chest x-ray dated 2/11/2022  TECHNIQUE: CT examination of the chest was performed without intravenous contrast   Axial, sagittal, and coronal 2D reformatted images were created from the source data and submitted for interpretation  Radiation dose length product (DLP) for this visit:  290 mGy-cm   This examination, like all CT scans performed in the [facility], was performed utilizing techniques to minimize radiation dose exposure, including the use of iterative reconstruction and automated exposure control  FINDINGS: LUNGS:  Mild interstitial prominence and small basilar effusions suggestive of pulmonary edema/CHF  Scattered calcified granulomas and calcified right hilar nodes  PLEURA:  As above  HEART/GREAT VESSELS: Heart is unremarkable for patient's age  Mildly prominent lateral pericardial fat likely corresponds to suspected pulmonary nodule on recent plain film  No thoracic aortic aneurysm  MEDIASTINUM AND NAVNEET:  Unremarkable  CHEST WALL AND LOWER NECK:   Unremarkable  VISUALIZED STRUCTURES IN THE UPPER ABDOMEN:  Prominently enlarged liver    Incomplete evaluation of calcified granulomas in right upper renal pole nonobstructive calculi    OSSEOUS STRUCTURES:  No acute fracture or destructive osseous lesion  1   Mildly prominent left lateral pericardial fat likely corresponds to suspected pulmonary nodule on recent plain film  No pulmonary nodule identified  2   Mild CHF with small bibasilar pleural effusions  3   Hepatomegaly  Workstation performed: [id number]     TRAUMA - CT spine cervical wo contrast    Addendum Date: 2/11/2022     I personally discussed this study with Margie Lozano on 2/11/2022 at 1:31 AM      Result Date: 2/11/2022  CT CERVICAL SPINE - WITHOUT CONTRAST INDICATION:   TRAUMA  COMPARISON:  None  TECHNIQUE:  CT examination of the cervical spine was performed without intravenous contrast   Contiguous axial images were obtained  Sagittal and coronal reconstructions were performed  Radiation dose length product (DLP) for this visit:  328 mGy-cm   This examination, like all CT scans performed in the [facility], was performed utilizing techniques to minimize radiation dose exposure, including the use of iterative reconstruction and automated exposure control  IMAGE QUALITY:  Diagnostic  FINDINGS: ALIGNMENT:  There is straightening of normal cervical lordosis  No subluxation or compression deformity  VERTEBRAL BODIES:  No fracture  DEGENERATIVE CHANGES:  Moderate multilevel cervical degenerative changes are noted  No critical central canal stenosis  PREVERTEBRAL AND PARASPINAL SOFT TISSUES:  Unremarkable  THORACIC INLET:  Normal      Images are motion affected  No displaced cervical spine fracture or gross traumatic malalignment  I personally discussed this study with Dr Reanna Briceno on 2/11/2022 at 1:02 AM   Workstation performed: [id number]     XR Trauma multiple (B/RA trauma bay ONLY)    Result Date: 2/11/2022  TRAUMA SERIES INDICATION:  TRAUMA   COMPARISON:  None VIEWS:  XR TRAUMA MULTIPLE Images: 2 (duplicate image interrogated with AI software algorithm designed for detection of pneumothorax)  FINDINGS: CHEST: Please note the extreme thoracic inlet was excluded from the field-of-view  Supine frontal view of the chest is obtained  Cardiomediastinal silhouette is within normal limits accounting for technique and patient positioning  Linear atelectasis or scarring left base  Question 9 mm nodule adjacent to the left heart border  No layering pleural effusions detected  No pneumothorax is seen on this supine film  Upright images are more sensitive to detect anterior pneumothoraces if relevant  No displaced fractures  Surgical clips epigastric region  No acute cardiopulmonary disease within limitations of supine imaging  Question 9 mm nodule adjacent to left heart border  Follow-up upright dual energy PA and lateral views when clinically feasible or CT chest advised for further evaluation  The study was marked in [facility] for immediate notification and follow-up  Workstation performed: [id number]     [address] bedside procedure    Result Date: 2/11/2022  1 [id number]  1 [id number]  1 [id number]       Current Medications:     Outpatient Medications Prior to Visit   Medication Sig Dispense Refill    amiodarone 100 mg tablet Take 1 tablet (100 mg total) by mouth daily 30 tablet 2    apixaban (Eliquis) 5 mg Take 5 mg by mouth 2 (two) times a day      doxazosin (CARDURA) 8 MG tablet Take 8 mg by mouth daily at bedtime      ferrous sulfate 324 (65 Fe) mg Take 1 tablet (324 mg total) by mouth in the morning 30 tablet 2    losartan (COZAAR) 50 mg tablet Take 1 tablet (50 mg total) by mouth daily 30 tablet 2    metFORMIN (GLUCOPHAGE-XR) 500 mg 24 hr tablet Take 1 tablet (500 mg total) by mouth 2 (two) times a day with meals 60 tablet 2    pantoprazole (PROTONIX) 40 mg tablet Take 1 tablet (40 mg total) by mouth 2 (two) times a day before meals 60 tablet 2    pravastatin (PRAVACHOL) 40 mg tablet Take 40 mg by mouth daily      docusate sodium (COLACE) 100 mg capsule Take 1 capsule (100 mg total) by mouth 2 (two) times a day 60 capsule 0    metoprolol tartrate (LOPRESSOR) 50 mg tablet Take 1 tablet (50 mg total) by mouth every 12 (twelve) hours 60 tablet 0    torsemide (DEMADEX) 20 mg tablet Take 2 tablets (40 mg total) by mouth 2 (two) times a day 120 tablet 0     No facility-administered medications prior to visit         Jeanne Mcallister MD  [facility] INTERNAL MEDICINE [address] [place] [address]

## 2022-03-29 NOTE — ASSESSMENT & PLAN NOTE
· Latest CBC with hemoglobin at 11  · Patient denies any recent blood in stool  · Continue iron supplements and colace  · CBC q3 weeks  · Patient advised to call the office if he notices any blood in the stool

## 2022-03-29 NOTE — TELEPHONE ENCOUNTER
Phyllis Cortez has called the [facility] office in regards to [facility]  Ryan has requested a refill of torsemide (DEMEDEX) 20 mg tablet  Refill to be sent to the [facility] on [address] [facility]

## 2022-03-29 NOTE — PROGRESS NOTES
Yamileth Michael is here for his Initial Wellness visit  Last Medicare Wellness visit information reviewed, patient interviewed and updates made to the record today  Health Risk Assessment:   Patient rates overall health as good  Patient feels that their physical health rating is slightly worse  Patient is satisfied with their life  Eyesight was rated as same  Hearing was rated as same  Patient feels that their emotional and mental health rating is same  Patients states they are often angry  Patient states they are always unusually tired/fatigued  Pain experienced in the last 7 days has been none  Patient states that he has experienced no weight loss or gain in last 6 months  Depression Screening:   PHQ-2 Score: 6  PHQ-9 Score: 19      Fall Risk Screening: In the past year, patient has experienced: history of falling in past year    Number of falls: 1  Injured during fall?: Yes      Home Safety:  Patient does not have trouble with stairs inside or outside of their home  Patient has working smoke alarms and has working carbon monoxide detector  Home safety hazards include: none  House has no stairs    Nutrition:   Current diet is Regular  Pt does not watch what he eats    Medications:   Patient is not currently taking any over-the-counter supplements  Patient is not able to manage medications  Wife uses organizer for when to take meds    Activities of Daily Living (ADLs)/Instrumental Activities of Daily Living (IADLs):   Walk and transfer into and out of bed and chair?: Yes  Dress and groom yourself?: Yes    Bathe or shower yourself?: Yes    Feed yourself?  Yes  Do your laundry/housekeeping?: Yes  Manage your money, pay your bills and track your expenses?: Yes  Make your own meals?: Yes    Do your own shopping?: Yes    Previous Hospitalizations:   Any hospitalizations or ED visits within the last 12 months?: Yes    How many hospitalizations have you had in the last year?: 1-2    Hospitalization Comments: Patient admitted at [address] [facility] from 02/11-02/19 after a syncopal event and found to have acute blood loss anemia  Patient evaluated by GI  He underwent EGD and colonoscopy with finding of erosive gastritis and a cecal mass  Biopsy confirmed adenocarcinoma  He is following with colorectal surgery  Advance Care Planning:       Comments: Will address ACP during next visit  PREVENTIVE SCREENINGS      Cardiovascular Screening:    General: Screening Not Indicated and History Lipid Disorder      Diabetes Screening:     General: Screening Not Indicated and History Diabetes      Colorectal Cancer Screening:     General: History Colorectal Cancer      Prostate Cancer Screening:    General: Screening Not Indicated      Abdominal Aortic Aneurysm (AAA) Screening:    Risk factors include: tobacco use        General: Screening Not Indicated      Lung Cancer Screening:     General: Screening Not Indicated    Screening, Brief Intervention, and Referral to Treatment (SBIRT)    Screening  Typical number of drinks in a day: 0  Typical number of drinks in a week: 3  Interpretation: Low risk drinking behavior  Single Item Drug Screening:  How often have you used an illegal drug (including marijuana) or a prescription medication for non-medical reasons in the past year? never    Single Item Drug Screen Score: 0  Interpretation: Negative screen for possible drug use disorder    Other Counseling Topics:   Car/seat belt/driving safety, sunscreen and regular weightbearing exercise

## 2022-04-05 DIAGNOSIS — I48.19 PERSISTENT ATRIAL FIBRILLATION (HCC): ICD-10-CM

## 2022-04-05 DIAGNOSIS — I10 HYPERTENSION, UNSPECIFIED TYPE: ICD-10-CM

## 2022-04-05 DIAGNOSIS — K63.89 COLONIC MASS: ICD-10-CM

## 2022-04-05 RX ORDER — METOPROLOL TARTRATE 50 MG/1
50 TABLET, FILM COATED ORAL EVERY 12 HOURS SCHEDULED
Qty: 60 TABLET | Refills: 2 | Status: SHIPPED | OUTPATIENT
Start: 2022-04-05 | End: 2022-08-05 | Stop reason: SDUPTHER

## 2022-04-05 RX ORDER — DOCUSATE SODIUM 100 MG/1
100 CAPSULE, LIQUID FILLED ORAL 2 TIMES DAILY
Qty: 60 CAPSULE | Refills: 2 | Status: SHIPPED | OUTPATIENT
Start: 2022-04-05 | End: 2022-05-05

## 2022-04-05 RX ORDER — TORSEMIDE 20 MG/1
40 TABLET ORAL 2 TIMES DAILY
Qty: 120 TABLET | Refills: 2 | Status: SHIPPED | OUTPATIENT
Start: 2022-04-05 | End: 2022-07-06

## 2022-04-05 NOTE — TELEPHONE ENCOUNTER
Eric Bobo spouse, Mariola Lemons, has come into the  [facility] office requesting a refill of metoprolol tartrate (LOPRESSOR) 50 mg tablet  Burke Spurling has also requested refills for both docusate sodium (COLACE) 100 mg capsule and torsemide (DEMADEX) 20 mg tablet  Medications to be sent to the [facility] on [address]

## 2022-04-11 ENCOUNTER — TELEPHONE (OUTPATIENT)
Dept: INTERNAL MEDICINE CLINIC | Facility: CLINIC | Age: 87
End: 2022-04-11

## 2022-04-11 ENCOUNTER — APPOINTMENT (OUTPATIENT)
Dept: LAB | Facility: CLINIC | Age: 87
End: 2022-04-11
Payer: COMMERCIAL

## 2022-04-11 DIAGNOSIS — N18.30 STAGE 3 CHRONIC KIDNEY DISEASE, UNSPECIFIED WHETHER STAGE 3A OR 3B CKD (HCC): ICD-10-CM

## 2022-04-11 DIAGNOSIS — I50.32 CHRONIC DIASTOLIC CONGESTIVE HEART FAILURE (HCC): ICD-10-CM

## 2022-04-11 DIAGNOSIS — I48.19 PERSISTENT ATRIAL FIBRILLATION (HCC): ICD-10-CM

## 2022-04-11 DIAGNOSIS — D64.9 ANEMIA, UNSPECIFIED TYPE: ICD-10-CM

## 2022-04-11 DIAGNOSIS — E78.00 HYPERCHOLESTEROLEMIA: ICD-10-CM

## 2022-04-11 DIAGNOSIS — K29.01 ACUTE GASTRITIS WITH HEMORRHAGE: ICD-10-CM

## 2022-04-11 DIAGNOSIS — N40.0 BENIGN PROSTATIC HYPERPLASIA, UNSPECIFIED WHETHER LOWER URINARY TRACT SYMPTOMS PRESENT: Primary | ICD-10-CM

## 2022-04-11 DIAGNOSIS — E11.9 TYPE 2 DIABETES MELLITUS, WITHOUT LONG-TERM CURRENT USE OF INSULIN (HCC): ICD-10-CM

## 2022-04-11 LAB
ANION GAP SERPL CALCULATED.3IONS-SCNC: 7 MMOL/L (ref 4–13)
BUN SERPL-MCNC: 34 MG/DL (ref 5–25)
CALCIUM SERPL-MCNC: 9.2 MG/DL (ref 8.3–10.1)
CHLORIDE SERPL-SCNC: 100 MMOL/L (ref 100–108)
CO2 SERPL-SCNC: 28 MMOL/L (ref 21–32)
CREAT SERPL-MCNC: 1.49 MG/DL (ref 0.6–1.3)
GFR SERPL CREATININE-BSD FRML MDRD: 42 ML/MIN/1.73SQ M
GLUCOSE P FAST SERPL-MCNC: 210 MG/DL (ref 65–99)
HCT VFR BLD AUTO: 35.4 % (ref 36.5–49.3)
HGB BLD-MCNC: 10.6 G/DL (ref 12–17)
MCH RBC QN AUTO: 25.1 PG (ref 26.8–34.3)
MCHC RBC AUTO-ENTMCNC: 29.9 G/DL (ref 31.4–37.4)
MCV RBC AUTO: 84 FL (ref 82–98)
PLATELET # BLD AUTO: 292 THOUSANDS/UL (ref 149–390)
POTASSIUM SERPL-SCNC: 4.1 MMOL/L (ref 3.5–5.3)
RBC # BLD AUTO: 4.23 MILLION/UL (ref 3.88–5.62)
SODIUM SERPL-SCNC: 135 MMOL/L (ref 136–145)
WBC # BLD AUTO: 5.56 THOUSAND/UL (ref 4.31–10.16)

## 2022-04-11 PROCEDURE — 80048 BASIC METABOLIC PNL TOTAL CA: CPT

## 2022-04-11 PROCEDURE — 36415 COLL VENOUS BLD VENIPUNCTURE: CPT | Performed by: INTERNAL MEDICINE

## 2022-04-11 PROCEDURE — 85027 COMPLETE CBC AUTOMATED: CPT | Performed by: INTERNAL MEDICINE

## 2022-04-11 RX ORDER — PRAVASTATIN SODIUM 40 MG
40 TABLET ORAL DAILY
Qty: 90 TABLET | Refills: 0 | Status: SHIPPED | OUTPATIENT
Start: 2022-04-11 | End: 2022-07-14 | Stop reason: SDUPTHER

## 2022-04-11 RX ORDER — AMIODARONE HYDROCHLORIDE 100 MG/1
100 TABLET ORAL DAILY
Qty: 90 TABLET | Refills: 0 | Status: SHIPPED | OUTPATIENT
Start: 2022-04-11 | End: 2022-07-14 | Stop reason: SDUPTHER

## 2022-04-11 RX ORDER — LOSARTAN POTASSIUM 50 MG/1
50 TABLET ORAL DAILY
Qty: 90 TABLET | Refills: 0 | Status: SHIPPED | OUTPATIENT
Start: 2022-04-11 | End: 2022-07-14 | Stop reason: SDUPTHER

## 2022-04-11 RX ORDER — FERROUS SULFATE TAB EC 324 MG (65 MG FE EQUIVALENT) 324 (65 FE) MG
324 TABLET DELAYED RESPONSE ORAL DAILY
Qty: 90 TABLET | Refills: 0 | Status: SHIPPED | OUTPATIENT
Start: 2022-04-11 | End: 2022-07-06

## 2022-04-11 RX ORDER — DOXAZOSIN 8 MG/1
8 TABLET ORAL
Qty: 90 TABLET | Refills: 0 | Status: SHIPPED | OUTPATIENT
Start: 2022-04-11 | End: 2022-07-14 | Stop reason: SDUPTHER

## 2022-04-11 NOTE — TELEPHONE ENCOUNTER
Called wife Ellen Moreno and discussed with her the results of the hemoglobin    Lab Results   Component Value Date    HGB 10 6 (L) 04/11/2022    HGB 11 2 (L) 03/04/2022    HGB 9 7 (L) 02/21/2022    HGB 8 8 (L) 02/19/2022    HGB 8 5 (L) 02/18/2022     Discussed with denise that the 10 6 level is appropriate at this time and no additional intervention needs to be performed  He is not symptomatic and has no complaints at this time  We will be seeing Ramos Kohler later this month  I asked Camila Grimaldo to contact us if she has additional concerns or questions

## 2022-04-12 ENCOUNTER — APPOINTMENT (OUTPATIENT)
Dept: LAB | Facility: CLINIC | Age: 87
End: 2022-04-12
Payer: COMMERCIAL

## 2022-04-12 LAB
CREAT UR-MCNC: 50.3 MG/DL
MICROALBUMIN UR-MCNC: 31.4 MG/L (ref 0–20)
MICROALBUMIN/CREAT 24H UR: 62 MG/G CREATININE (ref 0–30)

## 2022-04-12 PROCEDURE — 82043 UR ALBUMIN QUANTITATIVE: CPT | Performed by: INTERNAL MEDICINE

## 2022-04-12 PROCEDURE — 82570 ASSAY OF URINE CREATININE: CPT | Performed by: INTERNAL MEDICINE

## 2022-04-13 ENCOUNTER — APPOINTMENT (OUTPATIENT)
Dept: LAB | Facility: CLINIC | Age: 87
End: 2022-04-13
Payer: COMMERCIAL

## 2022-04-13 DIAGNOSIS — D64.9 ANEMIA, UNSPECIFIED TYPE: ICD-10-CM

## 2022-04-13 DIAGNOSIS — K29.50 CHRONIC GASTRITIS, PRESENCE OF BLEEDING UNSPECIFIED, UNSPECIFIED GASTRITIS TYPE: ICD-10-CM

## 2022-04-13 PROCEDURE — 87338 HPYLORI STOOL AG IA: CPT

## 2022-04-15 ENCOUNTER — TELEPHONE (OUTPATIENT)
Dept: INTERNAL MEDICINE CLINIC | Facility: CLINIC | Age: 87
End: 2022-04-15

## 2022-04-15 LAB — H PYLORI AG STL QL IA: NEGATIVE

## 2022-04-19 ENCOUNTER — OFFICE VISIT (OUTPATIENT)
Dept: INTERNAL MEDICINE CLINIC | Facility: CLINIC | Age: 87
End: 2022-04-19
Payer: COMMERCIAL

## 2022-04-19 VITALS
BODY MASS INDEX: 29.8 KG/M2 | TEMPERATURE: 98.6 F | SYSTOLIC BLOOD PRESSURE: 138 MMHG | DIASTOLIC BLOOD PRESSURE: 82 MMHG | HEIGHT: 66 IN | HEART RATE: 83 BPM | OXYGEN SATURATION: 96 % | WEIGHT: 185.4 LBS

## 2022-04-19 DIAGNOSIS — K29.00 ACUTE SUPERFICIAL GASTRITIS WITHOUT HEMORRHAGE: ICD-10-CM

## 2022-04-19 DIAGNOSIS — C18.9 COLON ADENOCARCINOMA (HCC): Primary | ICD-10-CM

## 2022-04-19 DIAGNOSIS — I48.19 PERSISTENT ATRIAL FIBRILLATION (HCC): ICD-10-CM

## 2022-04-19 DIAGNOSIS — E11.9 TYPE 2 DIABETES MELLITUS WITHOUT COMPLICATION, WITHOUT LONG-TERM CURRENT USE OF INSULIN (HCC): ICD-10-CM

## 2022-04-19 DIAGNOSIS — I50.32 CHRONIC DIASTOLIC CONGESTIVE HEART FAILURE (HCC): ICD-10-CM

## 2022-04-19 DIAGNOSIS — N18.31 STAGE 3A CHRONIC KIDNEY DISEASE (HCC): ICD-10-CM

## 2022-04-19 DIAGNOSIS — I10 PRIMARY HYPERTENSION: ICD-10-CM

## 2022-04-19 PROCEDURE — 99213 OFFICE O/P EST LOW 20 MIN: CPT | Performed by: INTERNAL MEDICINE

## 2022-04-19 PROCEDURE — 1160F RVW MEDS BY RX/DR IN RCRD: CPT | Performed by: INTERNAL MEDICINE

## 2022-04-19 NOTE — ASSESSMENT & PLAN NOTE
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  · Recent CT chest, abdomen and pelvis with no evidence of metastasis  · Hemoglobin stable  · Colorectal surgery recommended laparoscopic and possible open right hemicolectomy but   Patient unwilling to go through dose with surgery  · We had a long discussion about this  He reports that he has had multiple surgeries in the past and is unwilling to go through another surgery especially at 82  Patient is aware that if he does not undergo surgery, he is at high risk for cancer growth and spread/ metastasis  He is aware that with the cancer growth and spread, he will develop worsening symptoms including but not limited to pain and obstruction  He is fully aware that this would limit his life and hastened his death  I offered him a referral to palliative care to establish care and assist with symptom management should he develop symptoms  Patient declined my offer  Fortunately, he is asymptomatic at this point  · Advised patient to follow-up with Colorectal surgery to discuss this further  Next appointment 4/26  · Recheck CBC in 3 weeks    Call the office or proceed to the emergency department should you experience any lightheadedness, weakness, palpitation or bloody bowel movement

## 2022-04-19 NOTE — ASSESSMENT & PLAN NOTE
· Follows up withCardiology  · On amiodarone for rhythm control,  Lopressor for rate controlled      · Eliquis for anticoagulation

## 2022-04-19 NOTE — ASSESSMENT & PLAN NOTE
Lab Results   Component Value Date    EGFR 42 04/11/2022    EGFR 39 03/04/2022    EGFR 47 02/21/2022    CREATININE 1 49 (H) 04/11/2022    CREATININE 1 58 (H) 03/04/2022    CREATININE 1 35 (H) 02/21/2022     · Currently at baseline  · With mild proteinuria, continue losartan  · Monitor renal function

## 2022-04-19 NOTE — PATIENT INSTRUCTIONS
Gaurav Stark MD   Cardiology   NPI: [id number]   [address] [id number]       Phone: [phone]   Fax: [phone]

## 2022-04-19 NOTE — PROGRESS NOTES
INTERNAL MEDICINE FOLLOW-UP OFFICE VISIT  [facility] - [facility] INTERNAL MEDICINE [place]    NAME: Twyla Jacob  AGE: 82 y o  SEX: male  : 1935     DATE: 2022     Assessment and Plan:     1  Colon adenocarcinoma ([address] )  Assessment & Plan:  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  · Recent CT chest, abdomen and pelvis with no evidence of metastasis  · Hemoglobin stable  · Colorectal surgery recommended laparoscopic and possible open right hemicolectomy but   Patient unwilling to go through dose with surgery  · We had a long discussion about this  He reports that he has had multiple surgeries in the past and is unwilling to go through another surgery especially at 82  Patient is aware that if he does not undergo surgery, he is at high risk for cancer growth and spread/ metastasis  He is aware that with the cancer growth and spread, he will develop worsening symptoms including but not limited to pain and obstruction  He is fully aware that this would limit his life and hastened his death  I offered him a referral to palliative care to establish care and assist with symptom management should he develop symptoms  Patient declined my offer  Fortunately, he is asymptomatic at this point  · Advised patient to follow-up with Colorectal surgery to discuss this further  Next appointment   · Recheck CBC in 3 weeks  Call the office or proceed to the emergency department should you experience any lightheadedness, weakness, palpitation or bloody bowel movement      2  Stage 3a chronic kidney disease [facility])  Assessment & Plan:  Lab Results   Component Value Date    EGFR 42 2022    EGFR 39 2022    EGFR 47 2022    CREATININE 1 49 (H) 2022    CREATININE 1 58 (H) 2022    CREATININE 1 35 (H) 2022     · Currently at baseline  · With mild proteinuria, continue losartan  · Monitor renal function        3  Persistent atrial fibrillation [facility])  Assessment & Plan:  · Follows up withCardiology  · On amiodarone for rhythm control,  Lopressor for rate controlled  · Eliquis for anticoagulation      4  Type 2 diabetes mellitus without complication, without long-term current use of insulin (HCC)  Assessment & Plan:    Lab Results   Component Value Date    HGBA1C 8 1 (H) 02/11/2022     · Continue metformin 500 mg b i d  · No changes to medication at this time given recent blood transfusion and anemia which may affect the accuracy of last A1c  Recheck A1c in a month  5  Primary hypertension  Assessment & Plan:  · Blood pressure reviewed and acceptable  · Continue Lopressor, cardura, losartan  · Dash diet  6  Acute superficial gastritis without hemorrhage  Assessment & Plan:  · Noted on EGD  - Moderate patchy hemorrhagic gastritis  ·  with no abdominal complaints  · H pylori antigen negative  · Continue Protonix b i d  7  Chronic diastolic congestive heart failure (HCC)  Assessment & Plan:  Wt Readings from Last 3 Encounters:   04/19/22 84 1 kg (185 lb 6 4 oz)   03/29/22 84 1 kg (185 lb 6 4 oz)   03/08/22 83 9 kg (185 lb)     · Echo: EF 50%, moderate MR  · Follows up with Cardiology  · Appears euvolemic on exam     · Continue torsemide daily  Return in about 3 months (around 7/19/2022)  Chief Complaint:     Chief Complaint   Patient presents with    Follow-up     4 week f/u        History of Present Illness:     31-year-old male with a history of Afib on Eliquis, type 2 diabetes, stage III diastolic CHF, hypertension and hyperlipidemia who was last seen in the clinic 03/29/2022 Medicare wellness visit  Of note, patient was hospitalized 02/2022 for anemia and syncopal episode  He underwent an EGD and colonoscopy  Colonoscopy was notable for an ulcerated cecal mass  Colorectal surgery recommended laparoscopic and possible open right hemicolectomy      Today, patient reports that he feels well and offers no complaints  He denies any dizziness, lightheadedness, palpitation, abdominal pain or change in bowel movements  He does describe dark stools which he attributes to the iron pills that he has been taking  He does report that he had flu-like symptoms about a week ago but appears to be resolving  He only has an improving nonproductive cough  He otherwise denies any headaches, fever, chills, sweats, shortness of breath, nausea, vomiting or urinary symptoms  He reports good appetite  The following portions of the patient's history were reviewed and updated as appropriate: allergies, current medications, past family history, past medical history, past social history, past surgical history and problem list      Review of Systems:     Review of Systems   Constitutional: Negative for appetite change, chills, diaphoresis, fatigue and fever  Respiratory: Positive for cough  Negative for shortness of breath and wheezing  Cardiovascular: Negative for chest pain, palpitations and leg swelling  Gastrointestinal: Negative for abdominal distention, abdominal pain, constipation, diarrhea, nausea and vomiting  Genitourinary: Negative for difficulty urinating and dysuria  Musculoskeletal: Negative for arthralgias  Neurological: Negative for dizziness, weakness, light-headedness, numbness and headaches          Problem List:     Patient Active Problem List   Diagnosis    Syncope and collapse    Chronic diastolic congestive heart failure (HCC)    Persistent atrial fibrillation (HCC)    Laceration of left eyebrow    Anemia    Stage 3 chronic kidney disease (HCC)    Type 2 diabetes mellitus without complication, without long-term current use of insulin ([facility] [address]    Colon adenocarcinoma ([facility] )    Cognitive dysfunction    Gastritis    Hypertension    Esophageal reflux    Hypercalcemia    Hypercholesterolemia    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        Objective:     /82 (BP Location: Left arm, Patient Position: Sitting, Cuff Size: Standard)   Pulse 83   Temp 98 6 °F (37 °C) (Tympanic)   Ht 5' 6" (1 676 m)   Wt 84 1 kg (185 lb 6 4 oz)   SpO2 96%   BMI 29 92 kg/m²     Physical Exam  Vitals and nursing note reviewed  Constitutional:       General: He is not in acute distress  Appearance: He is well-developed  He is not ill-appearing or diaphoretic  HENT:      Head: Normocephalic and atraumatic  Eyes:      General: No scleral icterus  Conjunctiva/sclera: Conjunctivae normal    Cardiovascular:      Rate and Rhythm: Normal rate and regular rhythm  Heart sounds: Normal heart sounds  No murmur heard  Pulmonary:      Effort: Pulmonary effort is normal       Breath sounds: Normal breath sounds  No wheezing, rhonchi or rales  Abdominal:      General: Bowel sounds are normal  There is no distension  Palpations: Abdomen is soft  Tenderness: There is no abdominal tenderness  Musculoskeletal:      Right lower leg: No edema  Left lower leg: No edema  Lymphadenopathy:      Cervical: No cervical adenopathy  Skin:     General: Skin is warm and dry  Neurological:      Mental Status: He is alert and oriented to person, place, and time     Psychiatric:         Mood and Affect: Mood normal          Behavior: Behavior normal          Pertinent Laboratory/Diagnostic Studies:    Laboratory Results: I have personally reviewed the pertinent laboratory results/reports     CBC:   Results from Last 12 Months   Lab Units 04/11/22  0758 03/04/22  0732 03/04/22  0732 02/13/22  1608 02/13/22  0443   WBC Thousand/uL 5 56   < > 8 60   < > 9 23   RBC Million/uL 4 23   < > 5 15   < > 3 80*   HEMOGLOBIN g/dL 10 6*   < > 11 2*   < > 7 1*   HEMATOCRIT % 35 4*   < > 36 4*   < > 24 6*   MCV fL 84   < > 71*   < > 65*   MCH pg 25 1*   < > 21 7*   < > 18 7*   MCHC g/dL 29 9*   < > 30 8*   < > 28 9*   RDW %  --   --  33 4*   < > 23 9*   MPV fL  --   --  11 1   < > 10 6 PLATELETS Thousands/uL 292   < > 599*   < > 369   NRBC AUTO /100 WBCs  --   --   --   --  0   NEUTROS PCT %  --   --   --   --  77*   LYMPHS PCT %  --   --   --   --  8*   MONOS PCT %  --   --   --   --  13*   EOS PCT %  --   --   --   --  1   BASOS PCT %  --   --   --   --  0   NEUTROS ABS Thousands/µL  --   --   --   --  7 11   LYMPHS ABS Thousands/µL  --   --   --   --  0 76   MONOS ABS Thousand/µL  --   --   --   --  1 23*   EOS ABS Thousand/µL  --   --   --   --  0 05    < > = values in this interval not displayed  Chemistry Profile:   Results from Last 12 Months   Lab Units 04/11/22  0758 02/21/22  0739 02/19/22  0511 02/14/22  0406 02/13/22  0443 02/11/22  0048 02/11/22  0014   POTASSIUM mmol/L 4 1   < > 4 4   < > 4 0   < >  --    CHLORIDE mmol/L 100   < > 99*   < > 99*   < >  --    CO2 mmol/L 28   < > 20*   < > 25   < >  --    CO2, I-STAT mmol/L  --   --   --   --   --   --  27   BUN mg/dL 34*   < > 16   < > 26*   < >  --    CREATININE mg/dL 1 49*   < > 1 23   < > 1 40*   < >  --    GLUCOSE FASTING mg/dL 210*   < >  --   --   --   --   --    GLUCOSE RANDOM mg/dL  --   --  117   < > 127   < >  --    GLUCOSE, ISTAT mg/dl  --   --   --   --   --   --  165*   CALCIUM mg/dL 9 2   < > 8 7   < > 8 9   < >  --    CORRECTED CALCIUM mg/dL  --   --   --   --  9 5  --   --    MAGNESIUM mg/dL  --   --  2 1  --  2 4   < >  --    AST U/L  --   --   --   --  17  --   --    ALT U/L  --   --   --   --  21  --   --    ALK PHOS U/L  --   --   --   --  200*  --   --    EGFR ml/min/1 73sq m 42   < > 53   < > 45   < >  --     < > = values in this interval not displayed  Radiology/Other Diagnostic Testing Results: I have personally reviewed pertinent reports        Theresa Dyson MD  [facility] INTERNAL MEDICINE [place]

## 2022-04-19 NOTE — ASSESSMENT & PLAN NOTE
· Noted on EGD  - Moderate patchy hemorrhagic gastritis  ·  with no abdominal complaints  · H pylori antigen negative  · Continue Protonix b i d

## 2022-04-19 NOTE — ASSESSMENT & PLAN NOTE
Wt Readings from Last 3 Encounters:   04/19/22 84 1 kg (185 lb 6 4 oz)   03/29/22 84 1 kg (185 lb 6 4 oz)   03/08/22 83 9 kg (185 lb)     · Echo: EF 50%, moderate MR  · Follows up with Cardiology  · Appears euvolemic on exam     · Continue torsemide daily

## 2022-04-19 NOTE — ASSESSMENT & PLAN NOTE
Lab Results   Component Value Date    HGBA1C 8 1 (H) 02/11/2022     · Continue metformin 500 mg b i d  · No changes to medication at this time given recent blood transfusion and anemia which may affect the accuracy of last A1c  Recheck A1c in a month

## 2022-04-20 ENCOUNTER — DOCUMENTATION (OUTPATIENT)
Dept: HEMATOLOGY ONCOLOGY | Facility: CLINIC | Age: 87
End: 2022-04-20

## 2022-04-20 NOTE — PROGRESS NOTES
Upon review of patients chart there is a note from his recent (4/19) PCP visit which states:  Since his diagnosis, he has met with the colorectal surgery team and planned for R hemicolectomy next month, however since meeting with them, patient has decided to defer surgical treatment at this time citing his age and not wanting to undergo further surgical procedures with hx of multiple surgeries in the past     The note further goes on stating he will discuss with his surgeon at his next visit      I will hold off on outreach until after that appointment

## 2022-05-27 ENCOUNTER — APPOINTMENT (OUTPATIENT)
Dept: LAB | Facility: CLINIC | Age: 87
End: 2022-05-27
Payer: COMMERCIAL

## 2022-05-27 DIAGNOSIS — E11.9 TYPE 2 DIABETES MELLITUS WITHOUT COMPLICATION, WITHOUT LONG-TERM CURRENT USE OF INSULIN (HCC): ICD-10-CM

## 2022-05-27 LAB
EST. AVERAGE GLUCOSE BLD GHB EST-MCNC: 180 MG/DL
HBA1C MFR BLD: 7.9 %

## 2022-05-27 PROCEDURE — 83036 HEMOGLOBIN GLYCOSYLATED A1C: CPT

## 2022-05-31 ENCOUNTER — TELEPHONE (OUTPATIENT)
Dept: INTERNAL MEDICINE CLINIC | Facility: CLINIC | Age: 87
End: 2022-05-31

## 2022-05-31 NOTE — TELEPHONE ENCOUNTER
Daine Opitz has called the \[facility]\"" office in regards to [facility]  Alverto Schwab stated Tona Elias had labs done last week and wished to know the hemoglobin  Informed Alverto Schwab the hemoglobin can be given to her, but if she had any questions she would need to speak with a provider, which Alverto Schwab acknowledged  Informed Haase the hemoglobin, from 05/27/2022 lab, was 10 1  Alverto Schwab had then stated the last hemoglobin she has on file was 8 6, and wished to know if this was correct  Informed Haase the hemoglobin, from 04/11/22, was 10 6  Alverto cShwab was thankful for the information and ended the call

## 2022-06-20 ENCOUNTER — TELEPHONE (OUTPATIENT)
Dept: FAMILY MEDICINE CLINIC | Facility: CLINIC | Age: 87
End: 2022-06-20

## 2022-06-20 DIAGNOSIS — K63.89 COLONIC MASS: ICD-10-CM

## 2022-06-20 RX ORDER — DOCUSATE SODIUM 100 MG/1
100 CAPSULE, LIQUID FILLED ORAL 2 TIMES DAILY
Qty: 60 CAPSULE | Refills: 2 | OUTPATIENT
Start: 2022-06-20 | End: 2022-07-20

## 2022-06-20 NOTE — TELEPHONE ENCOUNTER
Errol Aburto called she was looking fo rthe script from today for a stool softener for Carthagegenaro Rodgers  I did explain to her that the script was written in April for a 90 day supply and I told her I would call the pharmacy to have them pull the script to the [facility] from the [employer]  Pharmacy will have it ready and the pt will  tomorrow   I did speak to the [employer] and Errol Aburto

## 2022-06-27 ENCOUNTER — OFFICE VISIT (OUTPATIENT)
Dept: INTERNAL MEDICINE CLINIC | Facility: CLINIC | Age: 87
End: 2022-06-27
Payer: COMMERCIAL

## 2022-06-27 ENCOUNTER — APPOINTMENT (OUTPATIENT)
Dept: LAB | Facility: CLINIC | Age: 87
End: 2022-06-27
Payer: COMMERCIAL

## 2022-06-27 VITALS
HEART RATE: 79 BPM | OXYGEN SATURATION: 93 % | HEIGHT: 66 IN | BODY MASS INDEX: 30.53 KG/M2 | DIASTOLIC BLOOD PRESSURE: 80 MMHG | SYSTOLIC BLOOD PRESSURE: 120 MMHG | WEIGHT: 190 LBS | TEMPERATURE: 97.9 F

## 2022-06-27 DIAGNOSIS — I48.19 PERSISTENT ATRIAL FIBRILLATION (HCC): ICD-10-CM

## 2022-06-27 DIAGNOSIS — E11.9 TYPE 2 DIABETES MELLITUS, WITHOUT LONG-TERM CURRENT USE OF INSULIN (HCC): ICD-10-CM

## 2022-06-27 DIAGNOSIS — D50.0 IRON DEFICIENCY ANEMIA DUE TO CHRONIC BLOOD LOSS: Primary | ICD-10-CM

## 2022-06-27 DIAGNOSIS — K29.01 ACUTE GASTRITIS WITH HEMORRHAGE: ICD-10-CM

## 2022-06-27 DIAGNOSIS — N18.31 STAGE 3A CHRONIC KIDNEY DISEASE (HCC): ICD-10-CM

## 2022-06-27 DIAGNOSIS — C18.9 COLON ADENOCARCINOMA (HCC): ICD-10-CM

## 2022-06-27 DIAGNOSIS — D50.0 IRON DEFICIENCY ANEMIA DUE TO CHRONIC BLOOD LOSS: ICD-10-CM

## 2022-06-27 DIAGNOSIS — G93.3 POSTVIRAL SYNDROME: ICD-10-CM

## 2022-06-27 DIAGNOSIS — R60.0 EDEMA OF BOTH LOWER EXTREMITIES: ICD-10-CM

## 2022-06-27 DIAGNOSIS — R06.02 SHORTNESS OF BREATH: Primary | ICD-10-CM

## 2022-06-27 LAB
BASOPHILS # BLD AUTO: 0.03 THOUSANDS/ΜL (ref 0–0.1)
BASOPHILS NFR BLD AUTO: 0 % (ref 0–1)
EOSINOPHIL # BLD AUTO: 0.04 THOUSAND/ΜL (ref 0–0.61)
EOSINOPHIL NFR BLD AUTO: 1 % (ref 0–6)
ERYTHROCYTE [DISTWIDTH] IN BLOOD BY AUTOMATED COUNT: 15.9 % (ref 11.6–15.1)
HCT VFR BLD AUTO: 28.4 % (ref 36.5–49.3)
HGB BLD-MCNC: 8.7 G/DL (ref 12–17)
HGB RETIC QN AUTO: 20.8 PG (ref 30–38.3)
IMM GRANULOCYTES # BLD AUTO: 0.02 THOUSAND/UL (ref 0–0.2)
IMM GRANULOCYTES NFR BLD AUTO: 0 % (ref 0–2)
IMM RETICS NFR: 27.6 % (ref 0–14)
LYMPHOCYTES # BLD AUTO: 1.13 THOUSANDS/ΜL (ref 0.6–4.47)
LYMPHOCYTES NFR BLD AUTO: 17 % (ref 14–44)
MCH RBC QN AUTO: 23.9 PG (ref 26.8–34.3)
MCHC RBC AUTO-ENTMCNC: 30.6 G/DL (ref 31.4–37.4)
MCV RBC AUTO: 78 FL (ref 82–98)
MONOCYTES # BLD AUTO: 0.79 THOUSAND/ΜL (ref 0.17–1.22)
MONOCYTES NFR BLD AUTO: 12 % (ref 4–12)
NEUTROPHILS # BLD AUTO: 4.72 THOUSANDS/ΜL (ref 1.85–7.62)
NEUTS SEG NFR BLD AUTO: 70 % (ref 43–75)
NRBC BLD AUTO-RTO: 0 /100 WBCS
PLATELET # BLD AUTO: 334 THOUSANDS/UL (ref 149–390)
PMV BLD AUTO: 11.7 FL (ref 8.9–12.7)
RBC # BLD AUTO: 3.64 MILLION/UL (ref 3.88–5.62)
RETICS # AUTO: ABNORMAL 10*3/UL (ref 14356–105094)
RETICS # CALC: 1.58 % (ref 0.37–1.87)
WBC # BLD AUTO: 6.73 THOUSAND/UL (ref 4.31–10.16)

## 2022-06-27 PROCEDURE — 85025 COMPLETE CBC W/AUTO DIFF WBC: CPT

## 2022-06-27 PROCEDURE — 1036F TOBACCO NON-USER: CPT | Performed by: INTERNAL MEDICINE

## 2022-06-27 PROCEDURE — 1160F RVW MEDS BY RX/DR IN RCRD: CPT | Performed by: INTERNAL MEDICINE

## 2022-06-27 PROCEDURE — 99215 OFFICE O/P EST HI 40 MIN: CPT | Performed by: INTERNAL MEDICINE

## 2022-06-27 PROCEDURE — 36415 COLL VENOUS BLD VENIPUNCTURE: CPT

## 2022-06-27 PROCEDURE — 85046 RETICYTE/HGB CONCENTRATE: CPT

## 2022-06-27 RX ORDER — METFORMIN HYDROCHLORIDE 500 MG/1
500 TABLET, EXTENDED RELEASE ORAL 2 TIMES DAILY WITH MEALS
Qty: 60 TABLET | Refills: 2 | Status: SHIPPED | OUTPATIENT
Start: 2022-06-27

## 2022-06-27 RX ORDER — FUROSEMIDE 20 MG/1
20 TABLET ORAL 2 TIMES DAILY
Qty: 5 TABLET | Refills: 0 | Status: SHIPPED | OUTPATIENT
Start: 2022-06-27 | End: 2022-07-14 | Stop reason: ALTCHOICE

## 2022-06-27 RX ORDER — PANTOPRAZOLE SODIUM 40 MG/1
40 TABLET, DELAYED RELEASE ORAL
Qty: 60 TABLET | Refills: 2 | Status: SHIPPED | OUTPATIENT
Start: 2022-06-27

## 2022-06-27 NOTE — TELEPHONE ENCOUNTER
Patient needs refill on metformin and pantoprazole, patient is asking if he can have a 90 day supply?

## 2022-06-27 NOTE — TELEPHONE ENCOUNTER
Mo García has requested a refill of pantoprazole (PROTONIX) 40 mg tablet and metFORMINN (GLUCOPHAGE-XR) 500 mg tablet  Pt does not meet the requirements placed by [facility]  Would a refill be appropriate?

## 2022-06-27 NOTE — ASSESSMENT & PLAN NOTE
Opted to not undergo any treatment  Last Hb was stable, however patient on Eliquis at high risk of GI bleed  Repeat Hb  Worsening anemia may be the cause of dyspnea

## 2022-06-27 NOTE — ASSESSMENT & PLAN NOTE
Likely multifactorial, secondary to chronic disease, Chronic blood loss due to adenocarcinoma of colon  Restart iron supplementation  Take 2 capsules of colace/day and start metamucil daily for constipation  Monitor Hb  Check iron panel  Patient may need infusion

## 2022-06-27 NOTE — ASSESSMENT & PLAN NOTE
EKG in office reveals afib  Rate contyrolled  BP stable and WNL  Continue amiodarone, metoprolol, Eliqus  Continue follow up with Cardiology

## 2022-06-27 NOTE — PROGRESS NOTES
Assessment/Plan:    Colon adenocarcinoma ([facility] [address] )  Opted to not undergo any treatment  Last Hb was stable, however patient on Eliquis at high risk of GI bleed  Repeat Hb  Worsening anemia may be the cause of dyspnea  Shortness of breath  Mostly with exertion  Present for about 5 weeks  Some LE pitting edema b/l, mild though  On PE clear lungs to auscultation  In afib , rate controlled  No other symptoms  Potential recent COVID 23 when wife was diagnosed with COVID 5 weeks ago, patient also had similar symptoms but did not get tested  Potential recent COVID and cancer- risks for DVT/PE  Patient is on Eliquis but concern for failure  Patient with high risk of GI bleed given anticoagulation and colon cancer  Worsening anemia could be the cause of dyspnea  Repeat CBC  Check BNP  Restart iron supplementation which patient stopped on his own  VQ scan  Echocardiogram   Take torsemide 60 mg in am for 2 days only  Call office if any symptoms worsen or new symptoms occur  Repeat BMP in 2 days  Follow up in 10 days  EKG in office- afib rate controlled, similar to previous EKG, no sign of ischemia  Persistent atrial fibrillation (HCC)  EKG in office reveals afib  Rate contyrolled  BP stable and WNL  Continue amiodarone, metoprolol, Eliqus  Continue follow up with Cardiology  Iron deficiency anemia due to chronic blood loss  Likely multifactorial, secondary to chronic disease, Chronic blood loss due to adenocarcinoma of colon  Restart iron supplementation  Take 2 capsules of colace/day and start metamucil daily for constipation  Monitor Hb  Check iron panel  Patient may need infusion  Diagnoses and all orders for this visit:    Shortness of breath  -     NT-BNP PRO; Future  -     Cancel: NM lung quantative perfusion; Future  -     Echo complete w/ contrast if indicated; Future  -     furosemide (LASIX) 20 mg tablet;  Take 1 tablet (20 mg total) by mouth 2 (two) times a day  -     XR chest pa & lateral; Future  -     NM lung ventilation / perfusion; Future    Colon adenocarcinoma (HCC)  -     NT-BNP PRO; Future  -     Cancel: NM lung quantative perfusion; Future  -     NM lung ventilation / perfusion; Future    Postviral syndrome  -     Cancel: NM lung quantative perfusion; Future  -     XR chest pa & lateral; Future  -     NM lung ventilation / perfusion; Future    Stage 3a chronic kidney disease ([address] )  -     Cancel: NM lung quantative perfusion; Future  -     Basic metabolic panel; Future    Edema of both lower extremities  -     Echo complete w/ contrast if indicated; Future  -     XR chest pa & lateral; Future    Persistent atrial fibrillation (HCC)  -     furosemide (LASIX) 20 mg tablet; Take 1 tablet (20 mg total) by mouth 2 (two) times a day    Iron deficiency anemia due to chronic blood loss        Subjective:      Patient ID: Wes Zhang is a 82 y o  male  Patient presents to the office for follow up for chronic conditions and with complaints of shortness of breath for the last 5 weeks  He has multiple chronic conditions including colon adenocarcinoma for which patient opted to not pursue any treatment, atrial fibrillation, CKD, anemia, DM type 2  Patient also reports that bout 5 weeks ago he has symptoms consistent with upper respiratory infection together with his wife  Wife tested positive for COVID, patient did not get tested  He reports that he also has a mild , dry cough  Also has stopped taking his iron supplements about 1 month ago due to constipation  Denies chest pain, palpitations, dizziness, lightheadedness, syncope, weakness  Has mild leg swelling but is unsure if this is worse than his usual   Denies fever, chills         The following portions of the patient's history were reviewed and updated as appropriate: allergies, current medications, past family history, past medical history, past social history, past surgical history and problem list     Review of Systems Constitutional: Negative for activity change, appetite change, chills, diaphoresis, fatigue, fever and unexpected weight change  HENT: Negative for congestion, ear discharge, ear pain, postnasal drip, rhinorrhea, sore throat and trouble swallowing  Eyes: Negative for discharge, itching and visual disturbance  Respiratory: Positive for cough and shortness of breath  Negative for choking, chest tightness, wheezing and stridor  Cardiovascular: Positive for leg swelling (mild)  Negative for chest pain and palpitations  Gastrointestinal: Negative for abdominal distention, abdominal pain, blood in stool, constipation, diarrhea, nausea and vomiting  Genitourinary: Negative for difficulty urinating and hematuria  Musculoskeletal: Negative for arthralgias, back pain, myalgias and neck pain  Skin: Positive for pallor  Negative for color change and rash  Neurological: Negative for dizziness, tremors, seizures, syncope, weakness, light-headedness, numbness and headaches  Psychiatric/Behavioral: Negative for agitation, confusion, dysphoric mood, hallucinations, self-injury, sleep disturbance and suicidal ideas  The patient is not nervous/anxious  Objective:      /80 (BP Location: Left arm, Patient Position: Sitting, Cuff Size: Standard)   Pulse 79   Temp 97 9 °F (36 6 °C) (Tympanic)   Ht 5' 6" (1 676 m)   Wt 86 2 kg (190 lb)   SpO2 93%   BMI 30 67 kg/m²        Physical Exam  Vitals and nursing note reviewed  Constitutional:       General: He is not in acute distress  Appearance: Normal appearance  He is well-developed  He is not ill-appearing, toxic-appearing or diaphoretic  HENT:      Head: Normocephalic and atraumatic  Mouth/Throat:      Pharynx: No oropharyngeal exudate  Eyes:      General: No scleral icterus  Pupils: Pupils are equal, round, and reactive to light  Cardiovascular:      Rate and Rhythm: Normal rate  Rhythm irregular        Heart sounds: Normal heart sounds  No murmur heard  Pulmonary:      Effort: Pulmonary effort is normal  No respiratory distress  Breath sounds: Normal breath sounds  No wheezing or rales  Chest:      Chest wall: No tenderness  Abdominal:      General: Bowel sounds are normal  There is no distension  Palpations: Abdomen is soft  There is no mass  Tenderness: There is no abdominal tenderness  There is no guarding or rebound  Hernia: No hernia is present  Musculoskeletal:         General: No deformity  Normal range of motion  Cervical back: Normal range of motion and neck supple  Right lower leg: Edema (1 +) present  Left lower leg: Edema (1 +) present  Skin:     General: Skin is warm and dry  Coloration: Skin is not pale  Findings: No erythema or rash  Neurological:      Mental Status: He is alert and oriented to person, place, and time  Cranial Nerves: No cranial nerve deficit  Sensory: No sensory deficit  Motor: No weakness or abnormal muscle tone  Coordination: Coordination normal    Psychiatric:         Mood and Affect: Mood normal          Behavior: Behavior normal          Thought Content:  Thought content normal          Judgment: Judgment normal

## 2022-06-27 NOTE — ASSESSMENT & PLAN NOTE
Mostly with exertion  Present for about 5 weeks  Some LE pitting edema b/l, mild though  On PE clear lungs to auscultation  In afib , rate controlled  No other symptoms  Potential recent COVID 23 when wife was diagnosed with COVID 5 weeks ago, patient also had similar symptoms but did not get tested  Potential recent COVID and cancer- risks for DVT/PE  Patient is on Eliquis but concern for failure  Patient with high risk of GI bleed given anticoagulation and colon cancer  Worsening anemia could be the cause of dyspnea  Repeat CBC  Check BNP  Restart iron supplementation which patient stopped on his own  VQ scan  Echocardiogram   Take torsemide 60 mg in am for 2 days only  Call office if any symptoms worsen or new symptoms occur  Repeat BMP in 2 days  Follow up in 10 days  EKG in office- afib rate controlled, similar to previous EKG, no sign of ischemia

## 2022-06-28 ENCOUNTER — TELEPHONE (OUTPATIENT)
Dept: INTERNAL MEDICINE CLINIC | Facility: CLINIC | Age: 87
End: 2022-06-28

## 2022-06-28 NOTE — TELEPHONE ENCOUNTER
Elaine Martinez has called the [facility] office in regards to [facility]  Abdirizak Grimm wished to know about the lab results from yesterday, 06/27/22  Informed Corrine Craig had only done 2 of the 5 labs ordered yesterdat  Abdirizak Grimm wished to know about Fine's hemoglobin  Informed Abdirizak Grimm the last hemoglobin was from 05/27/22  Abdirizak Grimm wished to know the last result, informed Ryan the result was 7 9  Abdirizak Grimm was thankful for the information and ended the call

## 2022-07-06 ENCOUNTER — OFFICE VISIT (OUTPATIENT)
Dept: INTERNAL MEDICINE CLINIC | Facility: CLINIC | Age: 87
End: 2022-07-06
Payer: COMMERCIAL

## 2022-07-06 VITALS
DIASTOLIC BLOOD PRESSURE: 80 MMHG | OXYGEN SATURATION: 95 % | HEIGHT: 66 IN | SYSTOLIC BLOOD PRESSURE: 124 MMHG | TEMPERATURE: 97 F | WEIGHT: 190.4 LBS | HEART RATE: 74 BPM | BODY MASS INDEX: 30.6 KG/M2

## 2022-07-06 DIAGNOSIS — I50.32 CHRONIC DIASTOLIC CONGESTIVE HEART FAILURE (HCC): ICD-10-CM

## 2022-07-06 DIAGNOSIS — D64.9 ANEMIA, UNSPECIFIED TYPE: ICD-10-CM

## 2022-07-06 DIAGNOSIS — E11.9 TYPE 2 DIABETES MELLITUS WITHOUT COMPLICATION, WITHOUT LONG-TERM CURRENT USE OF INSULIN (HCC): ICD-10-CM

## 2022-07-06 DIAGNOSIS — I10 HYPERTENSION, UNSPECIFIED TYPE: ICD-10-CM

## 2022-07-06 DIAGNOSIS — R06.02 SHORTNESS OF BREATH: Primary | ICD-10-CM

## 2022-07-06 PROCEDURE — 99214 OFFICE O/P EST MOD 30 MIN: CPT | Performed by: INTERNAL MEDICINE

## 2022-07-06 PROCEDURE — 3074F SYST BP LT 130 MM HG: CPT | Performed by: INTERNAL MEDICINE

## 2022-07-06 PROCEDURE — 1160F RVW MEDS BY RX/DR IN RCRD: CPT | Performed by: INTERNAL MEDICINE

## 2022-07-06 PROCEDURE — 3079F DIAST BP 80-89 MM HG: CPT | Performed by: INTERNAL MEDICINE

## 2022-07-06 RX ORDER — TORSEMIDE 20 MG/1
TABLET ORAL
Qty: 120 TABLET | Refills: 2
Start: 2022-07-06 | End: 2022-08-11

## 2022-07-06 RX ORDER — FERROUS SULFATE TAB EC 324 MG (65 MG FE EQUIVALENT) 324 (65 FE) MG
324 TABLET DELAYED RESPONSE ORAL EVERY OTHER DAY
Qty: 90 TABLET | Refills: 0
Start: 2022-07-06 | End: 2022-08-28 | Stop reason: SDUPTHER

## 2022-07-06 NOTE — PROGRESS NOTES
INTERNAL MEDICINE FOLLOW-UP OFFICE VISIT  [facility] - [facility] INTERNAL MEDICINE [place]    NAME: Pradip Arevalo  AGE: 82 y o  SEX: male  : 1935     DATE: 2022     Assessment and Plan:     1  Shortness of breath  Assessment & Plan:  Pt reports SOB with exertion  Denies CP  Pt stated it's been going on for years  Pt also said he drinks a lot of water daily and reported extensive smoking history for decades  Bilateral JVD present, clear lung sounds, trace bilateral pitting edema  Pt is compliant with Torsemide twice in the morning and twice at night  Lung V/Q scan showed little probability of PE  Suspect chronic lung disease due to smoking history and prior CXR showing scarring and offered pt a chest CT to evaluate for it but he refused  Echocardiogram is scheduled for     Counseled pt to take 60mg of Torsemide in the morning and 40mg at night to help with the fluid overload  Anemia from chronic blood loss could be contributing to SOB  Pt counseled to take iron supplements  Will follow up BMP, CBC, and iron studies next week  2  Hypertension, unspecified type  Assessment & Plan:  Current BP is acceptable at 124/80  Pt compliant with Losartan, Metoprolol, and Doxazosin  Will continue to monitor  Orders:  -     torsemide (DEMADEX) 20 mg tablet; Take 3 pills in the morning and 2 pills at night    3  Chronic diastolic congestive heart failure (HCC)  Assessment & Plan:  Wt Readings from Last 3 Encounters:   22 86 4 kg (190 lb 6 4 oz)   22 86 2 kg (190 lb)   22 84 1 kg (185 lb 6 4 oz)     Weight is increased in the past 3 months  Pt has chronic SOB, trace bilateral pitting edema, clear lung sounds and bilateral JVD  Denies CP  Does not appear overtly volume overloaded  Pt endorses increased fluid intake  Counseled him to decrease fluid intake and to increase Torsemide to 60mg in the morning and 40mg at night      Pt [facility] with [facility] at [facility]  Echocardiogram scheduled for 7/9  Will continue to monitor  4  Anemia, unspecified type  Assessment & Plan:  Last CBC shows Hb at 8 7, down from 10 1 on 5/27  Pt denies BRBPR  Pt stopped taking iron for a month due to constipation but restarted last week  Pt refused iron transfusion and was advised to take iron supplements every other day to alleviate the constipation  Additionally, can take a teaspoon of Metamucil daily in addition to his Colace bid  Will follow up iron studies and CBC next week  Would consider transfusion for Hb <7 or symptomatic anemia  Pt denies any CP or light headedness  Will likely plan on monitoring CBC at least every 4 weeks at this point  Pt advised to call the office if developing any bleeding  Orders:  -     ferrous sulfate 324 (65 Fe) mg; Take 1 tablet (324 mg total) by mouth every other day    5  Type 2 diabetes mellitus without complication, without long-term current use of insulin (HCC)  Assessment & Plan:    Lab Results   Component Value Date    HGBA1C 7 9 (H) 05/27/2022     A1C stable  Pt compliant with Metformin  Will continue to monitor  Recheck in 6 months  Pt is on ARB for microalbuminuria  Elevated, continue ARB  Recheck microalbumin/creat ratio in 1 year  No follow-ups on file  Chief Complaint:     Chief Complaint   Patient presents with    Follow-up     2 week f/u        History of Present Illness:     Lenice Spurling is an 82year old male with medical hx Colon CA, Anemia, diastolic HF, HTN, DM, and Afib who presents to the clinic with chronic shortness of breath and fatigue present with exertion  Mr Ramin Clark endorses this has been going on for years  His last echocardiogram in 2020 showed an ejection fraction of 50%  Mr Mohancharlettedanielle Clark also endorses an extensive smoking history for decades  He has also been increasing his intake of fluids  He denies CP, SOB at rest, PND, and leg swelling      Mr Mohansaramelissa Eduardo states that he has been increasingly constipated since he was started on iron supplements for his anemia due to chronic blood loss secondary to his colon adenocarcinoma  He has previously refused treatment for his colon CA  He stopped taking his iron supplements for a month due to constipation, but at the request of family he started taking it again daily last week  Mr Pako Cabrera states that the Docusate given to him isn't helping his constipation  He denies BRBPR, weight changes, N/V, decreased appetite, and light headedness  The following portions of the patient's history were reviewed and updated as appropriate: allergies, current medications, past family history, past medical history, past social history, past surgical history and problem list      Review of Systems:     Review of Systems   Constitutional: Negative for activity change, appetite change, chills, fever and unexpected weight change  HENT: Negative for hearing loss and tinnitus  Eyes: Negative for visual disturbance  Respiratory: Positive for shortness of breath (with exertion)  Negative for chest tightness and wheezing  Cardiovascular: Negative for chest pain and leg swelling  Gastrointestinal: Positive for constipation  Negative for abdominal distention, abdominal pain, blood in stool, diarrhea, nausea and vomiting  Endocrine: Positive for polydipsia, polyphagia and polyuria  Genitourinary: Positive for difficulty urinating, dysuria, enuresis, frequency and urgency  Negative for hematuria  Musculoskeletal: Negative for arthralgias, back pain, joint swelling, myalgias, neck pain and neck stiffness  Neurological: Negative for dizziness, tremors, syncope, weakness, light-headedness, numbness and headaches  Psychiatric/Behavioral: Positive for agitation  Negative for decreased concentration and sleep disturbance  The patient is not nervous/anxious           Problem List:     Patient Active Problem List   Diagnosis    Syncope and collapse    Chronic diastolic congestive heart failure (HCC)    Persistent atrial fibrillation (HCC)    Laceration of left eyebrow    Anemia    Stage 3 chronic kidney disease (HCC)    Type 2 diabetes mellitus without complication, without long-term current use of insulin (HCC)    Colon adenocarcinoma (HCC)    Cognitive dysfunction    Gastritis    Hypertension    Esophageal reflux    Hypercalcemia    Hypercholesterolemia    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    Shortness of breath    Iron deficiency anemia due to chronic blood loss        Objective:     /80 (BP Location: Left arm, Patient Position: Sitting, Cuff Size: Standard)   Pulse 74   Temp (!) 97 °F (36 1 °C) (Tympanic)   Ht 5' 6" (1 676 m)   Wt 86 4 kg (190 lb 6 4 oz)   SpO2 95%   BMI 30 73 kg/m²     Physical Exam  Constitutional:       General: He is not in acute distress  Appearance: He is obese  He is not toxic-appearing  HENT:      Head: Normocephalic and atraumatic  Right Ear: Tympanic membrane, ear canal and external ear normal  There is no impacted cerumen  Left Ear: Tympanic membrane, ear canal and external ear normal  There is no impacted cerumen  Nose: Nose normal       Mouth/Throat:      Mouth: Mucous membranes are moist       Comments: Dental caries  Eyes:      General: No scleral icterus  Right eye: No discharge  Left eye: No discharge  Extraocular Movements: Extraocular movements intact  Conjunctiva/sclera: Conjunctivae normal       Pupils: Pupils are equal, round, and reactive to light  Cardiovascular:      Rate and Rhythm: Normal rate  Rhythm irregular  Pulses: Normal pulses  Heart sounds: Normal heart sounds  No murmur heard  No friction rub  No gallop  Pulmonary:      Effort: Pulmonary effort is normal  No respiratory distress  Breath sounds: Normal breath sounds  No stridor  No wheezing, rhonchi or rales  Chest:      Chest wall: No tenderness     Abdominal: General: Bowel sounds are normal  There is distension  Tenderness: There is no abdominal tenderness  There is no guarding or rebound  Hernia: No hernia is present  Musculoskeletal:         General: Normal range of motion  Right lower leg: Edema (trace) present  Left lower leg: Edema (trace) present  Skin:     Capillary Refill: Capillary refill takes less than 2 seconds  Neurological:      General: No focal deficit present  Mental Status: He is alert and oriented to person, place, and time  Psychiatric:         Mood and Affect: Mood normal          Behavior: Behavior normal          Pertinent Laboratory/Diagnostic Studies:    Laboratory Results: I have personally reviewed the pertinent laboratory results/reports     Radiology/Other Diagnostic Testing Results: I have personally reviewed pertinent reports        Keren Harris MD  [facility] INTERNAL MEDICINE [place]

## 2022-07-06 NOTE — ASSESSMENT & PLAN NOTE
Last CBC shows Hb at 8 7, down from 10 1 on 5/27  Pt denies BRBPR  Pt stopped taking iron for a month due to constipation but restarted last week  Pt refused iron transfusion and was advised to take iron supplements every other day to alleviate the constipation  Additionally, can take a teaspoon of Metamucil daily in addition to his Colace bid  Will follow up iron studies and CBC next week  Would consider transfusion for Hb <7 or symptomatic anemia  Pt denies any CP or light headedness  Will likely plan on monitoring CBC at least every 4 weeks at this point  Pt advised to call the office if developing any bleeding

## 2022-07-06 NOTE — ASSESSMENT & PLAN NOTE
Lab Results   Component Value Date    HGBA1C 7 9 (H) 05/27/2022     A1C stable  Pt compliant with Metformin  Will continue to monitor  Recheck in 6 months  Pt is on ARB for microalbuminuria  Elevated, continue ARB  Recheck microalbumin/creat ratio in 1 year

## 2022-07-06 NOTE — ASSESSMENT & PLAN NOTE
Pt denies bright red bloody stools  Pt refused iron transfusion but was advised to take iron supplements every other day  Will follow up iron studies and CBC next week

## 2022-07-06 NOTE — ASSESSMENT & PLAN NOTE
Wt Readings from Last 3 Encounters:   07/06/22 86 4 kg (190 lb 6 4 oz)   06/27/22 86 2 kg (190 lb)   04/19/22 84 1 kg (185 lb 6 4 oz)     Weight is increased in the past 3 months  Pt has chronic SOB, trace bilateral pitting edema, clear lung sounds and bilateral JVD  Denies CP  Does not appear overtly volume overloaded  Pt endorses increased fluid intake  Counseled him to decrease fluid intake and to increase Torsemide to 60mg in the morning and 40mg at night  Pt follows up with Cardiology at [facility]  Echocardiogram scheduled for 7/9  Will continue to monitor

## 2022-07-06 NOTE — ASSESSMENT & PLAN NOTE
Pt reports SOB with exertion  Denies CP  Pt stated it's been going on for years  Pt also said he drinks a lot of water daily and reported extensive smoking history for decades  Bilateral JVD present, clear lung sounds, trace bilateral pitting edema  Pt is compliant with Torsemide twice in the morning and twice at night  Lung V/Q scan showed little probability of PE  Suspect chronic lung disease due to smoking history and prior CXR showing scarring and offered pt a chest CT to evaluate for it but he refused  Echocardiogram is scheduled for 7/9    Counseled pt to take 60mg of Torsemide in the morning and 40mg at night to help with the fluid overload  Anemia from chronic blood loss could be contributing to SOB  Pt counseled to take iron supplements  Will follow up BMP, CBC, and iron studies next week

## 2022-07-06 NOTE — PATIENT INSTRUCTIONS
Take 3 tablets of Torsemide in the morning and 2 at night, please call the office with any swelling or weight gain    Go for blood work next week to check your kidney function and blood counts    We will call you with the results and determine the frequency of testing to monitor your blood counts    Take your Colace twice a day, add additional teaspoon of Metamucil in 6-8 ounces of water daily as needed for constipation    Take your iron supplementation every other day

## 2022-07-06 NOTE — ASSESSMENT & PLAN NOTE
Current BP is acceptable at 124/80  Pt compliant with Losartan, Metoprolol, and Doxazosin  Will continue to monitor

## 2022-07-09 ENCOUNTER — HOSPITAL ENCOUNTER (OUTPATIENT)
Dept: RADIOLOGY | Facility: HOSPITAL | Age: 87
Discharge: HOME | End: 2022-07-09
Attending: INTERNAL MEDICINE
Payer: COMMERCIAL

## 2022-07-09 ENCOUNTER — HOSPITAL ENCOUNTER (OUTPATIENT)
Dept: NON INVASIVE DIAGNOSTICS | Facility: HOSPITAL | Age: 87
Discharge: HOME | End: 2022-07-09
Attending: INTERNAL MEDICINE
Payer: COMMERCIAL

## 2022-07-09 VITALS
WEIGHT: 190 LBS | DIASTOLIC BLOOD PRESSURE: 80 MMHG | SYSTOLIC BLOOD PRESSURE: 124 MMHG | BODY MASS INDEX: 30.53 KG/M2 | HEART RATE: 77 BPM | HEIGHT: 66 IN

## 2022-07-09 DIAGNOSIS — R60.0 EDEMA OF BOTH LOWER EXTREMITIES: ICD-10-CM

## 2022-07-09 DIAGNOSIS — R06.02 SHORTNESS OF BREATH: ICD-10-CM

## 2022-07-09 DIAGNOSIS — G93.31 POSTVIRAL SYNDROME: ICD-10-CM

## 2022-07-09 PROCEDURE — 93306 TTE W/DOPPLER COMPLETE: CPT

## 2022-07-09 PROCEDURE — 71046 X-RAY EXAM CHEST 2 VIEWS: CPT

## 2022-07-10 LAB
AORTIC ROOT: 3 CM
APICAL FOUR CHAMBER EJECTION FRACTION: 59 %
ASCENDING AORTA: 3.4 CM
AV LVOT MEAN GRADIENT: 2 MMHG
AV LVOT PEAK GRADIENT: 3 MMHG
DOP CALC LVOT PEAK VEL VTI: 17.25 CM
DOP CALC LVOT PEAK VEL: 0.88 M/S
FRACTIONAL SHORTENING: 26 (ref 28–44)
INTERVENTRICULAR SEPTUM IN DIASTOLE (PARASTERNAL SHORT AXIS VIEW): 1.5 CM
INTERVENTRICULAR SEPTUM: 1.5 CM (ref 0.6–1.1)
LAAS-AP2: 23 CM2
LAAS-AP4: 26.2 CM2
LEFT ATRIUM AREA SYSTOLE SINGLE PLANE A4C: 26.3 CM2
LEFT ATRIUM SIZE: 4.8 CM
LEFT INTERNAL DIMENSION IN SYSTOLE: 2.6 CM (ref 2.1–4)
LEFT VENTRICULAR INTERNAL DIMENSION IN DIASTOLE: 3.5 CM (ref 3.5–6)
LEFT VENTRICULAR POSTERIOR WALL IN END DIASTOLE: 1.5 CM
LEFT VENTRICULAR STROKE VOLUME: 25 ML
LVSV (TEICH): 25 ML
MV E'TISSUE VEL-SEP: 5 CM/S
PA SYSTOLIC PRESSURE: 49 MMHG
RIGHT ATRIUM AREA SYSTOLE A4C: 15.6 CM2
RIGHT VENTRICLE ID DIMENSION: 3.7 CM
SL CV LEFT ATRIUM LENGTH A2C: 6.1 CM
SL CV LV EF: 60
SL CV PED ECHO LEFT VENTRICLE DIASTOLIC VOLUME (MOD BIPLANE) 2D: 50 ML
SL CV PED ECHO LEFT VENTRICLE SYSTOLIC VOLUME (MOD BIPLANE) 2D: 25 ML
TR MAX PG: 41 MMHG
TR PEAK VELOCITY: 3.2 M/S
TRICUSPID VALVE PEAK REGURGITATION VELOCITY: 3.19 M/S

## 2022-07-10 PROCEDURE — 93306 TTE W/DOPPLER COMPLETE: CPT | Performed by: INTERNAL MEDICINE

## 2022-07-11 NOTE — RESULT ENCOUNTER NOTE
Hi Ed,    I reviewed results of his echocardiogram   Pumping function ofyour heart is good  It also suggested as we had thought at your last encounter that you may have a little bit of extra fluid in your body; we had advised you take 60mg torsemide in the morning and 40mg in the evening - Do you note increased urination? Any decrease in AM weight?       Dr Karlos Hines

## 2022-07-12 ENCOUNTER — TELEPHONE (OUTPATIENT)
Dept: INTERNAL MEDICINE CLINIC | Facility: CLINIC | Age: 87
End: 2022-07-12

## 2022-07-12 NOTE — TELEPHONE ENCOUNTER
I spoke with patient and his alternate contact Jagdeep Guo over the phone and updated them with imaging results for X-ray: which shows no cardiopulmonary disease  Echocardiogram which shows normal systolic function  and VQ scan (done at [facility]) shows low probability of a PE  Patient is still experiencing shortness of breath but claims that his weight is stable  At his last office visit patient was advised to increase the morning torsemide dose to 60 mg, but patient continued to take 40 mg BID  I advised patient to increase the torsemide to 60 mg in the morning and 40 mg in the evening and continue to monitor his weight   Patient agreed to follow the recommended change in dose and will make a follow up appointment with his cardiologist       ---- Message from Nahed Ferraro MD sent at 7/12/2022  1:45 PM EDT -----  Please call patient with normal results and follow how he is doing, thank you!  ----- Message -----  From: Interface, Radiology Results In  Sent: 7/12/2022   1:36 PM EDT  To: Jenae Lui MD

## 2022-07-13 ENCOUNTER — LAB (OUTPATIENT)
Dept: LAB | Facility: CLINIC | Age: 87
End: 2022-07-13
Payer: COMMERCIAL

## 2022-07-13 DIAGNOSIS — D50.0 IRON DEFICIENCY ANEMIA DUE TO CHRONIC BLOOD LOSS: ICD-10-CM

## 2022-07-13 DIAGNOSIS — C18.9 COLON ADENOCARCINOMA (HCC): ICD-10-CM

## 2022-07-13 DIAGNOSIS — N18.31 STAGE 3A CHRONIC KIDNEY DISEASE (HCC): ICD-10-CM

## 2022-07-13 DIAGNOSIS — R06.02 SHORTNESS OF BREATH: ICD-10-CM

## 2022-07-13 LAB
ANION GAP SERPL CALCULATED.3IONS-SCNC: 6 MMOL/L (ref 4–13)
BUN SERPL-MCNC: 25 MG/DL (ref 5–25)
CALCIUM SERPL-MCNC: 9.1 MG/DL (ref 8.3–10.1)
CHLORIDE SERPL-SCNC: 102 MMOL/L (ref 100–108)
CO2 SERPL-SCNC: 27 MMOL/L (ref 21–32)
CREAT SERPL-MCNC: 1.54 MG/DL (ref 0.6–1.3)
FERRITIN SERPL-MCNC: 9 NG/ML (ref 8–388)
GFR SERPL CREATININE-BSD FRML MDRD: 40 ML/MIN/1.73SQ M
GLUCOSE P FAST SERPL-MCNC: 168 MG/DL (ref 65–99)
IRON SATN MFR SERPL: 3 % (ref 20–50)
IRON SERPL-MCNC: 15 UG/DL (ref 65–175)
NT-PROBNP SERPL-MCNC: 1896 PG/ML
POTASSIUM SERPL-SCNC: 4.2 MMOL/L (ref 3.5–5.3)
SODIUM SERPL-SCNC: 135 MMOL/L (ref 136–145)
TIBC SERPL-MCNC: 568 UG/DL (ref 250–450)

## 2022-07-13 PROCEDURE — 83550 IRON BINDING TEST: CPT

## 2022-07-13 PROCEDURE — 82728 ASSAY OF FERRITIN: CPT

## 2022-07-13 PROCEDURE — 83540 ASSAY OF IRON: CPT

## 2022-07-13 PROCEDURE — 80048 BASIC METABOLIC PNL TOTAL CA: CPT

## 2022-07-13 PROCEDURE — 83880 ASSAY OF NATRIURETIC PEPTIDE: CPT

## 2022-07-14 DIAGNOSIS — E11.9 TYPE 2 DIABETES MELLITUS, WITHOUT LONG-TERM CURRENT USE OF INSULIN (HCC): ICD-10-CM

## 2022-07-14 DIAGNOSIS — N40.0 BENIGN PROSTATIC HYPERPLASIA, UNSPECIFIED WHETHER LOWER URINARY TRACT SYMPTOMS PRESENT: ICD-10-CM

## 2022-07-14 DIAGNOSIS — I50.32 CHRONIC DIASTOLIC CONGESTIVE HEART FAILURE (HCC): ICD-10-CM

## 2022-07-14 DIAGNOSIS — I48.19 PERSISTENT ATRIAL FIBRILLATION (HCC): ICD-10-CM

## 2022-07-14 DIAGNOSIS — E78.00 HYPERCHOLESTEROLEMIA: ICD-10-CM

## 2022-07-14 RX ORDER — LOSARTAN POTASSIUM 50 MG/1
50 TABLET ORAL DAILY
Qty: 90 TABLET | Refills: 1 | Status: SHIPPED | OUTPATIENT
Start: 2022-07-14 | End: 2022-10-11 | Stop reason: SDUPTHER

## 2022-07-14 RX ORDER — DOXAZOSIN 8 MG/1
8 TABLET ORAL
Qty: 90 TABLET | Refills: 1 | Status: SHIPPED | OUTPATIENT
Start: 2022-07-14 | End: 2022-10-11 | Stop reason: SDUPTHER

## 2022-07-14 RX ORDER — PRAVASTATIN SODIUM 40 MG
40 TABLET ORAL DAILY
Qty: 90 TABLET | Refills: 1 | Status: SHIPPED | OUTPATIENT
Start: 2022-07-14 | End: 2022-10-11 | Stop reason: SDUPTHER

## 2022-07-14 RX ORDER — AMIODARONE HYDROCHLORIDE 100 MG/1
100 TABLET ORAL DAILY
Qty: 90 TABLET | Refills: 1 | Status: SHIPPED | OUTPATIENT
Start: 2022-07-14 | End: 2022-10-11 | Stop reason: SDUPTHER

## 2022-07-14 NOTE — RESULT ENCOUNTER NOTE
Spoke Reema Luu (patient's alternate contact) as patient's phone went to [employer]  I updated her on the new lab results, Patients BNP is slightly increased  (1,537 to 1,896) and iron levels are about the same with a stable HB at 8  Advised patient to weigh himself daily and if there is an increase in weight of 3 lb over 2 days or 5 lb in a weak then to take an additional 20 mg of torsemide  Patient will be following up in office on 8/10 and with his cardiologist later in August  As per denise patient is doing better since the last time I spoke to them on 7/12

## 2022-07-14 NOTE — TELEPHONE ENCOUNTER
Mel Mckay came in and asked for refill on amiodarone, doxazosin, lsartan, pravastatin, eliquis please gaint in [place]

## 2022-07-25 ENCOUNTER — APPOINTMENT (OUTPATIENT)
Dept: LAB | Facility: CLINIC | Age: 87
End: 2022-07-25
Payer: COMMERCIAL

## 2022-07-25 ENCOUNTER — TELEPHONE (OUTPATIENT)
Dept: INTERNAL MEDICINE CLINIC | Facility: CLINIC | Age: 87
End: 2022-07-25

## 2022-07-25 DIAGNOSIS — D64.9 ANEMIA, UNSPECIFIED TYPE: ICD-10-CM

## 2022-07-25 LAB
ERYTHROCYTE [DISTWIDTH] IN BLOOD BY AUTOMATED COUNT: 18.7 % (ref 11.6–15.1)
HCT VFR BLD AUTO: 31.3 % (ref 36.5–49.3)
HGB BLD-MCNC: 9 G/DL (ref 12–17)
MCH RBC QN AUTO: 22.3 PG (ref 26.8–34.3)
MCHC RBC AUTO-ENTMCNC: 28.8 G/DL (ref 31.4–37.4)
MCV RBC AUTO: 78 FL (ref 82–98)
PLATELET # BLD AUTO: 364 THOUSANDS/UL (ref 149–390)
PMV BLD AUTO: 12 FL (ref 8.9–12.7)
RBC # BLD AUTO: 4.04 MILLION/UL (ref 3.88–5.62)
WBC # BLD AUTO: 5.53 THOUSAND/UL (ref 4.31–10.16)

## 2022-07-25 PROCEDURE — 36415 COLL VENOUS BLD VENIPUNCTURE: CPT

## 2022-07-25 PROCEDURE — 85027 COMPLETE CBC AUTOMATED: CPT

## 2022-07-25 NOTE — TELEPHONE ENCOUNTER
Pharmacy called stating to please provide 90 day supply pt insurance requires for coverage   Thank you

## 2022-08-05 ENCOUNTER — TELEPHONE (OUTPATIENT)
Dept: INTERNAL MEDICINE CLINIC | Facility: CLINIC | Age: 87
End: 2022-08-05

## 2022-08-05 DIAGNOSIS — I10 HYPERTENSION, UNSPECIFIED TYPE: ICD-10-CM

## 2022-08-05 DIAGNOSIS — I48.19 PERSISTENT ATRIAL FIBRILLATION (HCC): ICD-10-CM

## 2022-08-05 RX ORDER — METOPROLOL TARTRATE 50 MG/1
50 TABLET, FILM COATED ORAL EVERY 12 HOURS SCHEDULED
Qty: 60 TABLET | Refills: 2 | Status: SHIPPED | OUTPATIENT
Start: 2022-08-05 | End: 2022-09-26 | Stop reason: SDUPTHER

## 2022-08-05 NOTE — TELEPHONE ENCOUNTER
Pt needs metoprolol tartrate, [facility] pharmacy  Patient hasnt taken it in one week, needs 90 day  Supply

## 2022-08-11 ENCOUNTER — APPOINTMENT (OUTPATIENT)
Dept: LAB | Facility: CLINIC | Age: 87
End: 2022-08-11
Payer: COMMERCIAL

## 2022-08-11 ENCOUNTER — OFFICE VISIT (OUTPATIENT)
Dept: INTERNAL MEDICINE CLINIC | Facility: CLINIC | Age: 87
End: 2022-08-11
Payer: COMMERCIAL

## 2022-08-11 VITALS
BODY MASS INDEX: 31.05 KG/M2 | SYSTOLIC BLOOD PRESSURE: 136 MMHG | OXYGEN SATURATION: 96 % | TEMPERATURE: 97.8 F | HEART RATE: 84 BPM | WEIGHT: 193.2 LBS | DIASTOLIC BLOOD PRESSURE: 76 MMHG | HEIGHT: 66 IN

## 2022-08-11 DIAGNOSIS — D64.9 ANEMIA, UNSPECIFIED TYPE: ICD-10-CM

## 2022-08-11 DIAGNOSIS — I50.33 ACUTE ON CHRONIC DIASTOLIC CONGESTIVE HEART FAILURE (HCC): Primary | ICD-10-CM

## 2022-08-11 DIAGNOSIS — N18.31 STAGE 3A CHRONIC KIDNEY DISEASE (HCC): ICD-10-CM

## 2022-08-11 DIAGNOSIS — C18.9 COLON ADENOCARCINOMA (HCC): ICD-10-CM

## 2022-08-11 DIAGNOSIS — D50.0 IRON DEFICIENCY ANEMIA DUE TO CHRONIC BLOOD LOSS: ICD-10-CM

## 2022-08-11 PROBLEM — S01.112A LACERATION OF LEFT EYEBROW: Status: RESOLVED | Noted: 2022-02-11 | Resolved: 2022-08-11

## 2022-08-11 PROBLEM — R55 SYNCOPE AND COLLAPSE: Status: RESOLVED | Noted: 2022-02-11 | Resolved: 2022-08-11

## 2022-08-11 LAB
ERYTHROCYTE [DISTWIDTH] IN BLOOD BY AUTOMATED COUNT: 19.5 % (ref 11.6–15.1)
HCT VFR BLD AUTO: 28.5 % (ref 36.5–49.3)
HGB BLD-MCNC: 8.3 G/DL (ref 12–17)
MCH RBC QN AUTO: 22.1 PG (ref 26.8–34.3)
MCHC RBC AUTO-ENTMCNC: 29.1 G/DL (ref 31.4–37.4)
MCV RBC AUTO: 76 FL (ref 82–98)
PLATELET # BLD AUTO: 340 THOUSANDS/UL (ref 149–390)
PMV BLD AUTO: 11.6 FL (ref 8.9–12.7)
RBC # BLD AUTO: 3.76 MILLION/UL (ref 3.88–5.62)
WBC # BLD AUTO: 4.71 THOUSAND/UL (ref 4.31–10.16)

## 2022-08-11 PROCEDURE — 36415 COLL VENOUS BLD VENIPUNCTURE: CPT

## 2022-08-11 PROCEDURE — 85027 COMPLETE CBC AUTOMATED: CPT

## 2022-08-11 PROCEDURE — 99214 OFFICE O/P EST MOD 30 MIN: CPT | Performed by: INTERNAL MEDICINE

## 2022-08-11 RX ORDER — TORSEMIDE 20 MG/1
40 TABLET ORAL 2 TIMES DAILY
Qty: 60 TABLET | Refills: 1 | Status: SHIPPED | OUTPATIENT
Start: 2022-08-11 | End: 2022-09-06 | Stop reason: SDUPTHER

## 2022-08-11 NOTE — ASSESSMENT & PLAN NOTE
Wt Readings from Last 3 Encounters:   08/11/22 87 6 kg (193 lb 3 2 oz)   07/09/22 86 2 kg (190 lb)   07/06/22 86 4 kg (190 lb 6 4 oz)         Weight has increased 3lbs since last visit  Patient states his SOB, leg swelling, and abdominal bloating has been getting worse  Patient grossly volume overloaded with prominent bilateral JVD and leg swelling, lungs are clear  Patient not adhering to medical regimen or salt/fluid intake restrictions  Plan: Will prescribe Torsemide 40mg in the morning and 40mg in the evening  Get a new BMP this week and see the patient back in 1 week for follow up    Will keep the patient on 2g salt and 2L water restriction per day  Instructed the patient to weigh himself daily and call back if gained 2-3lbs in 1-2 days or 4-5lbs in a week    Strongly reinforced the importance of taking the Torsemide as prescribed as well as adhering to the salt/fluid restriction diet to keep the patient out of the hospital

## 2022-08-11 NOTE — ASSESSMENT & PLAN NOTE
Patient states he's taking the iron daily  He's been passing stools every 2-3 days  Denies BRBPR  His Hb has been trending back up to 9  Iron studies slowly improving  Patient got a new CBC this morning

## 2022-08-11 NOTE — PATIENT INSTRUCTIONS
Weigh yourself without clothing at the same time each day  Record your weight  Please call your doctor if you have a sudden weight gain of more than 2-3lbs (1-1 3kg) in 1-2 days or 5lbs (2 3kg) in a week  Try to adhere as best as possible to a 2g sodium restriction as well as 2 liter fluid restriction  Take the Torsemide 20mg 2 pills in the morning and 2 pills in the evening

## 2022-08-11 NOTE — ASSESSMENT & PLAN NOTE
Lab Results   Component Value Date    EGFR 40 07/13/2022    EGFR 42 04/11/2022    EGFR 39 03/04/2022    CREATININE 1 54 (H) 07/13/2022    CREATININE 1 49 (H) 04/11/2022    CREATININE 1 58 (H) 03/04/2022     Patient's creatinine currently at baseline  Will get a new BMP to monitor kidney function after adjustment to his diuretics

## 2022-08-11 NOTE — PROGRESS NOTES
INTERNAL MEDICINE FOLLOW-UP OFFICE VISIT  [facility] - [facility] INTERNAL MEDICINE [place]    NAME: Alberto Haile  AGE: 82 y o  SEX: male  : 1935     DATE: 2022     Assessment and Plan:     1  Acute on chronic diastolic congestive heart failure [facility])  Assessment & Plan:  Wt Readings from Last 3 Encounters:   22 87 6 kg (193 lb 3 2 oz)   22 86 2 kg (190 lb)   22 86 4 kg (190 lb 6 4 oz)         Weight has increased 3lbs since last visit  Patient states his SOB, leg swelling, and abdominal bloating has been getting worse  Patient grossly volume overloaded with prominent bilateral JVD and leg swelling, lungs are clear  Patient not adhering to medical regimen or salt/fluid intake restrictions  Plan: Will prescribe Torsemide 40mg in the morning and 40mg in the evening  Get a new BMP this week and see the patient back in 1 week for follow up  Will keep the patient on 2g salt and 2L water restriction per day  Instructed the patient to weigh himself daily and call back if gained 2-3lbs in 1-2 days or 4-5lbs in a week    Strongly reinforced the importance of taking the Torsemide as prescribed as well as adhering to the salt/fluid restriction diet to keep the patient out of the hospital         Orders:  -     torsemide (DEMADEX) 20 mg tablet; Take 2 tablets (40 mg total) by mouth 2 (two) times a day    2  Iron deficiency anemia due to chronic blood loss  Assessment & Plan:  Patient states he's taking the iron daily  He's been passing stools every 2-3 days  Denies BRBPR  His Hb has been trending back up to 9  Iron studies slowly improving  Patient got a new CBC this morning  3  Colon adenocarcinoma [facility])  Assessment & Plan:  Patient still feels the same that he doesn't want to pursue any further treatment towards his colon CA  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      Patient agrees with plan to get a V/Q scan to evaluate for PE       4  Stage 3a chronic kidney disease [facility])  Assessment & Plan:  Lab Results   Component Value Date    EGFR 40 07/13/2022    EGFR 42 04/11/2022    EGFR 39 03/04/2022    CREATININE 1 54 (H) 07/13/2022    CREATININE 1 49 (H) 04/11/2022    CREATININE 1 58 (H) 03/04/2022     Patient's creatinine currently at baseline  Will get a new BMP to monitor kidney function after adjustment to his diuretics  Orders:  -     Basic metabolic panel; Future; Expected date: 08/18/2022      Return in about 1 week (around 8/18/2022)  Chief Complaint:     Chief Complaint   Patient presents with    Follow-up     4 week f/u        History of Present Illness:     Mr David Rodriguez is a 81 yo M with a hx of HFpEF, acute on chronic anemia, colon CA, CKD stage 3, Afib on AC with Eliquis, HTN, DM2, HLD, GERD presenting with worsening SOB for the last month  Patient states that he can't walk from the kitchen to the bathroom or go up the stairs without feeling SOB  He was previously instructed to take 60mg of Torsemide in the morning and 40mg in the evening but he's only been taking 20mg in the morning and 20mg in the evening  Patient states that he doesn't like to keep having to go to the bathroom so often  His wife Abhilash Guess thought that he was supposed to be on this dosage  Patient and his wife both endorse him not adhering to the salt/fluid restricted diet as they go out to eat a lot  Patient has noticed his abdomen and legs getting more swollen as well  He denies CP, palpitations, lightheadedness, syncope, diaphoresis  Mr David Rodriguez states that he's been taking the iron supplements daily  He has been passing stools every 2-3 days but when he goes it's a good amount  He denies bright red blood per rectum, nausea, vomiting, or decreased appetite       The following portions of the patient's history were reviewed and updated as appropriate: allergies, current medications, past family history, past medical history, past social history, past surgical history and problem list      Review of Systems:     Review of Systems   Constitutional: Positive for fatigue and unexpected weight change (gained 3 lbs)  Negative for appetite change, chills, diaphoresis and fever  Eyes: Negative for visual disturbance  Respiratory: Positive for shortness of breath  Negative for chest tightness and wheezing  Cardiovascular: Positive for leg swelling  Negative for chest pain and palpitations  Gastrointestinal: Positive for abdominal distention and constipation  Negative for abdominal pain, blood in stool, diarrhea, nausea and vomiting  Endocrine: Positive for polydipsia and polyuria  Genitourinary: Positive for frequency  Negative for difficulty urinating  Neurological: Negative for syncope, light-headedness and headaches  Problem List:     Patient Active Problem List   Diagnosis    Acute on chronic diastolic congestive heart failure (HCC)    Persistent atrial fibrillation (HCC)    Anemia    Stage 3 chronic kidney disease (HCC)    Type 2 diabetes mellitus without complication, without long-term current use of insulin [facility] )    [facility] )    Cognitive dysfunction    Gastritis    Hypertension    Esophageal reflux    Hypercalcemia    Hypercholesterolemia    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    Shortness of breath    Iron deficiency anemia due to chronic blood loss        Objective:     /76 (BP Location: Left arm, Patient Position: Sitting, Cuff Size: Standard)   Pulse 84   Temp 97 8 °F (36 6 °C) (Tympanic)   Ht 5' 6" (1 676 m)   Wt 87 6 kg (193 lb 3 2 oz)   SpO2 96%   BMI 31 18 kg/m²     Physical Exam  Constitutional:       General: He is not in acute distress  Appearance: Normal appearance  He is obese  He is not ill-appearing or diaphoretic  HENT:      Head: Normocephalic and atraumatic  Eyes:      General: No scleral icterus  Extraocular Movements: Extraocular movements intact  Conjunctiva/sclera: Conjunctivae normal       Pupils: Pupils are equal, round, and reactive to light  Neck:      Vascular: Hepatojugular reflux and JVD (bilateral) present  No carotid bruit  Cardiovascular:      Rate and Rhythm: Normal rate and regular rhythm  Pulses: Normal pulses  Heart sounds: Normal heart sounds  No murmur heard  No friction rub  No gallop  Pulmonary:      Effort: Pulmonary effort is normal  No respiratory distress  Breath sounds: Normal breath sounds  No wheezing or rales  Abdominal:      General: Bowel sounds are normal  There is distension  Palpations: Abdomen is soft  Tenderness: There is no abdominal tenderness  There is no right CVA tenderness, left CVA tenderness, guarding or rebound  Musculoskeletal:         General: Normal range of motion  Cervical back: Normal range of motion  Right lower leg: Edema (2+) present  Left lower leg: Edema (2+) present  Lymphadenopathy:      Cervical: No cervical adenopathy  Skin:     General: Skin is warm  Capillary Refill: Capillary refill takes less than 2 seconds  Neurological:      Mental Status: He is alert and oriented to person, place, and time  Psychiatric:         Mood and Affect: Mood normal          Behavior: Behavior normal          Pertinent Laboratory/Diagnostic Studies:    Laboratory Results: I have personally reviewed the pertinent laboratory results/reports     Cardiac Studies:   Results from Last 12 Months   Lab Units 07/13/22  0746   NT-PRO BNP pg/mL 1,896*       Radiology/Other Diagnostic Testing Results: I have personally reviewed pertinent reports        Francisca Donnelly MD  [facility] INTERNAL MEDICINE [address] [place] [address]

## 2022-08-11 NOTE — ASSESSMENT & PLAN NOTE
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

## 2022-08-17 ENCOUNTER — OFFICE VISIT (OUTPATIENT)
Dept: INTERNAL MEDICINE CLINIC | Facility: CLINIC | Age: 87
End: 2022-08-17
Payer: COMMERCIAL

## 2022-08-17 ENCOUNTER — APPOINTMENT (OUTPATIENT)
Dept: LAB | Facility: CLINIC | Age: 87
End: 2022-08-17
Payer: COMMERCIAL

## 2022-08-17 VITALS
BODY MASS INDEX: 30.7 KG/M2 | HEIGHT: 66 IN | DIASTOLIC BLOOD PRESSURE: 74 MMHG | SYSTOLIC BLOOD PRESSURE: 156 MMHG | WEIGHT: 191 LBS | HEART RATE: 80 BPM | TEMPERATURE: 98.4 F | OXYGEN SATURATION: 95 %

## 2022-08-17 DIAGNOSIS — N18.31 STAGE 3A CHRONIC KIDNEY DISEASE (HCC): ICD-10-CM

## 2022-08-17 DIAGNOSIS — I10 PRIMARY HYPERTENSION: ICD-10-CM

## 2022-08-17 DIAGNOSIS — I50.33 ACUTE ON CHRONIC DIASTOLIC CONGESTIVE HEART FAILURE (HCC): Primary | ICD-10-CM

## 2022-08-17 DIAGNOSIS — D50.0 IRON DEFICIENCY ANEMIA DUE TO CHRONIC BLOOD LOSS: ICD-10-CM

## 2022-08-17 LAB
ANION GAP SERPL CALCULATED.3IONS-SCNC: 7 MMOL/L (ref 4–13)
BASOPHILS # BLD AUTO: 0.03 THOUSANDS/ΜL (ref 0–0.1)
BASOPHILS NFR BLD AUTO: 1 % (ref 0–1)
BUN SERPL-MCNC: 32 MG/DL (ref 5–25)
CALCIUM SERPL-MCNC: 8.9 MG/DL (ref 8.3–10.1)
CHLORIDE SERPL-SCNC: 99 MMOL/L (ref 96–108)
CO2 SERPL-SCNC: 27 MMOL/L (ref 21–32)
CREAT SERPL-MCNC: 1.58 MG/DL (ref 0.6–1.3)
EOSINOPHIL # BLD AUTO: 0.04 THOUSAND/ΜL (ref 0–0.61)
EOSINOPHIL NFR BLD AUTO: 1 % (ref 0–6)
ERYTHROCYTE [DISTWIDTH] IN BLOOD BY AUTOMATED COUNT: 19.7 % (ref 11.6–15.1)
GFR SERPL CREATININE-BSD FRML MDRD: 39 ML/MIN/1.73SQ M
GLUCOSE P FAST SERPL-MCNC: 192 MG/DL (ref 65–99)
HCT VFR BLD AUTO: 31.1 % (ref 36.5–49.3)
HGB BLD-MCNC: 8.8 G/DL (ref 12–17)
IMM GRANULOCYTES # BLD AUTO: 0.03 THOUSAND/UL (ref 0–0.2)
IMM GRANULOCYTES NFR BLD AUTO: 1 % (ref 0–2)
LYMPHOCYTES # BLD AUTO: 0.95 THOUSANDS/ΜL (ref 0.6–4.47)
LYMPHOCYTES NFR BLD AUTO: 15 % (ref 14–44)
MCH RBC QN AUTO: 21.8 PG (ref 26.8–34.3)
MCHC RBC AUTO-ENTMCNC: 28.3 G/DL (ref 31.4–37.4)
MCV RBC AUTO: 77 FL (ref 82–98)
MONOCYTES # BLD AUTO: 0.56 THOUSAND/ΜL (ref 0.17–1.22)
MONOCYTES NFR BLD AUTO: 9 % (ref 4–12)
NEUTROPHILS # BLD AUTO: 4.71 THOUSANDS/ΜL (ref 1.85–7.62)
NEUTS SEG NFR BLD AUTO: 73 % (ref 43–75)
NRBC BLD AUTO-RTO: 0 /100 WBCS
PLATELET # BLD AUTO: 356 THOUSANDS/UL (ref 149–390)
PMV BLD AUTO: 11.6 FL (ref 8.9–12.7)
POTASSIUM SERPL-SCNC: 4.8 MMOL/L (ref 3.5–5.3)
RBC # BLD AUTO: 4.03 MILLION/UL (ref 3.88–5.62)
SODIUM SERPL-SCNC: 133 MMOL/L (ref 135–147)
WBC # BLD AUTO: 6.32 THOUSAND/UL (ref 4.31–10.16)

## 2022-08-17 PROCEDURE — 80048 BASIC METABOLIC PNL TOTAL CA: CPT

## 2022-08-17 PROCEDURE — 85025 COMPLETE CBC W/AUTO DIFF WBC: CPT

## 2022-08-17 PROCEDURE — 3077F SYST BP >= 140 MM HG: CPT | Performed by: INTERNAL MEDICINE

## 2022-08-17 PROCEDURE — 1160F RVW MEDS BY RX/DR IN RCRD: CPT | Performed by: INTERNAL MEDICINE

## 2022-08-17 PROCEDURE — 99213 OFFICE O/P EST LOW 20 MIN: CPT | Performed by: INTERNAL MEDICINE

## 2022-08-17 PROCEDURE — 3078F DIAST BP <80 MM HG: CPT | Performed by: INTERNAL MEDICINE

## 2022-08-17 PROCEDURE — 36415 COLL VENOUS BLD VENIPUNCTURE: CPT

## 2022-08-17 NOTE — PROGRESS NOTES
INTERNAL MEDICINE FOLLOW-UP OFFICE VISIT  [facility] - [facility] INTERNAL MEDICINE [place]    NAME: Ghada Lobo  AGE: 82 y o  SEX: male  : 1935     DATE: 2022     Assessment and Plan:     1  Acute on chronic diastolic congestive heart failure (HCC)  Assessment & Plan:  Wt Readings from Last 3 Encounters:   22 86 6 kg (191 lb)   22 87 6 kg (193 lb 3 2 oz)   22 86 2 kg (190 lb)     Patient has lost 2 lbs since his office visit last week  He has increased the Torsemide dose to 40 mg BID as advised  His has notices and improvement in his symptoms since the dose change  Patient has also decreased the amount of salt intake  BMP and CBC labs are pending  Plan:  1) Continue salt restriction 2 g and water restriction to 2 L a day  2) Checking and logging daily weights, if there is a 2 lb increase in weight in a day or a 5 lb increase in a week then to take one additional 20 mg of Toresmide (total 60 mg in am) for one day  3) Checking and logging daily BP reading, preferably around the same time in the day  Bring BP log to follow up appointment  4) Increase ambulation as symptomatically tolerated, take deep breaths while ambulating and when sitting down after ambulation  Will follow up on CBC, BMP and update patient over the phone  Has an appointment with cardiologist at [facility] this afternoon  Will follow up in a month  2  Iron deficiency anemia due to chronic blood loss  Assessment & Plan:  Patient is compliant with daily ferrous sulfate 324 mg, mik bright red blood per rectum  CBC pending, feeling better symptomatically  Plan: Will follow up on CBC once resulted and will update patient over phone  Orders:  -     CBC and differential; Future; Expected date: 2022    3  Primary hypertension  Assessment & Plan:  Patient's blood pressure was 160/84 and 156/74 upon recheck towards the end of the visit   Patient states that he is compliant with BP medications  Patient advised to keep a log of daily BP and bring them to follow up visit  Plan:  Follow up on BP log on follow up visit and consider addition of another antihypertensive if BP readings are consistently over systolic 248 and diastolic 90        Return in about 4 weeks (around 9/14/2022)  Chief Complaint:     Chief Complaint   Patient presents with    Follow-up     1 week f/u        History of Present Illness:     Mr Tabitha Watts is a 82 Y  O  male here for a follow up after his Toresmide dose change from 20 mg BID  to 40 mg BID last week  Patient is feels his symptoms have improved  He is compliant with his medications and has reduced his salt intake as well  The following portions of the patient's history were reviewed and updated as appropriate: allergies, current medications, past family history, past medical history, past social history, past surgical history and problem list      Review of Systems:     Review of Systems   Constitutional: Negative for chills and fever  Respiratory: Positive for shortness of breath (upon ambulation but improved from last week)  Cardiovascular: Positive for leg swelling  Negative for chest pain  Gastrointestinal: Negative for abdominal pain, blood in stool, nausea and vomiting  Genitourinary: Negative for dysuria  Psychiatric/Behavioral: Negative for behavioral problems and confusion          Problem List:     Patient Active Problem List   Diagnosis    Acute on chronic diastolic congestive heart failure (HCC)    Persistent atrial fibrillation (HCC)    Anemia    Stage 3 chronic kidney disease (HCC)    Type 2 diabetes mellitus without complication, without long-term current use of insulin ([facility] )    Colon adenocarcinoma [facility] )    Cognitive dysfunction    Gastritis    Hypertension    Esophageal reflux    Hypercalcemia    Hypercholesterolemia    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    Shortness of breath    Iron deficiency anemia due to chronic blood loss        Objective:     /84 (BP Location: Left arm, Patient Position: Sitting, Cuff Size: Standard)   Pulse 80   Temp 98 4 °F (36 9 °C) (Tympanic)   Ht 5' 6" (1 676 m)   Wt 86 6 kg (191 lb)   SpO2 95%   BMI 30 83 kg/m²     Physical Exam  Constitutional:       Appearance: He is obese  HENT:      Head: Normocephalic and atraumatic  Eyes:      Extraocular Movements: Extraocular movements intact  Cardiovascular:      Rate and Rhythm: Normal rate and regular rhythm  Pulses: Normal pulses  Heart sounds: Normal heart sounds  Pulmonary:      Effort: Pulmonary effort is normal       Breath sounds: Normal breath sounds  No wheezing  Abdominal:      Palpations: Abdomen is soft  Tenderness: There is no abdominal tenderness  There is no guarding  Musculoskeletal:      Right lower leg: Edema (1+) present  Left lower leg: Edema (1+) present  Skin:     General: Skin is warm  Neurological:      General: No focal deficit present  Mental Status: He is alert and oriented to person, place, and time           Pertinent Laboratory/Diagnostic Studies:    Laboratory Results: I have personally reviewed the pertinent laboratory results/reports     CBC:   Results from Last 12 Months   Lab Units 08/11/22  0826 07/13/22  0746 06/27/22  0811   WBC Thousand/uL 4 71   < > 6 73   RBC Million/uL 3 76*   < > 3 64*   HEMOGLOBIN g/dL 8 3*   < > 8 7*   HEMATOCRIT % 28 5*   < > 28 4*   MCV fL 76*   < > 78*   MCH pg 22 1*   < > 23 9*   MCHC g/dL 29 1*   < > 30 6*   RDW % 19 5*   < > 15 9*   MPV fL 11 6   < > 11 7   PLATELETS Thousands/uL 340   < > 334   NRBC AUTO /100 WBCs  --   --  0   NEUTROS PCT %  --   --  70   LYMPHS PCT %  --   --  17   MONOS PCT %  --   --  12   EOS PCT %  --   --  1   BASOS PCT %  --   --  0   NEUTROS ABS Thousands/µL  --   --  4 72   LYMPHS ABS Thousands/µL  --   --  1 13   MONOS ABS Thousand/µL  --   --  0 79   EOS ABS Thousand/µL  -- --  0 04    < > = values in this interval not displayed  Chemistry Profile:   Results from Last 12 Months   Lab Units 07/13/22  0746 02/21/22  0739 02/19/22  0511 02/14/22  0406 02/13/22  0443 02/11/22  0048 02/11/22  0014   POTASSIUM mmol/L 4 2   < > 4 4   < > 4 0   < >  --    CHLORIDE mmol/L 102   < > 99*   < > 99*   < >  --    CO2 mmol/L 27   < > 20*   < > 25   < >  --    CO2, I-STAT mmol/L  --   --   --   --   --   --  27   BUN mg/dL 25   < > 16   < > 26*   < >  --    CREATININE mg/dL 1 54*   < > 1 23   < > 1 40*   < >  --    GLUCOSE FASTING mg/dL 168*   < >  --   --   --   --   --    GLUCOSE RANDOM mg/dL  --   --  117   < > 127   < >  --    GLUCOSE, ISTAT mg/dl  --   --   --   --   --   --  165*   CALCIUM mg/dL 9 1   < > 8 7   < > 8 9   < >  --    CORRECTED CALCIUM mg/dL  --   --   --   --  9 5  --   --    MAGNESIUM mg/dL  --   --  2 1  --  2 4   < >  --    AST U/L  --   --   --   --  17  --   --    ALT U/L  --   --   --   --  21  --   --    ALK PHOS U/L  --   --   --   --  200*  --   --    EGFR ml/min/1 73sq m 40   < > 53   < > 45   < >  --     < > = values in this interval not displayed  Radiology/Other Diagnostic Testing Results: I have personally reviewed pertinent reports        Robb Perez MD  [facility] INTERNAL MEDICINE [place]

## 2022-08-17 NOTE — ASSESSMENT & PLAN NOTE
Patient's blood pressure was 160/84 and 156/74 upon recheck towards the end of the visit  Patient states that he is compliant with BP medications  Patient advised to keep a log of daily BP and bring them to follow up visit       Plan:  Follow up on BP log on follow up visit and consider addition of another antihypertensive if BP readings are consistently over systolic 622 and diastolic 90

## 2022-08-17 NOTE — ASSESSMENT & PLAN NOTE
Patient is compliant with daily ferrous sulfate 324 mg, mik bright red blood per rectum  CBC pending, feeling better symptomatically  Plan: Will follow up on CBC once resulted and will update patient over phone

## 2022-08-17 NOTE — PATIENT INSTRUCTIONS
Daily weight checks bring log to next appointment, if weight increases 2 lb or more in a day or more than 5 lb in a week, increase the morning dose of toresmide to 60 mg for a day  Check daily blood pressure and bring a log of blood pressures readings to next appointment  Water restriction to less than 2 L a day  Continue with the low salt diet  Increase activity as tolerated, take 5 deep breaths while walking and resting after activity

## 2022-08-17 NOTE — ASSESSMENT & PLAN NOTE
Wt Readings from Last 3 Encounters:   08/17/22 86 6 kg (191 lb)   08/11/22 87 6 kg (193 lb 3 2 oz)   07/09/22 86 2 kg (190 lb)     Patient has lost 2 lbs since his office visit last week  He has increased the Torsemide dose to 40 mg BID as advised  His has notices and improvement in his symptoms since the dose change  Patient has also decreased the amount of salt intake  BMP and CBC labs are pending  Plan:  1) Continue salt restriction 2 g and water restriction to 2 L a day  2) Checking and logging daily weights, if there is a 2 lb increase in weight in a day or a 5 lb increase in a week then to take one additional 20 mg of Toresmide (total 60 mg in am) for one day  3) Checking and logging daily BP reading, preferably around the same time in the day  Bring BP log to follow up appointment  4) Increase ambulation as symptomatically tolerated, take deep breaths while ambulating and when sitting down after ambulation  Will follow up on CBC, BMP and update patient over the phone  Has an appointment with [facility]ologist at [facility] this afternoon  Will follow up in a month

## 2022-08-18 ENCOUNTER — TELEPHONE (OUTPATIENT)
Dept: INTERNAL MEDICINE CLINIC | Facility: CLINIC | Age: 87
End: 2022-08-18

## 2022-08-18 NOTE — TELEPHONE ENCOUNTER
Called Mr Joe See, spoke with his spouse denise on the phone and updated her on the lab results  His HB had increased from 8 3 to 8 8 and cr is stable 1 54 to 15 8  All questions were answered to the best of my ability

## 2022-08-28 ENCOUNTER — TELEPHONE (OUTPATIENT)
Dept: OTHER | Facility: HOSPITAL | Age: 87
End: 2022-08-28

## 2022-08-28 DIAGNOSIS — D64.9 ANEMIA, UNSPECIFIED TYPE: ICD-10-CM

## 2022-08-28 RX ORDER — FERROUS SULFATE TAB EC 324 MG (65 MG FE EQUIVALENT) 324 (65 FE) MG
324 TABLET DELAYED RESPONSE ORAL EVERY OTHER DAY
Qty: 90 TABLET | Refills: 0 | Status: SHIPPED | OUTPATIENT
Start: 2022-08-28 | End: 2022-09-26 | Stop reason: SDUPTHER

## 2022-09-06 DIAGNOSIS — I50.33 ACUTE ON CHRONIC DIASTOLIC CONGESTIVE HEART FAILURE (HCC): ICD-10-CM

## 2022-09-06 RX ORDER — TORSEMIDE 20 MG/1
40 TABLET ORAL 2 TIMES DAILY
Qty: 60 TABLET | Refills: 1 | Status: SHIPPED | OUTPATIENT
Start: 2022-09-06 | End: 2022-09-07

## 2022-09-06 NOTE — TELEPHONE ENCOUNTER
Catherine Hameed needs refill on torsemide, can they have 90 days supply please send to [facility] in [place]

## 2022-09-06 NOTE — TELEPHONE ENCOUNTER
Wong Carnes has called the [facility] in regards to [employer]  Genevieve Patton stated a refill of torsemide (DEMADEX) 20 mg tablet  Mago Matthews has requested the torsemidebe a 40 mg tablet over 20 mg tablet

## 2022-09-07 DIAGNOSIS — I50.33 ACUTE ON CHRONIC DIASTOLIC CONGESTIVE HEART FAILURE (HCC): Primary | ICD-10-CM

## 2022-09-07 NOTE — TELEPHONE ENCOUNTER
Jasmyn Hameed is asking if corin can have florisimide 40mg it comes in 40 mgs, they didn't pickup torsemide

## 2022-09-07 NOTE — TELEPHONE ENCOUNTER
Attempted to call [facility] in regards to [facility] torsemide  Wished to inform staff to cancel the torsemide 20 mg tablet order from yesterday, 09/06/22, as a new order for torsemide 40 mg had been placed  Unable to leave a message

## 2022-09-12 ENCOUNTER — APPOINTMENT (OUTPATIENT)
Dept: LAB | Facility: CLINIC | Age: 87
End: 2022-09-12
Payer: COMMERCIAL

## 2022-09-14 ENCOUNTER — RA CDI HCC (OUTPATIENT)
Dept: OTHER | Facility: HOSPITAL | Age: 87
End: 2022-09-14

## 2022-09-14 NOTE — PROGRESS NOTES
[facility]  coding opportunities          Chart Reviewed number of suggestions sent to Provider: 1   [id number] [phone]    Patients Insurance     Medicare Insurance: [employer]

## 2022-09-19 ENCOUNTER — TELEPHONE (OUTPATIENT)
Dept: INTERNAL MEDICINE CLINIC | Facility: CLINIC | Age: 87
End: 2022-09-19

## 2022-09-19 NOTE — TELEPHONE ENCOUNTER
Toni Landis has called the  [facility] office in regards to [facility]  Sheba Vaz stated they had received a call from the [address] [place] [address] office regarding Fine's medications  Looked into patient's chart to see no calls from the  office  Asked Sheba Vaz which medications were the call about, which Ryan stated she did not know  Informed Sheba Vaz there were no recent calls from the [address]  Sheba Vaz ended the call soon after

## 2022-09-20 ENCOUNTER — OFFICE VISIT (OUTPATIENT)
Dept: INTERNAL MEDICINE CLINIC | Facility: CLINIC | Age: 87
End: 2022-09-20
Payer: COMMERCIAL

## 2022-09-20 VITALS
TEMPERATURE: 98 F | HEART RATE: 86 BPM | DIASTOLIC BLOOD PRESSURE: 98 MMHG | OXYGEN SATURATION: 96 % | HEIGHT: 66 IN | WEIGHT: 192 LBS | SYSTOLIC BLOOD PRESSURE: 140 MMHG | BODY MASS INDEX: 30.86 KG/M2

## 2022-09-20 DIAGNOSIS — J30.2 SEASONAL ALLERGIC RHINITIS, UNSPECIFIED TRIGGER: ICD-10-CM

## 2022-09-20 DIAGNOSIS — I50.33 ACUTE ON CHRONIC DIASTOLIC CONGESTIVE HEART FAILURE (HCC): Primary | ICD-10-CM

## 2022-09-20 DIAGNOSIS — E11.9 TYPE 2 DIABETES MELLITUS WITHOUT COMPLICATION, WITHOUT LONG-TERM CURRENT USE OF INSULIN (HCC): ICD-10-CM

## 2022-09-20 DIAGNOSIS — D64.9 ANEMIA, UNSPECIFIED TYPE: ICD-10-CM

## 2022-09-20 PROBLEM — J06.9 UPPER RESPIRATORY INFECTION WITH COUGH AND CONGESTION: Status: ACTIVE | Noted: 2022-09-20

## 2022-09-20 PROCEDURE — 3080F DIAST BP >= 90 MM HG: CPT | Performed by: INTERNAL MEDICINE

## 2022-09-20 PROCEDURE — 1160F RVW MEDS BY RX/DR IN RCRD: CPT | Performed by: INTERNAL MEDICINE

## 2022-09-20 PROCEDURE — 99213 OFFICE O/P EST LOW 20 MIN: CPT | Performed by: INTERNAL MEDICINE

## 2022-09-20 PROCEDURE — 3077F SYST BP >= 140 MM HG: CPT | Performed by: INTERNAL MEDICINE

## 2022-09-20 RX ORDER — TORSEMIDE 20 MG/1
40 TABLET ORAL 2 TIMES DAILY
Qty: 360 TABLET | Refills: 0 | Status: SHIPPED | OUTPATIENT
Start: 2022-09-20 | End: 2022-12-19

## 2022-09-20 NOTE — PROGRESS NOTES
INTERNAL MEDICINE FOLLOW-UP OFFICE VISIT  [facility] INTERNAL MEDICINE [place]    NAME: Alberto Haile  AGE: 82 y o  SEX: male  : 1935     DATE: 2022     Assessment and Plan:     1  Acute on chronic diastolic congestive heart failure (HCC)  Assessment & Plan:  Wt Readings from Last 3 Encounters:   22 87 1 kg (192 lb)   22 86 6 kg (191 lb)   22 87 6 kg (193 lb 3 2 oz)     Mr Jag Holder did not  his Torsemide prescription at the pharmacy due to it costing $190 for a 3 month supply  He still has some of his previous prescription left which he has been stretching out by taking 40 mg in the morning and 20 mg in the evening  He states he checked his weight one week after the last follow up appointment which was stable and stopped checking it after that  He ambulated to the restroom and kitchen when needed but is sedentary most of the day  Encouraged him to increase his ambulation and walk with his partner as much tolerated  He stated he will try  Patient given a printed prescription of Torsemide 40 mg BID 90 day supply and advised to use good RX coupon as it shows a lower copayment of around $30 for 3 months supply  He is advised to call the office if there are any issues with getting his prescription filled  Orders:  -     torsemide (DEMADEX) 20 mg tablet; Take 2 tablets (40 mg total) by mouth 2 (two) times a day    2  Type 2 diabetes mellitus without complication, without long-term current use of insulin [facility])  Assessment & Plan:    Lab Results   Component Value Date    HGBA1C 7 9 (H) 2022     Patient states he is compliant with his medications, advised him to continue with lower carbohydrate intake and increase portion size of vegetables  His follow up A1C check is due in November, Ordered A1C level and advised them to get the lab test done before following up in 3 months       Orders:  -     Hemoglobin A1C; Future; Expected date: 12/20/2022    3  Anemia, unspecified type  Assessment & Plan:  Patient was diagnosed with colonic adenocarcinoma from biopsy of a rectal mass earlier this year  Patient does not want to pursue any treatments for the colon cancer  Denies any recent bright red blood per rectum  Stools are dark due to intake of iron supplementation pills  Patient's hemoglobin has been stable around 8 8-8 9  Will repeat CBC in a month to monitor H/H  Orders:  -     CBC and differential; Future; Expected date: 10/20/2022    4  Seasonal allergic rhinitis, unspecified trigger  Assessment & Plan:  Mr Lalo Carbajal complains of rhinorrhea in the evening along with intermittient cough with yellow sputum  Likely due to seasonal  Patient denies any fever or chills  He was started on mucinex a week ago which he has stopped taking  He does not want to take any additional pills  He was advised to try nasal rinse over the counter which he refused  He was advised to take steam at home and apply Vicks vapor rub, he is agreeable to this option  Follow up in 3 months  Chief Complaint:     Chief Complaint   Patient presents with    Follow-up     Torsemide discussion        History of Present Illness:     Mr Lalo Carbajal is a 82year old male with past medical history of HTN, DM2, HLD, GERD, Afib on anticoagulation with Eliquis, CKD stage III and heart failure with preserved ejection fraction on torsemide  Patient presented here today for a follow up regarding his shortness of breath with ambulation, patient has not been taking 40 mg of torsemide BID  Due to the prescription copayment being high he did not  his Torsemide prescription and has been  Taking 40 mg in the am and 20 mg in the evening to stretch out the medications he has at home  He also complains of rhinorrhea in the evening along with intermittent cough with yellow sputum  Patient was started on mucinex a week ago which he has stopped taking   He does not want to take any additional pills  The following portions of the patient's history were reviewed and updated as appropriate: allergies, current medications, past family history, past medical history, past social history, past surgical history and problem list      Review of Systems:     Review of Systems   Constitutional: Negative for activity change, appetite change, chills, fever and unexpected weight change  HENT: Positive for rhinorrhea (in the evenings)  Negative for postnasal drip, sinus pressure and trouble swallowing  Respiratory: Positive for cough  Gastrointestinal: Negative for blood in stool (dark stools due to iron supplements), constipation, diarrhea, nausea and vomiting  Genitourinary: Negative for dysuria, frequency and hematuria  Musculoskeletal: Negative for gait problem  Skin: Negative for pallor and rash  Neurological: Negative for dizziness, tremors, speech difficulty and headaches  Psychiatric/Behavioral: Negative for behavioral problems and confusion  Problem List:     Patient Active Problem List   Diagnosis    Acute on chronic diastolic congestive heart failure (HCC)    Persistent atrial fibrillation (HCC)    Anemia    Stage 3 chronic kidney disease (HCC)    Type 2 diabetes mellitus without complication, without long-term current use of insulin [facility]    [facility]    Cognitive dysfunction    Gastritis    Hypertension    Esophageal reflux    Hypercalcemia    Hypercholesterolemia    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    Shortness of breath    Iron deficiency anemia due to chronic blood loss    Seasonal allergic rhinitis        Objective:     /98 (BP Location: Left arm, Patient Position: Sitting, Cuff Size: Standard)   Pulse 86   Temp 98 °F (36 7 °C) (Tympanic)   Ht 5' 6" (1 676 m)   Wt 87 1 kg (192 lb)   SpO2 96%   BMI 30 99 kg/m²     Physical Exam  Constitutional:       General: He is not in acute distress  Appearance: He is obese  He is not ill-appearing  HENT:      Head: Normocephalic and atraumatic  Nose: Nose normal  No rhinorrhea  Mouth/Throat:      Mouth: Mucous membranes are moist       Pharynx: Oropharynx is clear  Eyes:      General: No scleral icterus  Right eye: No discharge  Left eye: No discharge  Extraocular Movements: Extraocular movements intact  Conjunctiva/sclera: Conjunctivae normal    Cardiovascular:      Rate and Rhythm: Normal rate and regular rhythm  Pulmonary:      Effort: Pulmonary effort is normal  No respiratory distress  Breath sounds: Normal breath sounds  No wheezing or rhonchi  Abdominal:      General: Bowel sounds are normal  There is no distension  Palpations: Abdomen is soft  Tenderness: There is no abdominal tenderness  There is no guarding  Musculoskeletal:         General: Normal range of motion  Right lower leg: Edema (1+) present  Left lower leg: Edema (1+) present  Skin:     Findings: No bruising, erythema or rash  Neurological:      General: No focal deficit present  Mental Status: He is alert and oriented to person, place, and time  Mental status is at baseline  Psychiatric:         Mood and Affect: Mood normal          Behavior: Behavior normal          Thought Content:  Thought content normal          Pertinent Laboratory/Diagnostic Studies:    Laboratory Results: I have personally reviewed the pertinent laboratory results/reports     CBC:   Results from Last 12 Months   Lab Units 09/12/22  0826 08/17/22  1215   WBC Thousand/uL 5 44 6 32   RBC Million/uL 4 12 4 03   HEMOGLOBIN g/dL 8 9* 8 8*   HEMATOCRIT % 31 9* 31 1*   MCV fL 77* 77*   MCH pg 21 6* 21 8*   MCHC g/dL 27 9* 28 3*   RDW % 23 0* 19 7*   MPV fL 11 3 11 6   PLATELETS Thousands/uL 328 356   NRBC AUTO /100 WBCs  --  0   NEUTROS PCT %  --  73   LYMPHS PCT %  --  15   MONOS PCT %  --  9   EOS PCT %  --  1   BASOS PCT %  --  1   NEUTROS ABS Thousands/µL  --  4 71   LYMPHS ABS Thousands/µL  --  0 95   MONOS ABS Thousand/µL  --  0 56   EOS ABS Thousand/µL  --  0 04       Radiology/Other Diagnostic Testing Results: I have personally reviewed pertinent reports         Jermaine Henriquez MD  [facility] INTERNAL MEDICINE [place]

## 2022-09-20 NOTE — ASSESSMENT & PLAN NOTE
Mr Judy Dean complains of rhinorrhea in the evening along with intermittient cough with yellow sputum  Likely due to seasonal  Patient denies any fever or chills  He was started on mucinex a week ago which he has stopped taking  He does not want to take any additional pills  He was advised to try nasal rinse over the counter which he refused  He was advised to take steam at home and apply Vicks vapor rub, he is agreeable to this option

## 2022-09-20 NOTE — ASSESSMENT & PLAN NOTE
Wt Readings from Last 3 Encounters:   09/20/22 87 1 kg (192 lb)   08/17/22 86 6 kg (191 lb)   08/11/22 87 6 kg (193 lb 3 2 oz)     Mr Gigi Lopez did not  his Torsemide prescription at the pharmacy due to it costing $190 for a 3 month supply  He still has some of his previous prescription left which he has been stretching out by taking 40 mg in the morning and 20 mg in the evening  He states he checked his weight one week after the last follow up appointment which was stable and stopped checking it after that  He ambulated to the restroom and kitchen when needed but is sedentary most of the day  Encouraged him to increase his ambulation and walk with his partner as much tolerated  He stated he will try  Patient given a printed prescription of Torsemide 40 mg BID 90 day supply and advised to use good RX coupon as it shows a lower copayment of around $30 for 3 months supply  He is advised to call the office if there are any issues with getting his prescription filled

## 2022-09-20 NOTE — ASSESSMENT & PLAN NOTE
Lab Results   Component Value Date    HGBA1C 7 9 (H) 05/27/2022     Patient states he is compliant with his medications, advised him to continue with lower carbohydrate intake and increase portion size of vegetables  His follow up A1C check is due in November, Ordered A1C level and advised them to get the lab test done before following up in 3 months

## 2022-09-20 NOTE — ASSESSMENT & PLAN NOTE
Patient was diagnosed with colonic adenocarcinoma from biopsy of a rectal mass earlier this year  Patient does not want to pursue any treatments for the colon cancer  Denies any recent bright red blood per rectum  Stools are dark due to intake of iron supplementation pills  Patient's hemoglobin has been stable around 8 8-8 9    Will repeat CBC in a month to monitor H/H

## 2022-09-22 ENCOUNTER — TELEPHONE (OUTPATIENT)
Dept: INTERNAL MEDICINE CLINIC | Facility: CLINIC | Age: 87
End: 2022-09-22

## 2022-09-22 DIAGNOSIS — J40 WHEEZY BRONCHITIS: Primary | ICD-10-CM

## 2022-09-22 DIAGNOSIS — J06.9 UPPER RESPIRATORY INFECTION WITH COUGH AND CONGESTION: Primary | ICD-10-CM

## 2022-09-22 RX ORDER — BENZONATATE 200 MG/1
200 CAPSULE ORAL 3 TIMES DAILY PRN
Qty: 21 CAPSULE | Refills: 0 | Status: SHIPPED | OUTPATIENT
Start: 2022-09-22 | End: 2022-09-29

## 2022-09-22 RX ORDER — CETIRIZINE HYDROCHLORIDE 10 MG/1
10 TABLET ORAL DAILY
Qty: 15 TABLET | Refills: 0 | Status: SHIPPED | OUTPATIENT
Start: 2022-09-22 | End: 2022-10-07

## 2022-09-22 NOTE — PROGRESS NOTES
Patient has cough and nasal congestion productive of yellow sputum  Denies fever,chills, headaches or sinus pressure  Is currently taking mucinex Advised patient to continue mucinex, take steam, and will prescribe tessalon pearls for the cough and zyrtec for the congestion  Patient advised to call back if symptoms worsen or if he develops fever and chills

## 2022-09-22 NOTE — TELEPHONE ENCOUNTER
Kaycee First called and states corin is having a headcold, bringing up yellow suptum, coughing, can he have antibotic , she states prednisone helped him last time?

## 2022-09-23 RX ORDER — METHYLPREDNISOLONE 4 MG/1
TABLET ORAL
Qty: 21 EACH | Refills: 0 | Status: SHIPPED | OUTPATIENT
Start: 2022-09-23

## 2022-09-23 NOTE — TELEPHONE ENCOUNTER
Isabel Kohler called again today and states he is getting worse   She is saying last year the prednisone helped him the best

## 2022-09-26 DIAGNOSIS — K29.01 ACUTE GASTRITIS WITH HEMORRHAGE: ICD-10-CM

## 2022-09-26 DIAGNOSIS — K63.89 COLONIC MASS: ICD-10-CM

## 2022-09-26 DIAGNOSIS — I48.19 PERSISTENT ATRIAL FIBRILLATION (HCC): ICD-10-CM

## 2022-09-26 DIAGNOSIS — D64.9 ANEMIA, UNSPECIFIED TYPE: ICD-10-CM

## 2022-09-26 DIAGNOSIS — E11.9 TYPE 2 DIABETES MELLITUS, WITHOUT LONG-TERM CURRENT USE OF INSULIN (HCC): ICD-10-CM

## 2022-09-26 DIAGNOSIS — I10 HYPERTENSION, UNSPECIFIED TYPE: ICD-10-CM

## 2022-09-26 RX ORDER — METOPROLOL TARTRATE 50 MG/1
50 TABLET, FILM COATED ORAL EVERY 12 HOURS SCHEDULED
Qty: 60 TABLET | Refills: 2 | Status: SHIPPED | OUTPATIENT
Start: 2022-09-26 | End: 2022-10-26

## 2022-09-26 RX ORDER — METFORMIN HYDROCHLORIDE 500 MG/1
500 TABLET, EXTENDED RELEASE ORAL 2 TIMES DAILY WITH MEALS
Qty: 60 TABLET | Refills: 0 | Status: SHIPPED | OUTPATIENT
Start: 2022-09-26 | End: 2022-10-27 | Stop reason: SDUPTHER

## 2022-09-26 RX ORDER — DOCUSATE SODIUM 100 MG/1
100 CAPSULE, LIQUID FILLED ORAL 2 TIMES DAILY
Qty: 60 CAPSULE | Refills: 2 | Status: SHIPPED | OUTPATIENT
Start: 2022-09-26 | End: 2022-10-27 | Stop reason: SDUPTHER

## 2022-09-26 RX ORDER — FERROUS SULFATE TAB EC 324 MG (65 MG FE EQUIVALENT) 324 (65 FE) MG
324 TABLET DELAYED RESPONSE ORAL EVERY OTHER DAY
Qty: 90 TABLET | Refills: 0 | Status: SHIPPED | OUTPATIENT
Start: 2022-09-26 | End: 2022-10-26

## 2022-09-26 RX ORDER — PANTOPRAZOLE SODIUM 40 MG/1
40 TABLET, DELAYED RELEASE ORAL
Qty: 60 TABLET | Refills: 2 | Status: SHIPPED | OUTPATIENT
Start: 2022-09-26 | End: 2022-10-27 | Stop reason: SDUPTHER

## 2022-09-26 NOTE — TELEPHONE ENCOUNTER
Nelia Bill has called the [facility] office in regards to [facility]  Linda Richardson has requested refills of docusate sodium (COLACE) 100 mg capsule, ferrous sulfate 324 (65 Fe) mg tablet, metFORMIN (GLUCOPHAGE-XR) 500 mg 24 hr tablet, metoprolol tartrate (LOPRESSOR) 50 mg tablet, and pantoprazole (PROTONIX) 40 mg tablet  Linda Richardson requested refills to be sent to the [facility] on [address]  Notify  patient his cholesterol prostate are good other labs are okay except his fasting sugar is 106. The cutoff is 100. I put in a hemoglobin A1c. Have him go to the lab on a fasting basis in the next 1 to 2 weeks to check his average sugar over time and see if he is prediabetic or not. I doubt very much.   I will let him know the results

## 2022-10-11 DIAGNOSIS — I48.19 PERSISTENT ATRIAL FIBRILLATION (HCC): ICD-10-CM

## 2022-10-11 DIAGNOSIS — E78.00 HYPERCHOLESTEROLEMIA: ICD-10-CM

## 2022-10-11 DIAGNOSIS — N40.0 BENIGN PROSTATIC HYPERPLASIA, UNSPECIFIED WHETHER LOWER URINARY TRACT SYMPTOMS PRESENT: ICD-10-CM

## 2022-10-11 DIAGNOSIS — I50.32 CHRONIC DIASTOLIC CONGESTIVE HEART FAILURE (HCC): ICD-10-CM

## 2022-10-11 DIAGNOSIS — E11.9 TYPE 2 DIABETES MELLITUS, WITHOUT LONG-TERM CURRENT USE OF INSULIN (HCC): ICD-10-CM

## 2022-10-11 NOTE — TELEPHONE ENCOUNTER
Pancho Vitale needs refills on amiodarone , apixaban, doxazosin, losartan  Pravastatin, please send to [facility]

## 2022-10-12 RX ORDER — DOXAZOSIN 8 MG/1
8 TABLET ORAL
Qty: 90 TABLET | Refills: 1 | Status: SHIPPED | OUTPATIENT
Start: 2022-10-12

## 2022-10-12 RX ORDER — LOSARTAN POTASSIUM 50 MG/1
50 TABLET ORAL DAILY
Qty: 90 TABLET | Refills: 1 | Status: SHIPPED | OUTPATIENT
Start: 2022-10-12

## 2022-10-12 RX ORDER — PRAVASTATIN SODIUM 40 MG
40 TABLET ORAL DAILY
Qty: 90 TABLET | Refills: 1 | Status: SHIPPED | OUTPATIENT
Start: 2022-10-12

## 2022-10-12 RX ORDER — AMIODARONE HYDROCHLORIDE 100 MG/1
100 TABLET ORAL DAILY
Qty: 90 TABLET | Refills: 1 | Status: SHIPPED | OUTPATIENT
Start: 2022-10-12

## 2022-10-27 DIAGNOSIS — E11.9 TYPE 2 DIABETES MELLITUS, WITHOUT LONG-TERM CURRENT USE OF INSULIN (HCC): ICD-10-CM

## 2022-10-27 DIAGNOSIS — K29.01 ACUTE GASTRITIS WITH HEMORRHAGE: ICD-10-CM

## 2022-10-27 DIAGNOSIS — K63.89 COLONIC MASS: ICD-10-CM

## 2022-10-27 RX ORDER — METFORMIN HYDROCHLORIDE 500 MG/1
500 TABLET, EXTENDED RELEASE ORAL 2 TIMES DAILY WITH MEALS
Qty: 60 TABLET | Refills: 2 | Status: SHIPPED | OUTPATIENT
Start: 2022-10-27

## 2022-10-27 RX ORDER — PANTOPRAZOLE SODIUM 40 MG/1
40 TABLET, DELAYED RELEASE ORAL
Qty: 60 TABLET | Refills: 2 | Status: SHIPPED | OUTPATIENT
Start: 2022-10-27

## 2022-10-27 RX ORDER — DOCUSATE SODIUM 100 MG/1
100 CAPSULE, LIQUID FILLED ORAL 2 TIMES DAILY
Qty: 60 CAPSULE | Refills: 2 | Status: SHIPPED | OUTPATIENT
Start: 2022-10-27 | End: 2022-11-26

## 2022-10-27 NOTE — TELEPHONE ENCOUNTER
Maxime Swift has requested refills of docusate sodium (COLACE) 100 mg capsule, metFORMIN (GLUCOPHAGE-XR) 500 mg 24 hr tablet, and pantoprazole (PROTONIX) 40 mg tablet  Would refills be appropriate?

## 2022-11-01 ENCOUNTER — APPOINTMENT (OUTPATIENT)
Dept: LAB | Facility: CLINIC | Age: 87
End: 2022-11-01

## 2022-11-23 ENCOUNTER — TELEPHONE (OUTPATIENT)
Dept: INTERNAL MEDICINE CLINIC | Facility: CLINIC | Age: 87
End: 2022-11-23

## 2022-11-23 NOTE — TELEPHONE ENCOUNTER
Stacey Ruelas has called the [facility] office in regards to [facility]  Delorse Castleman wished to know the hemoglobin  Looked into patient's chart to see CBC and Platelet from 12/96/0017  Informed [facility] the Hemoglobin was 9 5  Delorse Castleman was thankful for the information and ended the call

## 2022-12-02 ENCOUNTER — OFFICE VISIT (OUTPATIENT)
Dept: INTERNAL MEDICINE CLINIC | Facility: CLINIC | Age: 87
End: 2022-12-02

## 2022-12-02 VITALS
HEIGHT: 66 IN | HEART RATE: 81 BPM | WEIGHT: 189 LBS | SYSTOLIC BLOOD PRESSURE: 146 MMHG | DIASTOLIC BLOOD PRESSURE: 72 MMHG | OXYGEN SATURATION: 98 % | TEMPERATURE: 98.6 F | BODY MASS INDEX: 30.37 KG/M2

## 2022-12-02 DIAGNOSIS — R06.02 SHORTNESS OF BREATH: Primary | ICD-10-CM

## 2022-12-02 DIAGNOSIS — D63.1 ANEMIA DUE TO CHRONIC KIDNEY DISEASE, UNSPECIFIED CKD STAGE: ICD-10-CM

## 2022-12-02 DIAGNOSIS — E11.9 TYPE 2 DIABETES MELLITUS WITHOUT COMPLICATION, WITHOUT LONG-TERM CURRENT USE OF INSULIN (HCC): ICD-10-CM

## 2022-12-02 DIAGNOSIS — N18.9 ANEMIA DUE TO CHRONIC KIDNEY DISEASE, UNSPECIFIED CKD STAGE: ICD-10-CM

## 2022-12-02 DIAGNOSIS — N18.31 STAGE 3A CHRONIC KIDNEY DISEASE (HCC): ICD-10-CM

## 2022-12-02 NOTE — ASSESSMENT & PLAN NOTE
Patient is experiencing increased work of breathing with ambulation  Patient has become more deconditioned over the past three months due to being more sedentary throughout the day and ambulating just to the bathroom and back to the recliner in the living room  Had a long discussion with patient and his partner regarding the importance of increasing his activity level by walking daily  Physical therapy and cardiac rehab options were offered and to the patient and benefits of each were discussed which he declined  He stated he will try to increase his activity level first then will revisit this option during the next visit

## 2022-12-02 NOTE — PROGRESS NOTES
Name: Amada Stacy      : 1935      MRN: [id number]  Encounter Provider: Katarina Napoles MD  Encounter Date: 2022   Encounter department: [address] [place] [address]  Shortness of breath  Assessment & Plan:  Patient is experiencing increased work of breathing with ambulation  Patient has become more deconditioned over the past three months due to being more sedentary throughout the day and ambulating just to the bathroom and back to the recliner in the living room  Had a long discussion with patient and his partner regarding the importance of increasing his activity level by walking daily  Physical therapy and cardiac rehab options were offered and to the patient and benefits of each were discussed which he declined  He stated he will try to increase his activity level first then will revisit this option during the next visit  2  Stage 3a chronic kidney disease [facility])  Assessment & Plan:  Lab Results   Component Value Date    EGFR 39 2022    EGFR 40 2022    EGFR 42 2022    CREATININE 1 58 (H) 2022    CREATININE 1 54 (H) 2022    CREATININE 1 49 (H) 2022   Last CMP at [facility] in September shows cr of 1 13 and GFR of 64  Patient's Kidney function is stable  As per patient he is having good urinary output response to torsemide  Will check BMP and miroalbumin/creatinine ratio     Orders:  -     Basic metabolic panel; Future    3  Type 2 diabetes mellitus without complication, without long-term current use of insulin (HCC)  Assessment & Plan:    Lab Results   Component Value Date    HGBA1C 7 9 (H) 2022     Patient does not check BG at home, is compliant with metformin  Last A1C was in May will get repeat A1C to evaluate BG control over the last 3 months  Orders:  -     Microalbumin / creatinine urine ratio    4   Anemia due to chronic kidney disease, unspecified CKD stage  Assessment & Plan:  Patients HB level has been trending up since earlier this year and now has stabilized around 9 5 on 11/1  Continue Ferrous sulfate 324 mg  Will repeat CBC           Subjective      Mr Arlin Cole is a 82 Y  O  male with a history of CHF, Anemia, Colon cancer, and CKD who presents for a 3 month follow up  Overtime he has noticed increased work of breathing with ambulation, this is likely secondary to deconditioning  As per partner he has become more sedentary only ambulates to the bathroom and back to the recliner in the living room  Patient was seen in the [facility] ED in September with SOB and cough productive of yellow sputum, his bnp was elevated he felt symptomatic improvement with an extra dose of torsemide and signed out AMA from ED  He has lost 3 pounds since his last visit and appears euvolemic on physical exam  Patient denies fevers, chills he still has intermittent cough productive of whitish sputum  Review of Systems   Constitutional: Positive for activity change  Negative for appetite change, chills and fever  HENT: Negative for congestion, postnasal drip, rhinorrhea and sore throat  Respiratory: Positive for cough (productive of whitish sputum) and shortness of breath (with ambulation)  Negative for choking and chest tightness  Cardiovascular: Positive for leg swelling  Negative for chest pain  Gastrointestinal: Negative for abdominal distention, abdominal pain, constipation and diarrhea  Endocrine: Positive for polyuria  Genitourinary: Negative for difficulty urinating, frequency and hematuria  Musculoskeletal: Negative for arthralgias and gait problem  Neurological: Negative for headaches         Current Outpatient Medications on File Prior to Visit   Medication Sig   • amiodarone 100 mg tablet Take 1 tablet (100 mg total) by mouth daily   • apixaban (Eliquis) 5 mg Take 1 tablet (5 mg total) by mouth 2 (two) times a day   • losartan (COZAAR) 50 mg tablet Take 1 tablet (50 mg total) by mouth daily   • metFORMIN (GLUCOPHAGE-XR) 500 mg 24 hr tablet Take 1 tablet (500 mg total) by mouth 2 (two) times a day with meals   • methylPREDNISolone 4 MG tablet therapy pack Use as directed on package   • pantoprazole (PROTONIX) 40 mg tablet Take 1 tablet (40 mg total) by mouth 2 (two) times a day before meals   • pravastatin (PRAVACHOL) 40 mg tablet Take 1 tablet (40 mg total) by mouth daily   • torsemide (DEMADEX) 20 mg tablet Take 2 tablets (40 mg total) by mouth 2 (two) times a day   • cetirizine (ZyrTEC) 10 mg tablet Take 1 tablet (10 mg total) by mouth daily for 15 days   • docusate sodium (COLACE) 100 mg capsule Take 1 capsule (100 mg total) by mouth 2 (two) times a day   • doxazosin (CARDURA) 8 MG tablet Take 1 tablet (8 mg total) by mouth daily at bedtime (Patient not taking: Reported on 12/2/2022)   • ferrous sulfate 324 (65 Fe) mg Take 1 tablet (324 mg total) by mouth every other day   • metoprolol tartrate (LOPRESSOR) 50 mg tablet Take 1 tablet (50 mg total) by mouth every 12 (twelve) hours       Objective     /72 (BP Location: Left arm, Patient Position: Sitting, Cuff Size: Standard)   Pulse 81   Temp 98 6 °F (37 °C) (Tympanic)   Ht 5' 6" (1 676 m)   Wt 85 7 kg (189 lb)   SpO2 98%   BMI 30 51 kg/m²     Physical Exam  Vitals reviewed  Constitutional:       Appearance: Normal appearance  He is obese  HENT:      Head: Normocephalic and atraumatic  Nose: Nose normal  No congestion or rhinorrhea  Mouth/Throat:      Mouth: Mucous membranes are moist       Pharynx: Oropharynx is clear  Eyes:      General:         Right eye: No discharge  Left eye: No discharge  Extraocular Movements: Extraocular movements intact  Conjunctiva/sclera: Conjunctivae normal    Cardiovascular:      Rate and Rhythm: Normal rate and regular rhythm  Pulmonary:      Effort: Pulmonary effort is normal  No respiratory distress  Breath sounds: Normal breath sounds  No wheezing     Abdominal:      General: Bowel sounds are normal  There is no distension  Palpations: Abdomen is soft  There is no mass  Tenderness: There is no abdominal tenderness  Musculoskeletal:      Right lower leg: Edema (trace) present  Left lower leg: Edema (trace) present  Skin:     General: Skin is warm  Coloration: Skin is not jaundiced or pale  Neurological:      General: No focal deficit present  Mental Status: He is alert and oriented to person, place, and time  Mental status is at baseline         Isela Ly MD

## 2022-12-02 NOTE — ASSESSMENT & PLAN NOTE
Lab Results   Component Value Date    HGBA1C 7 9 (H) 05/27/2022     Patient does not check BG at home, is compliant with metformin  Last A1C was in May will get repeat A1C to evaluate BG control over the last 3 months

## 2022-12-02 NOTE — ASSESSMENT & PLAN NOTE
Patients HB level has been trending up since earlier this year and now has stabilized around 9 5 on 11/1      Continue Ferrous sulfate 324 mg  Will repeat CBC

## 2022-12-02 NOTE — ASSESSMENT & PLAN NOTE
Lab Results   Component Value Date    EGFR 39 08/17/2022    EGFR 40 07/13/2022    EGFR 42 04/11/2022    CREATININE 1 58 (H) 08/17/2022    CREATININE 1 54 (H) 07/13/2022    CREATININE 1 49 (H) 04/11/2022   Last CMP at [facility] in September shows cr of 1 13 and GFR of 64  Patient's Kidney function is stable  As per patient he is having good urinary output response to torsemide      Will check BMP and miroalbumin/creatinine ratio

## 2022-12-05 ENCOUNTER — TELEPHONE (OUTPATIENT)
Dept: GASTROENTEROLOGY | Facility: AMBULARY SURGERY CENTER | Age: 87
End: 2022-12-05

## 2022-12-13 ENCOUNTER — TELEPHONE (OUTPATIENT)
Dept: INTERNAL MEDICINE CLINIC | Facility: CLINIC | Age: 87
End: 2022-12-13

## 2022-12-13 ENCOUNTER — OFFICE VISIT (OUTPATIENT)
Dept: INTERNAL MEDICINE CLINIC | Facility: CLINIC | Age: 87
End: 2022-12-13

## 2022-12-13 VITALS
WEIGHT: 189.4 LBS | SYSTOLIC BLOOD PRESSURE: 136 MMHG | OXYGEN SATURATION: 94 % | HEART RATE: 93 BPM | TEMPERATURE: 99.5 F | DIASTOLIC BLOOD PRESSURE: 82 MMHG | BODY MASS INDEX: 30.44 KG/M2 | HEIGHT: 66 IN

## 2022-12-13 DIAGNOSIS — R68.89 FLU-LIKE SYMPTOMS: Primary | ICD-10-CM

## 2022-12-13 PROBLEM — J06.9 URI, ACUTE: Status: ACTIVE | Noted: 2022-12-13

## 2022-12-13 PROBLEM — J06.9 URI, ACUTE: Status: RESOLVED | Noted: 2022-12-13 | Resolved: 2022-12-13

## 2022-12-13 PROBLEM — J40 BRONCHITIS: Status: ACTIVE | Noted: 2022-12-13

## 2022-12-13 RX ORDER — AZITHROMYCIN 250 MG/1
TABLET, FILM COATED ORAL
Qty: 6 TABLET | Refills: 0 | Status: SHIPPED | OUTPATIENT
Start: 2022-12-13 | End: 2022-12-13

## 2022-12-13 RX ORDER — GUAIFENESIN 600 MG/1
600 TABLET, EXTENDED RELEASE ORAL EVERY 12 HOURS SCHEDULED
Qty: 14 TABLET | Refills: 0 | Status: SHIPPED | OUTPATIENT
Start: 2022-12-13 | End: 2022-12-20

## 2022-12-13 RX ORDER — AZITHROMYCIN 250 MG/1
TABLET, FILM COATED ORAL
Qty: 6 TABLET | Refills: 0 | Status: SHIPPED | OUTPATIENT
Start: 2022-12-13 | End: 2022-12-18

## 2022-12-13 RX ORDER — METHYLPREDNISOLONE 4 MG/1
TABLET ORAL
Qty: 21 EACH | Refills: 0 | Status: SHIPPED | OUTPATIENT
Start: 2022-12-13

## 2022-12-13 NOTE — ASSESSMENT & PLAN NOTE
· Patient started with intractable productive cough with yellowish sputum about a teaspoon in quantity and starting Sunday night  Accompanied with fatigue decreased appetite, runny nose, nasal congestion in shortness of breath with exertion however sometimes at rest worsening with intractable cough  · Denies any fever, chills, headaches, sore throat as well as no nausea or vomiting  · Denies any sick contact exposure however visited [facility] Sunday  · Patient up-to-date with COVID-19 and flu vaccine  · Patient endorses similar episode back in September for which he was diagnosed with acute bronchitis and later prescribed with steroid and azithromycin  · Patient have tried Mucinex at home every 12 hours for endorse helped improve symptoms  · Plan  · Patient was advised to get tested for COVID/flu however declined to get tested on this visit given intolerance with test    · Patient was advised to monitor for worsening symptoms as well as monitoring respiratory status  · Will continue with Mucinex every 12 hours  · Medrol Dosepak and Zithromax  · Patient was advised to increase hydration and monitor for worsening shortness of breath and respiratory status  · If symptoms does not improve in the next 48 hours patient was recommended to call at the office or visit the nearest ED for further management and evaluation

## 2022-12-13 NOTE — PROGRESS NOTES
Name: Valeriano Robbins      : 1935      MRN: [id number]  Encounter Provider: Daniele Gorman MD  Encounter Date: 2022   Encounter department: [address] [place] [address]  Flu-like symptoms  Assessment & Plan:  · Patient started with intractable productive cough with yellowish sputum about a teaspoon in quantity and starting  night  Accompanied with fatigue decreased appetite, runny nose, nasal congestion in shortness of breath with exertion however sometimes at rest worsening with intractable cough  · Denies any fever, chills, headaches, sore throat as well as no nausea or vomiting  · Denies any sick contact exposure however visited [facility]   · Patient up-to-date with COVID-19 and flu vaccine  · Patient endorses similar episode back in September for which he was diagnosed with acute bronchitis and later prescribed with steroid and azithromycin  · Patient have tried Mucinex at home every 12 hours for endorse helped improve symptoms  · Plan  · Patient was advised to get tested for COVID/flu however declined to get tested on this visit given intolerance with test    · Patient was advised to monitor for worsening symptoms as well as monitoring respiratory status  · Will continue with Mucinex every 12 hours  · Medrol Dosepak and Zithromax  · Patient was advised to increase hydration and monitor for worsening shortness of breath and respiratory status  · If symptoms does not improve in the next 48 hours patient was recommended to call at the office or visit the nearest ED for further management and evaluation  Orders:  -     guaiFENesin (MUCINEX) 600 mg 12 hr tablet; Take 1 tablet (600 mg total) by mouth every 12 (twelve) hours for 7 days  -     methylPREDNISolone 4 MG tablet therapy pack; Use as directed on package  -     azithromycin (Zithromax) 250 mg tablet;  Take 2 tablets (500 mg total) by mouth daily for 1 day, THEN 1 tablet (250 mg total) daily for 4 days  Subjective      82year old male presents to the office c/o intractable productive cough that started Sunday night  Cough is productive "yellow" color, about a teaspoon in quantity accompanied with runny nose, nasal congestion as well as shortness of breath on exertion and worsening with bouts of cough  Patient denies any fever, chills, headaches, sore throat or chest pain   as well as no nausea or vomiting however endorses fatigue and decrease in appetite  Denies any swelling of lower extremity and no changes in his weight  Patient started with Mucinex last night and this morning with good response  Patient mentions that around September and a similar episode for which at that time visited the emergency department and was prescribed prednisone and azithromycin due to a diagnosis of acute bronchitis with appropriate response  Patient up-to-date with COVID-19 and influenza vaccine however denies any recent test   Denies any sick contacts or recent traveling, although mention that last Sunday visited a [facility]  Review of Systems   Constitutional: Positive for activity change, appetite change and fatigue  Negative for chills, diaphoresis, fever and unexpected weight change  HENT: Positive for congestion, postnasal drip and rhinorrhea  Negative for ear discharge, ear pain, facial swelling, hearing loss, sinus pressure, sinus pain, sneezing, sore throat and trouble swallowing  Eyes: Negative for pain and visual disturbance  Respiratory: Positive for cough and shortness of breath  Negative for chest tightness and wheezing  Cardiovascular: Negative for chest pain, palpitations and leg swelling  Gastrointestinal: Negative for abdominal pain, diarrhea, nausea and vomiting  Genitourinary: Negative for dysuria and hematuria  Musculoskeletal: Negative for arthralgias, back pain and myalgias     Skin: Negative for color change and rash    Neurological: Negative for seizures, syncope and headaches  All other systems reviewed and are negative  Current Outpatient Medications on File Prior to Visit   Medication Sig   • amiodarone 100 mg tablet Take 1 tablet (100 mg total) by mouth daily   • apixaban (Eliquis) 5 mg Take 1 tablet (5 mg total) by mouth 2 (two) times a day   • losartan (COZAAR) 50 mg tablet Take 1 tablet (50 mg total) by mouth daily   • metFORMIN (GLUCOPHAGE-XR) 500 mg 24 hr tablet Take 1 tablet (500 mg total) by mouth 2 (two) times a day with meals   • methylPREDNISolone 4 MG tablet therapy pack Use as directed on package   • pantoprazole (PROTONIX) 40 mg tablet Take 1 tablet (40 mg total) by mouth 2 (two) times a day before meals   • pravastatin (PRAVACHOL) 40 mg tablet Take 1 tablet (40 mg total) by mouth daily   • torsemide (DEMADEX) 20 mg tablet Take 2 tablets (40 mg total) by mouth 2 (two) times a day (Patient taking differently: Take 40 mg by mouth 2 (two) times a day Pt's spouse states he takes 40 mg in the morning and 20 mg at night)   • cetirizine (ZyrTEC) 10 mg tablet Take 1 tablet (10 mg total) by mouth daily for 15 days   • docusate sodium (COLACE) 100 mg capsule Take 1 capsule (100 mg total) by mouth 2 (two) times a day   • doxazosin (CARDURA) 8 MG tablet Take 1 tablet (8 mg total) by mouth daily at bedtime (Patient not taking: Reported on 12/2/2022)   • ferrous sulfate 324 (65 Fe) mg Take 1 tablet (324 mg total) by mouth every other day   • metoprolol tartrate (LOPRESSOR) 50 mg tablet Take 1 tablet (50 mg total) by mouth every 12 (twelve) hours       Objective     /82 (BP Location: Left arm, Patient Position: Sitting, Cuff Size: Standard)   Pulse 93   Temp 99 5 °F (37 5 °C) (Tympanic)   Ht 5' 6" (1 676 m)   Wt 85 9 kg (189 lb 6 4 oz)   SpO2 94%   BMI 30 57 kg/m²     Physical Exam  Vitals and nursing note reviewed  Constitutional:       General: He is not in acute distress       Appearance: Normal appearance  He is not ill-appearing or toxic-appearing  HENT:      Head: Normocephalic and atraumatic  Right Ear: External ear normal       Left Ear: External ear normal       Nose: Congestion and rhinorrhea present  Mouth/Throat:      Mouth: Mucous membranes are moist    Eyes:      Extraocular Movements: Extraocular movements intact  Conjunctiva/sclera: Conjunctivae normal       Pupils: Pupils are equal, round, and reactive to light  Cardiovascular:      Rate and Rhythm: Normal rate and regular rhythm  Pulses: Normal pulses  Heart sounds: Normal heart sounds  Pulmonary:      Effort: Pulmonary effort is normal  No respiratory distress  Breath sounds: Normal breath sounds  No wheezing, rhonchi or rales  Abdominal:      General: Bowel sounds are normal  There is no distension  Tenderness: There is no right CVA tenderness or left CVA tenderness  Musculoskeletal:         General: Normal range of motion  Cervical back: Normal range of motion and neck supple  Right lower leg: No edema  Left lower leg: No edema  Neurological:      General: No focal deficit present  Mental Status: He is alert and oriented to person, place, and time     Psychiatric:         Mood and Affect: Mood normal          Behavior: Behavior normal        Dora Sultana MD

## 2022-12-13 NOTE — TELEPHONE ENCOUNTER
Halle Vera called for corin, he is coughing up a storm and bringing up phelgm, can we bring him in or do you want to do virtual, he will not get tested for covid

## 2022-12-13 NOTE — ASSESSMENT & PLAN NOTE
Wt Readings from Last 3 Encounters:   12/13/22 85 9 kg (189 lb 6 4 oz)   12/02/22 85 7 kg (189 lb)   09/20/22 87 1 kg (192 lb)

## 2022-12-13 NOTE — ASSESSMENT & PLAN NOTE
· Patient started with intractable productive cough with yellowish sputum about a teaspoon in quantity and starting Sunday night  Accompanied with fatigue decreased appetite, runny nose, nasal congestion in shortness of breath with exertion however sometimes at rest worsening with intractable cough  · Denies any fever, chills, headaches, sore throat as well as no nausea or vomiting  · Denies any sick contact exposure however visited [facility] Sunday  · Patient up-to-date with COVID-19 and flu vaccine  · Patient endorses similar episode back in September for which he was diagnosed with acute bronchitis and later prescribed with steroid and azithromycin  · Patient have tried Mucinex at home every 12 hours for endorse helped improve symptoms  · Plan  · Patient was advised to get tested for COVID/flu however declined to get tested on this visit given intolerance with test    · Patient was advised to monitor for worsening symptoms as well as monitoring respiratory status  · Will continue with Mucinex every 12 hours  · Medrol Dosepak and Zithromax  · Patient was advised to increase hydration and monitor for worsening shortness of breath and respiratory status  · If symptoms does not improve in the next 48 hours patient was recommended to call at the office or visit the nearest ED for further management and evaluation

## 2022-12-19 ENCOUNTER — APPOINTMENT (OUTPATIENT)
Dept: LAB | Facility: CLINIC | Age: 87
End: 2022-12-19

## 2022-12-19 DIAGNOSIS — D64.9 ANEMIA, UNSPECIFIED TYPE: ICD-10-CM

## 2022-12-19 DIAGNOSIS — E11.9 TYPE 2 DIABETES MELLITUS WITHOUT COMPLICATION, WITHOUT LONG-TERM CURRENT USE OF INSULIN (HCC): ICD-10-CM

## 2022-12-19 DIAGNOSIS — N18.31 STAGE 3A CHRONIC KIDNEY DISEASE (HCC): ICD-10-CM

## 2022-12-19 LAB
ANION GAP SERPL CALCULATED.3IONS-SCNC: 7 MMOL/L (ref 4–13)
BASOPHILS # BLD AUTO: 0.04 THOUSANDS/ÂΜL (ref 0–0.1)
BASOPHILS NFR BLD AUTO: 0 % (ref 0–1)
BUN SERPL-MCNC: 29 MG/DL (ref 5–25)
CALCIUM SERPL-MCNC: 9 MG/DL (ref 8.3–10.1)
CHLORIDE SERPL-SCNC: 101 MMOL/L (ref 96–108)
CO2 SERPL-SCNC: 28 MMOL/L (ref 21–32)
CREAT SERPL-MCNC: 1.44 MG/DL (ref 0.6–1.3)
CREAT UR-MCNC: 49 MG/DL
EOSINOPHIL # BLD AUTO: 0.09 THOUSAND/ÂΜL (ref 0–0.61)
EOSINOPHIL NFR BLD AUTO: 1 % (ref 0–6)
ERYTHROCYTE [DISTWIDTH] IN BLOOD BY AUTOMATED COUNT: 18.6 % (ref 11.6–15.1)
EST. AVERAGE GLUCOSE BLD GHB EST-MCNC: 200 MG/DL
GFR SERPL CREATININE-BSD FRML MDRD: 43 ML/MIN/1.73SQ M
GLUCOSE P FAST SERPL-MCNC: 149 MG/DL (ref 65–99)
HBA1C MFR BLD: 8.6 %
HCT VFR BLD AUTO: 32.8 % (ref 36.5–49.3)
HGB BLD-MCNC: 9.3 G/DL (ref 12–17)
IMM GRANULOCYTES # BLD AUTO: 0.05 THOUSAND/UL (ref 0–0.2)
IMM GRANULOCYTES NFR BLD AUTO: 1 % (ref 0–2)
LYMPHOCYTES # BLD AUTO: 1.31 THOUSANDS/ÂΜL (ref 0.6–4.47)
LYMPHOCYTES NFR BLD AUTO: 14 % (ref 14–44)
MCH RBC QN AUTO: 21.2 PG (ref 26.8–34.3)
MCHC RBC AUTO-ENTMCNC: 28.4 G/DL (ref 31.4–37.4)
MCV RBC AUTO: 75 FL (ref 82–98)
MICROALBUMIN UR-MCNC: 226 MG/L (ref 0–20)
MICROALBUMIN/CREAT 24H UR: 461 MG/G CREATININE (ref 0–30)
MONOCYTES # BLD AUTO: 0.92 THOUSAND/ÂΜL (ref 0.17–1.22)
MONOCYTES NFR BLD AUTO: 10 % (ref 4–12)
NEUTROPHILS # BLD AUTO: 6.81 THOUSANDS/ÂΜL (ref 1.85–7.62)
NEUTS SEG NFR BLD AUTO: 74 % (ref 43–75)
NRBC BLD AUTO-RTO: 0 /100 WBCS
PLATELET # BLD AUTO: 467 THOUSANDS/UL (ref 149–390)
PMV BLD AUTO: 11.2 FL (ref 8.9–12.7)
POTASSIUM SERPL-SCNC: 3.5 MMOL/L (ref 3.5–5.3)
RBC # BLD AUTO: 4.39 MILLION/UL (ref 3.88–5.62)
SODIUM SERPL-SCNC: 136 MMOL/L (ref 135–147)
WBC # BLD AUTO: 9.22 THOUSAND/UL (ref 4.31–10.16)

## 2022-12-20 ENCOUNTER — TELEPHONE (OUTPATIENT)
Dept: INTERNAL MEDICINE CLINIC | Facility: CLINIC | Age: 87
End: 2022-12-20

## 2022-12-20 NOTE — TELEPHONE ENCOUNTER
Patient came into office and he is still not feeling great  He said the prednisone helped a lot ,but he is still sick

## 2022-12-20 NOTE — TELEPHONE ENCOUNTER
Spoke with patient's wife Bria Poole who reports the patient has been progressively improving with steroids and azithromycin  Wife was wondering if the patient could extend his antibiotic course however I recommended against given his improving symptoms  Should his symptoms persist or fail to continue improving in the next 3-4 days the patient should return to the office for further follow up  Wife was also curious about the patient's Hgb and HbA1c  She and the patient were informed they were 9 3 and 8 6, respectively

## 2022-12-29 DIAGNOSIS — I48.19 PERSISTENT ATRIAL FIBRILLATION (HCC): ICD-10-CM

## 2022-12-29 DIAGNOSIS — I10 HYPERTENSION, UNSPECIFIED TYPE: ICD-10-CM

## 2022-12-29 RX ORDER — METOPROLOL TARTRATE 50 MG/1
50 TABLET, FILM COATED ORAL EVERY 12 HOURS SCHEDULED
Qty: 60 TABLET | Refills: 2 | Status: SHIPPED | OUTPATIENT
Start: 2022-12-29 | End: 2023-01-28

## 2022-12-29 NOTE — TELEPHONE ENCOUNTER
Trina Borja called patient needs refill on metoprolo  Tartrate, please send to [facility]

## 2023-01-01 ENCOUNTER — HOME CARE VISIT (OUTPATIENT)
Dept: HOME HOSPICE | Facility: HOSPICE | Age: 88
End: 2023-01-01
Payer: MEDICARE

## 2023-01-01 ENCOUNTER — HOME CARE VISIT (OUTPATIENT)
Dept: HOME HEALTH SERVICES | Facility: HOME HEALTHCARE | Age: 88
End: 2023-01-01
Payer: MEDICARE

## 2023-01-01 DIAGNOSIS — R26.2 AMBULATORY DYSFUNCTION: Primary | ICD-10-CM

## 2023-01-01 PROCEDURE — G0156 HHCP-SVS OF AIDE,EA 15 MIN: HCPCS

## 2023-01-13 ENCOUNTER — APPOINTMENT (OUTPATIENT)
Dept: LAB | Facility: CLINIC | Age: 88
End: 2023-01-13

## 2023-01-13 ENCOUNTER — TELEPHONE (OUTPATIENT)
Dept: INTERNAL MEDICINE CLINIC | Facility: CLINIC | Age: 88
End: 2023-01-13

## 2023-01-13 DIAGNOSIS — N18.9 ANEMIA DUE TO CHRONIC KIDNEY DISEASE, UNSPECIFIED CKD STAGE: ICD-10-CM

## 2023-01-13 DIAGNOSIS — D63.1 ANEMIA DUE TO CHRONIC KIDNEY DISEASE, UNSPECIFIED CKD STAGE: ICD-10-CM

## 2023-01-13 DIAGNOSIS — C18.9 COLON ADENOCARCINOMA (HCC): Primary | ICD-10-CM

## 2023-01-13 NOTE — TELEPHONE ENCOUNTER
I called Mr Fine to discuss his current CBC results  His hemoglobin has dropped from 9 3 to 8 over the course of 3 weeks  He  denies any blood per rectum, bloody stools or dark stools, or hematuria  He does continue to have cough productive of whitish and pale yellow sputum  He states he has been more short of breath with ambulation over the last month and feels dizzy intermittently  Mr iGgi Lopez handed off the phone to Ms  Concepción Dion his partner who proceeded to tell me that he overall looks more symptomatic and she has encouraged him to go to the ED but he is currently refusing  I advised Concepción Dion to take Mr Fine to the nearest ED if his symptoms worsen  If Mr Fine's symptoms remain stable throughout the weekend he will repeat CBC Monday morning  If there is a drop in hemoglobin on Monday we will discuss the need for blood transfusion at that time  Mr Gigi Lopez and Ms Concepción Ashley are in agreement with this plan

## 2023-01-16 ENCOUNTER — APPOINTMENT (OUTPATIENT)
Dept: LAB | Facility: CLINIC | Age: 88
End: 2023-01-16

## 2023-01-16 DIAGNOSIS — D63.1 ANEMIA DUE TO CHRONIC KIDNEY DISEASE, UNSPECIFIED CKD STAGE: ICD-10-CM

## 2023-01-16 DIAGNOSIS — C18.9 COLON ADENOCARCINOMA (HCC): ICD-10-CM

## 2023-01-16 DIAGNOSIS — N18.9 ANEMIA DUE TO CHRONIC KIDNEY DISEASE, UNSPECIFIED CKD STAGE: ICD-10-CM

## 2023-01-16 LAB
BASOPHILS # BLD AUTO: 0.04 THOUSANDS/ÂΜL (ref 0–0.1)
BASOPHILS NFR BLD AUTO: 1 % (ref 0–1)
EOSINOPHIL # BLD AUTO: 0.04 THOUSAND/ÂΜL (ref 0–0.61)
EOSINOPHIL NFR BLD AUTO: 1 % (ref 0–6)
ERYTHROCYTE [DISTWIDTH] IN BLOOD BY AUTOMATED COUNT: 25.2 % (ref 11.6–15.1)
HCT VFR BLD AUTO: 29.4 % (ref 36.5–49.3)
HGB BLD-MCNC: 8.5 G/DL (ref 12–17)
IMM GRANULOCYTES # BLD AUTO: 0.02 THOUSAND/UL (ref 0–0.2)
IMM GRANULOCYTES NFR BLD AUTO: 0 % (ref 0–2)
LYMPHOCYTES # BLD AUTO: 0.87 THOUSANDS/ÂΜL (ref 0.6–4.47)
LYMPHOCYTES NFR BLD AUTO: 14 % (ref 14–44)
MCH RBC QN AUTO: 22.4 PG (ref 26.8–34.3)
MCHC RBC AUTO-ENTMCNC: 28.9 G/DL (ref 31.4–37.4)
MCV RBC AUTO: 78 FL (ref 82–98)
MONOCYTES # BLD AUTO: 0.67 THOUSAND/ÂΜL (ref 0.17–1.22)
MONOCYTES NFR BLD AUTO: 11 % (ref 4–12)
NEUTROPHILS # BLD AUTO: 4.77 THOUSANDS/ÂΜL (ref 1.85–7.62)
NEUTS SEG NFR BLD AUTO: 73 % (ref 43–75)
NRBC BLD AUTO-RTO: 0 /100 WBCS
PLATELET # BLD AUTO: 415 THOUSANDS/UL (ref 149–390)
PMV BLD AUTO: 11 FL (ref 8.9–12.7)
RBC # BLD AUTO: 3.79 MILLION/UL (ref 3.88–5.62)
WBC # BLD AUTO: 6.41 THOUSAND/UL (ref 4.31–10.16)

## 2023-01-17 DIAGNOSIS — R06.02 SHORTNESS OF BREATH: Primary | ICD-10-CM

## 2023-01-17 NOTE — TELEPHONE ENCOUNTER
Redd Clayton came in today , he is asking for chest xray, difficult to breath   He had his cbc on monday

## 2023-01-23 DIAGNOSIS — I48.19 PERSISTENT ATRIAL FIBRILLATION (HCC): ICD-10-CM

## 2023-01-23 DIAGNOSIS — I10 HYPERTENSION, UNSPECIFIED TYPE: ICD-10-CM

## 2023-01-23 DIAGNOSIS — K29.01 ACUTE GASTRITIS WITH HEMORRHAGE: ICD-10-CM

## 2023-01-23 DIAGNOSIS — E11.9 TYPE 2 DIABETES MELLITUS, WITHOUT LONG-TERM CURRENT USE OF INSULIN (HCC): ICD-10-CM

## 2023-01-23 DIAGNOSIS — K63.89 COLONIC MASS: ICD-10-CM

## 2023-01-24 ENCOUNTER — APPOINTMENT (OUTPATIENT)
Dept: LAB | Facility: CLINIC | Age: 88
End: 2023-01-24

## 2023-01-24 ENCOUNTER — APPOINTMENT (OUTPATIENT)
Dept: RADIOLOGY | Facility: CLINIC | Age: 88
End: 2023-01-24

## 2023-01-24 DIAGNOSIS — R06.02 SHORTNESS OF BREATH: ICD-10-CM

## 2023-01-24 DIAGNOSIS — D64.9 ANEMIA, UNSPECIFIED TYPE: ICD-10-CM

## 2023-01-24 LAB
ERYTHROCYTE [DISTWIDTH] IN BLOOD BY AUTOMATED COUNT: 24.9 % (ref 11.6–15.1)
HCT VFR BLD AUTO: 31.3 % (ref 36.5–49.3)
HGB BLD-MCNC: 8.7 G/DL (ref 12–17)
MCH RBC QN AUTO: 22.4 PG (ref 26.8–34.3)
MCHC RBC AUTO-ENTMCNC: 27.8 G/DL (ref 31.4–37.4)
MCV RBC AUTO: 81 FL (ref 82–98)
PLATELET # BLD AUTO: 286 THOUSANDS/UL (ref 149–390)
PMV BLD AUTO: 11.4 FL (ref 8.9–12.7)
RBC # BLD AUTO: 3.89 MILLION/UL (ref 3.88–5.62)
WBC # BLD AUTO: 6.07 THOUSAND/UL (ref 4.31–10.16)

## 2023-01-24 RX ORDER — METOPROLOL TARTRATE 50 MG/1
50 TABLET, FILM COATED ORAL EVERY 12 HOURS SCHEDULED
Qty: 60 TABLET | Refills: 2 | Status: SHIPPED | OUTPATIENT
Start: 2023-01-24 | End: 2023-02-23

## 2023-01-24 RX ORDER — DOCUSATE SODIUM 100 MG/1
100 CAPSULE, LIQUID FILLED ORAL 2 TIMES DAILY
Qty: 60 CAPSULE | Refills: 2 | Status: SHIPPED | OUTPATIENT
Start: 2023-01-24 | End: 2023-02-23

## 2023-01-24 RX ORDER — PANTOPRAZOLE SODIUM 40 MG/1
40 TABLET, DELAYED RELEASE ORAL
Qty: 60 TABLET | Refills: 1 | Status: SHIPPED | OUTPATIENT
Start: 2023-01-24

## 2023-01-24 RX ORDER — METFORMIN HYDROCHLORIDE 500 MG/1
500 TABLET, EXTENDED RELEASE ORAL 2 TIMES DAILY WITH MEALS
Qty: 60 TABLET | Refills: 2 | Status: SHIPPED | OUTPATIENT
Start: 2023-01-24

## 2023-01-25 ENCOUNTER — RA CDI HCC (OUTPATIENT)
Dept: OTHER | Facility: HOSPITAL | Age: 88
End: 2023-01-25

## 2023-01-25 NOTE — PROGRESS NOTES
[address]  coding opportunities     I13 0 and E11 65     Chart Reviewed number of suggestions sent to Provider: 2     Patients Insurance     Medicare Insurance: [address] [place] [address]

## 2023-01-26 ENCOUNTER — OFFICE VISIT (OUTPATIENT)
Dept: INTERNAL MEDICINE CLINIC | Facility: CLINIC | Age: 88
End: 2023-01-26

## 2023-01-26 ENCOUNTER — TELEPHONE (OUTPATIENT)
Dept: FAMILY MEDICINE CLINIC | Facility: OTHER | Age: 88
End: 2023-01-26

## 2023-01-26 VITALS
DIASTOLIC BLOOD PRESSURE: 78 MMHG | BODY MASS INDEX: 30.96 KG/M2 | SYSTOLIC BLOOD PRESSURE: 160 MMHG | TEMPERATURE: 98.4 F | WEIGHT: 185.8 LBS | OXYGEN SATURATION: 94 % | HEART RATE: 87 BPM | HEIGHT: 65 IN

## 2023-01-26 DIAGNOSIS — C18.9 COLON ADENOCARCINOMA (HCC): ICD-10-CM

## 2023-01-26 DIAGNOSIS — I10 PRIMARY HYPERTENSION: ICD-10-CM

## 2023-01-26 DIAGNOSIS — G93.31 POSTVIRAL SYNDROME: Primary | ICD-10-CM

## 2023-01-26 DIAGNOSIS — E11.9 TYPE 2 DIABETES MELLITUS, WITHOUT LONG-TERM CURRENT USE OF INSULIN (HCC): ICD-10-CM

## 2023-01-26 DIAGNOSIS — D50.0 IRON DEFICIENCY ANEMIA DUE TO CHRONIC BLOOD LOSS: ICD-10-CM

## 2023-01-26 DIAGNOSIS — I50.32 CHRONIC DIASTOLIC CONGESTIVE HEART FAILURE (HCC): ICD-10-CM

## 2023-01-26 RX ORDER — ALBUTEROL SULFATE 90 UG/1
2 AEROSOL, METERED RESPIRATORY (INHALATION) EVERY 6 HOURS PRN
Qty: 18 G | Refills: 2 | Status: SHIPPED | OUTPATIENT
Start: 2023-01-26 | End: 2023-02-25

## 2023-01-26 RX ORDER — LOSARTAN POTASSIUM 25 MG/1
75 TABLET ORAL DAILY
Qty: 90 TABLET | Refills: 2 | Status: SHIPPED | OUTPATIENT
Start: 2023-01-26 | End: 2023-01-31

## 2023-01-26 RX ORDER — GUAIFENESIN 600 MG/1
1200 TABLET, EXTENDED RELEASE ORAL EVERY 12 HOURS SCHEDULED
Qty: 56 TABLET | Refills: 0 | Status: SHIPPED | OUTPATIENT
Start: 2023-01-26 | End: 2023-02-09

## 2023-01-26 RX ORDER — BENZONATATE 100 MG/1
100 CAPSULE ORAL 3 TIMES DAILY PRN
Qty: 20 CAPSULE | Refills: 0 | Status: SHIPPED | OUTPATIENT
Start: 2023-01-26

## 2023-01-26 NOTE — ASSESSMENT & PLAN NOTE
Wt Readings from Last 3 Encounters:   01/26/23 84 3 kg (185 lb 12 8 oz)   12/13/22 85 9 kg (189 lb 6 4 oz)   12/02/22 85 7 kg (189 lb)   History of diastolic CHF, most recent echo ejection fraction 60%, pulmonary hypertension 49  On PE: JVD, positive hepatojugular reflux, no lower extremities edema, patient is compliant with Lasix at home  · Continue his torsemide 40 mg in a m , 20 mg in p m

## 2023-01-26 NOTE — TELEPHONE ENCOUNTER
Kirsten Ask from [facility] called stating the patient's insurance will not pay for 3 - 25 mg tablets  Can they give the patient 1 - 25 mg and 1 - 50 mg tablets? Please advise    Thank you!

## 2023-01-26 NOTE — ASSESSMENT & PLAN NOTE
Hx of URI in mid Dec managed with Abx with improvement  Pt refused Covid test, pt's wife tested positive at the same time  Residual cough and SOB with ADL and walking on the flat earth  Recent CXR showed b/l hilar prominence and congestion, no effusion  Most likely sob is multifactorial (CHF and postviral/postCovid fibrotic lung changes and COPD component not excluded as pt used to be a smoker for many years)    · Tessalon 100 mg TID  · Mucinex 1 tab BID  Ventolin 2 puffs prn Q6  · Honey intake twice a day encouraged  · Palliative care referral provided

## 2023-01-26 NOTE — ASSESSMENT & PLAN NOTE
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

## 2023-01-26 NOTE — ASSESSMENT & PLAN NOTE
Lab Results   Component Value Date    HGBA1C 8 6 (H) 12/19/2022   Patient is not checking blood sugar at home, patient incontinent encouraged to monitor blood sugar  Next A1C check in April  Continue with metformin 5 mg q  24

## 2023-01-26 NOTE — PROGRESS NOTES
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  Currently hemoglobin is stable 8 7 (baseline around 9)    · Palliative care referral was provided verification of goals of care and symptomatic management      Type 2 diabetes mellitus, without long-term current use of insulin ([facility])    Lab Results   Component Value Date    HGBA1C 8 6 (H) 12/19/2022   Patient is not checking blood sugar at home, patient incontinent encouraged to monitor blood sugar  Next A1C check in April  Continue with metformin 5 mg q  24    Chronic diastolic congestive heart failure ([facility] [address] )  Wt Readings from Last 3 Encounters:   01/26/23 84 3 kg (185 lb 12 8 oz)   12/13/22 85 9 kg (189 lb 6 4 oz)   12/02/22 85 7 kg (189 lb)   History of diastolic CHF, most recent echo ejection fraction 60%, pulmonary hypertension 49  On PE: JVD, positive hepatojugular reflux, no lower extremities edema, patient is compliant with Lasix at home  · Continue his torsemide 40 mg in a m , 20 mg in p m             Postviral syndrome  Hx of URI in mid Dec managed with Abx with improvement  Pt refused Covid test, pt's wife tested positive at the same time  Residual cough and SOB with ADL and walking on the flat earth  Recent CXR showed b/l hilar prominence and congestion, no effusion  Most likely sob is multifactorial (CHF and postviral/postCovid fibrotic lung changes and COPD component not excluded as pt used to be a smoker for many years)    · Tessalon 100 mg TID  · Mucinex 1 tab BID  Ventolin 2 puffs prn Q6  · Honey intake twice a day encouraged  · Palliative care referral provided         Iron deficiency anemia due to chronic blood loss  Hx of Iron deficiency anemia,   Multifactorial  (anemia of chronic disease and colon adenocarcinoma ulcerated mass with chronic mild blood loss)  Current hemoglobin 8 7, MCV 81    · Continue with iron supplements  · Repeat CBC in 3 months       Diagnoses and all orders for this visit:    Postviral syndrome  -     benzonatate (TESSALON PERLES) 100 mg capsule; Take 1 capsule (100 mg total) by mouth 3 (three) times a day as needed for cough  -     guaiFENesin (MUCINEX) 600 mg 12 hr tablet; Take 2 tablets (1,200 mg total) by mouth every 12 (twelve) hours for 14 days  -     albuterol (Ventolin HFA) 90 mcg/act inhaler; Inhale 2 puffs every 6 (six) hours as needed for wheezing  -     Ambulatory Referral to Palliative Care; Future    Colon adenocarcinoma [facility])  -     Ambulatory Referral to Palliative Care; Future  -     CBC and differential; Future    Type 2 diabetes mellitus, without long-term current use of insulin (HCC)  -     losartan (COZAAR) 25 mg tablet; Take 3 tablets (75 mg total) by mouth daily    Chronic diastolic congestive heart failure (HCC)  -     losartan (COZAAR) 25 mg tablet; Take 3 tablets (75 mg total) by mouth daily        Subjective:      Patient ID: Arpita Pretty is a 82 y o  male  A 81 yo m with pmh of HTN, HLD, Afib (on Eliquis), NIDDM T2, CKD 3, gastritis, colon adenocarcinoma present with c/o sob  Pt admits that he has sob with ADL and "putting socks on", is not new and slowly progressing over the last 2 years  Pt admits no acute changes during the last 3 moths  Recent hx of URI in mid Dec managed with antibiotics and symptomatic treatment, patient refused COVID testing at that time but his wife tested positive for COVID  Most of pt's symptoms resolved but shortness of breath and persistent cough is until today   today patient is complaining of shortness of breath on minimal exertion and residual cough with production of small amount of clear sputum  Patient is afebrile  Patient denies chest pain, palpitation, dizziness, nausea, vomiting, diarrhea, constipation, blood in stool , recent falls, unprovoked bleeding and bruisers, focal neurologic deficit or balance problem  No recent weight loss  The following portions of the patient's history were reviewed and updated as appropriate: allergies, current medications, past family history, past medical history, past social history, past surgical history and problem list     Review of Systems   Constitutional: Positive for activity change  Negative for appetite change, chills, fatigue and fever  HENT: Negative for congestion, postnasal drip, rhinorrhea, sore throat and trouble swallowing  Respiratory: Positive for cough and shortness of breath  Cardiovascular: Negative for chest pain, palpitations and leg swelling  Gastrointestinal: Negative for abdominal pain, anal bleeding, diarrhea, nausea and vomiting  Genitourinary: Negative  Skin: Positive for pallor  Psychiatric/Behavioral: Negative  Objective:      /78 (BP Location: Left arm, Patient Position: Sitting, Cuff Size: Large)   Pulse 87   Temp 98 4 °F (36 9 °C) (Tympanic)   Ht 5' 5" (1 651 m)   Wt 84 3 kg (185 lb 12 8 oz)   SpO2 94%   BMI 30 92 kg/m²          Physical Exam  Constitutional:       Appearance: Normal appearance  HENT:      Head: Normocephalic and atraumatic  Comments: Mild JVD   Positive hepatojugular reflex     Nose: Nose normal       Mouth/Throat:      Mouth: Mucous membranes are moist    Eyes:      Conjunctiva/sclera: Conjunctivae normal    Cardiovascular:      Rate and Rhythm: Normal rate  Rhythm irregular  Pulmonary:      Effort: No respiratory distress  Breath sounds: No stridor  Rales present  No wheezing or rhonchi  Comments: Crepitus in the left lower lobe  Chest:      Chest wall: No tenderness     Abdominal: General: Bowel sounds are normal  There is no distension  Palpations: Abdomen is soft  Tenderness: There is no abdominal tenderness  There is no guarding  Musculoskeletal:      Right lower leg: No edema  Left lower leg: No edema  Skin:     Coloration: Skin is pale  Neurological:      Mental Status: He is alert  Mental status is at baseline  Sensory: No sensory deficit        Coordination: Coordination normal       Gait: Gait normal    Psychiatric:         Mood and Affect: Mood normal

## 2023-01-26 NOTE — ASSESSMENT & PLAN NOTE
Hx of Iron deficiency anemia,   Multifactorial  (anemia of chronic disease and colon adenocarcinoma ulcerated mass with chronic mild blood loss)  Current hemoglobin 8 7, MCV 81    · Continue with iron supplements  · Repeat CBC in 3 months

## 2023-01-27 NOTE — TELEPHONE ENCOUNTER
Yes if you can please  I'm back at my regular office today  I was just filling in at [facility]    Thank you!

## 2023-01-31 RX ORDER — LOSARTAN POTASSIUM 25 MG/1
25 TABLET ORAL DAILY
Qty: 30 TABLET | Refills: 2 | Status: SHIPPED | OUTPATIENT
Start: 2023-01-31 | End: 2023-03-02

## 2023-01-31 RX ORDER — LOSARTAN POTASSIUM 50 MG/1
50 TABLET ORAL DAILY
Qty: 90 TABLET | Refills: 0 | Status: SHIPPED | OUTPATIENT
Start: 2023-01-31 | End: 2023-03-02

## 2023-02-08 ENCOUNTER — TELEPHONE (OUTPATIENT)
Dept: GASTROENTEROLOGY | Facility: AMBULARY SURGERY CENTER | Age: 88
End: 2023-02-08

## 2023-02-08 NOTE — TELEPHONE ENCOUNTER
Spoke w/ pt's wife because pt is due for follow up COLONOSCOPY w/ Dr Filiberto aCrrillo wife states she and he do not want any further testing done at this time

## 2023-02-13 ENCOUNTER — TRANSITIONAL CARE MANAGEMENT (OUTPATIENT)
Dept: INTERNAL MEDICINE CLINIC | Facility: CLINIC | Age: 88
End: 2023-02-13

## 2023-02-14 ENCOUNTER — OFFICE VISIT (OUTPATIENT)
Dept: INTERNAL MEDICINE CLINIC | Facility: CLINIC | Age: 88
End: 2023-02-14

## 2023-02-14 VITALS
BODY MASS INDEX: 32.15 KG/M2 | HEART RATE: 92 BPM | SYSTOLIC BLOOD PRESSURE: 121 MMHG | DIASTOLIC BLOOD PRESSURE: 72 MMHG | OXYGEN SATURATION: 96 % | WEIGHT: 193 LBS | TEMPERATURE: 98.4 F | HEIGHT: 65 IN

## 2023-02-14 DIAGNOSIS — S42.201D CLOSED FRACTURE OF PROXIMAL END OF RIGHT HUMERUS WITH ROUTINE HEALING, UNSPECIFIED FRACTURE MORPHOLOGY, SUBSEQUENT ENCOUNTER: Primary | ICD-10-CM

## 2023-02-14 DIAGNOSIS — N18.31 STAGE 3A CHRONIC KIDNEY DISEASE (HCC): ICD-10-CM

## 2023-02-14 DIAGNOSIS — C18.9 COLON ADENOCARCINOMA (HCC): ICD-10-CM

## 2023-02-14 PROBLEM — R68.89 FLU-LIKE SYMPTOMS: Status: RESOLVED | Noted: 2022-12-13 | Resolved: 2023-02-14

## 2023-02-14 PROBLEM — I48.91 ATRIAL FIBRILLATION (HCC): Status: ACTIVE | Noted: 2020-03-10

## 2023-02-14 PROBLEM — R26.2 AMBULATORY DYSFUNCTION: Status: ACTIVE | Noted: 2023-02-11

## 2023-02-14 PROBLEM — G93.31 POSTVIRAL SYNDROME: Status: RESOLVED | Noted: 2023-01-26 | Resolved: 2023-02-14

## 2023-02-14 PROBLEM — S42.309A HUMERUS FRACTURE: Status: ACTIVE | Noted: 2023-02-09

## 2023-02-14 NOTE — ASSESSMENT & PLAN NOTE
Mr Nii Nation presented to the clinic today for transtition of care post hospitalization due to fracture of his right humerus  He tripped and fell while stepping up to the curb on Tuesday 3/7/2023  He landed onto his right arm, he denied hitting his head or losing consciousness  He began to experience increased arm pain over the next few days and required more support from his partner in completing his ADL's  While attempting to get up from a chair to go to the bathroom with support, he slipped from the chair and onto the floor  His partner called EMS and he was taken to the [facility] ED for evaluation  Xray of the RUE on 2/9 showed acute fracture of the proximal humerus involving the surgical neck, no dislocation visualized  Patient's arm is supported in a sling, he is getting bi-weekly physical therapy  As per patient his pain is controlled with extra strength tylenol and use of ice packs to reduce swelling  Patient requested to follow up with [facility] near his residence  Referral sent to [facility] orthopedics  Continue Tylenol as needed for pain, patient counseled on not exceeding more than 3,000 mg tylenol in a day  Continue using the arm sling and bi-weekly physical therapy

## 2023-02-14 NOTE — PROGRESS NOTES
Name: Aye Pritchett      : 1935      MRN: [id number]  Encounter Provider: Leonel Rojo MD  Encounter Date: 2023   Encounter department: [address] [place] [address]  Closed fracture of proximal end of right humerus with routine healing, unspecified fracture morphology, subsequent encounter  Assessment & Plan:  Mr Abdiel Lynn presented to the clinic today for transtition of care post hospitalization due to fracture of his right humerus  He tripped and fell while stepping up to the curb on Tuesday 3/7/2023  He landed onto his right arm, he denied hitting his head or losing consciousness  He began to experience increased arm pain over the next few days and required more support from his partner in completing his ADL's  While attempting to get up from a chair to go to the bathroom with support, he slipped from the chair and onto the floor  His partner called EMS and he was taken to the [facility] ED for evaluation  Xray of the RUE on  showed acute fracture of the proximal humerus involving the surgical neck, no dislocation visualized  Patient's arm is supported in a sling, he is getting bi-weekly physical therapy  As per patient his pain is controlled with extra strength tylenol and use of ice packs to reduce swelling  Patient requested to follow up with [facility] orthopedics near his residence  Referral sent to [facility] orthoped[facility]  Continue Tylenol as needed for pain, patient counseled on not exceeding more than 3,000 mg tylenol in a day  Continue using the arm sling and bi-weekly physical therapy  Orders:  -     Ambulatory Referral to Orthopedic Surgery; Future    2  Colon adenocarcinoma [facility])  Assessment & Plan: With his colon adenocarcinoma and recent fall Mr Abdiel Lynn has been needing more support with is ADL's such as bathing, dressing and using the restroom      He is too weak to stand for prolonged period of time and would need a shower bench with back support and side handles to help transfer  Order placed for Shower Transfer Bench  Follow up in one month with repeat CBC to monitor hb levels  CMP to monitor cr and liver function  Orders:  -     CBC and differential; Future; Expected date: 03/14/2023    3  Stage 3a chronic kidney disease [facility])  Assessment & Plan:  Lab Results   Component Value Date    EGFR 43 12/19/2022    EGFR 39 08/17/2022    EGFR 40 07/13/2022    CREATININE 1 44 (H) 12/19/2022    CREATININE 1 58 (H) 08/17/2022    CREATININE 1 54 (H) 07/13/2022     Continue metoprolol 50 mg BID, losartan 50 mg, and Torsemide 40 mg am and 20 mg in evening  Repeat CMP in a month to monitor electrolytes and creatinine level  Orders:  -     Comprehensive metabolic panel; Future; Expected date: 03/14/2023       Subjective      Mr Ela Suarez presented to the office for transition of care post his recent hospitalization due to proximal right humerus fracture  His right arm is supported by a sling and he is receiving bi-weekly physical therapy and using tylenol extra strength for pain control  Review of Systems   Constitutional: Positive for fatigue  Negative for activity change, chills and fever  HENT: Negative for congestion, rhinorrhea and sore throat  Eyes: Negative for visual disturbance  Respiratory: Positive for shortness of breath  Negative for chest tightness  Cardiovascular: Positive for leg swelling  Gastrointestinal: Negative for blood in stool, diarrhea, nausea and vomiting  Genitourinary: Negative for difficulty urinating, hematuria and urgency  Musculoskeletal: Positive for arthralgias  Negative for back pain  Right arm pain, at fracture site   Skin: Positive for color change (bruising of RUE)  Neurological: Negative for dizziness and weakness  Psychiatric/Behavioral: Negative for agitation and confusion         Current Outpatient Medications on File Prior to Visit   Medication Sig   • albuterol (Ventolin HFA) 90 mcg/act inhaler Inhale 2 puffs every 6 (six) hours as needed for wheezing   • amiodarone 100 mg tablet Take 1 tablet (100 mg total) by mouth daily   • apixaban (Eliquis) 5 mg Take 1 tablet (5 mg total) by mouth 2 (two) times a day   • docusate sodium (COLACE) 100 mg capsule Take 1 capsule (100 mg total) by mouth 2 (two) times a day   • doxazosin (CARDURA) 8 MG tablet Take 1 tablet (8 mg total) by mouth daily at bedtime   • ferrous sulfate 324 (65 Fe) mg Take 1 tablet (324 mg total) by mouth every other day   • losartan (COZAAR) 50 mg tablet Take 1 tablet (50 mg total) by mouth daily   • metFORMIN (GLUCOPHAGE-XR) 500 mg 24 hr tablet Take 1 tablet (500 mg total) by mouth 2 (two) times a day with meals   • metoprolol tartrate (LOPRESSOR) 50 mg tablet Take 1 tablet (50 mg total) by mouth every 12 (twelve) hours   • pantoprazole (PROTONIX) 40 mg tablet Take 1 tablet (40 mg total) by mouth 2 (two) times a day before meals   • pravastatin (PRAVACHOL) 40 mg tablet Take 1 tablet (40 mg total) by mouth daily   • torsemide (DEMADEX) 20 mg tablet Take 2 tablets in the morning 40mg and 1 tablet in the evening 20mg   • [DISCONTINUED] benzonatate (TESSALON PERLES) 100 mg capsule Take 1 capsule (100 mg total) by mouth 3 (three) times a day as needed for cough (Patient not taking: Reported on 2/14/2023)   • [DISCONTINUED] cetirizine (ZyrTEC) 10 mg tablet Take 1 tablet (10 mg total) by mouth daily for 15 days (Patient not taking: Reported on 1/26/2023)   • [DISCONTINUED] losartan (COZAAR) 25 mg tablet Take 1 tablet (25 mg total) by mouth daily (Patient not taking: Reported on 2/14/2023)   • [DISCONTINUED] methylPREDNISolone 4 MG tablet therapy pack Use as directed on package (Patient not taking: Reported on 1/26/2023)   • [DISCONTINUED] methylPREDNISolone 4 MG tablet therapy pack Use as directed on package (Patient not taking: Reported on 1/26/2023)       Objective     /72 (BP Location: Left arm, Patient Position: Sitting, Cuff Size: Large)   Pulse 92   Temp 98 4 °F (36 9 °C) (Tympanic)   Ht 5' 5" (1 651 m)   Wt 87 5 kg (193 lb)   SpO2 96%   BMI 32 12 kg/m²     Physical Exam  Constitutional:       General: He is not in acute distress  Appearance: He is obese  He is not ill-appearing  HENT:      Head: Normocephalic and atraumatic  Nose: Nose normal  No congestion or rhinorrhea  Mouth/Throat:      Mouth: Mucous membranes are moist    Eyes:      General: No scleral icterus  Extraocular Movements: Extraocular movements intact  Conjunctiva/sclera: Conjunctivae normal    Cardiovascular:      Rate and Rhythm: Normal rate and regular rhythm  Heart sounds: No murmur heard  Pulmonary:      Effort: Pulmonary effort is normal  No respiratory distress  Breath sounds: No stridor  No wheezing or rhonchi  Abdominal:      General: Bowel sounds are normal       Palpations: Abdomen is soft  Tenderness: There is no abdominal tenderness  There is no guarding  Musculoskeletal:         General: Tenderness (RUE) and signs of injury (right humerus fracture) present  Right lower leg: Edema present  Left lower leg: Edema present  Skin:     General: Skin is warm  Coloration: Skin is not jaundiced  Findings: No bruising or erythema  Neurological:      General: No focal deficit present  Mental Status: He is alert and oriented to person, place, and time  Psychiatric:         Mood and Affect: Mood normal          Behavior: Behavior normal          Thought Content:  Thought content normal        Trae Sandoval MD

## 2023-02-14 NOTE — ASSESSMENT & PLAN NOTE
With his colon adenocarcinoma and recent fall Mr Jean Carlos Toure has been needing more support with is ADL's such as bathing, dressing and using the restroom  He is too weak to stand for prolonged period of time and would need a shower bench with back support and side handles to help transfer  Order placed for Shower Transfer Bench  Follow up in one month with repeat CBC to monitor hb levels  CMP to monitor cr and liver function

## 2023-02-14 NOTE — ASSESSMENT & PLAN NOTE
Lab Results   Component Value Date    EGFR 43 12/19/2022    EGFR 39 08/17/2022    EGFR 40 07/13/2022    CREATININE 1 44 (H) 12/19/2022    CREATININE 1 58 (H) 08/17/2022    CREATININE 1 54 (H) 07/13/2022     Continue metoprolol 50 mg BID, losartan 50 mg, and Torsemide 40 mg am and 20 mg in evening  Repeat CMP in a month to monitor electrolytes and creatinine level

## 2023-02-20 ENCOUNTER — APPOINTMENT (OUTPATIENT)
Dept: LAB | Facility: CLINIC | Age: 88
End: 2023-02-20

## 2023-02-20 DIAGNOSIS — C18.9 COLON ADENOCARCINOMA (HCC): ICD-10-CM

## 2023-02-20 DIAGNOSIS — N18.31 STAGE 3A CHRONIC KIDNEY DISEASE (HCC): ICD-10-CM

## 2023-02-20 LAB
ALBUMIN SERPL BCP-MCNC: 2.9 G/DL (ref 3.5–5)
ALP SERPL-CCNC: 311 U/L (ref 46–116)
ALT SERPL W P-5'-P-CCNC: 23 U/L (ref 12–78)
ANION GAP SERPL CALCULATED.3IONS-SCNC: 7 MMOL/L (ref 4–13)
AST SERPL W P-5'-P-CCNC: 23 U/L (ref 5–45)
BASOPHILS # BLD AUTO: 0.05 THOUSANDS/ÂΜL (ref 0–0.1)
BASOPHILS NFR BLD AUTO: 1 % (ref 0–1)
BILIRUB SERPL-MCNC: 0.95 MG/DL (ref 0.2–1)
BUN SERPL-MCNC: 25 MG/DL (ref 5–25)
CALCIUM ALBUM COR SERPL-MCNC: 10.2 MG/DL (ref 8.3–10.1)
CALCIUM SERPL-MCNC: 9.3 MG/DL (ref 8.3–10.1)
CHLORIDE SERPL-SCNC: 100 MMOL/L (ref 96–108)
CO2 SERPL-SCNC: 26 MMOL/L (ref 21–32)
CREAT SERPL-MCNC: 1.38 MG/DL (ref 0.6–1.3)
EOSINOPHIL # BLD AUTO: 0.09 THOUSAND/ÂΜL (ref 0–0.61)
EOSINOPHIL NFR BLD AUTO: 1 % (ref 0–6)
ERYTHROCYTE [DISTWIDTH] IN BLOOD BY AUTOMATED COUNT: 22.6 % (ref 11.6–15.1)
GFR SERPL CREATININE-BSD FRML MDRD: 46 ML/MIN/1.73SQ M
GLUCOSE P FAST SERPL-MCNC: 142 MG/DL (ref 65–99)
HCT VFR BLD AUTO: 32 % (ref 36.5–49.3)
HGB BLD-MCNC: 9.3 G/DL (ref 12–17)
IMM GRANULOCYTES # BLD AUTO: 0.02 THOUSAND/UL (ref 0–0.2)
IMM GRANULOCYTES NFR BLD AUTO: 0 % (ref 0–2)
LYMPHOCYTES # BLD AUTO: 1.04 THOUSANDS/ÂΜL (ref 0.6–4.47)
LYMPHOCYTES NFR BLD AUTO: 14 % (ref 14–44)
MCH RBC QN AUTO: 23.1 PG (ref 26.8–34.3)
MCHC RBC AUTO-ENTMCNC: 29.1 G/DL (ref 31.4–37.4)
MCV RBC AUTO: 79 FL (ref 82–98)
MONOCYTES # BLD AUTO: 0.7 THOUSAND/ÂΜL (ref 0.17–1.22)
MONOCYTES NFR BLD AUTO: 10 % (ref 4–12)
NEUTROPHILS # BLD AUTO: 5.4 THOUSANDS/ÂΜL (ref 1.85–7.62)
NEUTS SEG NFR BLD AUTO: 74 % (ref 43–75)
NRBC BLD AUTO-RTO: 0 /100 WBCS
PLATELET # BLD AUTO: 483 THOUSANDS/UL (ref 149–390)
PMV BLD AUTO: 10.9 FL (ref 8.9–12.7)
POTASSIUM SERPL-SCNC: 4 MMOL/L (ref 3.5–5.3)
PROT SERPL-MCNC: 6.8 G/DL (ref 6.4–8.4)
RBC # BLD AUTO: 4.03 MILLION/UL (ref 3.88–5.62)
SODIUM SERPL-SCNC: 133 MMOL/L (ref 135–147)
WBC # BLD AUTO: 7.3 THOUSAND/UL (ref 4.31–10.16)

## 2023-02-27 ENCOUNTER — TELEPHONE (OUTPATIENT)
Dept: INTERNAL MEDICINE CLINIC | Facility: CLINIC | Age: 88
End: 2023-02-27

## 2023-02-27 DIAGNOSIS — I50.32 CHRONIC DIASTOLIC CONGESTIVE HEART FAILURE (HCC): Primary | ICD-10-CM

## 2023-02-27 RX ORDER — POTASSIUM CHLORIDE 20 MEQ/1
20 TABLET, EXTENDED RELEASE ORAL DAILY
Qty: 30 TABLET | Refills: 0 | Status: SHIPPED | OUTPATIENT
Start: 2023-02-27 | End: 2023-03-29

## 2023-02-27 NOTE — TELEPHONE ENCOUNTER
40mg in morning and 20 at nite, and still have swelling and calf and knees  What does he need to do?

## 2023-02-27 NOTE — TELEPHONE ENCOUNTER
Spoke with Trina Borja (spouse) and patient on the phone  Patient was instructed to increase torsemide 40mg on the afternoon  Patient will take 40mg am and 40mg afternoon for 7 days Prescription Kdur 20meq was sent as well

## 2023-03-01 ENCOUNTER — TELEPHONE (OUTPATIENT)
Dept: OBGYN CLINIC | Facility: HOSPITAL | Age: 88
End: 2023-03-01

## 2023-03-01 NOTE — TELEPHONE ENCOUNTER
Caller: Kathie-Home health therapist    Doctor: Blanca Monk    Reason for call: Has been working with patient on fist pumps and passive elbow ROM  Wants to know if it is ok to do pendulums?     Call back#: [phone], may leave LM

## 2023-03-01 NOTE — TELEPHONE ENCOUNTER
Called and spoke w/K[facility] Therapist   Pt has surgery at [facility] and did not want to f/u with that provider as he is on [employer]   Pt lives in [place]  So, they are seeing Dr Cherri Marcial on 3/16/23  Informed her that Dr Cherri Marcial has not see the pt in order to give orders as he will be a NP to him  Please contact the ortho surgeon at [facility] to get f/u instructions  She said she will try

## 2023-03-01 NOTE — TELEPHONE ENCOUNTER
I do not see any notes from Leonel Urias in regards to his recent proximal humerus fracture  Appears that he was hospitalized at another network    Please advise them to reach out to network that had seen him in the hospital and hospitalized him for this injury

## 2023-03-16 ENCOUNTER — OFFICE VISIT (OUTPATIENT)
Dept: OBGYN CLINIC | Facility: CLINIC | Age: 88
End: 2023-03-16

## 2023-03-16 ENCOUNTER — APPOINTMENT (OUTPATIENT)
Dept: RADIOLOGY | Facility: AMBULARY SURGERY CENTER | Age: 88
End: 2023-03-16
Attending: ORTHOPAEDIC SURGERY

## 2023-03-16 VITALS
SYSTOLIC BLOOD PRESSURE: 150 MMHG | WEIGHT: 193 LBS | HEART RATE: 71 BPM | HEIGHT: 65 IN | BODY MASS INDEX: 32.15 KG/M2 | DIASTOLIC BLOOD PRESSURE: 77 MMHG

## 2023-03-16 DIAGNOSIS — S42.292A FRACTURE OF HUMERAL HEAD, CLOSED, LEFT, INITIAL ENCOUNTER: Primary | ICD-10-CM

## 2023-03-16 DIAGNOSIS — M25.511 RIGHT SHOULDER PAIN, UNSPECIFIED CHRONICITY: ICD-10-CM

## 2023-03-16 DIAGNOSIS — M25.511 ACUTE PAIN OF RIGHT SHOULDER: ICD-10-CM

## 2023-03-16 NOTE — PROGRESS NOTES
Assessment:  1  Fracture of humeral head, closed, left, initial encounter  XR shoulder 2+ vw right    Ambulatory Referral to Physical Therapy      2  Acute pain of right shoulder  Ambulatory Referral to Orthopedic Surgery    Ambulatory Referral to Physical Therapy        Patient Active Problem List   Diagnosis   • Chronic diastolic congestive heart failure (HCC)   • Atrial fibrillation (HCC)   • Anemia   • Stage 3 chronic kidney disease (HCC)   • Type 2 diabetes mellitus, without long-term current use of insulin (HCC)   • Colon adenocarcinoma (HCC)   • Cognitive dysfunction   • Gastritis   • Hypertension   • Esophageal reflux   • Hypercalcemia   • Hypercholesterolemia   • 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   • Shortness of breath   • Iron deficiency anemia due to chronic blood loss   • Seasonal allergic rhinitis   • Ambulatory dysfunction   • Humerus fracture           Plan      82 y o  male with right humeral head fracture    · X-rays taken and reviewed today  · Discontinue use of sling, non weight bearing through right upper extremity    · Continue physical therapy, a script for outpatient therapy was provided  Focus on range of motion only until 6 weeks from time of injury   · Concern for hematoma of right shoulder, noted by humeral head sitting low on x-ray  Axillary nerve normal on exam   · Follow up in 4 weeks, repeat x-rays          Subjective:     Patient ID:    Chief Complaint:Florencio Jackson Tip or Skip 82 y o  male      HPI    Patient presents to the office today for initial evaluation of right shoulder  Patient fell 2/7/2023 landing directly on his right side  He was seen at [facility] ED where x-rays were taken demonstrating a closed fracture of the proximal end of the right humerus  Patient was placed in a sling and described oxycodone 5 mg every 8 hours as needed for pain  Today the patient states he is using Tylenol to control his pain    He has come out of the sling for elbow range of motion, and has started physical therapy  His pain is in the proximal shoulder, it does not radiate  He denies any numbness or paresthesias  The following portions of the patient's history were reviewed and updated as appropriate: allergies, current medications, past family history, past social history, past surgical history and problem list         Social History     Socioeconomic History   • Marital status: /Civil Union     Spouse name: Not on file   • Number of children: Not on file   • Years of education: Not on file   • Highest education level: Not on file   Occupational History   • Not on file   Tobacco Use   • Smoking status: Former     Types: Cigarettes     Quit date: 1966     Years since quittin 0   • Smokeless tobacco: Never   Vaping Use   • Vaping Use: Never used   Substance and Sexual Activity   • Alcohol use: Yes     Comment: Rarely   • Drug use: Never   • Sexual activity: Not on file   Other Topics Concern   • Not on file   Social History Narrative   • Not on file     Social Determinants of Health     Financial Resource Strain: Low Risk    • Difficulty of Paying Living Expenses: Not hard at all   Food Insecurity: No Food Insecurity   • Worried About Running Out of Food in the Last Year: Never true   • Ran Out of Food in the Last Year: Never true   Transportation Needs: Not on file   Physical Activity: Inactive   • Days of Exercise per Week: 0 days   • Minutes of Exercise per Session: 0 min   Stress: No Stress Concern Present   • Feeling of Stress : Not at all   Social Connections: Unknown   • Frequency of Communication with Friends and Family: Three times a week   • Frequency of Social Gatherings with Friends and Family: Three times a week   • Attends Hoahaoism Services: Never   • Active Member of Clubs or Organizations:  Yes   • Attends Club or Organization Meetings: Never   • Marital Status: Not on file   Intimate Partner Violence: Not At Risk   • Fear of Current or Ex-Partner: No   • Emotionally Abused: No   • Physically Abused: No   • Sexually Abused: No   Housing Stability: Unknown   • Unable to Pay for Housing in the Last Year: No   • Number of Places Lived in the Last Year: Not on file   • Unstable Housing in the Last Year: No     Past Medical History:   Diagnosis Date   • A-fib ([facility] [address] )    • Di[facility]    • Flu-like symptoms 12/13/2022   • HTN (hypertension)    • Postviral syndrome 1/26/2023     Past Surgical History:   Procedure Laterality Date   • EXPLORATORY LAPAROTOMY      with gastric ulcer repair     No Known Allergies  Current Outpatient Medications on File Prior to Visit   Medication Sig Dispense Refill   • amiodarone 100 mg tablet Take 1 tablet (100 mg total) by mouth daily 90 tablet 1   • apixaban (Eliquis) 5 mg Take 1 tablet (5 mg total) by mouth 2 (two) times a day 180 tablet 1   • doxazosin (CARDURA) 8 MG tablet Take 1 tablet (8 mg total) by mouth daily at bedtime 90 tablet 1   • metFORMIN (GLUCOPHAGE-XR) 500 mg 24 hr tablet Take 1 tablet (500 mg total) by mouth 2 (two) times a day with meals 60 tablet 2   • pantoprazole (PROTONIX) 40 mg tablet Take 1 tablet (40 mg total) by mouth 2 (two) times a day before meals 60 tablet 1   • potassium chloride (K-DUR,KLOR-CON) 20 mEq tablet Take 1 tablet (20 mEq total) by mouth daily 30 tablet 0   • pravastatin (PRAVACHOL) 40 mg tablet Take 1 tablet (40 mg total) by mouth daily 90 tablet 1   • torsemide (DEMADEX) 20 mg tablet Take 2 tablets in the morning 40mg and 1 tablet in the evening 20mg 270 tablet 2   • docusate sodium (COLACE) 100 mg capsule Take 1 capsule (100 mg total) by mouth 2 (two) times a day 60 capsule 2   • ferrous sulfate 324 (65 Fe) mg Take 1 tablet (324 mg total) by mouth every other day 90 tablet 0   • losartan (COZAAR) 50 mg tablet Take 1 tablet (50 mg total) by mouth daily 90 tablet 0   • metoprolol tartrate (LOPRESSOR) 50 mg tablet Take 1 tablet (50 mg total) by mouth every 12 (twelve) hours 60 tablet 2     No current facility-administered medications on file prior to visit  Objective:    Review of Systems   Constitutional: Negative for chills and fever  HENT: Negative for ear pain and sore throat  Eyes: Negative for pain and visual disturbance  Respiratory: Negative for cough and shortness of breath  Cardiovascular: Negative for chest pain and palpitations  Gastrointestinal: Negative for abdominal pain and vomiting  Genitourinary: Negative for dysuria and hematuria  Musculoskeletal: Negative for arthralgias and back pain  Skin: Negative for color change and rash  Neurological: Negative for seizures and syncope  All other systems reviewed and are negative  Left Shoulder Exam     Range of Motion   Forward flexion: 30     Other   Erythema: absent  Sensation: normal  Pulse: present     Comments:  (-) police man's patch   Deltoid is firing on exam             Physical Exam  Vitals and nursing note reviewed  Constitutional:       Appearance: Normal appearance  HENT:      Head: Normocephalic  Eyes:      Extraocular Movements: Extraocular movements intact  Cardiovascular:      Rate and Rhythm: Normal rate  Pulses: Normal pulses  Pulmonary:      Effort: Pulmonary effort is normal    Musculoskeletal:         General: Normal range of motion  Cervical back: Normal range of motion  Skin:     General: Skin is warm and dry  Neurological:      General: No focal deficit present  Mental Status: He is alert  Psychiatric:         Behavior: Behavior normal          Procedures  No Procedures performed today    I have personally reviewed pertinent films in PACS      Scribe Attestation    I,:  Katelyn Loyola am acting as a scribe while in the presence of the attending physician :       I,:  Juan Del Rosario DO personally performed the services described in this documentation    as scribed in my presence :           Portions of the record may have been created with voice recognition software   Occasional wrong word or "sound a like" substitutions may have occurred due to the inherent limitations of voice recognition software   Read the chart carefully and recognize, using context, where substitutions have occurred

## 2023-03-17 DIAGNOSIS — I48.19 PERSISTENT ATRIAL FIBRILLATION (HCC): ICD-10-CM

## 2023-03-17 DIAGNOSIS — E78.00 HYPERCHOLESTEROLEMIA: ICD-10-CM

## 2023-03-17 DIAGNOSIS — N40.0 BENIGN PROSTATIC HYPERPLASIA, UNSPECIFIED WHETHER LOWER URINARY TRACT SYMPTOMS PRESENT: ICD-10-CM

## 2023-03-17 RX ORDER — DOXAZOSIN 8 MG/1
TABLET ORAL
Qty: 90 TABLET | Refills: 1 | Status: SHIPPED | OUTPATIENT
Start: 2023-03-17

## 2023-03-17 RX ORDER — APIXABAN 5 MG/1
TABLET, FILM COATED ORAL
Qty: 180 TABLET | Refills: 1 | Status: SHIPPED | OUTPATIENT
Start: 2023-03-17

## 2023-03-17 RX ORDER — PRAVASTATIN SODIUM 40 MG
TABLET ORAL
Qty: 90 TABLET | Refills: 1 | Status: SHIPPED | OUTPATIENT
Start: 2023-03-17

## 2023-03-20 ENCOUNTER — OFFICE VISIT (OUTPATIENT)
Dept: INTERNAL MEDICINE CLINIC | Facility: CLINIC | Age: 88
End: 2023-03-20

## 2023-03-20 VITALS
OXYGEN SATURATION: 91 % | BODY MASS INDEX: 30.15 KG/M2 | TEMPERATURE: 97.8 F | DIASTOLIC BLOOD PRESSURE: 63 MMHG | HEART RATE: 82 BPM | WEIGHT: 187.6 LBS | SYSTOLIC BLOOD PRESSURE: 125 MMHG | HEIGHT: 66 IN

## 2023-03-20 DIAGNOSIS — N18.9 ANEMIA DUE TO CHRONIC KIDNEY DISEASE, UNSPECIFIED CKD STAGE: ICD-10-CM

## 2023-03-20 DIAGNOSIS — R60.0 BILATERAL LOWER EXTREMITY EDEMA: ICD-10-CM

## 2023-03-20 DIAGNOSIS — N18.32 TYPE 2 DIABETES MELLITUS WITH STAGE 3B CHRONIC KIDNEY DISEASE, WITHOUT LONG-TERM CURRENT USE OF INSULIN (HCC): Primary | ICD-10-CM

## 2023-03-20 DIAGNOSIS — D63.1 ANEMIA DUE TO CHRONIC KIDNEY DISEASE, UNSPECIFIED CKD STAGE: ICD-10-CM

## 2023-03-20 DIAGNOSIS — E11.22 TYPE 2 DIABETES MELLITUS WITH STAGE 3B CHRONIC KIDNEY DISEASE, WITHOUT LONG-TERM CURRENT USE OF INSULIN (HCC): Primary | ICD-10-CM

## 2023-03-20 NOTE — PROGRESS NOTES
Name: Bailey Mcekon      : 1935      MRN: [id number]  Encounter Provider: Marcy Silva MD  Encounter Date: 3/20/2023   Encounter department: [address] [place] [address]  Type 2 diabetes mellitus with stage 3b chronic kidney disease, without long-term current use of insulin ([facility])  Assessment & Plan:    Lab Results   Component Value Date    HGBA1C 7 8 (H) 2023     Patient had noticed increased thirst and has checked sugar sporadically and is found to be around 250, recent CMP (which was done after a 12 hr fast as per patient) sugars of 142 and his most recent A1C in  is 7 8  Will increase Metformin dose to 1,000 mg BID  Follow up in a month  Orders:  -     metFORMIN (GLUCOPHAGE) 1000 MG tablet; Take 1 tablet (1,000 mg total) by mouth 2 (two) times a day with meals    2  Bilateral lower extremity edema  Assessment & Plan:  Patient present with increasing bilateral lower extremity edema with recent venous stasis changes and pruritis for about 3 weeks  Patient states he is compliant with his torsemide dose of 40 mg in the am and 20 mg in the evening  He also is limiting his sodium intake  Patient states that since his arm injury he has unable to use the lever on the side of his recliner to prop up his feet  As he spends most of his day and also sleeps in the recliner during the night (due to chronic back pain) his feet are constantly prone to the effect of gravity and may be contributing to the increase in his BL LE edema  Patient advised to use ESTEBAN stockings if they are difficult to put on, then he should use Ace bandage to wrap his lower extremities from feet to the knees  Ace wrap was applied to the patient's lower extremities in the office and a few extra were given to him to use at home  He is also advised to elevate his legs on a foot rest/chair and take an extra 20 mg dose of torsemide in the afternoon for the next 2-3 days   Patient advised to contact the office if the edema persists  3  Anemia due to chronic kidney disease, unspecified CKD stage  Assessment & Plan:  Patient's anemia is currently improved and stable at 9 3  As per patient's partner he has intermittent small amounts of blood loss in the stool  He has a history of colon cancer with metastasis for which he is refusing any treatment  Continue Fe sulfate 324 mg every other day  Repeat CBC in one month  Subjective      Lyndsey Leone is a 68-year-old male with past medical history of diabetes, CKD, anemia, CHF, colon cancer with metastasis who presents for a follow-up appointment  He reports an increase in bilateral lower extremity edema due constantly sitting with his feet on the ground  He states he is unable to use his right arm to pull the lever on the side of his recliner to open the foot rest   Patient reports some improvement in his arm pain post fracture but has restriction of motion in the right arm  He continues to practice range of motion exercises and will be starting physical therapy next week  He has also noted feeling excessively thirsty and intermittent BG checks so elevation around 250  Patient does state he has an improvement in his breathing is encouraged to increase his ambulation and use a cane for support (which he refused)  He denies any fevers, chills, chest pain, nausea, vomiting or abdominal pain  Review of Systems   Constitutional: Negative for activity change, appetite change, chills and fever  HENT: Positive for rhinorrhea  Negative for congestion, postnasal drip, sinus pain and sore throat  Respiratory: Negative for cough, chest tightness, shortness of breath and wheezing  Cardiovascular: Negative for chest pain, palpitations and leg swelling  Gastrointestinal: Positive for constipation (intermittent)  Negative for abdominal distention, abdominal pain, nausea and vomiting  Endocrine: Positive for polydipsia and polyuria  Genitourinary: Negative for dysuria  Musculoskeletal: Positive for arthralgias (right shoulder)  Skin: Positive for color change (both legs)  Negative for wound  Neurological: Negative for dizziness, syncope, weakness, light-headedness and headaches  Psychiatric/Behavioral: Negative for agitation, behavioral problems and confusion  Current Outpatient Medications on File Prior to Visit   Medication Sig   • amiodarone 100 mg tablet Take 1 tablet (100 mg total) by mouth daily   • doxazosin (CARDURA) 8 MG tablet TAKE ONE TABLET BY MOUTH AT BEDTIME   • Eliquis 5 MG TAKE ONE TABLET BY MOUTH TWICE A DAY   • ferrous sulfate 324 (65 Fe) mg Take 1 tablet (324 mg total) by mouth every other day   • losartan (COZAAR) 50 mg tablet Take 1 tablet (50 mg total) by mouth daily   • metoprolol tartrate (LOPRESSOR) 50 mg tablet Take 1 tablet (50 mg total) by mouth every 12 (twelve) hours   • pantoprazole (PROTONIX) 40 mg tablet TAKE ONE TABLET BY MOUTH TWICE A DAY BEFORE MEALS   • potassium chloride (K-DUR,KLOR-CON) 20 mEq tablet Take 1 tablet (20 mEq total) by mouth daily   • pravastatin (PRAVACHOL) 40 mg tablet TAKE ONE TABLET BY MOUTH EVERY DAY   • torsemide (DEMADEX) 20 mg tablet Take 2 tablets in the morning 40mg and 1 tablet in the evening 20mg   • [DISCONTINUED] metFORMIN (GLUCOPHAGE-XR) 500 mg 24 hr tablet Take 1 tablet (500 mg total) by mouth 2 (two) times a day with meals   • docusate sodium (COLACE) 100 mg capsule Take 1 capsule (100 mg total) by mouth 2 (two) times a day (Patient not taking: Reported on 3/20/2023)       Objective     /63 (BP Location: Left arm, Patient Position: Sitting, Cuff Size: Large)   Pulse 82   Temp 97 8 °F (36 6 °C) (Tympanic)   Ht 5' 5 75" (1 67 m)   Wt 85 1 kg (187 lb 9 6 oz)   SpO2 91%   BMI 30 51 kg/m²     Physical Exam  Constitutional:       General: He is not in acute distress  Appearance: Normal appearance  He is obese  He is not ill-appearing     HENT: Head: Normocephalic and atraumatic  Nose: Nose normal  No congestion or rhinorrhea  Mouth/Throat:      Mouth: Mucous membranes are moist       Pharynx: Oropharynx is clear  No oropharyngeal exudate or posterior oropharyngeal erythema  Eyes:      General: No scleral icterus  Extraocular Movements: Extraocular movements intact  Conjunctiva/sclera: Conjunctivae normal    Cardiovascular:      Rate and Rhythm: Normal rate and regular rhythm  Pulses: Normal pulses  Pulmonary:      Effort: Pulmonary effort is normal  No respiratory distress  Breath sounds: Normal breath sounds  No stridor  No wheezing  Abdominal:      General: Bowel sounds are normal  There is no distension  Palpations: Abdomen is soft  There is no mass  Tenderness: There is no abdominal tenderness  There is no guarding  Hernia: No hernia is present  Musculoskeletal:         General: No swelling or tenderness  Right lower leg: Edema (3+) present  Left lower leg: Edema (3+) present  Skin:     General: Skin is warm  Coloration: Skin is not jaundiced or pale  Findings: No bruising or lesion  Comments: some venous stasis changes noted in the bilateral lower extremities  Neurological:      General: No focal deficit present  Mental Status: He is alert and oriented to person, place, and time  Mental status is at baseline     Psychiatric:         Mood and Affect: Mood normal          Behavior: Behavior normal        Pura Dominguez MD

## 2023-03-20 NOTE — ASSESSMENT & PLAN NOTE
Patient's anemia is currently improved and stable at 9 3  As per patient's partner he has intermittent small amounts of blood loss in the stool  He has a history of colon cancer with metastasis for which he is refusing any treatment  Continue Fe sulfate 324 mg every other day  Repeat CBC in one month

## 2023-03-20 NOTE — ASSESSMENT & PLAN NOTE
Patient present with increasing bilateral lower extremity edema with recent venous stasis changes and pruritis for about 3 weeks  Patient states he is compliant with his torsemide dose of 40 mg in the am and 20 mg in the evening  He also is limiting his sodium intake  Patient states that since his arm injury he has unable to use the lever on the side of his recliner to prop up his feet  As he spends most of his day and also sleeps in the recliner during the night (due to chronic back pain) his feet are constantly prone to the effect of gravity and may be contributing to the increase in his BL LE edema  Patient advised to use ESTEBAN stockings if they are difficult to put on, then he should use Ace bandage to wrap his lower extremities from feet to the knees  Ace wrap was applied to the patient's lower extremities in the office and a few extra were given to him to use at home  He is also advised to elevate his legs on a foot rest/chair and take an extra 20 mg dose of torsemide in the afternoon for the next 2-3 days  Patient advised to contact the office if the edema persists

## 2023-03-20 NOTE — ASSESSMENT & PLAN NOTE
Lab Results   Component Value Date    HGBA1C 7 8 (H) 02/09/2023     Patient had noticed increased thirst and has checked sugar sporadically and is found to be around 250, recent CMP (which was done after a 12 hr fast as per patient) sugars of 142 and his most recent A1C in 2/23 is 7 8  Will increase Metformin dose to 1,000 mg BID  Follow up in a month

## 2023-03-20 NOTE — PATIENT INSTRUCTIONS
Take 40 mg torsemide in the morning    Take extra 20 mg in the afternoon for (Monday, Tuesday, Wednesday)  Take 20 mg in the evening like normal

## 2023-03-27 ENCOUNTER — EVALUATION (OUTPATIENT)
Dept: PHYSICAL THERAPY | Facility: CLINIC | Age: 88
End: 2023-03-27

## 2023-03-27 ENCOUNTER — APPOINTMENT (OUTPATIENT)
Dept: LAB | Facility: CLINIC | Age: 88
End: 2023-03-27

## 2023-03-27 DIAGNOSIS — S42.291D CLOSED FRACTURE OF HEAD OF RIGHT HUMERUS WITH ROUTINE HEALING, SUBSEQUENT ENCOUNTER: Primary | ICD-10-CM

## 2023-03-27 DIAGNOSIS — G89.29 CHRONIC RIGHT SHOULDER PAIN: ICD-10-CM

## 2023-03-27 DIAGNOSIS — M25.511 CHRONIC RIGHT SHOULDER PAIN: ICD-10-CM

## 2023-03-27 NOTE — PROGRESS NOTES
PT Evaluation     Today's date: 3/27/2023  Patient name: Neal Cantu  : 1935  MRN: [id number]  Referring provider: Fior Enriquez DO  Dx: No diagnosis found  Assessment  Assessment details: Neal Cantu is a pleasant 82 y o  male who presents with R shoulder pain following proximal humeral fracture sustained on 23  The patient's greatest concerns are the pain he is experiencing, concern at no signs of improvement, fear of not being able to keep active and future ill health (and wanting to prevent it)  No further referral appears necessary at this time based upon examination results  Primary movement impairment diagnosis of R shoulder hypomobility and muscle weakness secondary to humeral fracture, resulting in pathoanatomical symptoms of pain with all movements and restricted motion, and limiting his ability to care for self, carry, lift, perform household chores, reach overhead and perform ADL's dressing and personal care needs without assistance  Primary Impairments:  1) R shoulder hypomobility  2) R shoulder weakness    Etiologic factors include poor balance resulting in a fall  Impairments: abnormal muscle firing, abnormal or restricted ROM, abnormal movement, activity intolerance, impaired physical strength, lacks appropriate home exercise program, pain with function, poor posture  and poor body mechanics    Symptom irritability: highUnderstanding of Dx/Px/POC: good   Prognosis: fair  Prognosis details: Positive prognostic indicators include positive attitude toward recovery, good understanding of diagnosis and treatment plan options and absence of observed red flags  Negative prognostic indicators include high symptom irritability, multiple concurrent orthopedic problems and dependency on transportation        Goals  (STG) Impairment Goals 4-6 weeks  - Decrease pain to 5/10 at worst with movement  - Improve shoulder AROM by 10-20* in all deficient planes  - Increase shoulder strength to 3+/5 throughout  - Patient will be able to dress himself without assistance    (LTG) Functional Goals 6-8 weeks  - Return to Prior Level of Function  - Increase Functional Status Measure (FOTO) to: predicted outcome  - Patient will be independent with HEP  - Patient will be able to reach overhead to wash/comb his hair without assistance  - Patient will be able to reach behind back to pull up his pants and wash his back   - Improve ROM to within 5* of L UE         Plan  Patient would benefit from: skilled physical therapy  Planned modality interventions: Modalities PRN  Planned therapy interventions: activity modification, manual therapy, neuromuscular re-education, patient education, therapeutic activities, therapeutic exercise, graded activity, home exercise program, behavior modification, self care, body mechanics training, flexibility, functional ROM exercises, stretching, strengthening, postural training and joint mobilization  Frequency: 2x week  Duration in weeks: 8  Treatment plan discussed with: patient and family        Subjective Evaluation    History of Present Illness  Mechanism of injury: trauma  Mechanism of injury: WORK/SCHOOL: retired  HOME LIFE: lives with a friend  PLOF: none  HISTORY OF CURRENT INJURY: Patient reports that on 2/7/23 he had a fall when he was getting out of the car and fells when he tried to step up a curb  He landed directly on the shoulder  He did receive some therapy at home but is down with it now  He reports he is still having some pain in his mid-arm and also some bruising  He is R handed  Not able to perform ADL's without pain     PAIN LOCATION/DESCRIPTORS: mid-upper arm  AGGRAVATING FACTORS: lifting his arm, bending his elbow, reaching behind his back  EASES: not using any pain medication   DAY PATTERN: none, but worse with activity   IMAGING: See chart  SPECIAL QUESTIONS: No n/t  Destiny Samantha denies a new onset of Bladder incontinence, Bowel dysfunction, "Dizziness, Dysphagia, Dysarthria, Drop attacks, Diplopia, Nausea, Ataxia, Tingling and Numbness  PATIENT GOALS: : \"To get my arm moving a little bit better\" - be able to perform ADL's independently     Pain  Current pain ratin  At best pain ratin  At worst pain ratin  Quality: sharp  Aggravating factors: overhead activity  Progression: improved    Hand dominance: right      Diagnostic Tests  X-ray: abnormal  Treatments  Previous treatment: physical therapy  Patient Goals  Patient goals for therapy: increased motion and decreased pain          Objective     Postural Observations  Seated posture: fair  Standing posture: fair        Observations     Additional Observation Details  R UE held in guarded position    Tenderness     Right Shoulder  Tenderness in the subacromial bursa  No tenderness in the [facility] joint, acromion and clavicle       Additional Tenderness Details  (+) proximal biceps, triceps     Neurological Testing     Sensation     Shoulder   Left Shoulder   Intact: light touch    Right Shoulder   Intact: light touch    Active Range of Motion   Left Shoulder   Flexion: 112 degrees   Abduction: 95 degrees   External rotation BTH: C7   Internal rotation BTB: T10     Right Shoulder   Flexion: 65 degrees with pain  Abduction: 35 degrees with pain  External rotation BTH: Active external rotation behind the head: R ear  with pain  Internal rotation BTB: sacrum with pain    Passive Range of Motion     Right Shoulder   Flexion: 80 degrees with pain  Abduction: 50 degrees with pain  External rotation 0°: 0 degrees with pain    Additional Passive Range of Motion Details  Elbow flexion 144*  Elbow extension -10*    Strength/Myotome Testing     Left Shoulder   Normal muscle strength    Right Shoulder     Planes of Motion   Flexion: 3- (pain)   Adduction: 3- (pain)   External rotation at 0°: 3 (pain)   Internal rotation at 0°: 4     Tests     Additional Tests Details  Special testing held due to limited ROM and " high symptom irritability             Diagnosis: s/p R humeral head fracture 2/7/23   Precautions: T2BM, heart failure, kidney failure, a-fib, HTN, fall risk   Primary Goals: R shoulder A/PROM, strength   *asterisks by exercise = given for HEP   Manuals 3/27       R shoulder PROM        R shoulder joint mobs                                There Ex        pulleys        Table slides        Baudilio ELIZABETH        IR strap stretch        TB rows                                        Neuro Re-Ed        scap retraction                                                                                         Education POC, post-exercise soreness, modalities        Re-evaluation              Ther Act                                         Modalities             Heat PRN

## 2023-03-28 ENCOUNTER — TELEPHONE (OUTPATIENT)
Dept: INTERNAL MEDICINE CLINIC | Facility: CLINIC | Age: 88
End: 2023-03-28

## 2023-03-28 DIAGNOSIS — I48.91 ATRIAL FIBRILLATION, UNSPECIFIED TYPE (HCC): Primary | ICD-10-CM

## 2023-03-28 DIAGNOSIS — D63.1 ANEMIA DUE TO CHRONIC KIDNEY DISEASE, UNSPECIFIED CKD STAGE: Primary | ICD-10-CM

## 2023-03-28 DIAGNOSIS — N18.9 ANEMIA DUE TO CHRONIC KIDNEY DISEASE, UNSPECIFIED CKD STAGE: Primary | ICD-10-CM

## 2023-03-28 DIAGNOSIS — R53.1 GENERALIZED WEAKNESS: ICD-10-CM

## 2023-03-28 NOTE — TELEPHONE ENCOUNTER
Called to tell us the patient had Bloodwork yesterday  Reviewed with Macho Joseph and Hgb is 8 1  Reviewed with Dr Pradeep Pulido and she would like to repeat the Hgb in a week and also to report if any excess bleeding noted in the stools  Call us with any increase bleeding   Macho Joseph notified of the plan

## 2023-03-28 NOTE — TELEPHONE ENCOUNTER
Spoke with pt's spouse Macho Joseph at length regarding her concern for patient's symptom of weakness in the lower extremities  Reports very poor exercise tolerance  She also states that patient looks pale, and is concerned for drop in Hgb from 9 2 to 8 1 over the last month  She reports that he has not had any bright red blood per rectum, and that stools are always dark in color due to iron supplementation  No obvious bleeding  She reports that weakness and color have waxed and waned in recent days, but have generally gotten worse in the last week  She has not identified any new sxs over that same time period  She denies pt has dizziness or lightheadedness at rest, does report he has lightheadedness with standing, which she states has been long-standing and has not changed recently  Similarly, she reports chronic SOB that has not changed recently from baseline  Pt is taking torsemide 40 mg QAM and 20 mg QPM for BLE edema, raising possibility of kidney injury and/or metabolic derangements contributing to weakness  Wife reports that swelling in BLEs has improved since prior appointment, however now reports unilateral redness of one leg, raising the additional possibility of an infectious process, though he is reportedly afebrile  Given time of day, provider availibilty, and degree of concern, pt is encouraged to present to ED, however pt declines  Pt is therefore encouraged to present to clinic at earliest availability tomorrow morning for in-person evaluation, and to present to the nearest ED or call emergency services should he deteriorate clinically before that time  Pt is available in the room with his wife to answer questions through her as intermediary and is in no apparent distress, however, he declines to speak on the phone at this time

## 2023-03-28 NOTE — TELEPHONE ENCOUNTER
Horacio Graham said he is very weak , legs are very wobbly, she thinks he is going to pass out   She is very concerned about him, this same thing happened last year, she thinks he needs a transfusion  She states he is pale and legs want to go down, she said he barely made it inside the house

## 2023-03-29 ENCOUNTER — APPOINTMENT (OUTPATIENT)
Dept: LAB | Facility: CLINIC | Age: 88
End: 2023-03-29

## 2023-03-29 DIAGNOSIS — I48.91 ATRIAL FIBRILLATION, UNSPECIFIED TYPE (HCC): ICD-10-CM

## 2023-03-29 DIAGNOSIS — D63.1 ANEMIA DUE TO CHRONIC KIDNEY DISEASE, UNSPECIFIED CKD STAGE: ICD-10-CM

## 2023-03-29 DIAGNOSIS — R53.1 GENERALIZED WEAKNESS: ICD-10-CM

## 2023-03-29 DIAGNOSIS — N18.9 ANEMIA DUE TO CHRONIC KIDNEY DISEASE, UNSPECIFIED CKD STAGE: ICD-10-CM

## 2023-03-29 LAB
ANION GAP SERPL CALCULATED.3IONS-SCNC: 6 MMOL/L (ref 4–13)
BUN SERPL-MCNC: 39 MG/DL (ref 5–25)
CALCIUM SERPL-MCNC: 9 MG/DL (ref 8.3–10.1)
CHLORIDE SERPL-SCNC: 104 MMOL/L (ref 96–108)
CO2 SERPL-SCNC: 25 MMOL/L (ref 21–32)
CREAT SERPL-MCNC: 1.51 MG/DL (ref 0.6–1.3)
ERYTHROCYTE [DISTWIDTH] IN BLOOD BY AUTOMATED COUNT: 19.7 % (ref 11.6–15.1)
GFR SERPL CREATININE-BSD FRML MDRD: 41 ML/MIN/1.73SQ M
GLUCOSE P FAST SERPL-MCNC: 158 MG/DL (ref 65–99)
HCT VFR BLD AUTO: 26 % (ref 36.5–49.3)
HGB BLD-MCNC: 7.7 G/DL (ref 12–17)
MAGNESIUM SERPL-MCNC: 2.2 MG/DL (ref 1.6–2.6)
MCH RBC QN AUTO: 24.1 PG (ref 26.8–34.3)
MCHC RBC AUTO-ENTMCNC: 29.6 G/DL (ref 31.4–37.4)
MCV RBC AUTO: 81 FL (ref 82–98)
PLATELET # BLD AUTO: 324 THOUSANDS/UL (ref 149–390)
PMV BLD AUTO: 10.7 FL (ref 8.9–12.7)
POTASSIUM SERPL-SCNC: 3.9 MMOL/L (ref 3.5–5.3)
RBC # BLD AUTO: 3.2 MILLION/UL (ref 3.88–5.62)
SODIUM SERPL-SCNC: 135 MMOL/L (ref 135–147)
TSH SERPL DL<=0.05 MIU/L-ACNC: 1.56 UIU/ML (ref 0.45–4.5)
WBC # BLD AUTO: 5.94 THOUSAND/UL (ref 4.31–10.16)

## 2023-03-30 ENCOUNTER — TELEPHONE (OUTPATIENT)
Dept: INTERNAL MEDICINE CLINIC | Facility: CLINIC | Age: 88
End: 2023-03-30

## 2023-03-31 ENCOUNTER — OFFICE VISIT (OUTPATIENT)
Dept: INTERNAL MEDICINE CLINIC | Facility: CLINIC | Age: 88
End: 2023-03-31

## 2023-03-31 ENCOUNTER — OFFICE VISIT (OUTPATIENT)
Dept: PHYSICAL THERAPY | Facility: CLINIC | Age: 88
End: 2023-03-31

## 2023-03-31 VITALS
HEIGHT: 66 IN | BODY MASS INDEX: 31.15 KG/M2 | OXYGEN SATURATION: 96 % | DIASTOLIC BLOOD PRESSURE: 58 MMHG | WEIGHT: 193.8 LBS | SYSTOLIC BLOOD PRESSURE: 120 MMHG | HEART RATE: 92 BPM | TEMPERATURE: 96.8 F

## 2023-03-31 DIAGNOSIS — M25.511 CHRONIC RIGHT SHOULDER PAIN: ICD-10-CM

## 2023-03-31 DIAGNOSIS — G89.29 CHRONIC RIGHT SHOULDER PAIN: ICD-10-CM

## 2023-03-31 DIAGNOSIS — I50.33 ACUTE ON CHRONIC DIASTOLIC CONGESTIVE HEART FAILURE (HCC): Primary | ICD-10-CM

## 2023-03-31 DIAGNOSIS — S42.291D CLOSED FRACTURE OF HEAD OF RIGHT HUMERUS WITH ROUTINE HEALING, SUBSEQUENT ENCOUNTER: Primary | ICD-10-CM

## 2023-03-31 RX ORDER — TORSEMIDE 20 MG/1
TABLET ORAL
Qty: 270 TABLET | Refills: 2 | Status: SHIPPED | OUTPATIENT
Start: 2023-03-31

## 2023-03-31 NOTE — ASSESSMENT & PLAN NOTE
Wt Readings from Last 3 Encounters:   03/31/23 87 9 kg (193 lb 12 8 oz)   03/20/23 85 1 kg (187 lb 9 6 oz)   03/16/23 87 5 kg (193 lb)     -increased weight and LE edema  -Increase torsemide to 40 mg BID  -drop in Hgb likely dilutional, will recheck CBC/BMP in one week  -check daily weights, can use additional 20 mg in middle of the day if his weight increases by 3 lbs in 5 days   Discussed in detail with patient and his wife

## 2023-03-31 NOTE — PROGRESS NOTES
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 heart failure (HCC)  Assessment & Plan:  Wt Readings from Last 3 Encounters:   23 87 9 kg (193 lb 12 8 oz)   23 85 1 kg (187 lb 9 6 oz)   23 87 5 kg (193 lb)     -increased weight and LE edema  -Increase torsemide to 40 mg BID  -drop in Hgb likely dilutional, will recheck CBC/BMP in one week  -check daily weights, can use additional 20 mg in middle of the day if his weight increases by 3 lbs in 5 days  Discussed in detail with patient and his wife    Orders:  -     torsemide (DEMADEX) 20 mg tablet; Take 2 tablets in the morning (40mg) and 2 tablet in the evening (40mg)  -     CBC and differential; Future; Expected date: 2023  -     Basic metabolic panel; Future; Expected date: 2023         Subjective      HPI     Mr  Onita Soulier is an 68-year-old male with past medical history unstaged/untreated colonic adenocarcinoma, DM 2, CHF, A-fib on Eliquis, HTN, CKD 3, chronic anemia (SID+CKD), and recent closed fracture of proximal right humerus who is presenting today to review recent lab work, weight gain, and LE edema  No CP/SOB  Wife was concerned because patient's hemoglobin dropped from 7 7 from 8 1 previously  Also has worsening bilateral lower extremity edema and weight gain      Review of Systems  -negative unless otherwise noted in HPI above    Current Outpatient Medications on File Prior to Visit   Medication Sig   • amiodarone 100 mg tablet TAKE ONE TABLET BY MOUTH EVERY DAY   • doxazosin (CARDURA) 8 MG tablet TAKE ONE TABLET BY MOUTH AT BEDTIME   • Eliquis 5 MG TAKE ONE TABLET BY MOUTH TWICE A DAY   • ferrous sulfate 324 (65 Fe) mg Take 1 tablet (324 mg total) by mouth every other day   • losartan (COZAAR) 50 mg tablet "Take 1 tablet (50 mg total) by mouth daily   • metFORMIN (GLUCOPHAGE) 1000 MG tablet Take 1 tablet (1,000 mg total) by mouth 2 (two) times a day with meals   • metoprolol tartrate (LOPRESSOR) 50 mg tablet Take 1 tablet (50 mg total) by mouth every 12 (twelve) hours   • pantoprazole (PROTONIX) 40 mg tablet TAKE ONE TABLET BY MOUTH TWICE A DAY BEFORE MEALS   • potassium chloride (K-DUR,KLOR-CON) 20 mEq tablet Take 1 tablet (20 mEq total) by mouth daily   • pravastatin (PRAVACHOL) 40 mg tablet TAKE ONE TABLET BY MOUTH EVERY DAY   • [DISCONTINUED] torsemide (DEMADEX) 20 mg tablet Take 2 tablets in the morning 40mg and 1 tablet in the evening 20mg   • [DISCONTINUED] docusate sodium (COLACE) 100 mg capsule Take 1 capsule (100 mg total) by mouth 2 (two) times a day (Patient not taking: Reported on 3/20/2023)       Objective     /58 (BP Location: Left arm, Patient Position: Sitting, Cuff Size: Large)   Pulse 92   Temp (!) 96 8 °F (36 °C) (Tympanic)   Ht 5' 6\" (1 676 m)   Wt 87 9 kg (193 lb 12 8 oz)   SpO2 96%   BMI 31 28 kg/m²     Physical Exam  Constitutional:       Appearance: Normal appearance  He is not ill-appearing or diaphoretic  HENT:      Head: Normocephalic and atraumatic  Cardiovascular:      Rate and Rhythm: Normal rate  Pulses: Normal pulses  Heart sounds: Normal heart sounds  Pulmonary:      Effort: Pulmonary effort is normal  No respiratory distress  Breath sounds: Rales present  Abdominal:      General: There is distension  Tenderness: There is no abdominal tenderness  Musculoskeletal:         General: Normal range of motion  Right lower leg: Edema present  Left lower leg: Edema present  Skin:     General: Skin is warm and dry  Neurological:      General: No focal deficit present  Mental Status: He is alert and oriented to person, place, and time     Psychiatric:         Mood and Affect: Mood normal          Behavior: Behavior normal        Korey " Jamie Trotter MD

## 2023-03-31 NOTE — PROGRESS NOTES
Daily Note     Today's date: 3/31/2023  Patient name: Isela Davis  : 1935  MRN: [id number]  Referring provider: Lazaro Jaquez DO  Dx:   Encounter Diagnosis     ICD-10-CM    1  Closed fracture of head of right humerus with routine healing, subsequent encounter  S42 291D       2  Chronic right shoulder pain  M25 511     G89 29           Start Time: 930  Stop Time: 1015  Total time in clinic (min): 45 minutes    Subjective: Patient reports that his shoulder is feeling good and that the pain he feels is down lower, in his biceps and triceps  Says she had some bloodwork come back abnormal so he may be needing some transfusions  Objective: See treatment diary below      Assessment: Tolerated treatment well  Patient would benefit from continued PT  Patient was able to tolerate initiation of gentle stretching and range of motion exercises well, however did have some discomfort with the pulleys by the end of the exercise and had to end this early  Was able to progress joint mobility with mobilizations and PROM within limited ranges  He tolerated table slides very well and I added this to his HEP, in addition to instructing his partner in this so she can help him at home  Will continue to progress as tolerated  Plan: Continue per plan of care  Progress treatment as tolerated         Diagnosis: s/p R humeral head fracture 23   Precautions: T2BM, heart failure, kidney failure, a-fib, HTN, fall risk   Primary Goals: R shoulder A/PROM, strength   *asterisks by exercise = given for HEP   Manuals 3/27 3/31      R shoulder PROM  LB      R shoulder joint mobs  LB                              There Ex        pulleys  2 mins flexion/scaption      Table slides*  20x flexion  20x scaption      Cane AAROM        IR strap stretch        TB rows                                        Neuro Re-Ed        scap retraction Education POC, post-exercise soreness, modalities Updated HEP       Re-evaluation              Ther Act                                         Modalities             Heat PRN  5 mins pre session

## 2023-04-04 ENCOUNTER — OFFICE VISIT (OUTPATIENT)
Dept: PHYSICAL THERAPY | Facility: CLINIC | Age: 88
End: 2023-04-04

## 2023-04-04 DIAGNOSIS — S42.291D CLOSED FRACTURE OF HEAD OF RIGHT HUMERUS WITH ROUTINE HEALING, SUBSEQUENT ENCOUNTER: Primary | ICD-10-CM

## 2023-04-04 DIAGNOSIS — G89.29 CHRONIC RIGHT SHOULDER PAIN: ICD-10-CM

## 2023-04-04 DIAGNOSIS — M25.511 CHRONIC RIGHT SHOULDER PAIN: ICD-10-CM

## 2023-04-04 NOTE — PROGRESS NOTES
"Daily Note     Today's date: 2023  Patient name: Elle Adams  : 1935  MRN: [id number]  Referring provider: Janet Tay DO  Dx:   Encounter Diagnosis     ICD-10-CM    1  Closed fracture of head of right humerus with routine healing, subsequent encounter  S42 291D       2  Chronic right shoulder pain  M25 511     G89 29           Start Time: 0900  Stop Time: 09  Total time in clinic (min): 40 minutes    Subjective: Patient reports that his shoulder/arm is still bothering him  He reports that he has been doing his HEP and that it is \"no problem\", but his partner says he has not been doing HEP as instructed  Objective: See treatment diary below      Assessment: Tolerated treatment well  Patient would benefit from continued PT  Patient was able to tolerate stretches and ROM exercises better this sessions vs last  He was able to demonstrate and improvement in available range of motion this session vs last as well  ROM remains limited in all ranges, but improving  Used heat for a few minutes post session to help with post exercise soreness  He will continue with HEP as prescribed at last session  Will progress ROM and light strengthening as able  Plan: Continue per plan of care  Progress treatment as tolerated         Diagnosis: s/p R humeral head fracture 23   Precautions: T2BM, heart failure, kidney failure, a-fib, HTN, fall risk   Primary Goals: R shoulder A/PROM, strength   *asterisks by exercise = given for HEP   Manuals 3/27 3/31 4/4     R shoulder PROM  LB LB     R shoulder joint mobs  LB LB                             There Ex        pulleys  2 mins flexion/scaption 2 mins flexion/scaption     Table slides*  20x flexion  20x scaption 20x flexion  20x scaption     Cane AAROM        IR strap stretch        TB rows                                        Neuro Re-Ed        scap retraction                                                                                         " Education POC, post-exercise soreness, modalities Updated HEP       Re-evaluation              Ther Act                                         Modalities             Heat PRN  5 mins pre session 5 mins post session

## 2023-04-07 ENCOUNTER — OFFICE VISIT (OUTPATIENT)
Dept: INTERNAL MEDICINE CLINIC | Facility: CLINIC | Age: 88
End: 2023-04-07

## 2023-04-07 ENCOUNTER — APPOINTMENT (OUTPATIENT)
Dept: LAB | Facility: CLINIC | Age: 88
End: 2023-04-07

## 2023-04-07 VITALS
BODY MASS INDEX: 30.63 KG/M2 | OXYGEN SATURATION: 98 % | RESPIRATION RATE: 20 BRPM | DIASTOLIC BLOOD PRESSURE: 60 MMHG | WEIGHT: 189.8 LBS | TEMPERATURE: 97 F | SYSTOLIC BLOOD PRESSURE: 130 MMHG | HEART RATE: 83 BPM

## 2023-04-07 DIAGNOSIS — I50.33 ACUTE ON CHRONIC DIASTOLIC CONGESTIVE HEART FAILURE (HCC): Primary | ICD-10-CM

## 2023-04-07 DIAGNOSIS — I50.33 ACUTE ON CHRONIC DIASTOLIC CONGESTIVE HEART FAILURE (HCC): ICD-10-CM

## 2023-04-07 LAB
ANION GAP SERPL CALCULATED.3IONS-SCNC: 8 MMOL/L (ref 4–13)
BASOPHILS # BLD AUTO: 0.05 THOUSANDS/ÂΜL (ref 0–0.1)
BASOPHILS NFR BLD AUTO: 1 % (ref 0–1)
BUN SERPL-MCNC: 44 MG/DL (ref 5–25)
CALCIUM SERPL-MCNC: 8.6 MG/DL (ref 8.3–10.1)
CHLORIDE SERPL-SCNC: 102 MMOL/L (ref 96–108)
CO2 SERPL-SCNC: 24 MMOL/L (ref 21–32)
CREAT SERPL-MCNC: 1.91 MG/DL (ref 0.6–1.3)
EOSINOPHIL # BLD AUTO: 0.05 THOUSAND/ÂΜL (ref 0–0.61)
EOSINOPHIL NFR BLD AUTO: 1 % (ref 0–6)
ERYTHROCYTE [DISTWIDTH] IN BLOOD BY AUTOMATED COUNT: 18.3 % (ref 11.6–15.1)
GFR SERPL CREATININE-BSD FRML MDRD: 31 ML/MIN/1.73SQ M
GLUCOSE SERPL-MCNC: 128 MG/DL (ref 65–140)
HCT VFR BLD AUTO: 26 % (ref 36.5–49.3)
HGB BLD-MCNC: 7.7 G/DL (ref 12–17)
IMM GRANULOCYTES # BLD AUTO: 0.08 THOUSAND/UL (ref 0–0.2)
IMM GRANULOCYTES NFR BLD AUTO: 1 % (ref 0–2)
LYMPHOCYTES # BLD AUTO: 0.78 THOUSANDS/ÂΜL (ref 0.6–4.47)
LYMPHOCYTES NFR BLD AUTO: 12 % (ref 14–44)
MCH RBC QN AUTO: 23.9 PG (ref 26.8–34.3)
MCHC RBC AUTO-ENTMCNC: 29.6 G/DL (ref 31.4–37.4)
MCV RBC AUTO: 81 FL (ref 82–98)
MONOCYTES # BLD AUTO: 0.83 THOUSAND/ÂΜL (ref 0.17–1.22)
MONOCYTES NFR BLD AUTO: 13 % (ref 4–12)
NEUTROPHILS # BLD AUTO: 4.86 THOUSANDS/ÂΜL (ref 1.85–7.62)
NEUTS SEG NFR BLD AUTO: 72 % (ref 43–75)
NRBC BLD AUTO-RTO: 0 /100 WBCS
PLATELET # BLD AUTO: 380 THOUSANDS/UL (ref 149–390)
PMV BLD AUTO: 10.9 FL (ref 8.9–12.7)
POTASSIUM SERPL-SCNC: 4 MMOL/L (ref 3.5–5.3)
RBC # BLD AUTO: 3.22 MILLION/UL (ref 3.88–5.62)
SODIUM SERPL-SCNC: 134 MMOL/L (ref 135–147)
WBC # BLD AUTO: 6.65 THOUSAND/UL (ref 4.31–10.16)

## 2023-04-07 RX ORDER — ALBUTEROL SULFATE 90 UG/1
AEROSOL, METERED RESPIRATORY (INHALATION)
COMMUNITY
Start: 2023-03-17

## 2023-04-07 NOTE — PROGRESS NOTES
Name: Cristiana Araya      : 1935      MRN: [id number]  Encounter Provider: Casandra Balderrama MD  Encounter Date: 2023   Encounter department: [address] [place] [address]  Acute on chronic diastolic congestive heart failure (HCC)  Assessment & Plan:  Wt Readings from Last 3 Encounters:   23 86 1 kg (189 lb 12 8 oz)   23 87 9 kg (193 lb 12 8 oz)   23 85 1 kg (187 lb 9 6 oz)     -still remains volume overloaded, but improvement from last week  -still with POLK but not SOB at rest, orthopnea, CP, or PND  No respiratory distress   -continue torsemide dose 40mg BID  -will be getting CBC/BMP today to monitor Cr + electrolytes after increased diuretic dose and follow Hgb drop, which was likely dilutional due to volume overload, f/u results               Subjective      HPI     Mr Halima Kay is coming in today for f/u  He was seen last week for worsening shortness of breath, leg swelling, abdominal bloating and 6 lb weight gain  His diuretic dose was increased to torsemide 40 mg BID     4lb weight loss this so far this past week  Patient noted improvement in LE swelling  Still pitting edema in bilateral lower extremities  Increased urination, as expected from increased torsemide dose  SOB remains at baseline, worse on exertion  No orthopnea or PND  No CP  Lungs clear today compared to rales present last week  Abdomen remains distended, BM frequency and consistency normal  Good appetite  Discussed limiting salt intake, patient and wife acknowledged and agree        Review of Systems   -negative unless otherwise noted in HPI    Current Outpatient Medications on File Prior to Visit   Medication Sig   • amiodarone 100 mg tablet TAKE ONE TABLET BY MOUTH EVERY DAY   • doxazosin (CARDURA) 8 MG tablet TAKE ONE TABLET BY MOUTH AT BEDTIME   • Eliquis 5 MG TAKE ONE TABLET BY MOUTH TWICE A DAY   • ferrous sulfate 324 (65 Fe) mg Take 1 tablet (324 mg total) by mouth every other day   • losartan (COZAAR) 50 mg tablet Take 1 tablet (50 mg total) by mouth daily   • metFORMIN (GLUCOPHAGE) 1000 MG tablet Take 1 tablet (1,000 mg total) by mouth 2 (two) times a day with meals   • metoprolol tartrate (LOPRESSOR) 50 mg tablet Take 1 tablet (50 mg total) by mouth every 12 (twelve) hours   • pantoprazole (PROTONIX) 40 mg tablet TAKE ONE TABLET BY MOUTH TWICE A DAY BEFORE MEALS (Patient taking differently: As needed)   • potassium chloride (K-DUR,KLOR-CON) 20 mEq tablet Take 1 tablet (20 mEq total) by mouth daily   • pravastatin (PRAVACHOL) 40 mg tablet TAKE ONE TABLET BY MOUTH EVERY DAY   • torsemide (DEMADEX) 20 mg tablet Take 2 tablets in the morning (40mg) and 2 tablet in the evening (40mg)   • albuterol (PROVENTIL HFA,VENTOLIN HFA) 90 mcg/act inhaler        Objective     /60 (BP Location: Left arm, Patient Position: Sitting, Cuff Size: Large)   Pulse 83   Temp (!) 97 °F (36 1 °C)   Resp 20   Wt 86 1 kg (189 lb 12 8 oz)   SpO2 98%   BMI 30 63 kg/m²     Physical Exam  Constitutional:       Appearance: Normal appearance  He is not ill-appearing or diaphoretic  HENT:      Head: Normocephalic and atraumatic  Cardiovascular:      Rate and Rhythm: Normal rate  Pulses: Normal pulses  Heart sounds: Normal heart sounds  Pulmonary:      Effort: Pulmonary effort is normal  No respiratory distress  Breath sounds: No rales  Abdominal:      General: There is distension  Tenderness: There is no abdominal tenderness  Musculoskeletal:         General: Normal range of motion  Right lower leg: Edema present  Left lower leg: Edema present  Skin:     General: Skin is warm and dry  Neurological:      General: No focal deficit present  Mental Status: He is alert and oriented to person, place, and time     Psychiatric:         Mood and Affect: Mood normal          Behavior: Behavior normal        Korey Powell MD

## 2023-04-07 NOTE — ASSESSMENT & PLAN NOTE
Wt Readings from Last 3 Encounters:   04/07/23 86 1 kg (189 lb 12 8 oz)   03/31/23 87 9 kg (193 lb 12 8 oz)   03/20/23 85 1 kg (187 lb 9 6 oz)     -still remains volume overloaded, but improvement from last week  -still with POLK but not SOB at rest, orthopnea, CP, or PND   No respiratory distress   -continue torsemide dose 40mg BID  -will be getting CBC/BMP today to monitor Cr + electrolytes after increased diuretic dose and follow Hgb drop, which was likely dilutional due to volume overload, f/u results

## 2023-04-14 ENCOUNTER — APPOINTMENT (OUTPATIENT)
Dept: PHYSICAL THERAPY | Facility: CLINIC | Age: 88
End: 2023-04-14

## 2023-04-17 DIAGNOSIS — I50.32 CHRONIC DIASTOLIC CONGESTIVE HEART FAILURE (HCC): ICD-10-CM

## 2023-04-17 DIAGNOSIS — E11.9 TYPE 2 DIABETES MELLITUS, WITHOUT LONG-TERM CURRENT USE OF INSULIN (HCC): ICD-10-CM

## 2023-04-17 RX ORDER — LOSARTAN POTASSIUM 50 MG/1
50 TABLET ORAL DAILY
Qty: 90 TABLET | Refills: 1 | Status: SHIPPED | OUTPATIENT
Start: 2023-04-17 | End: 2023-05-15 | Stop reason: SDUPTHER

## 2023-04-17 RX ORDER — AMIODARONE HYDROCHLORIDE 100 MG/1
100 TABLET ORAL DAILY
Qty: 90 TABLET | Refills: 1 | Status: SHIPPED | OUTPATIENT
Start: 2023-04-17 | End: 2023-05-08

## 2023-04-23 DIAGNOSIS — N18.32 TYPE 2 DIABETES MELLITUS WITH STAGE 3B CHRONIC KIDNEY DISEASE, WITHOUT LONG-TERM CURRENT USE OF INSULIN (HCC): ICD-10-CM

## 2023-04-23 DIAGNOSIS — E11.22 TYPE 2 DIABETES MELLITUS WITH STAGE 3B CHRONIC KIDNEY DISEASE, WITHOUT LONG-TERM CURRENT USE OF INSULIN (HCC): ICD-10-CM

## 2023-04-23 DIAGNOSIS — I50.32 CHRONIC DIASTOLIC CONGESTIVE HEART FAILURE (HCC): ICD-10-CM

## 2023-04-24 RX ORDER — POTASSIUM CHLORIDE 20 MEQ/1
TABLET, EXTENDED RELEASE ORAL
Qty: 30 TABLET | Refills: 0 | Status: SHIPPED | OUTPATIENT
Start: 2023-04-24 | End: 2023-04-26 | Stop reason: SDUPTHER

## 2023-04-25 ENCOUNTER — OFFICE VISIT (OUTPATIENT)
Dept: PHYSICAL THERAPY | Facility: CLINIC | Age: 88
End: 2023-04-25

## 2023-04-25 DIAGNOSIS — S42.291D CLOSED FRACTURE OF HEAD OF RIGHT HUMERUS WITH ROUTINE HEALING, SUBSEQUENT ENCOUNTER: Primary | ICD-10-CM

## 2023-04-25 DIAGNOSIS — G89.29 CHRONIC RIGHT SHOULDER PAIN: ICD-10-CM

## 2023-04-25 DIAGNOSIS — M25.511 CHRONIC RIGHT SHOULDER PAIN: ICD-10-CM

## 2023-04-25 DIAGNOSIS — N18.32 TYPE 2 DIABETES MELLITUS WITH STAGE 3B CHRONIC KIDNEY DISEASE, WITHOUT LONG-TERM CURRENT USE OF INSULIN (HCC): ICD-10-CM

## 2023-04-25 DIAGNOSIS — E11.22 TYPE 2 DIABETES MELLITUS WITH STAGE 3B CHRONIC KIDNEY DISEASE, WITHOUT LONG-TERM CURRENT USE OF INSULIN (HCC): ICD-10-CM

## 2023-04-25 NOTE — PROGRESS NOTES
Daily Note     Today's date: 2023  Patient name: Michelle Aguilar  : 1935  MRN: [id number]  Referring provider: Emma Lopez DO  Dx:   Encounter Diagnosis     ICD-10-CM    1  Closed fracture of head of right humerus with routine healing, subsequent encounter  S42 291D       2  Chronic right shoulder pain  M25 511     G89 29                      Subjective: patient reports no significant changes, still has discomfort in the shoulder  Objective: See treatment diary below      Assessment: Tolerated treatment well  Continued to focus on R shoulder range of motion throughout today's session  He had no reports of increased symptoms throughout the session  He demonstrates good technique for provided program and would benefit from continuing physical therapy  Plan: Continue per plan of care        Diagnosis: s/p R humeral head fracture 23   Precautions: T2BM, heart failure, kidney failure, a-fib, HTN, fall risk   Primary Goals: R shoulder A/PROM, strength   *asterisks by exercise = given for HEP   Manuals    R shoulder PROM LB SK  LB LB   R shoulder joint mobs gr 3 LB SK  LB LB                           There Ex        pulleys 5 mins flexion/scaption 5 min  Flexion/scaption   2 mins flexion/scaption 5 mins flexion/scaption   Table slides* 20x flexion  20x scaption 20x flexion  20x scaption  20x flexion  20x scaption 20x flexion  20x scaption   Cane AAROM        IR strap stretch        TB rows                                        Neuro Re-Ed        scap retraction                                                                                         Education         Re-evaluation           Ther Act                                 Modalities           Heat PRN deferred deferred  deferred deferrd

## 2023-04-26 ENCOUNTER — OFFICE VISIT (OUTPATIENT)
Dept: INTERNAL MEDICINE CLINIC | Facility: CLINIC | Age: 88
End: 2023-04-26

## 2023-04-26 VITALS
RESPIRATION RATE: 20 BRPM | HEART RATE: 50 BPM | OXYGEN SATURATION: 99 % | BODY MASS INDEX: 31.57 KG/M2 | DIASTOLIC BLOOD PRESSURE: 80 MMHG | TEMPERATURE: 97.8 F | SYSTOLIC BLOOD PRESSURE: 120 MMHG | WEIGHT: 195.6 LBS

## 2023-04-26 DIAGNOSIS — N18.9 ANEMIA DUE TO CHRONIC KIDNEY DISEASE, UNSPECIFIED CKD STAGE: ICD-10-CM

## 2023-04-26 DIAGNOSIS — D63.1 ANEMIA DUE TO CHRONIC KIDNEY DISEASE, UNSPECIFIED CKD STAGE: ICD-10-CM

## 2023-04-26 DIAGNOSIS — R06.02 SHORTNESS OF BREATH: Primary | ICD-10-CM

## 2023-04-26 DIAGNOSIS — R60.0 BILATERAL LOWER EXTREMITY EDEMA: ICD-10-CM

## 2023-04-26 DIAGNOSIS — N18.31 STAGE 3A CHRONIC KIDNEY DISEASE (HCC): ICD-10-CM

## 2023-04-26 DIAGNOSIS — I50.32 CHRONIC DIASTOLIC CONGESTIVE HEART FAILURE (HCC): ICD-10-CM

## 2023-04-26 DIAGNOSIS — C18.9 COLON ADENOCARCINOMA (HCC): ICD-10-CM

## 2023-04-26 RX ORDER — POTASSIUM CHLORIDE 20 MEQ/1
20 TABLET, EXTENDED RELEASE ORAL DAILY
Qty: 30 TABLET | Refills: 0 | Status: ON HOLD | OUTPATIENT
Start: 2023-04-26 | End: 2023-05-05

## 2023-04-26 NOTE — ASSESSMENT & PLAN NOTE
Patient continues to have bilateral lower extremity edema, has gained 6 lb since since last office visit a month ago  Has been compliant with torsemide 40 mg am and 20 mg pm, with intermittent dosing of additional 20 mg in the evening on days he feels his overall urine output is low  Renal function has trended up from 1 5 to 1 9  He leads a general sedentary lifestyle and is not able to tolerate ace wraps or compression stockings due to leg cramping  Discussed some seated exercises that can be done multiple times a day to improve blood circulation and fluid movement in the legs  Increased torsemide to 40 mg BID  Will check BMP to evaluate renal function  Patient reports weak stream and intermittent sensation of incomplete bladder emptying  Renal ultrasound with PVR to rule out obstruction as patient has enlarged prostate  If no obstruction is found on ultra sound then will start Flomax to improve urine flow

## 2023-04-26 NOTE — ASSESSMENT & PLAN NOTE
Reports continued shortness of breath with slightly worse than before, has gained 6 lb since last visit  SOB may be multifactorial due fluid overload, anemia secondary to colon cancer, deconditioning  Decreased breath sounds heard in the left lower lobes will get a chest x-ray to evaluate for potential right pleural effusion  Patient advised to continue 40 mg of torsemide in the morning and increase to the evening dose from 20 to 40 mg  Anemia secondary to colon cancer  hemoglobin has trended down and is stabilized at 7 7, he denies any melena or hematochezia  He has refused any surgical or chemoradiation for his chancer  Will get a CBC to evaluate hemoglobin level  Ambulatory referral to Palliative care provided to continue goals of care discussion  Discussed the importance of increasing physical activity  Demonstrated some exercises that can be done wile seated as well and stressed walking short distances multiple times a day as tolerated

## 2023-04-26 NOTE — PROGRESS NOTES
Name: Michelle Aguilar      : 1935      MRN: [id number]  Encounter Provider: Milo Antoine MD  Encounter Date: 2023   Encounter department: [address] [place] [address]  Shortness of breath  Assessment & Plan:  Reports continued shortness of breath with slightly worse than before, has gained 6 lb since last visit  SOB may be multifactorial due fluid overload, anemia secondary to colon cancer, deconditioning  Decreased breath sounds heard in the left lower lobes will get a chest x-ray to evaluate for potential right pleural effusion  Patient advised to continue 40 mg of torsemide in the morning and increase to the evening dose from 20 to 40 mg  Anemia secondary to colon cancer  hemoglobin has trended down and is stabilized at 7 7, he denies any melena or hematochezia  He has refused any surgical or chemoradiation for his chancer  Will get a CBC to evaluate hemoglobin level  Ambulatory referral to Palliative care provided to continue goals of care discussion  Discussed the importance of increasing physical activity  Demonstrated some exercises that can be done wile seated as well and stressed walking short distances multiple times a day as tolerated  Orders:  -     XR chest pa & lateral; Future; Expected date: 2023    2  Bilateral lower extremity edema  Assessment & Plan:  Patient continues to have bilateral lower extremity edema, has gained 6 lb since since last office visit a month ago  Has been compliant with torsemide 40 mg am and 20 mg pm, with intermittent dosing of additional 20 mg in the evening on days he feels his overall urine output is low  Renal function has trended up from 1 5 to 1 9  He leads a general sedentary lifestyle and is not able to tolerate ace wraps or compression stockings due to leg cramping  Discussed some seated exercises that can be done multiple times a day to improve blood circulation and fluid movement in the legs  Increased torsemide to 40 mg BID  Will check BMP to evaluate renal function  Patient reports weak stream and intermittent sensation of incomplete bladder emptying  Renal ultrasound with PVR to rule out obstruction as patient has enlarged prostate  If no obstruction is found on ultra sound then will start Flomax to improve urine flow  3  Chronic diastolic congestive heart failure (HCC)  -     potassium chloride (K-DUR,KLOR-CON) 20 mEq tablet; Take 1 tablet (20 mEq total) by mouth daily    4  Anemia due to chronic kidney disease, unspecified CKD stage  Assessment & Plan:  Colon cancer related anemia, he is currently taking iron 324 mg every other day  Hb trended down and stabilized around 7 7 early April  He denies any hematochezia or melena in the past two weeks  Refuses to take daily iron due to constipation  Will recheck CBC, palliative care referral      Orders:  -     CBC and differential; Future    5  Colon adenocarcinoma [facility])  -     Ambulatory Referral to Palliative Care; Future    6  Stage 3a chronic kidney disease ([facility] [address] )  -     US kidney and bladder with pvr; Future; Expected date: 04/26/2023  -     Comprehensive metabolic panel; Future         Subjective      Mr Laura Doshi is an 82 Y  O  male with colon cancer related anemia, diabetes, presents with continues shortness of breath and  bilateral LE edema  He does not wish to explore surgical, radiation or chemotherapy options for his colon cancer  who presents with a follow up regarding shortness of breath an lower extremity swelling  Patient has been sedentary overall but goes to physical therapy twice a week for right shoulder injury  He states getting short of breath with ambulating short distances, shortness of breath resolves with rest, denies any chest pain or tightness  Is not compliant with compression stockings or Ace wraps due to leg cramps   Is compliant with torsemide 40 mg in the morning and 20 mg in the evening, intermittently takes an extra evening dose of torsemide due to bilateral lower extremity swelling  Denies any fevers, chills, nausea, vomiting, or abdominal pain  Review of Systems   Constitutional: Negative for activity change, appetite change, chills and fever  HENT: Negative for congestion, postnasal drip, rhinorrhea and sore throat  Respiratory: Positive for shortness of breath  Negative for cough and chest tightness  Cardiovascular: Positive for leg swelling  Negative for chest pain and palpitations  Gastrointestinal: Positive for constipation  Negative for abdominal pain, blood in stool, diarrhea, nausea and vomiting  Genitourinary: Negative for dysuria, enuresis and hematuria  Musculoskeletal: Positive for arthralgias (right shoulder)  Skin: Negative for color change, rash and wound  Neurological: Negative for dizziness, syncope and weakness  Psychiatric/Behavioral: Negative for confusion and sleep disturbance  All other systems reviewed and are negative        Current Outpatient Medications on File Prior to Visit   Medication Sig   • albuterol (PROVENTIL HFA,VENTOLIN HFA) 90 mcg/act inhaler    • amiodarone 100 mg tablet Take 1 tablet (100 mg total) by mouth daily   • doxazosin (CARDURA) 8 MG tablet TAKE ONE TABLET BY MOUTH AT BEDTIME   • Eliquis 5 MG TAKE ONE TABLET BY MOUTH TWICE A DAY   • ferrous sulfate 324 (65 Fe) mg Take 1 tablet (324 mg total) by mouth every other day   • losartan (COZAAR) 50 mg tablet Take 1 tablet (50 mg total) by mouth daily   • metFORMIN (GLUCOPHAGE) 1000 MG tablet Take 1 tablet (1,000 mg total) by mouth 2 (two) times a day with meals   • metoprolol tartrate (LOPRESSOR) 50 mg tablet Take 1 tablet (50 mg total) by mouth every 12 (twelve) hours   • pantoprazole (PROTONIX) 40 mg tablet TAKE ONE TABLET BY MOUTH TWICE A DAY BEFORE MEALS (Patient taking differently: As needed)   • pravastatin (PRAVACHOL) 40 mg tablet TAKE ONE TABLET BY MOUTH EVERY DAY   • torsemide (DEMADEX) 20 mg tablet Take 2 tablets in the morning (40mg) and 2 tablet in the evening (40mg)   • [DISCONTINUED] potassium chloride (K-DUR,KLOR-CON) 20 mEq tablet TAKE ONE TABLET BY MOUTH EVERY DAY       Objective     /80 (BP Location: Right arm, Patient Position: Sitting, Cuff Size: Large)   Pulse (!) 50   Temp 97 8 °F (36 6 °C)   Resp 20   Wt 88 7 kg (195 lb 9 6 oz)   SpO2 99%   BMI 31 57 kg/m²     Physical Exam  Constitutional:       General: He is not in acute distress  Appearance: He is obese  He is not ill-appearing  HENT:      Head: Normocephalic and atraumatic  Nose: Nose normal  No congestion or rhinorrhea  Mouth/Throat:      Mouth: Mucous membranes are moist       Pharynx: Oropharynx is clear  Eyes:      General: No scleral icterus  Extraocular Movements: Extraocular movements intact  Conjunctiva/sclera: Conjunctivae normal    Cardiovascular:      Rate and Rhythm: Normal rate and regular rhythm  Pulmonary:      Effort: Pulmonary effort is normal  No respiratory distress  Breath sounds: No wheezing  Comments: Decreased breath sounds at the right lung base  Abdominal:      General: Bowel sounds are normal       Palpations: There is no mass  Tenderness: There is no abdominal tenderness  Comments: Protuberant abdomen   Musculoskeletal:         General: Normal range of motion  Right lower leg: Edema present  Left lower leg: Edema present  Skin:     Coloration: Skin is pale  Skin is not jaundiced  Findings: No bruising or erythema  Neurological:      Mental Status: He is alert and oriented to person, place, and time  Psychiatric:         Mood and Affect: Mood normal          Behavior: Behavior normal          Thought Content:  Thought content normal        Juliana Palmer MD

## 2023-04-26 NOTE — PATIENT INSTRUCTIONS
Start torsemide 40 mg twice a day  Get lab work and chest x-ray and kidney ultrasound done before your next visit  Follow up with palliative care

## 2023-04-26 NOTE — ASSESSMENT & PLAN NOTE
Colon cancer related anemia, he is currently taking iron 324 mg every other day  Hb trended down and stabilized around 7 7 early April  He denies any hematochezia or melena in the past two weeks  Refuses to take daily iron due to constipation      Will recheck CBC, palliative care referral

## 2023-04-27 ENCOUNTER — APPOINTMENT (OUTPATIENT)
Dept: LAB | Facility: CLINIC | Age: 88
End: 2023-04-27

## 2023-04-27 ENCOUNTER — APPOINTMENT (OUTPATIENT)
Dept: RADIOLOGY | Facility: CLINIC | Age: 88
End: 2023-04-27

## 2023-04-27 DIAGNOSIS — N18.31 STAGE 3A CHRONIC KIDNEY DISEASE (HCC): ICD-10-CM

## 2023-04-27 DIAGNOSIS — N18.9 ANEMIA DUE TO CHRONIC KIDNEY DISEASE, UNSPECIFIED CKD STAGE: ICD-10-CM

## 2023-04-27 DIAGNOSIS — R06.02 SHORTNESS OF BREATH: ICD-10-CM

## 2023-04-27 DIAGNOSIS — D63.1 ANEMIA DUE TO CHRONIC KIDNEY DISEASE, UNSPECIFIED CKD STAGE: ICD-10-CM

## 2023-04-27 LAB
ALBUMIN SERPL BCP-MCNC: 3.1 G/DL (ref 3.5–5)
ALP SERPL-CCNC: 273 U/L (ref 46–116)
ALT SERPL W P-5'-P-CCNC: 18 U/L (ref 12–78)
ANION GAP SERPL CALCULATED.3IONS-SCNC: 8 MMOL/L (ref 4–13)
AST SERPL W P-5'-P-CCNC: 23 U/L (ref 5–45)
BASOPHILS # BLD AUTO: 0.03 THOUSANDS/ΜL (ref 0–0.1)
BASOPHILS NFR BLD AUTO: 1 % (ref 0–1)
BILIRUB SERPL-MCNC: 0.69 MG/DL (ref 0.2–1)
BUN SERPL-MCNC: 37 MG/DL (ref 5–25)
CALCIUM ALBUM COR SERPL-MCNC: 9.2 MG/DL (ref 8.3–10.1)
CALCIUM SERPL-MCNC: 8.5 MG/DL (ref 8.3–10.1)
CHLORIDE SERPL-SCNC: 99 MMOL/L (ref 96–108)
CO2 SERPL-SCNC: 27 MMOL/L (ref 21–32)
CREAT SERPL-MCNC: 1.76 MG/DL (ref 0.6–1.3)
EOSINOPHIL # BLD AUTO: 0.04 THOUSAND/ΜL (ref 0–0.61)
EOSINOPHIL NFR BLD AUTO: 1 % (ref 0–6)
ERYTHROCYTE [DISTWIDTH] IN BLOOD BY AUTOMATED COUNT: 17.4 % (ref 11.6–15.1)
GFR SERPL CREATININE-BSD FRML MDRD: 34 ML/MIN/1.73SQ M
GLUCOSE P FAST SERPL-MCNC: 140 MG/DL (ref 65–99)
HCT VFR BLD AUTO: 26 % (ref 36.5–49.3)
HGB BLD-MCNC: 7.6 G/DL (ref 12–17)
IMM GRANULOCYTES # BLD AUTO: 0.02 THOUSAND/UL (ref 0–0.2)
IMM GRANULOCYTES NFR BLD AUTO: 0 % (ref 0–2)
LYMPHOCYTES # BLD AUTO: 0.77 THOUSANDS/ΜL (ref 0.6–4.47)
LYMPHOCYTES NFR BLD AUTO: 13 % (ref 14–44)
MCH RBC QN AUTO: 22.9 PG (ref 26.8–34.3)
MCHC RBC AUTO-ENTMCNC: 29.2 G/DL (ref 31.4–37.4)
MCV RBC AUTO: 78 FL (ref 82–98)
MONOCYTES # BLD AUTO: 0.71 THOUSAND/ΜL (ref 0.17–1.22)
MONOCYTES NFR BLD AUTO: 12 % (ref 4–12)
NEUTROPHILS # BLD AUTO: 4.26 THOUSANDS/ΜL (ref 1.85–7.62)
NEUTS SEG NFR BLD AUTO: 73 % (ref 43–75)
NRBC BLD AUTO-RTO: 0 /100 WBCS
PLATELET # BLD AUTO: 360 THOUSANDS/UL (ref 149–390)
PMV BLD AUTO: 10.7 FL (ref 8.9–12.7)
POTASSIUM SERPL-SCNC: 4 MMOL/L (ref 3.5–5.3)
PROT SERPL-MCNC: 6.8 G/DL (ref 6.4–8.4)
RBC # BLD AUTO: 3.32 MILLION/UL (ref 3.88–5.62)
SODIUM SERPL-SCNC: 134 MMOL/L (ref 135–147)
WBC # BLD AUTO: 5.83 THOUSAND/UL (ref 4.31–10.16)

## 2023-04-28 ENCOUNTER — EVALUATION (OUTPATIENT)
Dept: PHYSICAL THERAPY | Facility: CLINIC | Age: 88
End: 2023-04-28

## 2023-04-28 DIAGNOSIS — S42.291D CLOSED FRACTURE OF HEAD OF RIGHT HUMERUS WITH ROUTINE HEALING, SUBSEQUENT ENCOUNTER: Primary | ICD-10-CM

## 2023-04-28 DIAGNOSIS — M25.511 CHRONIC RIGHT SHOULDER PAIN: ICD-10-CM

## 2023-04-28 DIAGNOSIS — G89.29 CHRONIC RIGHT SHOULDER PAIN: ICD-10-CM

## 2023-04-28 NOTE — PROGRESS NOTES
PT Re-Evaluation     Today's date: 2023  Patient name: Elmira Morris  : 1935  MRN: [id number]  Referring provider: DO Deann Larose:   Encounter Diagnosis     ICD-10-CM    1  Closed fracture of head of right humerus with routine healing, subsequent encounter  S42 291D       2  Chronic right shoulder pain  M25 511     G89 29           Start Time: 0950  Stop Time: 1045  Total time in clinic (min): 55 minutes    Assessment  Assessment details: Elmira Morris has been compliant with attending PT and home exercise program since initial eval  Mary Ann Mcconnell has made improvements in subjective and objective data since initial eval but continues to have limitations compared to prior level of function  Both his passive and active range of motion have improved, however he still remains limited globally, with discomfort when reaching behind his back and neck for functional activity  Mary Ann Mcconnell continues to have deficits in the above listed impairments and would benefit from additional skilled PT to address these deficits to return to prior level of function  Discussed with him and his significant other that he would benefit from a continuation of skilled PT for one more month to continue to progress ROM and he is agreement with POC  Impairments: abnormal muscle firing, abnormal or restricted ROM, activity intolerance, impaired physical strength, lacks appropriate home exercise program, pain with function, poor posture  and poor body mechanics    Symptom irritability: moderateUnderstanding of Dx/Px/POC: good   Prognosis: fair  Prognosis details: Positive prognostic indicators include positive attitude toward recovery, good understanding of diagnosis and treatment plan options and absence of observed red flags  Negative prognostic indicators include high symptom irritability, multiple concurrent orthopedic problems and dependency on transportation        Goals  (STG) Impairment Goals 4-6 weeks  - Decrease pain to 5/10 at worst with movement (progressing)  - Improve shoulder AROM by 10-20* in all deficient planes (met)  - Increase shoulder strength to 3+/5 throughout (met)  - Patient will be able to dress himself without assistance (progressing)    (LTG) Functional Goals 6-8 weeks  - Return to Prior Level of Function (progressing)  - Increase Functional Status Measure (FOTO) to: predicted outcome (met)  - Patient will be independent with HEP (progressing)  - Patient will be able to reach overhead to wash/comb his hair without assistance (progressing)  - Patient will be able to reach behind back to pull up his pants and wash his back (met)  - Improve ROM to within 5* of L UE (progressing)         Plan  Patient would benefit from: skilled physical therapy  Planned modality interventions: Modalities PRN  Planned therapy interventions: activity modification, manual therapy, neuromuscular re-education, patient education, therapeutic activities, therapeutic exercise, graded activity, home exercise program, behavior modification, self care, body mechanics training, flexibility, functional ROM exercises, stretching, strengthening, postural training and joint mobilization  Frequency: 2x week  Duration in weeks: 4  Treatment plan discussed with: patient and family        Subjective Evaluation    History of Present Illness  Mechanism of injury: trauma  Mechanism of injury: WORK/SCHOOL: retired  HOME LIFE: lives with a friend  PLOF: none  HISTORY OF CURRENT INJURY: Patient reports that on 2/7/23 he had a fall when he was getting out of the car and fells when he tried to step up a curb  He landed directly on the shoulder  He did receive some therapy at home but is down with it now  He reports he is still having some pain in his mid-arm and also some bruising  He is R handed  Not able to perform ADL's without pain     PAIN LOCATION/DESCRIPTORS: mid-upper arm  AGGRAVATING FACTORS: lifting his arm, bending his elbow, reaching behind his "back  EASES: not using any pain medication   DAY PATTERN: none, but worse with activity   IMAGING: See chart  SPECIAL QUESTIONS: No n/t  Colleen Estrada denies a new onset of Bladder incontinence, Bowel dysfunction, Dizziness, Dysphagia, Dysarthria, Drop attacks, Diplopia, Nausea, Ataxia, Tingling and Numbness  PATIENT GOALS: : \"To get my arm moving a little bit better\" - be able to perform ADL's independently     Re-evaluation (23): Patient reports that his shoulder is feeling better overall, however still painful, particularly with reaching behind his back and behind his head  His baseline pain levels have decreased, but he still has pain with most movements  He is also dealing with some fluid retention issues and shortness of breath which he is being treated for  He would like to continue with PT for a few more weeks to continue to work on functional ROM  Pain  Current pain ratin  At best pain ratin  At worst pain ratin  Quality: sharp  Aggravating factors: overhead activity  Progression: improved    Hand dominance: right      Diagnostic Tests  X-ray: abnormal  Treatments  Previous treatment: physical therapy  Patient Goals  Patient goals for therapy: increased motion and decreased pain          Objective     Postural Observations  Seated posture: fair  Standing posture: fair        Tenderness     Right Shoulder  Tenderness in the subacromial bursa  No tenderness in the [facility] [place] joint, acromion and clavicle       Additional Tenderness Details       Neurological Testing     Sensation     Shoulder   Left Shoulder   Intact: light touch    Right Shoulder   Intact: light touch    Active Range of Motion   Left Shoulder   Flexion: 112 degrees   Abduction: 95 degrees   External rotation BTH: C7   Internal rotation BTB: T10     Right Shoulder   Flexion: 85 degrees with pain  Abduction: 65 degrees with pain  External rotation BTH: C2 with pain  Internal rotation BTB: sacrum with pain    Passive Range of Motion " Right Shoulder   Flexion: 105 degrees with pain  Abduction: 95 degrees with pain  External rotation 45°: 35 degrees   Internal rotation 45°: 55 degrees     Additional Passive Range of Motion Details  Elbow flexion WNL  Elbow extension -5*    Strength/Myotome Testing     Left Shoulder   Normal muscle strength    Right Shoulder     Planes of Motion   Flexion: 4 (pain)   Abduction: 4 (pain)   External rotation at 0°: 4+   Internal rotation at 0°: 4+               Diagnosis: s/p R humeral head fracture 2/7/23   Precautions: T2BM, heart failure, kidney failure, a-fib, HTN, fall risk   Primary Goals: R shoulder A/PROM, strength   *asterisks by exercise = given for HEP   Manuals 4/21 4/25 4/28 4/18   R shoulder PROM LB SK LB  LB   R shoulder joint mobs gr 3 LB SK LB  LB                           There Ex        pulleys 5 mins flexion/scaption 5 min  Flexion/scaption  5 min  Flexion/scaption   5 mins flexion/scaption   Table slides* 20x flexion  20x scaption 20x flexion  20x scaption 20x flexion  20x scaption  20x flexion  20x scaption   Baudilio ELIZABETH        IR strap stretch        TB rows                                        Neuro Re-Ed        scap retraction                                                                                         Education   POC moving forward      Re-evaluation   LB       Ther Act                              Modalities          Heat PRN deferred deferred   deferrd

## 2023-05-01 ENCOUNTER — TELEPHONE (OUTPATIENT)
Dept: ADMINISTRATIVE | Facility: HOSPITAL | Age: 88
End: 2023-05-01

## 2023-05-01 NOTE — TELEPHONE ENCOUNTER
Owen Munguia! I just wanted to let you know that I reached out to pt to schedule the US order. Pt stated he is not going to do so due to not being able to hold his bladder. Pt stated he tried before and it did not work. I tried to encourage him that he doesn't have to have a full  Bladder, just something in his bladder. Pt stated he tried before and couldn't do it so he doesn't want to do it.        Thank you,   Baldo Bray

## 2023-05-02 ENCOUNTER — APPOINTMENT (EMERGENCY)
Dept: RADIOLOGY | Facility: HOSPITAL | Age: 88
End: 2023-05-02

## 2023-05-02 ENCOUNTER — HOSPITAL ENCOUNTER (INPATIENT)
Facility: HOSPITAL | Age: 88
LOS: 6 days | Discharge: HOME | End: 2023-05-08
Attending: EMERGENCY MEDICINE | Admitting: INTERNAL MEDICINE

## 2023-05-02 ENCOUNTER — TELEPHONE (OUTPATIENT)
Dept: INTERNAL MEDICINE CLINIC | Facility: CLINIC | Age: 88
End: 2023-05-02

## 2023-05-02 ENCOUNTER — OFFICE VISIT (OUTPATIENT)
Dept: PHYSICAL THERAPY | Facility: CLINIC | Age: 88
End: 2023-05-02

## 2023-05-02 DIAGNOSIS — I50.32 CHRONIC DIASTOLIC CONGESTIVE HEART FAILURE (HCC): ICD-10-CM

## 2023-05-02 DIAGNOSIS — I50.33 ACUTE ON CHRONIC DIASTOLIC CONGESTIVE HEART FAILURE (HCC): ICD-10-CM

## 2023-05-02 DIAGNOSIS — I50.9 CHF EXACERBATION (HCC): Primary | ICD-10-CM

## 2023-05-02 DIAGNOSIS — S42.291D CLOSED FRACTURE OF HEAD OF RIGHT HUMERUS WITH ROUTINE HEALING, SUBSEQUENT ENCOUNTER: Primary | ICD-10-CM

## 2023-05-02 DIAGNOSIS — E11.22 TYPE 2 DIABETES MELLITUS WITH STAGE 3B CHRONIC KIDNEY DISEASE, WITHOUT LONG-TERM CURRENT USE OF INSULIN (HCC): ICD-10-CM

## 2023-05-02 DIAGNOSIS — G89.29 CHRONIC RIGHT SHOULDER PAIN: ICD-10-CM

## 2023-05-02 DIAGNOSIS — N18.32 TYPE 2 DIABETES MELLITUS WITH STAGE 3B CHRONIC KIDNEY DISEASE, WITHOUT LONG-TERM CURRENT USE OF INSULIN (HCC): ICD-10-CM

## 2023-05-02 DIAGNOSIS — R26.2 AMBULATORY DYSFUNCTION: ICD-10-CM

## 2023-05-02 DIAGNOSIS — M25.511 CHRONIC RIGHT SHOULDER PAIN: ICD-10-CM

## 2023-05-02 DIAGNOSIS — I48.91 ATRIAL FIBRILLATION, UNSPECIFIED TYPE (HCC): ICD-10-CM

## 2023-05-02 DIAGNOSIS — R60.0 BILATERAL LOWER EXTREMITY EDEMA: ICD-10-CM

## 2023-05-02 LAB
ABO GROUP BLD: NORMAL
ALBUMIN SERPL BCP-MCNC: 3.6 G/DL (ref 3.5–5)
ALP SERPL-CCNC: 243 U/L (ref 34–104)
ALT SERPL W P-5'-P-CCNC: 13 U/L (ref 7–52)
ANION GAP SERPL CALCULATED.3IONS-SCNC: 7 MMOL/L (ref 4–13)
ANION GAP SERPL CALCULATED.3IONS-SCNC: 9 MMOL/L (ref 4–13)
AST SERPL W P-5'-P-CCNC: 18 U/L (ref 13–39)
BASOPHILS # BLD AUTO: 0.03 THOUSANDS/ÂΜL (ref 0–0.1)
BASOPHILS NFR BLD AUTO: 0 % (ref 0–1)
BILIRUB SERPL-MCNC: 0.81 MG/DL (ref 0.2–1)
BLD GP AB SCN SERPL QL: NEGATIVE
BNP SERPL-MCNC: 1397 PG/ML (ref 0–100)
BUN SERPL-MCNC: 35 MG/DL (ref 5–25)
BUN SERPL-MCNC: 35 MG/DL (ref 5–25)
CALCIUM SERPL-MCNC: 8.3 MG/DL (ref 8.4–10.2)
CALCIUM SERPL-MCNC: 8.5 MG/DL (ref 8.4–10.2)
CARDIAC TROPONIN I PNL SERPL HS: 69 NG/L (ref 8–18)
CARDIAC TROPONIN I PNL SERPL HS: 70 NG/L (ref 8–18)
CHLORIDE SERPL-SCNC: 97 MMOL/L (ref 96–108)
CHLORIDE SERPL-SCNC: 98 MMOL/L (ref 96–108)
CO2 SERPL-SCNC: 26 MMOL/L (ref 21–32)
CO2 SERPL-SCNC: 30 MMOL/L (ref 21–32)
CREAT SERPL-MCNC: 1.64 MG/DL (ref 0.6–1.3)
CREAT SERPL-MCNC: 1.68 MG/DL (ref 0.6–1.3)
EOSINOPHIL # BLD AUTO: 0.03 THOUSAND/ÂΜL (ref 0–0.61)
EOSINOPHIL NFR BLD AUTO: 0 % (ref 0–6)
ERYTHROCYTE [DISTWIDTH] IN BLOOD BY AUTOMATED COUNT: 17.3 % (ref 11.6–15.1)
GFR SERPL CREATININE-BSD FRML MDRD: 36 ML/MIN/1.73SQ M
GFR SERPL CREATININE-BSD FRML MDRD: 37 ML/MIN/1.73SQ M
GLUCOSE SERPL-MCNC: 108 MG/DL (ref 65–140)
GLUCOSE SERPL-MCNC: 148 MG/DL (ref 65–140)
GLUCOSE SERPL-MCNC: 225 MG/DL (ref 65–140)
GLUCOSE SERPL-MCNC: 267 MG/DL (ref 65–140)
HCT VFR BLD AUTO: 25.8 % (ref 36.5–49.3)
HGB BLD-MCNC: 7.7 G/DL (ref 12–17)
IMM GRANULOCYTES # BLD AUTO: 0.02 THOUSAND/UL (ref 0–0.2)
IMM GRANULOCYTES NFR BLD AUTO: 0 % (ref 0–2)
LYMPHOCYTES # BLD AUTO: 0.62 THOUSANDS/ÂΜL (ref 0.6–4.47)
LYMPHOCYTES NFR BLD AUTO: 8 % (ref 14–44)
MAGNESIUM SERPL-MCNC: 1.7 MG/DL (ref 1.9–2.7)
MCH RBC QN AUTO: 23.1 PG (ref 26.8–34.3)
MCHC RBC AUTO-ENTMCNC: 29.8 G/DL (ref 31.4–37.4)
MCV RBC AUTO: 78 FL (ref 82–98)
MONOCYTES # BLD AUTO: 0.9 THOUSAND/ÂΜL (ref 0.17–1.22)
MONOCYTES NFR BLD AUTO: 12 % (ref 4–12)
NEUTROPHILS # BLD AUTO: 6.14 THOUSANDS/ÂΜL (ref 1.85–7.62)
NEUTS SEG NFR BLD AUTO: 80 % (ref 43–75)
NRBC BLD AUTO-RTO: 0 /100 WBCS
PLATELET # BLD AUTO: 333 THOUSANDS/UL (ref 149–390)
PMV BLD AUTO: 10.3 FL (ref 8.9–12.7)
POTASSIUM SERPL-SCNC: 3.4 MMOL/L (ref 3.5–5.3)
POTASSIUM SERPL-SCNC: 3.9 MMOL/L (ref 3.5–5.3)
PROT SERPL-MCNC: 6.3 G/DL (ref 6.4–8.4)
RBC # BLD AUTO: 3.33 MILLION/UL (ref 3.88–5.62)
RH BLD: POSITIVE
SODIUM SERPL-SCNC: 133 MMOL/L (ref 135–147)
SODIUM SERPL-SCNC: 134 MMOL/L (ref 135–147)
SPECIMEN EXPIRATION DATE: NORMAL
WBC # BLD AUTO: 7.74 THOUSAND/UL (ref 4.31–10.16)

## 2023-05-02 RX ORDER — FUROSEMIDE 10 MG/ML
40 INJECTION INTRAMUSCULAR; INTRAVENOUS ONCE
Status: COMPLETED | OUTPATIENT
Start: 2023-05-02 | End: 2023-05-02

## 2023-05-02 RX ORDER — INSULIN LISPRO 100 [IU]/ML
1-5 INJECTION, SOLUTION INTRAVENOUS; SUBCUTANEOUS
Status: DISCONTINUED | OUTPATIENT
Start: 2023-05-02 | End: 2023-05-08 | Stop reason: HOSPADM

## 2023-05-02 RX ORDER — INSULIN LISPRO 100 [IU]/ML
1-6 INJECTION, SOLUTION INTRAVENOUS; SUBCUTANEOUS
Status: DISCONTINUED | OUTPATIENT
Start: 2023-05-02 | End: 2023-05-08 | Stop reason: HOSPADM

## 2023-05-02 RX ORDER — POTASSIUM CHLORIDE 20 MEQ/1
20 TABLET, EXTENDED RELEASE ORAL DAILY
Status: DISCONTINUED | OUTPATIENT
Start: 2023-05-02 | End: 2023-05-05

## 2023-05-02 RX ORDER — LOSARTAN POTASSIUM 50 MG/1
50 TABLET ORAL DAILY
Status: DISCONTINUED | OUTPATIENT
Start: 2023-05-02 | End: 2023-05-08 | Stop reason: HOSPADM

## 2023-05-02 RX ORDER — METOPROLOL TARTRATE 50 MG/1
50 TABLET, FILM COATED ORAL EVERY 12 HOURS SCHEDULED
Status: DISCONTINUED | OUTPATIENT
Start: 2023-05-02 | End: 2023-05-08 | Stop reason: HOSPADM

## 2023-05-02 RX ORDER — MAGNESIUM SULFATE 1 G/100ML
1 INJECTION INTRAVENOUS ONCE
Status: COMPLETED | OUTPATIENT
Start: 2023-05-03 | End: 2023-05-03

## 2023-05-02 RX ORDER — METHOCARBAMOL 500 MG/1
500 TABLET, FILM COATED ORAL ONCE
Status: COMPLETED | OUTPATIENT
Start: 2023-05-02 | End: 2023-05-03

## 2023-05-02 RX ORDER — AMIODARONE HYDROCHLORIDE 200 MG/1
100 TABLET ORAL DAILY
Status: DISCONTINUED | OUTPATIENT
Start: 2023-05-02 | End: 2023-05-07

## 2023-05-02 RX ORDER — PRAVASTATIN SODIUM 40 MG
40 TABLET ORAL
Status: DISCONTINUED | OUTPATIENT
Start: 2023-05-02 | End: 2023-05-08 | Stop reason: HOSPADM

## 2023-05-02 RX ORDER — FERROUS SULFATE 325(65) MG
325 TABLET ORAL EVERY OTHER DAY
Status: DISCONTINUED | OUTPATIENT
Start: 2023-05-02 | End: 2023-05-08 | Stop reason: HOSPADM

## 2023-05-02 RX ORDER — DOXAZOSIN MESYLATE 4 MG/1
8 TABLET ORAL
Status: DISCONTINUED | OUTPATIENT
Start: 2023-05-02 | End: 2023-05-08

## 2023-05-02 RX ORDER — POTASSIUM CHLORIDE 20 MEQ/1
20 TABLET, EXTENDED RELEASE ORAL ONCE
Status: COMPLETED | OUTPATIENT
Start: 2023-05-03 | End: 2023-05-03

## 2023-05-02 RX ORDER — ACETAMINOPHEN 325 MG/1
650 TABLET ORAL EVERY 4 HOURS PRN
Status: DISCONTINUED | OUTPATIENT
Start: 2023-05-02 | End: 2023-05-08 | Stop reason: HOSPADM

## 2023-05-02 RX ORDER — FUROSEMIDE 10 MG/ML
80 INJECTION INTRAMUSCULAR; INTRAVENOUS
Status: DISCONTINUED | OUTPATIENT
Start: 2023-05-02 | End: 2023-05-07

## 2023-05-02 RX ADMIN — METOPROLOL TARTRATE 50 MG: 50 TABLET, FILM COATED ORAL at 21:18

## 2023-05-02 RX ADMIN — DOXAZOSIN 8 MG: 4 TABLET ORAL at 21:17

## 2023-05-02 RX ADMIN — APIXABAN 2.5 MG: 2.5 TABLET, FILM COATED ORAL at 17:19

## 2023-05-02 RX ADMIN — POTASSIUM CHLORIDE 20 MEQ: 1500 TABLET, EXTENDED RELEASE ORAL at 17:19

## 2023-05-02 RX ADMIN — FUROSEMIDE 40 MG: 10 INJECTION, SOLUTION INTRAMUSCULAR; INTRAVENOUS at 13:33

## 2023-05-02 RX ADMIN — AMIODARONE HYDROCHLORIDE 100 MG: 200 TABLET ORAL at 17:19

## 2023-05-02 RX ADMIN — INSULIN LISPRO 3 UNITS: 100 INJECTION, SOLUTION INTRAVENOUS; SUBCUTANEOUS at 17:20

## 2023-05-02 RX ADMIN — FUROSEMIDE 80 MG: 10 INJECTION, SOLUTION INTRAMUSCULAR; INTRAVENOUS at 17:19

## 2023-05-02 RX ADMIN — PRAVASTATIN SODIUM 40 MG: 40 TABLET ORAL at 17:19

## 2023-05-02 RX ADMIN — LOSARTAN POTASSIUM 50 MG: 50 TABLET, FILM COATED ORAL at 17:19

## 2023-05-02 RX ADMIN — INSULIN LISPRO 2 UNITS: 100 INJECTION, SOLUTION INTRAVENOUS; SUBCUTANEOUS at 21:17

## 2023-05-02 RX ADMIN — ACETAMINOPHEN 650 MG: 325 TABLET ORAL at 21:17

## 2023-05-02 RX ADMIN — FERROUS SULFATE TAB 325 MG (65 MG ELEMENTAL FE) 325 MG: 325 (65 FE) TAB at 17:19

## 2023-05-02 NOTE — ACP (ADVANCE CARE PLANNING)
Advanced Care Planning Progress Note    Serious Illness Conversation    1  What is your understanding now of where you are with your illness? Prognostic Understanding: appropriate understanding of prognosis     2  How much information about what is likely to be ahead with your illness would you like to have? Information: patient wants to be fully informed     3  What did you (clinician) communicate to the patient? We will continue to treat volume overload with intravenous diuretics  We understand you do not desire any treatment for colon cancer     4  If your health situation worsens, what are your most important goals? Goals: have my medical decisions respected, be spiritually and emotionally at peace, be physically comfortable     5  What are the biggest fears and worries about the future and your health? Not being able to breathe     6  If you become sicker, how much are you willing to go through for the possibility of gaining more time? Be in the hospital: Yes Have a feeding tube: No   Be in the ICU: No Live in a nursing home: No   Be on a ventilator: No Be uncomfortable: No   Be on dialysis: No Undergo aggressive test and/or procedures: No     7  How much does your proxy and family know about your priorities and wishes? Discussion Discussion: extensive discussion with family about goals and wishes  My wife takes care of my medications and everything concerning my health  She should be fully informed  I’ve heard you say that being comfortable and not being short of breath is really important to you  Keeping that in mind, and what we know about your illness, I recommend that we continue to treat volume overload with intravenous diuretics  This will help us make sure that your treatment plans reflect what’s important to you    Since you desire no treatment for diagnosed colon cancer, I would also recommend that you follow-up with palliative care team in the office to help with your treatment when worsening symptoms or cancer or heart failure begin to develop       Patient verbalized understanding of the plan     I have spent >30minutes speaking with my patient on advanced care planning today or during this visit     Advanced directives         Isabella June MD

## 2023-05-02 NOTE — ASSESSMENT & PLAN NOTE
Wt Readings from Last 3 Encounters:   05/02/23 88 5 kg (195 lb 1 7 oz)   04/26/23 88 7 kg (195 lb 9 6 oz)   04/13/23 85 7 kg (189 lb)     · POA, presents with progressively worsening shortness of breath and lower extremity swelling  · Chest x-ray showed prominent interstitial lung markings with possible pulmonary vascular congestion  · Most recent echocardiogram in July 2022 revealed EF 60%, mild to moderate TR, mild to moderately elevated PA systolic pressure, unable to assess diastolic function  · He is on torsemide 40 mg twice daily at home which was recently increased from 20 twice daily  · He received IV Lasix 40 mg in the ED and put out approximately 400 cc urine  · He will be continued on IV Lasix 80 mg 3 times daily  · Echocardiogram ordered    Cardiology consult  · Daily weights, I's and O's, 2 g sodium diet, 1800 cc fluid restriction

## 2023-05-02 NOTE — PROGRESS NOTES
Daily Note     Today's date: 2023  Patient name: Juliet Javier  : 1935  MRN: [id number]  Referring provider: Dwight Sky DO  Dx:   Encounter Diagnosis     ICD-10-CM    1  Closed fracture of head of right humerus with routine healing, subsequent encounter  S42 291D       2  Chronic right shoulder pain  M25 511     G89 29           Start Time: 915  Stop Time: 1000  Total time in clinic (min): 45 minutes    Subjective: Patient reports that his shoulder is feeling better  Says that he has been having increased swelling in his legs and starting to have pain in them as well  Says that he plans to go to the ER following this session to get this checked out  Would like to stay for session today  Objective: See treatment diary below  Pre session vitals: BP seated R /86, SpO2 95%, HR 95 BPM  Post session vitals: BP seated L /84, SpO2 98%       Assessment: Vitals were assessed prior to beginning session today due to subjective reports upon arrival  I determined that it was safe to perform session based upon above readings, but did encourage patient to seek treatment today for his increased swelling and SOB  Tolerated treatment well  Patient would benefit from continued PT  Shoulder mobility is slowly improving each session  Will continue to progress as able  Plan: Continue per plan of care  Progress treatment as tolerated         Diagnosis: s/p R humeral head fracture 23   Precautions: T2BM, heart failure, kidney failure, a-fib, HTN, fall risk   Primary Goals: R shoulder A/PROM, strength   *asterisks by exercise = given for HEP   Manuals    R shoulder PROM LB SK LB LB LB   R shoulder joint mobs gr 3 LB SK LB LB LB                           There Ex        pulleys 5 mins flexion/scaption 5 min  Flexion/scaption  5 min  Flexion/scaption  5 min  Flexion/scaption 5 mins flexion/scaption   Table slides* 20x flexion  20x scaption 20x flexion  20x scaption 20x flexion  20x scaption 20x flexion  20x scaption 20x flexion  20x scaption   Cane AAROM        IR strap stretch        TB rows                                        Neuro Re-Ed        scap retraction                                                                                         Education   POC moving forward      Re-evaluation   LB       Ther Act                              Modalities          Heat PRN deferred deferred   deferrd

## 2023-05-02 NOTE — ASSESSMENT & PLAN NOTE
· EKG shows sinus rhythm  · Eliquis dose decreased to 2 5 mg twice daily based on age and renal function

## 2023-05-02 NOTE — TELEPHONE ENCOUNTER
Pt spouse called us to ask how to get thru to Jasper at the hospital  I did give her the [phone]  I did also call over to the number and I have asked them to put a message in to tht enurse for Ivetteca to give the spouse a call  I have been informed there are no direct numbers to the floor

## 2023-05-02 NOTE — ASSESSMENT & PLAN NOTE
Lab Results   Component Value Date    HGBA1C 7 8 (H) 02/09/2023     No results for input(s): POCGLU in the last 72 hours      Blood Sugar Average: Last 72 hrs:  · On metformin 1000 mg twice daily at home holding metformin  · Holding metformin at this time  · Accu-Cheks ACHS with SSI coverage  · Check hemoglobin A1c

## 2023-05-02 NOTE — ASSESSMENT & PLAN NOTE
· POA, baseline hemoglobin has fluctuated between 7 and 8 in the last couple of months  · Secondary to history of colon cancer for which patient has declined treatment  · He is on Eliquis for history of atrial fibrillation  · Hemoglobin currently stable at his baseline  · Continue iron supplements every other day

## 2023-05-02 NOTE — ED PROVIDER NOTES
"History  Chief Complaint   Patient presents with   • Shortness of Breath     Pt with h/o CHF, colon CA and and afib presents with SOB \"for awhile\" but progressively getting worse  Also reports increased bilateral leg swelling     82-year-old male with previous medical history of anemia, CKD, CHF, diabetes presents for evaluation of bilateral lower extremity swelling and shortness of breath  No CP  No palpitation  No abdominal pain  No distention  No nausea vomiting  No back pain  Shortness of Breath  Associated symptoms: no abdominal pain, no chest pain, no cough, no ear pain, no fever, no headaches, no neck pain, no rash, no sore throat, no sputum production, no vomiting and no wheezing    Risk factors: no recent alcohol use and no tobacco use        Prior to Admission Medications   Prescriptions Last Dose Informant Patient Reported? Taking?    Eliquis 5 MG   No No   Sig: TAKE ONE TABLET BY MOUTH TWICE A DAY   albuterol (PROVENTIL HFA,VENTOLIN HFA) 90 mcg/act inhaler   Yes No   amiodarone 100 mg tablet   No No   Sig: Take 1 tablet (100 mg total) by mouth daily   doxazosin (CARDURA) 8 MG tablet   No No   Sig: TAKE ONE TABLET BY MOUTH AT BEDTIME   ferrous sulfate 324 (65 Fe) mg   No No   Sig: Take 1 tablet (324 mg total) by mouth every other day   losartan (COZAAR) 50 mg tablet   No No   Sig: Take 1 tablet (50 mg total) by mouth daily   metFORMIN (GLUCOPHAGE) 1000 MG tablet   No No   Sig: Take 1 tablet (1,000 mg total) by mouth 2 (two) times a day with meals   metoprolol tartrate (LOPRESSOR) 50 mg tablet   No No   Sig: Take 1 tablet (50 mg total) by mouth every 12 (twelve) hours   pantoprazole (PROTONIX) 40 mg tablet   No No   Sig: TAKE ONE TABLET BY MOUTH TWICE A DAY BEFORE MEALS   Patient taking differently: As needed   potassium chloride (K-DUR,KLOR-CON) 20 mEq tablet   No No   Sig: Take 1 tablet (20 mEq total) by mouth daily   pravastatin (PRAVACHOL) 40 mg tablet   No No   Sig: TAKE ONE TABLET BY MOUTH " EVERY DAY   torsemide (DEMADEX) 20 mg tablet   No No   Sig: Take 2 tablets in the morning (40mg) and 2 tablet in the evening (40mg)      Facility-Administered Medications: None       Past Medical History:   Diagnosis Date   • A-fib [facility])    • Colon cancer (HCC)    • Diastolic heart failure (HCC)    • Flu-like symptoms 2022   • HTN (hypertension)    • Postviral syndrome 2023       Past Surgical History:   Procedure Laterality Date   • EXPLORATORY LAPAROTOMY      with gastric ulcer repair       Family History   Problem Relation Age of Onset   • Colon cancer Neg Hx      I have reviewed and agree with the history as documented  E-Cigarette/Vaping   • E-Cigarette Use Never User      E-Cigarette/Vaping Substances   • Nicotine No    • THC No    • CBD No    • Flavoring No    • Other No    • Unknown No      Social History     Tobacco Use   • Smoking status: Former     Types: Cigarettes     Start date: 1953     Quit date: 1966     Years since quittin 2     Passive exposure: Past   • Smokeless tobacco: Never   Vaping Use   • Vaping Use: Never used   Substance Use Topics   • Alcohol use: Yes     Comment: Rarely   • Drug use: Never       Review of Systems   Constitutional: Negative for activity change, appetite change, chills and fever  HENT: Negative for congestion, ear pain and sore throat  Eyes: Negative for pain, discharge and visual disturbance  Respiratory: Positive for shortness of breath  Negative for cough, sputum production and wheezing  Cardiovascular: Negative for chest pain and palpitations  Gastrointestinal: Negative for abdominal distention, abdominal pain and vomiting  Endocrine: Negative for cold intolerance, heat intolerance and polydipsia  Genitourinary: Negative for dysuria, flank pain, frequency and hematuria  Musculoskeletal: Negative for arthralgias, back pain and neck pain  Skin: Negative for color change and rash     Allergic/Immunologic: Negative for environmental allergies and food allergies  Neurological: Negative for dizziness, seizures, syncope and headaches  Hematological: Negative for adenopathy  Does not bruise/bleed easily  Psychiatric/Behavioral: Negative for agitation and confusion  All other systems reviewed and are negative  Physical Exam  Physical Exam  Constitutional:       Appearance: He is well-developed  HENT:      Head: Normocephalic  Mouth/Throat:      Mouth: Mucous membranes are moist    Eyes:      Extraocular Movements: Extraocular movements intact  Pupils: Pupils are equal, round, and reactive to light  Cardiovascular:      Rate and Rhythm: Normal rate  Pulmonary:      Effort: Pulmonary effort is normal       Comments: Bilateral rales  Chest:      Chest wall: No mass, deformity or tenderness  Abdominal:      Palpations: Abdomen is soft  Musculoskeletal:         General: Normal range of motion  Cervical back: Normal range of motion  Skin:     General: Skin is warm  Capillary Refill: Capillary refill takes less than 2 seconds  Neurological:      General: No focal deficit present  Mental Status: He is alert           Vital Signs  ED Triage Vitals   Temperature Pulse Respirations Blood Pressure SpO2   05/02/23 1101 05/02/23 1101 05/02/23 1101 05/02/23 1101 05/02/23 1101   (!) 97 4 °F (36 3 °C) 84 (!) 24 (!) 147/117 94 %      Temp Source Heart Rate Source Patient Position - Orthostatic VS BP Location FiO2 (%)   05/02/23 1101 05/02/23 1130 05/02/23 1130 05/02/23 1130 --   Oral Monitor Sitting Right arm       Pain Score       05/02/23 1101       No Pain           Vitals:    05/02/23 1130 05/02/23 1230 05/02/23 1300 05/02/23 1330   BP: 160/75 143/70 150/69 144/62   Pulse: 80 80 77 81   Patient Position - Orthostatic VS: Sitting Sitting Sitting Sitting         Visual Acuity      ED Medications  Medications   furosemide (LASIX) injection 40 mg (40 mg Intravenous Given 5/2/23 1333)       Diagnostic Studies  Results Reviewed     Procedure Component Value Units Date/Time    High Sensitivity Troponin I Random [[id number]]     Lab Status: No result Specimen: Blood     High Sensitivity Troponin I Random [[id number]]  (Abnormal) Collected: 05/02/23 1203    Lab Status: Final result Specimen: Blood from Arm, Left Updated: 05/02/23 1240     HS TnI random 69 ng/L     B-Type Natriuretic Peptide(BNP) [[id number]]  (Abnormal) Collected: 05/02/23 1203    Lab Status: Final result Specimen: Blood from Arm, Left Updated: 05/02/23 1239     BNP 1,397 pg/mL     Comprehensive metabolic panel [[id number]]  (Abnormal) Collected: 05/02/23 1203    Lab Status: Final result Specimen: Blood from Arm, Left Updated: 05/02/23 1235     Sodium 134 mmol/L      Potassium 3 9 mmol/L      Chloride 97 mmol/L      CO2 30 mmol/L      ANION GAP 7 mmol/L      BUN 35 mg/dL      Creatinine 1 68 mg/dL      Glucose 148 mg/dL      Calcium 8 5 mg/dL      AST 18 U/L      ALT 13 U/L      Alkaline Phosphatase 243 U/L      Total Protein 6 3 g/dL      Albumin 3 6 g/dL      Total Bilirubin 0 81 mg/dL      eGFR 36 ml/min/1 73sq m     Narrative:      [place] guidelines for Chronic Kidney Disease (CKD):   •  Stage 1 with normal or high GFR (GFR > 90 mL/min/1 73 square meters)  •  Stage 2 Mild CKD (GFR = 60-89 mL/min/1 73 square meters)  •  Stage 3A Moderate CKD (GFR = 45-59 mL/min/1 73 square meters)  •  Stage 3B Moderate CKD (GFR = 30-44 mL/min/1 73 square meters)  •  Stage 4 Severe CKD (GFR = 15-29 mL/min/1 73 square meters)  •  Stage 5 End Stage CKD (GFR <15 mL/min/1 73 square meters)  Note: GFR calculation is accurate only with a steady state creatinine    CBC and differential [[id number]]  (Abnormal) Collected: 05/02/23 1203    Lab Status: Final result Specimen: Blood from Arm, Left Updated: 05/02/23 1216     WBC 7 74 Thousand/uL      RBC 3 33 Million/uL      Hemoglobin 7 7 g/dL      Hematocrit 25 8 %      MCV 78 fL      MCH 23 1 pg MCHC 29 8 g/dL      RDW 17 3 %      MPV 10 3 fL      Platelets 319 Thousands/uL      nRBC 0 /100 WBCs      Neutrophils Relative 80 %      Immat GRANS % 0 %      Lymphocytes Relative 8 %      Monocytes Relative 12 %      Eosinophils Relative 0 %      Basophils Relative 0 %      Neutrophils Absolute 6 14 Thousands/µL      Immature Grans Absolute 0 02 Thousand/uL      Lymphocytes Absolute 0 62 Thousands/µL      Monocytes Absolute 0 90 Thousand/µL      Eosinophils Absolute 0 03 Thousand/µL      Basophils Absolute 0 03 Thousands/µL                  XR chest 1 view portable   Final Result by Scooter Spivey MD (05/02 1212)      Possible pulmonary vascular congestion  Workstation performed: [id number]                    Procedures  ECG 12 Lead Documentation Only    Date/Time: 5/2/2023 2:00 PM  Performed by: Ronn Ramírez DO  Authorized by: Ronn Ramírez DO     Patient location:  ED  Interpretation:     Interpretation: normal    Rate:     ECG rate assessment: normal    Rhythm:     Rhythm: sinus rhythm    QRS:     QRS axis:  Normal    QRS intervals:  Normal  Conduction:     Conduction: normal    ST segments:     ST segments:  Normal  T waves:     T waves: normal               ED Course      72-year-old male with history of CHF presents with bilateral leg swelling and shortness of breath  Chest x-ray shows pulmonary edema  Patient is well-appearing  No tachypnea  No respiratory distress  IV Lasix administered  He does show signs stable anemia  No acute blood loss  We will admit at this time  Dr Adam Kimbrough accepted admission  Medical Decision Making  72-year-old male with history of CHF presents with bilateral leg swelling and shortness of breath  Chest x-ray shows pulmonary edema  Patient is well-appearing  No tachypnea  No respiratory distress  IV Lasix administered  He does show signs stable anemia  No acute blood loss  We will admit at this time    Dr Adam Kimbrough accepted admission  Amount and/or Complexity of Data Reviewed  External Data Reviewed: labs and radiology  Details: Anemia  Elevated BNP  X-ray shows pulmonary congestion  Labs: ordered  Radiology: ordered  Risk  Prescription drug management  Decision regarding hospitalization  Disposition  Final diagnoses:   CHF exacerbation ([address] )     Time reflects when diagnosis was documented in both MDM as applicable and the Disposition within this note     Time User Action Codes Description Comment    5/2/2023  1:50 PM Ghada Esquivel Add [I50 9] CHF exacerbation [facility])       ED Disposition     ED Disposition   Admit    Condition   Stable    Date/Time   Tue May 2, 2023  1:50 PM    Comment   Case was discussed with Dr Franco Nunez and the patient's admission status was agreed to be Admission Status: inpatient status to the service of Dr Franco Nunez   Follow-up Information    None         Patient's Medications   Discharge Prescriptions    No medications on file       No discharge procedures on file      PDMP Review     None          ED Provider  Electronically Signed by           Jose Cruz Pastor DO  05/02/23 5093

## 2023-05-02 NOTE — PLAN OF CARE
Problem: CARDIOVASCULAR - ADULT  Goal: Maintains optimal cardiac output and hemodynamic stability  Description: INTERVENTIONS:  - Monitor I/O, vital signs and rhythm  - Monitor for S/S and trends of decreased cardiac output  - Administer and titrate ordered vasoactive medications to optimize hemodynamic stability  - Assess quality of pulses, skin color and temperature  - Assess for signs of decreased coronary artery perfusion  - Instruct patient to report change in severity of symptoms  Outcome: Progressing  Goal: Absence of cardiac dysrhythmias or at baseline rhythm  Description: INTERVENTIONS:  - Continuous cardiac monitoring, vital signs, obtain 12 lead EKG if ordered  - Administer antiarrhythmic and heart rate control medications as ordered  - Monitor electrolytes and administer replacement therapy as ordered  Outcome: Progressing     Problem: RESPIRATORY - ADULT  Goal: Achieves optimal ventilation and oxygenation  Description: INTERVENTIONS:  - Assess for changes in respiratory status  - Assess for changes in mentation and behavior  - Position to facilitate oxygenation and minimize respiratory effort  - Oxygen administered by appropriate delivery if ordered  - Initiate smoking cessation education as indicated  - Encourage broncho-pulmonary hygiene including cough, deep breathe, Incentive Spirometry  - Assess the need for suctioning and aspirate as needed  - Assess and instruct to report SOB or any respiratory difficulty  - Respiratory Therapy support as indicated  Outcome: Progressing     Problem: METABOLIC, FLUID AND ELECTROLYTES - ADULT  Goal: Fluid balance maintained  Description: INTERVENTIONS:  - Monitor labs   - Monitor I/O and WT  - Instruct patient on fluid and nutrition as appropriate  - Assess for signs & symptoms of volume excess or deficit  Outcome: Progressing

## 2023-05-02 NOTE — H&P
[facility]  H&P  Name: Alem Ziegler 82 y o  male I MRN: [id number]  Unit/Bed#: S -89 I Date of Admission: 5/2/2023   Date of Service: 5/2/2023 I Hospital Day: 0      Assessment/Plan   * Acute on chronic diastolic congestive heart failure [facility])  Assessment & Plan  Wt Readings from Last 3 Encounters:   05/02/23 88 5 kg (195 lb 1 7 oz)   04/26/23 88 7 kg (195 lb 9 6 oz)   04/13/23 85 7 kg (189 lb)     · POA, presents with progressively worsening shortness of breath and lower extremity swelling  · Chest x-ray showed prominent interstitial lung markings with possible pulmonary vascular congestion  · Most recent echocardiogram in July 2022 revealed EF 60%, mild to moderate TR, mild to moderately elevated PA systolic pressure, unable to assess diastolic function  · He is on torsemide 40 mg twice daily at home which was recently increased from 20 twice daily  · He received IV Lasix 40 mg in the ED and put out approximately 400 cc urine  · He will be continued on IV Lasix 80 mg 3 times daily  · Echocardiogram ordered  Cardiology consult  · Daily weights, I's and O's, 2 g sodium diet, 1800 cc fluid restriction        Stage 3 chronic kidney disease [facility])  Assessment & Plan  Lab Results   Component Value Date    EGFR 36 05/02/2023    EGFR 34 04/27/2023    EGFR 31 04/07/2023    CREATININE 1 68 (H) 05/02/2023    CREATININE 1 76 (H) 04/27/2023    CREATININE 1 91 (H) 04/07/2023     · Baseline creatinine appears to be around 1 5 although has been trending up recently  · Presents with creatinine of 1 68 today  · Dose of torsemide recently increased to 40 mg twice daily  · Continue to monitor renal function closely while on IV diuretics  · Avoid hypotension    Continue losartan    Iron deficiency anemia due to chronic blood loss  Assessment & Plan  · POA, baseline hemoglobin has fluctuated between 7 and 8 in the last couple of months  · Secondary to history of colon cancer for which patient has declined treatment  · He is on Eliquis for history of atrial fibrillation  · Hemoglobin currently stable at his baseline  · Continue iron supplements every other day    Type 2 diabetes mellitus, without long-term current use of insulin (HCC)  Assessment & Plan  Lab Results   Component Value Date    HGBA1C 7 8 (H) 02/09/2023     No results for input(s): POCGLU in the last 72 hours  Blood Sugar Average: Last 72 hrs:  · On metformin 1000 mg twice daily at home holding metformin  · Holding metformin at this time  · Accu-Cheks ACHS with SSI coverage  · Check hemoglobin A1c    Primary hypertension  Assessment & Plan  · Continue home medications    Colon adenocarcinoma [facility])  Assessment & Plan  · Has been referred to palliative care outpatient  · Has declined treatment    Atrial fibrillation (HCC)  Assessment & Plan  · EKG shows sinus rhythm  · Eliquis dose decreased to 2 5 mg twice daily based on age and renal function         VTE Prophylaxis: Apixaban (Eliquis)  / sequential compression device     Code Status: DNR/DNI    Discussed with patient's spouse David Silva on phone  All questions answered    Anticipated Length of Stay:  Patient will be admitted on an Inpatient basis with an anticipated length of stay of  > 2 midnights  Justification for Hospital Stay: Acute diastolic CHF    Chief Complaint:     Worsening shortness of breath and lower extremity swelling with weight gain    History of Present Illness:  Lauri Bo is a 82 y o  male with PMH significant for diastolic CHF, CKD stage III, type 2 diabetes, chronic anemia and adenocarcinoma of the colon who presents with progressively worsening shortness of breath and bilateral lower extremity swelling with weight gain  The patient reports shortness of breath has been chronic for him with associated orthopnea and PND  He noted in the past day a 6 pound weight gain with increased lower extremity swelling    He denies any dietary or medication noncompliance and reports his wife takes care of his medications and meals  According to spouse, she tries to give him a low-salt diet however she does not always check the label on the meals she buys  He denied any chest pain, no fever or chills  Review of Systems   Constitutional: Positive for fatigue  Negative for appetite change and fever  HENT: Negative  Respiratory: Positive for shortness of breath  Negative for cough and wheezing  Cardiovascular: Positive for leg swelling  Negative for chest pain and palpitations  Gastrointestinal: Negative  Genitourinary: Positive for difficulty urinating  Musculoskeletal: Negative  Neurological: Negative  Psychiatric/Behavioral: Negative  All other systems reviewed and are negative        Past Medical History:   Diagnosis Date   • A-fib [facility])    • Colon cancer ([facility] )    • Diastolic heart failure ([facility] )    • Flu-like symptoms 2022   • HTN (hypertension)    • Postviral syndrome 2023       Past Surgical History:   Procedure Laterality Date   • EXPLORATORY LAPAROTOMY      with gastric ulcer repair       Family History:  Family History   Problem Relation Age of Onset   • Colon cancer Neg Hx        Home Meds:  all medications and allergies reviewed    Allergies: No Known Allergies    Marital Status: /Civil Union     Social History     Substance and Sexual Activity   Alcohol Use Yes    Comment: Rarely     Social History     Tobacco Use   Smoking Status Former   • Types: Cigarettes   • Start date: 1953   • Quit date: 1966   • Years since quittin 2   • Passive exposure: Past   Smokeless Tobacco Never     Social History     Substance and Sexual Activity   Drug Use Never       Physical Exam:   Vitals:   Blood Pressure: 141/71 (23 1430)  Pulse: 82 (23 1430)  Temperature: (!) 97 4 °F (36 3 °C) (23 1101)  Temp Source: Oral (23 1101)  Respirations: 22 (23 1430)  Weight - Scale: 88 5 kg (195 lb 1 7 oz) (23 1101)  SpO2: 92 % (23 1430)    Physical Exam  Vitals reviewed  Constitutional:       General: He is not in acute distress  Appearance: He is not toxic-appearing or diaphoretic  Comments: Chronically ill-appearing   HENT:      Head: Normocephalic and atraumatic  Mouth/Throat:      Mouth: Mucous membranes are moist    Eyes:      General:         Right eye: No discharge  Left eye: No discharge  Cardiovascular:      Rate and Rhythm: Normal rate and regular rhythm  Heart sounds: Murmur heard  Pulmonary:      Effort: Pulmonary effort is normal       Breath sounds: Examination of the right-lower field reveals rales  Examination of the left-lower field reveals rales  Rales present  No wheezing  Abdominal:      General: There is no distension  Palpations: Abdomen is soft  There is no mass  Tenderness: There is no abdominal tenderness  Musculoskeletal:      Cervical back: Neck supple  Right lower le+ Edema present  Left lower le+ Edema present  Skin:     General: Skin is warm and dry  Neurological:      Mental Status: He is alert and oriented to person, place, and time  Mental status is at baseline  Lab Results: I have personally reviewed pertinent reports  Results from last 7 days   Lab Units 23  1203   WBC Thousand/uL 7 74   HEMOGLOBIN g/dL 7 7*   HEMATOCRIT % 25 8*   PLATELETS Thousands/uL 333   NEUTROS PCT % 80*   LYMPHS PCT % 8*   MONOS PCT % 12   EOS PCT % 0     Results from last 7 days   Lab Units 23  1203   POTASSIUM mmol/L 3 9   CHLORIDE mmol/L 97   CO2 mmol/L 30   BUN mg/dL 35*   CREATININE mg/dL 1 68*   CALCIUM mg/dL 8 5   ALK PHOS U/L 243*   ALT U/L 13   AST U/L 18           Imaging: I have personally reviewed pertinent reports  XR chest 1 view portable    Result Date: 2023  Narrative: CHEST INDICATION:   sob   COMPARISON: Chest radiograph 2023 EXAM PERFORMED/VIEWS:  XR CHEST PORTABLE FINDINGS: Cardiomediastinal silhouette appears unremarkable  Bibasilar linear atelectasis  Increased prominence of the interstitial markings  No pleural effusion or pneumothorax  No acute osseous abnormality identified within limitations of portable radiography,     Impression: Possible pulmonary vascular congestion  Workstation performed: [id number]     XR chest pa & lateral    Result Date: 4/27/2023  Narrative: CHEST INDICATION:   R06 02: Shortness of breath  COMPARISON: 1/24/2023 EXAM PERFORMED/VIEWS:  XR CHEST PA & LATERAL Images: 2 FINDINGS: Cardiomediastinal silhouette appears unremarkable  Stable scarring at the left base  Calcified granulomata  No active lung consolidation, pneumothorax or effusion  Healing fracture of the right humeral neck, previously described  No acute osseous abnormalities noted  Impression: No acute cardiopulmonary disease  Workstation performed: [id number]     XR shoulder 2+ vw right    Result Date: 4/17/2023  Narrative: RIGHT SHOULDER INDICATION:   S42 292A: Other displaced fracture of upper end of left humerus, initial encounter for closed fracture  COMPARISON:  Right shoulder x-ray 3/16/2023 VIEWS:  XR SHOULDER 2+ VW RIGHT Images: 3 FINDINGS: Stable alignment of proximal humerus fracture with bone callus formation  Mild osteoarthritis acromioclavicular joint  No lytic or blastic osseous lesion  Soft tissues are unremarkable  Impression: Healing proximal humerus fracture with stable alignment  Workstation performed: [id number]       EKG, Pathology, and Other Studies Reviewed on Admission:   · Normal sinus rhythm, no acute ST-T changes    ** Please Note: Dragon 360 Dictation voice to text software may have been used in the creation of this document   **

## 2023-05-02 NOTE — ASSESSMENT & PLAN NOTE
Lab Results   Component Value Date    EGFR 36 05/02/2023    EGFR 34 04/27/2023    EGFR 31 04/07/2023    CREATININE 1 68 (H) 05/02/2023    CREATININE 1 76 (H) 04/27/2023    CREATININE 1 91 (H) 04/07/2023     · Baseline creatinine appears to be around 1 5 although has been trending up recently  · Presents with creatinine of 1 68 today  · Dose of torsemide recently increased to 40 mg twice daily  · Continue to monitor renal function closely while on IV diuretics  · Avoid hypotension    Continue losartan

## 2023-05-03 ENCOUNTER — APPOINTMENT (INPATIENT)
Dept: NON INVASIVE DIAGNOSTICS | Facility: HOSPITAL | Age: 88
End: 2023-05-03

## 2023-05-03 LAB
ANION GAP SERPL CALCULATED.3IONS-SCNC: 9 MMOL/L (ref 4–13)
AORTIC ROOT: 3.5 CM
APICAL FOUR CHAMBER EJECTION FRACTION: 74 %
ASCENDING AORTA: 3.4 CM
ATRIAL RATE: 133 BPM
ATRIAL RATE: 52 BPM
BASOPHILS # BLD AUTO: 0.04 THOUSANDS/ÂΜL (ref 0–0.1)
BASOPHILS NFR BLD AUTO: 1 % (ref 0–1)
BUN SERPL-MCNC: 33 MG/DL (ref 5–25)
CALCIUM SERPL-MCNC: 8.7 MG/DL (ref 8.4–10.2)
CHLORIDE SERPL-SCNC: 98 MMOL/L (ref 96–108)
CO2 SERPL-SCNC: 26 MMOL/L (ref 21–32)
CREAT SERPL-MCNC: 1.66 MG/DL (ref 0.6–1.3)
E WAVE DECELERATION TIME: 169 MS
EOSINOPHIL # BLD AUTO: 0.07 THOUSAND/ÂΜL (ref 0–0.61)
EOSINOPHIL NFR BLD AUTO: 1 % (ref 0–6)
ERYTHROCYTE [DISTWIDTH] IN BLOOD BY AUTOMATED COUNT: 17.6 % (ref 11.6–15.1)
FRACTIONAL SHORTENING: 34 % (ref 28–44)
GFR SERPL CREATININE-BSD FRML MDRD: 37 ML/MIN/1.73SQ M
GLUCOSE SERPL-MCNC: 123 MG/DL (ref 65–140)
GLUCOSE SERPL-MCNC: 124 MG/DL (ref 65–140)
GLUCOSE SERPL-MCNC: 140 MG/DL (ref 65–140)
GLUCOSE SERPL-MCNC: 153 MG/DL (ref 65–140)
GLUCOSE SERPL-MCNC: 225 MG/DL (ref 65–140)
HCT VFR BLD AUTO: 26 % (ref 36.5–49.3)
HGB BLD-MCNC: 7.8 G/DL (ref 12–17)
IMM GRANULOCYTES # BLD AUTO: 0.02 THOUSAND/UL (ref 0–0.2)
IMM GRANULOCYTES NFR BLD AUTO: 0 % (ref 0–2)
INTERVENTRICULAR SEPTUM IN DIASTOLE (PARASTERNAL SHORT AXIS VIEW): 1.8 CM
INTERVENTRICULAR SEPTUM: 1.8 CM (ref 0.6–1.1)
LAAS-AP2: 25.6 CM2
LAAS-AP4: 31.5 CM2
LEFT ATRIUM SIZE: 4.5 CM
LEFT INTERNAL DIMENSION IN SYSTOLE: 2.5 CM (ref 2.1–4)
LEFT VENTRICULAR INTERNAL DIMENSION IN DIASTOLE: 3.8 CM (ref 3.5–6)
LEFT VENTRICULAR POSTERIOR WALL IN END DIASTOLE: 1.9 CM
LEFT VENTRICULAR STROKE VOLUME: 40 ML
LVSV (TEICH): 40 ML
LYMPHOCYTES # BLD AUTO: 0.89 THOUSANDS/ÂΜL (ref 0.6–4.47)
LYMPHOCYTES NFR BLD AUTO: 13 % (ref 14–44)
MCH RBC QN AUTO: 23.3 PG (ref 26.8–34.3)
MCHC RBC AUTO-ENTMCNC: 30 G/DL (ref 31.4–37.4)
MCV RBC AUTO: 78 FL (ref 82–98)
MONOCYTES # BLD AUTO: 0.9 THOUSAND/ÂΜL (ref 0.17–1.22)
MONOCYTES NFR BLD AUTO: 13 % (ref 4–12)
MV E'TISSUE VEL-SEP: 7 CM/S
MV PEAK A VEL: 0.01 M/S
MV PEAK E VEL: 122 CM/S
MV STENOSIS PRESSURE HALF TIME: 49 MS
MV VALVE AREA P 1/2 METHOD: 4.49 CM2
NEUTROPHILS # BLD AUTO: 4.93 THOUSANDS/ÂΜL (ref 1.85–7.62)
NEUTS SEG NFR BLD AUTO: 72 % (ref 43–75)
NRBC BLD AUTO-RTO: 0 /100 WBCS
PLATELET # BLD AUTO: 320 THOUSANDS/UL (ref 149–390)
PMV BLD AUTO: 10.4 FL (ref 8.9–12.7)
POTASSIUM SERPL-SCNC: 4 MMOL/L (ref 3.5–5.3)
QRS AXIS: -70 DEGREES
QRS AXIS: -75 DEGREES
QRSD INTERVAL: 118 MS
QRSD INTERVAL: 120 MS
QT INTERVAL: 434 MS
QT INTERVAL: 438 MS
QTC INTERVAL: 502 MS
QTC INTERVAL: 504 MS
RA PRESSURE ESTIMATED: 15 MMHG
RBC # BLD AUTO: 3.35 MILLION/UL (ref 3.88–5.62)
RIGHT ATRIUM AREA SYSTOLE A4C: 23.6 CM2
RIGHT VENTRICLE ID DIMENSION: 4.7 CM
RV PSP: 66 MMHG
SL CV LEFT ATRIUM LENGTH A2C: 6.1 CM
SL CV LV EF: 60
SL CV PED ECHO LEFT VENTRICLE DIASTOLIC VOLUME (MOD BIPLANE) 2D: 64 ML
SL CV PED ECHO LEFT VENTRICLE SYSTOLIC VOLUME (MOD BIPLANE) 2D: 23 ML
SODIUM SERPL-SCNC: 133 MMOL/L (ref 135–147)
T WAVE AXIS: 22 DEGREES
T WAVE AXIS: 74 DEGREES
TR MAX PG: 51 MMHG
TR PEAK VELOCITY: 3.6 M/S
TRICUSPID ANNULAR PLANE SYSTOLIC EXCURSION: 1.5 CM
TRICUSPID VALVE PEAK REGURGITATION VELOCITY: 3.58 M/S
TSH SERPL DL<=0.05 MIU/L-ACNC: 2.89 UIU/ML (ref 0.45–4.5)
VENTRICULAR RATE: 79 BPM
VENTRICULAR RATE: 81 BPM
WBC # BLD AUTO: 6.85 THOUSAND/UL (ref 4.31–10.16)

## 2023-05-03 RX ADMIN — LOSARTAN POTASSIUM 50 MG: 50 TABLET, FILM COATED ORAL at 09:05

## 2023-05-03 RX ADMIN — METOPROLOL TARTRATE 50 MG: 50 TABLET, FILM COATED ORAL at 21:16

## 2023-05-03 RX ADMIN — METHOCARBAMOL 500 MG: 500 TABLET ORAL at 00:27

## 2023-05-03 RX ADMIN — POTASSIUM CHLORIDE 20 MEQ: 1500 TABLET, EXTENDED RELEASE ORAL at 00:27

## 2023-05-03 RX ADMIN — METOPROLOL TARTRATE 50 MG: 50 TABLET, FILM COATED ORAL at 09:05

## 2023-05-03 RX ADMIN — FUROSEMIDE 80 MG: 10 INJECTION, SOLUTION INTRAMUSCULAR; INTRAVENOUS at 12:07

## 2023-05-03 RX ADMIN — INSULIN LISPRO 2 UNITS: 100 INJECTION, SOLUTION INTRAVENOUS; SUBCUTANEOUS at 12:07

## 2023-05-03 RX ADMIN — APIXABAN 2.5 MG: 2.5 TABLET, FILM COATED ORAL at 17:35

## 2023-05-03 RX ADMIN — AMIODARONE HYDROCHLORIDE 100 MG: 200 TABLET ORAL at 09:05

## 2023-05-03 RX ADMIN — FUROSEMIDE 80 MG: 10 INJECTION, SOLUTION INTRAMUSCULAR; INTRAVENOUS at 17:35

## 2023-05-03 RX ADMIN — POTASSIUM CHLORIDE 20 MEQ: 1500 TABLET, EXTENDED RELEASE ORAL at 09:05

## 2023-05-03 RX ADMIN — MAGNESIUM SULFATE HEPTAHYDRATE 1 G: 1 INJECTION, SOLUTION INTRAVENOUS at 00:28

## 2023-05-03 RX ADMIN — APIXABAN 2.5 MG: 2.5 TABLET, FILM COATED ORAL at 09:05

## 2023-05-03 RX ADMIN — INSULIN LISPRO 1 UNITS: 100 INJECTION, SOLUTION INTRAVENOUS; SUBCUTANEOUS at 21:15

## 2023-05-03 RX ADMIN — PRAVASTATIN SODIUM 40 MG: 40 TABLET ORAL at 16:32

## 2023-05-03 RX ADMIN — FUROSEMIDE 80 MG: 10 INJECTION, SOLUTION INTRAMUSCULAR; INTRAVENOUS at 05:25

## 2023-05-03 RX ADMIN — DOXAZOSIN 8 MG: 4 TABLET ORAL at 21:16

## 2023-05-03 NOTE — ASSESSMENT & PLAN NOTE
Lab Results   Component Value Date    EGFR 37 05/03/2023    EGFR 37 05/02/2023    EGFR 36 05/02/2023    CREATININE 1 66 (H) 05/03/2023    CREATININE 1 64 (H) 05/02/2023    CREATININE 1 68 (H) 05/02/2023     · Baseline creatinine appears to be around 1 5 although has been trending up recently  · Presents with creatinine of 1 68 today  · Dose of torsemide recently increased to 40 mg twice daily  · Continue to monitor renal function closely while on IV diuretics  · Avoid hypotension  · Continue losartan

## 2023-05-03 NOTE — ASSESSMENT & PLAN NOTE
Lab Results   Component Value Date    HGBA1C 7 8 (H) 02/09/2023     Recent Labs     05/02/23  1639 05/02/23  2114 05/03/23  0643 05/03/23  1045   POCGLU 267* 225* 123 225*       Blood Sugar Average: Last 72 hrs:  · (P) 210   · BG acceptable   · On metformin 1000 mg twice daily at home   · Holding metformin  · Accu-Cheks ACHS with SSI coverage  · Follow up A1c

## 2023-05-03 NOTE — ASSESSMENT & PLAN NOTE
Wt Readings from Last 3 Encounters:   05/03/23 88 5 kg (195 lb)   04/26/23 88 7 kg (195 lb 9 6 oz)   04/13/23 85 7 kg (189 lb)     · POA, presents with progressively worsening shortness of breath and lower extremity swelling  · Chest x-ray showed prominent interstitial lung markings with possible pulmonary vascular congestion  · Most recent echocardiogram in July 2022 revealed EF 60%, mild to moderate TR, mild to moderately elevated PA systolic pressure, unable to assess diastolic function  · He is on torsemide 40 mg twice daily at home which was recently increased from 20 twice daily  · He received IV Lasix 40 mg in the ED and put out approximately 400 cc urine  · Continued on IV Lasix 80 mg 3 times daily  · Monitor UOP closely next 24 hours, may need to consider Bumex vs Lasix Drip   · Echocardiogram ordered, follow up results  · Cardiology consult  · Daily weights, I's and O's, 2 g sodium diet, 1800 cc fluid restriction

## 2023-05-03 NOTE — UTILIZATION REVIEW
"Initial Clinical Review    Admission: Date/Time/Statement:   Admission Orders (From admission, onward)     Ordered        05/02/23 1350  INPATIENT ADMISSION  Once                      Orders Placed This Encounter   Procedures   • INPATIENT ADMISSION     Standing Status:   Standing     Number of Occurrences:   1     Order Specific Question:   Level of Care     Answer:   Med Surg [16]     Comments:   telemetry     Order Specific Question:   Estimated length of stay     Answer:   More than 2 Midnights     Order Specific Question:   Certification     Answer:   I certify that inpatient services are medically necessary for this patient for a duration of greater than two midnights  See H&P and MD Progress Notes for additional information about the patient's course of treatment  ED Arrival Information     Expected   -    Arrival   5/2/2023 10:40    Acuity   Emergent            Means of arrival   Walk-In    Escorted by   Spouse    Service   Hospitalist    Admission type   Emergency            Arrival complaint   SOB/ edema           Chief Complaint   Patient presents with   • Shortness of Breath     Pt with h/o CHF, colon CA and and afib presents with SOB \"for awhile\" but progressively getting worse  Also reports increased bilateral leg swelling       Initial Presentation: 82 y o  male pmh diastolic CHF, CKD stage III, type 2 diabetes, chronic anemia and adenocarcinoma of the colon to ED w Spouse presents with progressively worsening shortness of breath and bilateral lower extremity swelling with weight gain, un expectant weight gain of 6 LBs in 1 day with increased lower extremity swelling    Wife reports compliance recently increased torsemide 40 mg twice daily, attempts to maintain low salt diet but fails to read some labels  EXAM  Tachypnea, rales, hypertensive; per ED MD read CXR pulmonary edema;IV Lasix administered, labs w elevated BUN/creatinine, BNP  Inpatient admission due to acute on chronic diastolic CHF, CKD3 " (baseline around1 5), afib,   Cont IV Lasix 80 mg TID, ECHO, Cardiology consult; daily wt, I/O, cardiac low NA diet/ fluid restrict; Monitor creatinine, avoid hypotension, cont ARB  cont iron supplements, referred to Palliative care OP, cont decreased Eliquis dose    Date: 5/3/2023   Day 2:    Cardiology  Volume overloaded, urine output inaccurate bc patient voiding in toilet; reclarified w patient; Cont IV diuretics at current dose & assess diuresis  Repeat ECHO personally reviewed & 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; further workup deferred to OP Cardiology  Monitor BP, creatinine  HR rhythm irreg irregular, + thi LE edema extending to thighs, breath sounds decreased at bases; ABD distention  Date: 5/4/2023    Day 3: Has surpassed a 2nd midnight with active treatments and services     Continues w Cardiology recommendation for management; cont w Metolazone prior to IV Lasix; if diuresis not improved changing to IV Bumex; I/O, daily wt w low NA diet; 1500L fluid restrict AM BMP    ED Triage Vitals   Temperature Pulse Respirations Blood Pressure SpO2   05/02/23 1101 05/02/23 1101 05/02/23 1101 05/02/23 1101 05/02/23 1101   (!) 97 4 °F (36 3 °C) 84 (!) 24 (!) 147/117 94 %      Temp Source Heart Rate Source Patient Position - Orthostatic VS BP Location FiO2 (%)   05/02/23 1101 05/02/23 1130 05/02/23 1130 05/02/23 1130 --   Oral Monitor Sitting Right arm       Pain Score       05/02/23 1101       No Pain          Wt Readings from Last 1 Encounters:   05/04/23 87 9 kg (193 lb 12 6 oz)     Additional Vital Signs:   Date/Time Temp Pulse Resp BP MAP (mmHg) SpO2 O2 Device Patient Position - Orthostatic VS   05/04/23 11:59:59 -- 83 -- 123/67 86 96 % -- --   05/04/23 1015 -- -- -- -- -- 96 % None (Room air) --   05/04/23 07:24:05 97 9 °F (36 6 °C) 83 18 143/74 97 97 % -- --   05/03/23 21:16:44 -- 81 -- 166/79 108 96 % -- --   05/03/23 2116 -- 81 -- 166/79 -- -- -- --   05/03/23 21:09:22 97 7 °F (36 5 °C) 97 20 143/72 96 94 % "None (Room air) Sitting   05/03/23 17:34:24 -- 84 -- 123/72 89 95 % --      Date/Time Temp Pulse Resp BP MAP (mmHg) SpO2 O2 Device Patient Position - Orthostatic VS   05/03/23 15:06:55 97 4 °F (36 3 °C) Abnormal  87 16 125/71 89 100 % -- --   05/03/23 12:07:19 -- 88 -- 136/75 95 96 % -- --   05/03/23 0843 -- 76 -- 129/80 -- -- -- --   05/03/23 06:44:37 97 7 °F (36 5 °C) 76 17 129/80 96 96 % None (Room air) Lying   05/03/23 05:25:53 -- 84 -- 122/71 88 95 % -- --   05/02/23 2300 -- -- -- -- -- -- None (Room air) --   05/02/23 21:17:45 97 6 °F (36 4 °C) 100 -- 132/73 93 96 % -- --   05/02/23 2117 -- 105 -- 132/73 -- -- -- --   05/02/23 1957 -- -- -- -- -- -- None (Room air) --   05/02/23 15:53:52 -- 86 20 143/70 94 96 % None (Room air) Sitting   05/02/23 1430 -- 82 22 141/71 97 92 % None (Room air) Sitting   05/02/23 1400 -- 81 24 Abnormal  152/72 104 93 % None (Room air) Sitting   05/02/23 1330 -- 81 24 Abnormal  144/62 89 94 % None (Room air) Sitting   05/02/23 1300 -- 77 24 Abnormal  150/69 99 93 % None (Room air) Sitting   05/02/23 1230 -- 80 24 Abnormal  143/70 101 92 % None (Room air) Sitting   05/02/23 1205 -- -- -- -- -- -- None (Room air) --   05/02/23 1130 -- 80 24 Abnormal  160/75 105 93 % None (Room air) Sitting   05/02/23 1101 97 4 °F (36 3 °C) Abnormal  84 24 Abnormal  147/117 Abnormal  -- 94 % None (Room air) --   Weights (last 14 days)    Date/Time Weight Weight Method Height   05/03/23 0843 88 5 kg (195 lb) -- 5' 6\" (1 676 m)   05/03/23 0545 88 9 kg (195 lb 15 8 oz) Standing scale --   05/02/23 1101 88 5 kg (195 lb 1 7 oz) --      Pertinent Labs/Diagnostic Test Results:   XR chest 1 view portable   Final Result by Iván Benjamin MD (05/02 1212)      Possible pulmonary vascular congestion           Results from last 7 days   Lab Units 05/04/23  0448 05/03/23  0454 05/02/23  1203   WBC Thousand/uL 6 24 6 85 7 74   HEMOGLOBIN g/dL 7 7* 7 8* 7 7*   HEMATOCRIT % 26 4* 26 0* 25 8*   PLATELETS Thousands/uL " 345 320 333   NEUTROS ABS Thousands/µL  --  4 93 6 14         Results from last 7 days   Lab Units 05/04/23  0448 05/03/23  0454 05/02/23  2318 05/02/23  1203   SODIUM mmol/L 134* 133* 133* 134*   POTASSIUM mmol/L 4 1 4 0 3 4* 3 9   CHLORIDE mmol/L 99 98 98 97   CO2 mmol/L 26 26 26 30   ANION GAP mmol/L 9 9 9 7   BUN mg/dL 35* 33* 35* 35*   CREATININE mg/dL 1 85* 1 66* 1 64* 1 68*   EGFR ml/min/1 73sq m 32 37 37 36   CALCIUM mg/dL 8 8 8 7 8 3* 8 5   MAGNESIUM mg/dL  --   --  1 7*  --      Results from last 7 days   Lab Units 05/02/23  1203   AST U/L 18   ALT U/L 13   ALK PHOS U/L 243*   TOTAL PROTEIN g/dL 6 3*   ALBUMIN g/dL 3 6   TOTAL BILIRUBIN mg/dL 0 81     Results from last 7 days   Lab Units 05/04/23  1033 05/04/23  0712 05/03/23  2102 05/03/23  1603 05/03/23  1045 05/03/23  0643 05/02/23  2114 05/02/23  1639   POC GLUCOSE mg/dl 209* 129 153* 140 225* 123 225* 267*     Results from last 7 days   Lab Units 05/04/23  0448 05/03/23  0454 05/02/23  2318 05/02/23  1203   GLUCOSE RANDOM mg/dL 119 124 108 148*         Results from last 7 days   Lab Units 05/02/23  1203   HEMOGLOBIN A1C % 8 0*   EAG mg/dl 183     No results found for: BETA-HYDROXYBUTYRATE                               Results from last 7 days   Lab Units 05/03/23  0454   TSH 3RD GENERATON uIU/mL 2 892                     Results from last 7 days   Lab Units 05/02/23  1203   BNP pg/mL 1,397*     ED Treatment:   Medication Administration from 05/02/2023 1040 to 05/02/2023 1515       Date/Time Order Dose Route Action     05/02/2023 1333 EDT furosemide (LASIX) injection 40 mg 40 mg Intravenous Given        Past Medical History:   Diagnosis Date   • A-fib (HCC)    • Colon cancer ([facility] [address] )    • Diastolic heart failure ([facility] [address] )    • Flu-like symptoms 12/13/2022   • HTN (hypertension)    • Postviral syndrome 01/26/2023     Present on Admission:  • Type 2 diabetes mellitus, without long-term current use of insulin (HCC)  • Acute on chronic diastolic congestive heart failure (HCC)  • Stage 3 chronic kidney disease (HCC)  • Iron deficiency anemia due to chronic blood loss  • Primary hypertension  • Colon adenocarcinoma (HCC)  • Atrial fibrillation (HCC)      Admitting Diagnosis: SOB (shortness of breath) [R06 02]  CHF exacerbation (HCC) [I50 9]  Age/Sex: 82 y o  male  Admission Orders:  Tele  Cardiac diet w 1800 ML fluid restrict changed to 1500 ML fluid restrict  I/O  Daily wt    Scheduled Medications:  amiodarone, 100 mg, Oral, Daily  apixaban, 2 5 mg, Oral, BID  doxazosin, 8 mg, Oral, HS  ferrous sulfate, 325 mg, Oral, Every Other Day  furosemide, 80 mg, Intravenous, TID (diuretic)  insulin lispro, 1-5 Units, Subcutaneous, HS  insulin lispro, 1-6 Units, Subcutaneous, TID AC  losartan, 50 mg, Oral, Daily  metoprolol tartrate, 50 mg, Oral, Q12H CHUY  potassium chloride, 20 mEq, Oral, Daily  pravastatin, 40 mg, Oral, Daily With Dinner      Continuous IV Infusions:     PRN Meds:  acetaminophen, 650 mg, Oral, Q4H PRN      IP CONSULT TO NUTRITION SERVICES  IP CONSULT TO CARDIOLOGY    Network Utilization Review Department  ATTENTION: Please call with any questions or concerns to [phone] and carefully listen to the prompts so that you are directed to the right person  All voicemails are confidential   Juliana Jimenez all requests for admission clinical reviews, approved or denied determinations and any other requests to dedicated fax number below belonging to the campus where the patient is receiving treatment   List of dedicated fax numbers for the Facilities:  [address] [place] [address] DENIALS (Administrative/Medical Necessity) [phone]   [address] [place] [address] (Maternity/NICU/Pediatrics) [address] [place] [address] [phone]   [address] [place] [address] [facility] [phone]   [facility] [address] [address] [address] [facility] [place] [address] [phone]

## 2023-05-03 NOTE — ASSESSMENT & PLAN NOTE
· POA, baseline hemoglobin has fluctuated between 7 and 8 in the last couple of months  · Secondary to history of colon cancer for which patient has declined treatment  · He is on Eliquis for history of atrial fibrillation  · Hemoglobin currently stable at his baseline  · Continue iron supplements every other day  · Consider Venofer   · May improve with diuresis, remains to be seen

## 2023-05-03 NOTE — PROGRESS NOTES
[facility]  Progress Note  Name: Pippa Ohara  MRN: [id number]  Unit/Bed#: S -67 I Date of Admission: 5/2/2023   Date of Service: 5/3/2023 I Hospital Day: 1    Assessment/Plan   * Acute on chronic diastolic congestive heart failure [facility])  Assessment & Plan  Wt Readings from Last 3 Encounters:   05/03/23 88 5 kg (195 lb)   04/26/23 88 7 kg (195 lb 9 6 oz)   04/13/23 85 7 kg (189 lb)     · POA, presents with progressively worsening shortness of breath and lower extremity swelling  · Chest x-ray showed prominent interstitial lung markings with possible pulmonary vascular congestion  · Most recent echocardiogram in July 2022 revealed EF 60%, mild to moderate TR, mild to moderately elevated PA systolic pressure, unable to assess diastolic function  · He is on torsemide 40 mg twice daily at home which was recently increased from 20 twice daily  · He received IV Lasix 40 mg in the ED and put out approximately 400 cc urine  · Continued on IV Lasix 80 mg 3 times daily  · Monitor UOP closely next 24 hours, may need to consider Bumex vs Lasix Drip   · Echocardiogram ordered, follow up results  · Cardiology consult  · Daily weights, I's and O's, 2 g sodium diet, 1800 cc fluid restriction        Atrial fibrillation (HCC)  Assessment & Plan  · EKG shows sinus rhythm, but now in rate controlled A   Fib on telemetry   · Eliquis dose decreased to 2 5 mg twice daily based on age and renal function  · Continue Metoprolol 50 mg PO BID     Stage 3 chronic kidney disease [facility])  Assessment & Plan  Lab Results   Component Value Date    EGFR 37 05/03/2023    EGFR 37 05/02/2023    EGFR 36 05/02/2023    CREATININE 1 66 (H) 05/03/2023    CREATININE 1 64 (H) 05/02/2023    CREATININE 1 68 (H) 05/02/2023     · Baseline creatinine appears to be around 1 5 although has been trending up recently  · Presents with creatinine of 1 68 today  · Dose of torsemide recently increased to 40 mg twice daily  · Continue to monitor renal function closely while on IV diuretics  · Avoid hypotension  · Continue losartan    Colon adenocarcinoma (HCC)  Assessment & Plan  · Has been referred to palliative care outpatient  · Has declined treatment    Iron deficiency anemia due to chronic blood loss  Assessment & Plan  · POA, baseline hemoglobin has fluctuated between 7 and 8 in the last couple of months  · Secondary to history of colon cancer for which patient has declined treatment  · He is on Eliquis for history of atrial fibrillation  · Hemoglobin currently stable at his baseline  · Continue iron supplements every other day  · Consider Venofer   · May improve with diuresis, remains to be seen    Type 2 diabetes mellitus, without long-term current use of insulin [facility])  Assessment & Plan  Lab Results   Component Value Date    HGBA1C 7 8 (H) 02/09/2023     Recent Labs     05/02/23  1639 05/02/23  2114 05/03/23  0643 05/03/23  1045   POCGLU 267* 225* 123 225*       Blood Sugar Average: Last 72 hrs:  · (P) 210   · BG acceptable   · On metformin 1000 mg twice daily at home   · Holding metformin  · Accu-Cheks ACHS with SSI coverage  · Follow up A1c    Primary hypertension  Assessment & Plan  · Continue home medications             VTE Pharmacologic Prophylaxis: VTE Score: 3 Moderate Risk (Score 3-4) - Pharmacological DVT Prophylaxis Ordered: apixaban (Eliquis)  Patient Centered Rounds: I performed bedside rounds with nursing staff today  Discussions with Specialists or Other Care Team Provider: Discussed with Cardiology, RN, CM    Education and Discussions with Family / Patient: Updated  (significant other) at bedside      Total Time Spent on Date of Encounter in care of patient: 35 minutes This time was spent on one or more of the following: performing physical exam; counseling and coordination of care; obtaining or reviewing history; documenting in the medical record; reviewing/ordering tests, medications or procedures; communicating with other healthcare professionals and discussing with patient's family/caregivers  Current Length of Stay: 1 day(s)  Current Patient Status: Inpatient   Certification Statement: The patient will continue to require additional inpatient hospital stay due to further diuresis needed  Discharge Plan: Anticipate discharge in 48-72 hrs to home  Code Status: Level 3 - DNAR and DNI    Subjective:   Patient reports leg swelling still present, reports some improvement in knee pain that he was experiencing from it  Has been urinating frequently, but in the toilet  Denies chest pain  Objective:     Vitals:   Temp (24hrs), Av 7 °F (36 5 °C), Min:97 6 °F (36 4 °C), Max:97 7 °F (36 5 °C)    Temp:  [97 6 °F (36 4 °C)-97 7 °F (36 5 °C)] 97 7 °F (36 5 °C)  HR:  [] 76  Resp:  [17-24] 17  BP: (122-152)/(62-80) 129/80  SpO2:  [92 %-96 %] 96 %  Body mass index is 31 47 kg/m²  Input and Output Summary (last 24 hours): Intake/Output Summary (Last 24 hours) at 5/3/2023 1135  Last data filed at 5/3/2023 0601  Gross per 24 hour   Intake 240 ml   Output 625 ml   Net -385 ml       Physical Exam:   Physical Exam  Constitutional:       General: He is not in acute distress  Appearance: Normal appearance  He is normal weight  He is not ill-appearing or diaphoretic  HENT:      Head: Normocephalic and atraumatic  Mouth/Throat:      Mouth: Mucous membranes are moist    Eyes:      General: No scleral icterus  Pupils: Pupils are equal, round, and reactive to light  Cardiovascular:      Rate and Rhythm: Normal rate  Rhythm irregularly irregular  Pulses: Normal pulses  Heart sounds: Normal heart sounds, S1 normal and S2 normal  No murmur heard  No systolic murmur is present  No diastolic murmur is present  No gallop  No S3 or S4 sounds  Pulmonary:      Effort: Pulmonary effort is normal  No accessory muscle usage or respiratory distress        Breath sounds: Normal breath sounds  No stridor  No decreased breath sounds, wheezing, rhonchi or rales  Chest:      Chest wall: No tenderness  Abdominal:      General: Bowel sounds are normal  There is no distension  Palpations: Abdomen is soft  Tenderness: There is no abdominal tenderness  There is no guarding  Musculoskeletal:      Right lower leg: 3+ Edema present  Left lower leg: 3+ Edema present  Skin:     General: Skin is warm and dry  Coloration: Skin is not jaundiced  Neurological:      General: No focal deficit present  Mental Status: He is alert  Mental status is at baseline  Motor: No tremor or seizure activity  Psychiatric:         Behavior: Behavior is cooperative  Additional Data:     Labs:  Results from last 7 days   Lab Units 05/03/23  0454   WBC Thousand/uL 6 85   HEMOGLOBIN g/dL 7 8*   HEMATOCRIT % 26 0*   PLATELETS Thousands/uL 320   NEUTROS PCT % 72   LYMPHS PCT % 13*   MONOS PCT % 13*   EOS PCT % 1     Results from last 7 days   Lab Units 05/03/23  0454 05/02/23  2318 05/02/23  1203   SODIUM mmol/L 133*   < > 134*   POTASSIUM mmol/L 4 0   < > 3 9   CHLORIDE mmol/L 98   < > 97   CO2 mmol/L 26   < > 30   BUN mg/dL 33*   < > 35*   CREATININE mg/dL 1 66*   < > 1 68*   ANION GAP mmol/L 9   < > 7   CALCIUM mg/dL 8 7   < > 8 5   ALBUMIN g/dL  --   --  3 6   TOTAL BILIRUBIN mg/dL  --   --  0 81   ALK PHOS U/L  --   --  243*   ALT U/L  --   --  13   AST U/L  --   --  18   GLUCOSE RANDOM mg/dL 124   < > 148*    < > = values in this interval not displayed           Results from last 7 days   Lab Units 05/03/23  1045 05/03/23  0643 05/02/23  2114 05/02/23  1639   POC GLUCOSE mg/dl 225* 123 225* 267*               Lines/Drains:  Invasive Devices     Peripheral Intravenous Line  Duration           Peripheral IV 05/02/23 Left Antecubital <1 day                  Telemetry:  Telemetry Orders (From admission, onward)             48 Hour Telemetry Monitoring  Continuous x 48 hours References:    Telemetry Guidelines   Question:  Reason for 48 Hour Telemetry  Answer:  Acute Decompensated CHF (continuous diuretic infusion or total diuretic dose > 200 mg daily, associated electrolyte derangement, ionotropic drip, history of ventricular arrhythmia, or new EF <35%)                 Telemetry Reviewed: Atrial fibrillation  HR averaging 70-85  Indication for Continued Telemetry Use: Acute CHF on >200 mg lasix/day or equivalent dose or with new reduced EF  Imaging: No pertinent imaging reviewed  Recent Cultures (last 7 days):         Last 24 Hours Medication List:   Current Facility-Administered Medications   Medication Dose Route Frequency Provider Last Rate   • acetaminophen  650 mg Oral Q4H PRN Katelyn Kemp MD     • amiodarone  100 mg Oral Daily Katelyn Kemp MD     • apixaban  2 5 mg Oral BID Katelyn Kemp MD     • doxazosin  8 mg Oral HS Katelyn Kemp MD     • ferrous sulfate  325 mg Oral Every Other Day Laurita Abdul MD     • furosemide  80 mg Intravenous TID (diuretic) Laurita Abdul MD     • insulin lispro  1-5 Units Subcutaneous HS Katelyn Kemp MD     • insulin lispro  1-6 Units Subcutaneous TID AC Katelyn Kemp MD     • losartan  50 mg Oral Daily Katelyn Kemp MD     • metoprolol tartrate  50 mg Oral Q12H [address] Katelyn Kemp MD     • potassium chloride  20 mEq Oral Daily Katelyn Kemp MD     • pravastatin  40 mg Oral Daily With Maggi Alfaro MD          Today, Patient Was Seen By: Regino Thayer PA-C    **Please Note: This note may have been constructed using a voice recognition system  **

## 2023-05-03 NOTE — ASSESSMENT & PLAN NOTE
· EKG shows sinus rhythm, but now in rate controlled A   Fib on telemetry   · Eliquis dose decreased to 2 5 mg twice daily based on age and renal function  · Continue Metoprolol 50 mg PO BID

## 2023-05-03 NOTE — CONSULTS
Consultation - Cardiology Team One  Sabrina Al 82 y o  male MRN: [id number]  Unit/Bed#: S -28 Encounter: [id number]    Inpatient consult to Cardiology  Consult performed by: JESUS Mora  Consult ordered by: Tamy Mathur MD      Physician Requesting Consult: Domo Foster MD  Reason for Consult / Principal Problem: CHF    Assessment    1  Acute on chronic diastolic CHF  BNP 6006, CXR read as mild pulmonary vascular congestion  Home diuretic: Torsemide 40 mg twice daily  Current diuretic Lasix 80 mg IV TID  I/Os: Not kept  Weight: 195 lb per standing scale  Dry weight: Was 187 pounds at office visit last year however he was felt to be overloaded then so dry weight is potentially lower    2  Permanent atrial fibrillation  Rates well controlled on metoprolol tartrate 3 mg twice daily  Telemetry reviewed-atrial fibrillation with rates 70s to 80s  On anticoagulation with Eliquis 2 5 mg twice daily  3  CKD 3-creatinine 1 66 which appears to be within his recent baseline of 1 3-1 9   4   Chronic anemia-hemoglobin slowly downtrending since last year but overall stable at 7 8 today  No active bleeding noted on anticoagulation with Eliquis  5   HTN-stable, average /76 on doxazosin 8 mg at bedtime, losartan 50 mg daily, metoprolol tartrate 50 mg twice daily, and IV diuretics  6  HLD-LDL 54 last year on pravastatin 40 mg daily  7  Adenocarcinoma of the colon-not being treated per patient wishes  8  Type 2 DM-A1c 7 8% in February    Plan  1  Monitor response to IV Lasix 80 mg TID over the next 24 hours  Patient instructed to void in a urinal or hat in the toilet  If inadequate response then will plan to transition either to a Lasix infusion or try Bumex instead  2   Strict I/Os, daily standing weights, 2 g sodium diet with 1500 mL fluid restriction  3  Repeat BMP in the morning  4  Follow-up on echocardiogram results  5    Continue metoprolol tartrate 50 mg twice daily and Eliquis 2 5 mg twice daily    History of Present Illness   HPI: Ling Rogers is a 82y o  year old male with chronic diastolic CHF, atrial fibrillation, HTN, HLD, type 2 DM, CKD 3, chronic anemia, colon cancer, and GERD  He follows with cardiologist Dr Manuel Waggoner at [facility] and was last seen in the office in April 2022  He appeared overloaded and his torsemide was doubled to 40 mg BID with a phone follow up 4 weeks later  At some point over the past year, his PCP decreased this to 40 mg in the morning and 20 mg in the afternoon due to patient c/o increased urination overnight  The patient started taking 40 mg BID again about one month ago when he noticed his legs starting to swell up again  Presented with at least one month of worsening leg edema and dyspnea  Significant other at bedside also noticed increasing abdominal distention and shortness of breath in bed  He has been compliant with diuretics at home  Diet likely contributing as he eats out 2-3 times per week and eats anything he wants  He does not add additional salt to his food and his significant other does not add salt when cooking at home  He has the occasional beer but not on a daily basis  BNP 1397, hs troponin 70, and CXR read as mild vascular congestion  BP stable and telemetry reveals rate controlled atrial fibrillation  He is maintaining adequate O2 sats on room air  He has been started on Lasix 80 mg IV TID and he reports good response however unable to quantify this as he was not aware he needed to void in urinal   His last echocardiogram is from July 2022 with LVEF 60% with aortic sclerosis, mild MR, mild to moderate MR, and mild to moderate pulmonic valve insufficiency  An updated echocardiogram was completed earlier today with read pending and cardiology has been consulted for further management of his acute CHF exacerbation      Telemetry reviewed personally: Atrial fibrillation with controlled ventricular response, rates 70s to 80s    Review of Systems Constitutional: Positive for weight gain  Negative for chills, decreased appetite and malaise/fatigue  Cardiovascular: Positive for dyspnea on exertion, leg swelling and orthopnea  Negative for chest pain, palpitations and syncope  Respiratory: Positive for shortness of breath  Negative for cough, sleep disturbances due to breathing and sputum production  Endocrine: Negative  Hematologic/Lymphatic: Negative  Negative for bleeding problem  Gastrointestinal: Positive for bloating  Negative for nausea and vomiting  Neurological: Negative for dizziness, light-headedness and weakness  Psychiatric/Behavioral: Negative for altered mental status  All other systems reviewed and are negative      Historical Information   Past Medical History:   Diagnosis Date   • A-fib [facility])    • Colon cancer ([facility] [address] )    • Diastolic heart failure ([facility] )    • Flu-like symptoms 2022   • HTN (hypertension)    • Postviral syndrome 2023     Past Surgical History:   Procedure Laterality Date   • EXPLORATORY LAPAROTOMY      with gastric ulcer repair     Social History     Substance and Sexual Activity   Alcohol Use Yes    Comment: Rarely     Social History     Substance and Sexual Activity   Drug Use Never     Social History     Tobacco Use   Smoking Status Former   • Types: Cigarettes   • Start date: 1953   • Quit date: 1966   • Years since quittin 2   • Passive exposure: Past   Smokeless Tobacco Never     Family History:   Family History   Problem Relation Age of Onset   • Colon cancer Neg Hx        Meds/Allergies   all current active meds have been reviewed and current meds:   Current Facility-Administered Medications   Medication Dose Route Frequency   • acetaminophen (TYLENOL) tablet 650 mg  650 mg Oral Q4H PRN   • amiodarone tablet 100 mg  100 mg Oral Daily   • apixaban (ELIQUIS) tablet 2 5 mg  2 5 mg Oral BID   • doxazosin (CARDURA) tablet 8 mg  8 mg Oral HS   • ferrous sulfate tablet 325 mg  325 mg "Oral Every Other Day   • furosemide (LASIX) injection 80 mg  80 mg Intravenous TID (diuretic)   • insulin lispro (HumaLOG) 100 units/mL subcutaneous injection 1-5 Units  1-5 Units Subcutaneous HS   • insulin lispro (HumaLOG) 100 units/mL subcutaneous injection 1-6 Units  1-6 Units Subcutaneous TID AC   • losartan (COZAAR) tablet 50 mg  50 mg Oral Daily   • metoprolol tartrate (LOPRESSOR) tablet 50 mg  50 mg Oral Q12H CHUY   • potassium chloride (K-DUR,KLOR-CON) CR tablet 20 mEq  20 mEq Oral Daily   • pravastatin (PRAVACHOL) tablet 40 mg  40 mg Oral Daily With Dinner          No Known Allergies    Objective   Vitals: Blood pressure 129/80, pulse 76, temperature 97 7 °F (36 5 °C), temperature source Oral, resp  rate 17, height 5' 6\" (1 676 m), weight 88 5 kg (195 lb), SpO2 96 %  , Body mass index is 31 47 kg/m²  ,     Systolic (35RAJ), NZD:156 , Min:122 , JWC:813     Diastolic (69STY), GZC:85, Min:62, Max:117        Intake/Output Summary (Last 24 hours) at 5/3/2023 1017  Last data filed at 5/3/2023 0601  Gross per 24 hour   Intake 240 ml   Output 625 ml   Net -385 ml     Wt Readings from Last 3 Encounters:   05/03/23 88 5 kg (195 lb)   04/26/23 88 7 kg (195 lb 9 6 oz)   04/13/23 85 7 kg (189 lb)     Invasive Devices     Peripheral Intravenous Line  Duration           Peripheral IV 05/02/23 Left Antecubital <1 day                Physical Exam  Vitals reviewed  Constitutional:       General: He is not in acute distress  Appearance: He is obese  Neck:      Vascular: No hepatojugular reflux or JVD  Cardiovascular:      Rate and Rhythm: Normal rate  Rhythm irregularly irregular  Heart sounds: Normal heart sounds  No murmur heard  No friction rub  No gallop  Comments: Bilateral LE edema extending to thighs  Pulmonary:      Effort: No respiratory distress  Breath sounds: No rales  Comments: Decreased at bases, room air  Abdominal:      General: Bowel sounds are normal  There is distension        " Palpations: Abdomen is soft  Tenderness: There is no abdominal tenderness  Musculoskeletal:         General: No tenderness  Normal range of motion  Cervical back: Neck supple  Right lower le+ Pitting Edema present  Left lower le+ Pitting Edema present  Skin:     General: Skin is warm and dry  Findings: No erythema  Neurological:      Mental Status: He is alert and oriented to person, place, and time     Psychiatric:         Mood and Affect: Mood normal      LABORATORY RESULTS:      CBC with diff:   Results from last 7 days   Lab Units 23  0737   WBC Thousand/uL 6 85 7 74 5 83   HEMOGLOBIN g/dL 7 8* 7 7* 7 6*   HEMATOCRIT % 26 0* 25 8* 26 0*   MCV fL 78* 78* 78*   PLATELETS Thousands/uL 320 333 360   MCH pg 23 3* 23 1* 22 9*   MCHC g/dL 30 0* 29 8* 29 2*   RDW % 17 6* 17 3* 17 4*   MPV fL 10 4 10 3 10 7   NRBC AUTO /100 WBCs 0 0 0       CMP:  Results from last 7 days   Lab Units 23  0737   POTASSIUM mmol/L 4 0 3 4* 3 9 4 0   CHLORIDE mmol/L 98 98 97 99   CO2 mmol/L 26 26 30 27   BUN mg/dL 33* 35* 35* 37*   CREATININE mg/dL 1 66* 1 64* 1 68* 1 76*   CALCIUM mg/dL 8 7 8 3* 8 5 8 5   AST U/L  --   --  18 23   ALT U/L  --   --  13 18   ALK PHOS U/L  --   --  243* 273*   EGFR ml/min/1 73sq m 37 37 36 34       BMP:  Results from last 7 days   Lab Units 23  0737   POTASSIUM mmol/L 4 0 3 4* 3 9 4 0   CHLORIDE mmol/L 98 98 97 99   CO2 mmol/L 26 26 30 27   BUN mg/dL 33* 35* 35* 37*   CREATININE mg/dL 1 66* 1 64* 1 68* 1 76*   CALCIUM mg/dL 8 7 8 3* 8 5 8 5       Lab Results   Component Value Date    CREATININE 1 66 (H) 2023    CREATININE 1 64 (H) 2023    CREATININE 1 68 (H) 2023       Lab Results   Component Value Date    NTBNP 1,896 (H) 2022          Results from last 7 days   Lab Units 23  2318   MAGNESIUM mg/dL 1 7* Results from last 7 days   Lab Units 05/03/23  0454   TSH 3RD GENERATON uIU/mL 2 892           Lipid Profile:   No results found for: CHOL  Lab Results   Component Value Date    HDL 63 03/04/2022    HDL 60 01/31/2018     Lab Results   Component Value Date    LDLCALC 54 03/04/2022    LDLCALC 40 01/31/2018     Lab Results   Component Value Date    TRIG 69 03/04/2022    TRIG 81 01/31/2018     Imaging: I have personally reviewed pertinent reports  and I have personally reviewed pertinent films in PACS  XR chest 1 view portable    Result Date: 5/2/2023  Narrative: CHEST INDICATION:   sob  COMPARISON: Chest radiograph April 27, 2023 EXAM PERFORMED/VIEWS:  XR CHEST PORTABLE FINDINGS: Cardiomediastinal silhouette appears unremarkable  Bibasilar linear atelectasis  Increased prominence of the interstitial markings  No pleural effusion or pneumothorax  No acute osseous abnormality identified within limitations of portable radiography,     Impression: Possible pulmonary vascular congestion  Workstation performed: [id number]     XR chest pa & lateral    Result Date: 4/27/2023  Narrative: CHEST INDICATION:   R06 02: Shortness of breath  COMPARISON: 1/24/2023 EXAM PERFORMED/VIEWS:  XR CHEST PA & LATERAL Images: 2 FINDINGS: Cardiomediastinal silhouette appears unremarkable  Stable scarring at the left base  Calcified granulomata  No active lung consolidation, pneumothorax or effusion  Healing fracture of the right humeral neck, previously described  No acute osseous abnormalities noted  Impression: No acute cardiopulmonary disease  Workstation performed: [id number]     XR shoulder 2+ vw right    Result Date: 4/17/2023  Narrative: RIGHT SHOULDER INDICATION:   S42 292A: Other displaced fracture of upper end of left humerus, initial encounter for closed fracture    COMPARISON:  Right shoulder x-ray 3/16/2023 VIEWS:  XR SHOULDER 2+ VW RIGHT Images: 3 FINDINGS: Stable alignment of proximal humerus fracture with bone callus formation  Mild osteoarthritis acromioclavicular joint  No lytic or blastic osseous lesion  Soft tissues are unremarkable  Impression: Healing proximal humerus fracture with stable alignment  Workstation performed: [id number]     Thank you for allowing us to participate in this patient's care  This pt will follow up with Dr Letty Peterson once discharged  Counseling / Coordination of Care  Total floor / unit time spent today 45 minutes  Greater than 50% of total time was spent with the patient and / or family counseling and / or coordination of care  A description of the counseling / coordination of care: Review of history, current assessment, development of a plan  Code Status: Level 3 - DNAR and DNI    ** Please Note: Dragon 360 Dictation voice to text software may have been used in the creation of this document   **

## 2023-05-04 LAB
ANION GAP SERPL CALCULATED.3IONS-SCNC: 9 MMOL/L (ref 4–13)
BUN SERPL-MCNC: 35 MG/DL (ref 5–25)
CALCIUM SERPL-MCNC: 8.8 MG/DL (ref 8.4–10.2)
CHLORIDE SERPL-SCNC: 99 MMOL/L (ref 96–108)
CO2 SERPL-SCNC: 26 MMOL/L (ref 21–32)
CREAT SERPL-MCNC: 1.85 MG/DL (ref 0.6–1.3)
ERYTHROCYTE [DISTWIDTH] IN BLOOD BY AUTOMATED COUNT: 17.7 % (ref 11.6–15.1)
EST. AVERAGE GLUCOSE BLD GHB EST-MCNC: 183 MG/DL
GFR SERPL CREATININE-BSD FRML MDRD: 32 ML/MIN/1.73SQ M
GLUCOSE SERPL-MCNC: 119 MG/DL (ref 65–140)
GLUCOSE SERPL-MCNC: 129 MG/DL (ref 65–140)
GLUCOSE SERPL-MCNC: 150 MG/DL (ref 65–140)
GLUCOSE SERPL-MCNC: 164 MG/DL (ref 65–140)
GLUCOSE SERPL-MCNC: 209 MG/DL (ref 65–140)
HBA1C MFR BLD: 8 %
HCT VFR BLD AUTO: 26.4 % (ref 36.5–49.3)
HGB BLD-MCNC: 7.7 G/DL (ref 12–17)
MCH RBC QN AUTO: 22.8 PG (ref 26.8–34.3)
MCHC RBC AUTO-ENTMCNC: 29.2 G/DL (ref 31.4–37.4)
MCV RBC AUTO: 78 FL (ref 82–98)
PLATELET # BLD AUTO: 345 THOUSANDS/UL (ref 149–390)
PMV BLD AUTO: 10.9 FL (ref 8.9–12.7)
POTASSIUM SERPL-SCNC: 4.1 MMOL/L (ref 3.5–5.3)
RBC # BLD AUTO: 3.38 MILLION/UL (ref 3.88–5.62)
SODIUM SERPL-SCNC: 134 MMOL/L (ref 135–147)
WBC # BLD AUTO: 6.24 THOUSAND/UL (ref 4.31–10.16)

## 2023-05-04 RX ORDER — METOLAZONE 5 MG/1
2.5 TABLET ORAL ONCE
Status: COMPLETED | OUTPATIENT
Start: 2023-05-04 | End: 2023-05-04

## 2023-05-04 RX ADMIN — METOLAZONE 2.5 MG: 5 TABLET ORAL at 11:29

## 2023-05-04 RX ADMIN — METOPROLOL TARTRATE 50 MG: 50 TABLET, FILM COATED ORAL at 21:27

## 2023-05-04 RX ADMIN — FUROSEMIDE 80 MG: 10 INJECTION, SOLUTION INTRAMUSCULAR; INTRAVENOUS at 17:56

## 2023-05-04 RX ADMIN — AMIODARONE HYDROCHLORIDE 100 MG: 200 TABLET ORAL at 10:11

## 2023-05-04 RX ADMIN — LOSARTAN POTASSIUM 50 MG: 50 TABLET, FILM COATED ORAL at 10:11

## 2023-05-04 RX ADMIN — METOPROLOL TARTRATE 50 MG: 50 TABLET, FILM COATED ORAL at 10:10

## 2023-05-04 RX ADMIN — FUROSEMIDE 80 MG: 10 INJECTION, SOLUTION INTRAMUSCULAR; INTRAVENOUS at 11:58

## 2023-05-04 RX ADMIN — INSULIN LISPRO 2 UNITS: 100 INJECTION, SOLUTION INTRAVENOUS; SUBCUTANEOUS at 11:31

## 2023-05-04 RX ADMIN — DOXAZOSIN 8 MG: 4 TABLET ORAL at 21:27

## 2023-05-04 RX ADMIN — FERROUS SULFATE TAB 325 MG (65 MG ELEMENTAL FE) 325 MG: 325 (65 FE) TAB at 10:11

## 2023-05-04 RX ADMIN — PRAVASTATIN SODIUM 40 MG: 40 TABLET ORAL at 16:48

## 2023-05-04 RX ADMIN — POTASSIUM CHLORIDE 20 MEQ: 1500 TABLET, EXTENDED RELEASE ORAL at 10:11

## 2023-05-04 RX ADMIN — INSULIN LISPRO 1 UNITS: 100 INJECTION, SOLUTION INTRAVENOUS; SUBCUTANEOUS at 21:32

## 2023-05-04 RX ADMIN — APIXABAN 2.5 MG: 2.5 TABLET, FILM COATED ORAL at 10:10

## 2023-05-04 RX ADMIN — INSULIN LISPRO 1 UNITS: 100 INJECTION, SOLUTION INTRAVENOUS; SUBCUTANEOUS at 16:48

## 2023-05-04 RX ADMIN — APIXABAN 2.5 MG: 2.5 TABLET, FILM COATED ORAL at 17:56

## 2023-05-04 RX ADMIN — FUROSEMIDE 80 MG: 10 INJECTION, SOLUTION INTRAMUSCULAR; INTRAVENOUS at 05:19

## 2023-05-04 NOTE — ACP (ADVANCE CARE PLANNING)
"Advanced Care Planning Progress Note    Serious Illness Conversation    1  What is your understanding now of where you are with your illness? Prognostic Understanding: appropriate understanding of prognosis     2  How much information about what is likely to be ahead with your illness would you like to have? Information: patient wants to be fully informed  Patient wants to be fully informed \"with no sugar coating\" per the patient  3  What did you (clinician) communicate to the patient? Prognostic Communication: Uncertain - It can be difficult to predict what will happen with your illness  I hope you will continue to live well for a long time but I’m worried that you could get sick quickly, and I think it is important to prepare for that possibility  We will continue to treat volume overload with intravenous diuretics  We understand you do not desire any treatment for colon cancer  He does not want aggressive work up at this time for Amyloid  He understands that his cardiac function, colon cancer, or both conditions could get worse  4  If your health situation worsens, what are your most important goals? Goals: have my medical decisions respected, be spiritually and emotionally at peace, be physically comfortable     5  What are the biggest fears and worries about the future and your health? My wife takes care of everything concerning my health     6  What abilities are so critical to your life that you cannot imagine living without them? Unacceptable Function: being unable to interact with others     7  What gives you strength as you think about the future with your illness? He gets a lot of support from his wife and both of them get support from their neighbors in their 54 and older community  8  If you become sicker, how much are you willing to go through for the possibility of gaining more time?   Be in the hospital: Yes Have a feeding tube: No   Be in the ICU: No Live in a nursing " home: No   Be on a ventilator: No Be uncomfortable: No   Be on dialysis: No Undergo aggressive test and/or procedures: No   9  How much does your proxy and family know about your priorities and wishes? Discussion Discussion: wants clinician to talk with family  My wife takes care of my medications and everything concerning my health  She should be fully informed  How does this plan sound to you? I will do everything I can to help you through this    Patient verbalized understanding of the plan     I have spent 14 minutes speaking with my patient on advanced care planning today or during this visit     Advanced directives         Von Sanabria PA-C

## 2023-05-04 NOTE — PLAN OF CARE
Problem: PHYSICAL THERAPY ADULT  Goal: Performs mobility at highest level of function for planned discharge setting  See evaluation for individualized goals  Description: Treatment/Interventions: Functional transfer training, LE strengthening/ROM, Therapeutic exercise, Endurance training, Cognitive reorientation, Patient/family training, Bed mobility, Gait training, Equipment eval/education, Spoke to nursing, OT  Equipment Recommended:  (would recommend rolling walker, but pt has one, declines to use it )       See flowsheet documentation for full assessment, interventions and recommendations  Note: Prognosis: Fair  Problem List: Decreased endurance, Impaired balance, Decreased range of motion, Decreased strength, Decreased mobility, Decreased cognition, Decreased safety awareness, Obesity, Decreased skin integrity (gait deviations)  Assessment: Pt is a 82 y o  male seen for PT evaluation s/p admit to [facility] on 5/2/2023 w/ Acute on chronic diastolic congestive heart failure ([facility] [address] )  Order placed for PT  Prior to admission: Pt lived with significant other in a 1 floor set up with no stairs to enter  Patient reports not using any DME prior to admission and was going to outpatient PT for his right shoulder  Upon Eval: Pt Required no physical assistance for transfers nor ambulation, but displays gait deviations  Pt's clinical presentation is currently unstable/unpredictable given the functional mobility deficits above, especially (but not limited to) gait deviations, decreased functional mobility tolerance and SOB upon exertion, coupled with fall risks including hx of falls, impaired coordination and decreased cognition, and combined with medical complications of hypertension , abnormal renal lab values, abnormal H&H and abnormal sodium values         During this admission, pt would benefit from continued skilled inpatient PT in the acute care setting in order to address deficits as defined "above to maximize function and mobility  Recommendations:    From a PT standpoint, recommend next several sessions focus on potential trials with DME to increase ambulation distance and improve gait quality  Nursing Recommendations:   Mobility Plan as of 05/04/23: Pt is S/one person Assist with no AD to/from recliner, BSC and bathroom  Encourage OOB for all meals  Recommend nursing Amb w/ pt at least 3 times daily in halls  Would recommend f/u with SLIM/cardiology to see if pt would be willing to trial GENTLE light pressure compression at LE's as he was unable to tolerate \"compression\" in past due to pain/cramping (family hx of cramping)  Barriers to Discharge: Decreased caregiver support, Inaccessible home environment  Barriers to Discharge Comments: Co-morbidities affecting pt's physical performance at time of assessment include: self reported muscular cramping unrelated to K, A-fib, Colon cancer (no treatment), heart failure, obesity, HTN (hypertension), R humeral fx w/ OPPT  Paulo Palumbo Personal factors affecting pt at time of IE include: advanced age, past experience, behavioral pattern, inability to navigate community distances, limited insight into impairments, recent fall(s) and negative perception of personally using rolling walker>cane  PT Discharge Recommendation: Home with outpatient rehabilitation (including to resume RUE PT)    See flowsheet documentation for full assessment          "

## 2023-05-04 NOTE — PROGRESS NOTES
[facility]  Progress Note  Name: Mirna Godinez  MRN: [id number]  Unit/Bed#: S -17 I Date of Admission: 5/2/2023   Date of Service: 5/4/2023 I Hospital Day: 2    Assessment/Plan   * Acute on chronic diastolic congestive heart failure [facility])  Assessment & Plan  Wt Readings from Last 3 Encounters:   05/04/23 87 9 kg (193 lb 12 6 oz)   04/26/23 88 7 kg (195 lb 9 6 oz)   04/13/23 85 7 kg (189 lb)     · POA, presents with progressively worsening shortness of breath and lower extremity swelling  · Chest x-ray showed prominent interstitial lung markings with possible pulmonary vascular congestion  · Most recent echocardiogram in July 2022 revealed EF 60%, mild to moderate TR, mild to moderately elevated PA systolic pressure, unable to assess diastolic function  · He is on torsemide 40 mg twice daily at home which was recently increased from 20 twice daily  · He received IV Lasix 40 mg in the ED and put out approximately 400 cc urine  · Now put out over 1000 mL   · 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 - patient would not want further work up for amyloid at this time   · Continued on IV Lasix 80 mg 3 times daily  · Add Metolazone today   · Monitor UOP closely next 24 hours, may need to consider Bumex vs Lasix Drip   · Cardiology consult appreciated   · Daily weights, I's and O's, 2 g sodium diet, 1800 cc fluid restriction        Atrial fibrillation (HCC)  Assessment & Plan  · EKG shows sinus rhythm, but now in rate controlled A   Fib on telemetry   · Eliquis dose decreased to 2 5 mg twice daily based on age and renal function  · Continue Metoprolol 50 mg PO BID     Stage 3 chronic kidney disease [facility])  Assessment & Plan  Lab Results   Component Value Date    EGFR 32 05/04/2023    EGFR 37 05/03/2023    EGFR 37 05/02/2023    CREATININE 1 85 (H) 05/04/2023    CREATININE 1 66 (H) 05/03/2023    CREATININE 1 64 (H) 05/02/2023     · Baseline creatinine appears to be around 1 5 although has been trending up recently  · Creatinine yoshi slightly, likely response to diuretics   · Dose of torsemide recently increased to 40 mg twice daily  · Continue to monitor renal function closely while on IV diuretics  · Avoid hypotension  · Continue losartan    Colon adenocarcinoma (HCC)  Assessment & Plan  · Has been referred to palliative care outpatient  · Has declined treatment    Iron deficiency anemia due to chronic blood loss  Assessment & Plan  · POA, baseline hemoglobin has fluctuated between 7 and 8 in the last couple of months  · Secondary to history of colon cancer for which patient has declined treatment  · He is on Eliquis for history of atrial fibrillation  · Hemoglobin currently stable at his baseline  · Continue iron supplements every other day  · Consider Venofer   · May improve with diuresis, remains to be seen    Type 2 diabetes mellitus, without long-term current use of insulin [facility])  Assessment & Plan  Lab Results   Component Value Date    HGBA1C 8 0 (H) 05/02/2023     Recent Labs     05/03/23  1603 05/03/23  2102 05/04/23  0712 05/04/23  1033   POCGLU 140 153* 129 209*       Blood Sugar Average: Last 72 hrs:  · (P) 183 875   · BG acceptable, A1c above goal    · On metformin 1000 mg twice daily at home   · Holding metformin  · Accu-Cheks ACHS with SSI coverage    Primary hypertension  Assessment & Plan  · Continue home medications             VTE Pharmacologic Prophylaxis: VTE Score: 3 Moderate Risk (Score 3-4) - Pharmacological DVT Prophylaxis Ordered: apixaban (Eliquis)  Patient Centered Rounds: I performed bedside rounds with nursing staff today  Discussions with Specialists or Other Care Team Provider: Discussed with Cardiology, RN, CM    Education and Discussions with Family / Patient: Updated  (wife) at bedside      Total Time Spent on Date of Encounter in care of patient: 35 minutes This time was spent on one or more of the following: performing physical exam; counseling and coordination of care; obtaining or reviewing history; documenting in the medical record; reviewing/ordering tests, medications or procedures; communicating with other healthcare professionals and discussing with patient's family/caregivers  Current Length of Stay: 2 day(s)  Current Patient Status: Inpatient   Certification Statement: The patient will continue to require additional inpatient hospital stay due to on going diuresis   Discharge Plan: Anticipate discharge in 24-48 hrs to home  Code Status: Level 3 - DNAR and DNI    Subjective:   Patient reports feeling better today  Still with leg swelling but feels this improved  He is aware of amyloid diagnosis and does not want treatment  Objective:     Vitals:   Temp (24hrs), Av 7 °F (36 5 °C), Min:97 4 °F (36 3 °C), Max:97 9 °F (36 6 °C)    Temp:  [97 4 °F (36 3 °C)-97 9 °F (36 6 °C)] 97 9 °F (36 6 °C)  HR:  [81-97] 83  Resp:  [16-20] 18  BP: (123-166)/(67-79) 123/67  SpO2:  [94 %-100 %] 96 %  Body mass index is 31 28 kg/m²  Input and Output Summary (last 24 hours): Intake/Output Summary (Last 24 hours) at 2023 1425  Last data filed at 2023 1018  Gross per 24 hour   Intake 240 ml   Output 1100 ml   Net -860 ml       Physical Exam:   Physical Exam  Constitutional:       General: He is not in acute distress  Appearance: Normal appearance  He is normal weight  He is not ill-appearing or diaphoretic  HENT:      Head: Normocephalic and atraumatic  Mouth/Throat:      Mouth: Mucous membranes are moist    Eyes:      General: No scleral icterus  Pupils: Pupils are equal, round, and reactive to light  Cardiovascular:      Rate and Rhythm: Normal rate  Rhythm irregularly irregular  Pulses: Normal pulses  Heart sounds: Normal heart sounds, S1 normal and S2 normal  No murmur heard  No systolic murmur is present  No diastolic murmur is present  No gallop  No S3 or S4 sounds     Pulmonary:      Effort: Pulmonary effort is normal  No accessory muscle usage or respiratory distress  Breath sounds: Normal breath sounds  No stridor  No decreased breath sounds, wheezing, rhonchi or rales  Chest:      Chest wall: No tenderness  Abdominal:      General: Bowel sounds are normal  There is no distension  Palpations: Abdomen is soft  Tenderness: There is no abdominal tenderness  There is no guarding  Musculoskeletal:      Right lower leg: 3+ Edema present  Left lower leg: 3+ Edema present  Skin:     General: Skin is warm and dry  Coloration: Skin is not jaundiced  Neurological:      General: No focal deficit present  Mental Status: He is alert  Mental status is at baseline  Motor: No tremor or seizure activity  Psychiatric:         Behavior: Behavior is cooperative  Additional Data:     Labs:  Results from last 7 days   Lab Units 05/04/23  0448 05/03/23  0454   WBC Thousand/uL 6 24 6 85   HEMOGLOBIN g/dL 7 7* 7 8*   HEMATOCRIT % 26 4* 26 0*   PLATELETS Thousands/uL 345 320   NEUTROS PCT %  --  72   LYMPHS PCT %  --  13*   MONOS PCT %  --  13*   EOS PCT %  --  1     Results from last 7 days   Lab Units 05/04/23  0448 05/02/23  2318 05/02/23  1203   SODIUM mmol/L 134*   < > 134*   POTASSIUM mmol/L 4 1   < > 3 9   CHLORIDE mmol/L 99   < > 97   CO2 mmol/L 26   < > 30   BUN mg/dL 35*   < > 35*   CREATININE mg/dL 1 85*   < > 1 68*   ANION GAP mmol/L 9   < > 7   CALCIUM mg/dL 8 8   < > 8 5   ALBUMIN g/dL  --   --  3 6   TOTAL BILIRUBIN mg/dL  --   --  0 81   ALK PHOS U/L  --   --  243*   ALT U/L  --   --  13   AST U/L  --   --  18   GLUCOSE RANDOM mg/dL 119   < > 148*    < > = values in this interval not displayed           Results from last 7 days   Lab Units 05/04/23  1033 05/04/23  0712 05/03/23  2102 05/03/23  1603 05/03/23  1045 05/03/23  0643 05/02/23  2114 05/02/23  1639   POC GLUCOSE mg/dl 209* 129 153* 140 225* 123 225* 267*     Results from last 7 days   Lab Units 05/02/23  1203   HEMOGLOBIN A1C % 8 0*           Lines/Drains:  Invasive Devices     Peripheral Intravenous Line  Duration           Peripheral IV 05/02/23 Left Antecubital 2 days                  Telemetry:  Telemetry Orders (From admission, onward)             48 Hour Telemetry Monitoring  Continuous x 48 hours        References:    Telemetry Guidelines   Question:  Reason for 48 Hour Telemetry  Answer:  Acute Decompensated CHF (continuous diuretic infusion or total diuretic dose > 200 mg daily, associated electrolyte derangement, ionotropic drip, history of ventricular arrhythmia, or new EF <35%)                 Telemetry Reviewed: Atrial fibrillation  HR averaging 70-85  Indication for Continued Telemetry Use: Acute CHF on >200 mg lasix/day or equivalent dose or with new reduced EF                Imaging: Reviewed radiology reports from this admission including: ECHO    Recent Cultures (last 7 days):         Last 24 Hours Medication List:   Current Facility-Administered Medications   Medication Dose Route Frequency Provider Last Rate   • acetaminophen  650 mg Oral Q4H PRN Katelyn Méndez MD     • amiodarone  100 mg Oral Daily Katelyn Méndez MD     • apixaban  2 5 mg Oral BID Katelyn Méndez MD     • doxazosin  8 mg Oral HS Katelyn Méndez MD     • ferrous sulfate  325 mg Oral Every Other Day Jesu Ram MD     • furosemide  80 mg Intravenous TID (diuretic) Jesu Ram MD     • insulin lispro  1-5 Units Subcutaneous HS Katelyn Méndez MD     • insulin lispro  1-6 Units Subcutaneous TID AC Katelyn Méndez MD     • losartan  50 mg Oral Daily Katelyn Méndez MD     • metoprolol tartrate  50 mg Oral Q12H [address] Katelyn Méndez MD     • potassium chloride  20 mEq Oral Daily Katelyn Méndez MD     • pravastatin  40 mg Oral Daily With Zach Loyola MD          Today, Patient Was Seen By: Luisa Sorenson PA-C    **Please Note: This note may have been constructed using a voice recognition system  **

## 2023-05-04 NOTE — ASSESSMENT & PLAN NOTE
Lab Results   Component Value Date    EGFR 32 05/04/2023    EGFR 37 05/03/2023    EGFR 37 05/02/2023    CREATININE 1 85 (H) 05/04/2023    CREATININE 1 66 (H) 05/03/2023    CREATININE 1 64 (H) 05/02/2023     · Baseline creatinine appears to be around 1 5 although has been trending up recently  · Creatinine yoshi slightly, likely response to diuretics   · Dose of torsemide recently increased to 40 mg twice daily  · Continue to monitor renal function closely while on IV diuretics  · Avoid hypotension  · Continue losartan

## 2023-05-04 NOTE — PHYSICAL THERAPY NOTE
"PHYSICAL THERAPY EVALUATION  NAME:  Ana Huber  DATE: 05/04/23    AGE:   82 y o  Mrn:   [id number]  Principal problem: Principal Problem:    Acute on chronic diastolic congestive heart failure (HCC)  Active Problems:    Atrial fibrillation (HCC)    Stage 3 chronic kidney disease (HCC)    Type 2 diabetes mellitus, without long-term current use of insulin (HCC)    Colon adenocarcinoma ([facility] [address] )    Primary hypertension    Iron deficiency anemia due to chronic blood loss      Vitals:    05/04/23 0600 05/04/23 0724 05/04/23 1015 05/04/23 1159   BP:  143/74  123/67   BP Location:       Pulse:  83  83   Resp:  18     Temp:  97 9 °F (36 6 °C)     TempSrc:       SpO2:  97% 96% 96%   Weight: 87 9 kg (193 lb 12 6 oz)      Height:           Length Of Stay: 2  Performed at least 2 patient identifiers during session: Name and Birthday  PHYSICAL THERAPY EVALUATION :    05/04/23 1033   PT Last Visit   PT Visit Date 05/04/23   Note Type   Note type Evaluation   Pain Assessment   Pain Assessment Tool 0-10   Pain Score No Pain   Restrictions/Precautions   Weight Bearing Precautions Per Order No   Other Precautions Bed Alarm; Chair Alarm;Cognitive; Fall Risk;Pain   Home Living   Type of Home House  (condo)   Home Layout One level  (no MELIDA)   Home Equipment   (has walker but did not use it  primarily furniture walks)   Additional Comments sleeps in recliner fo years   Prior Function   Level of [place] Independent with ADLs; Independent with functional mobility   Lives With Significant other   IADLs Family/Friend/Other provides meals; Family/Friend/Other provides medication management   Falls in the last 6 months 1 to 4  (1- tripped on curb \"drags feet\" per spouse)   Vocational Retired   General   Family/Caregiver Present Yes   Cognition   Overall Cognitive Status Impaired   Arousal/Participation Alert   Orientation Level Oriented X4   Memory Decreased recall of precautions;Decreased recall of recent events   Following Commands Follows " one step commands with increased time or repetition   Subjective   Subjective Patient pleasant and cooperative  Sitting in chair upon entering room  Significant other present and reports patient is eager to get up and ambulate     RUE Assessment   RUE Assessment X  (limited, currently getting OPPT)   RUE Overall AROM   R Shoulder Flexion < 90 AG   RUE Overall PROM   R Shoulder Flexion tested to 90 AG   LUE Assessment   LUE Assessment WFL   RLE Assessment   RLE Assessment   (pitting LE edema @ lower legs--patient reports that he has not been able to tolerate compression stockings or Ace bandages due to history of cramping at multiple sites)   Strength RLE   R Hip Flexion 4-/5   R Knee Extension 4/5   R Ankle Dorsiflexion 4/5   LLE Assessment   LLE Assessment   (pitting LE edema @ lower legs--patient reports that he has not been able to tolerate compression stockings or Ace bandages due to history of cramping at multiple sites)   Strength LLE   L Hip Flexion 4/5   L Knee Extension 4/5   L Ankle Dorsiflexion 4-/5   Vision-Basic Assessment   Current Vision Wears glasses only for reading   Light Touch   RLE Light Touch Grossly intact   LLE Light Touch Grossly intact   Timed Up and Go   TUG Trial 1 (Seconds) 30 Seconds  (w/o device)   Bed Mobility   Supine to Sit Unable to assess  (as Pt OOB upon entering room)   Transfers   Sit to Stand 5  Supervision   Additional items Increased time required;Verbal cues;Armrests   Stand to Sit 5  Supervision   Additional items Increased time required;Verbal cues;Armrests   Ambulation/Elevation   Gait pattern Excessively slow;Decreased foot clearance   Gait Assistance 5  Supervision   Additional items Assist x 1   Assistive Device None   Distance 20'   Stair Management Assistance Not tested   Balance   Static Sitting Good   Static Standing Fair   Ambulatory Fair -   Endurance Deficit   Endurance Deficit Yes   Endurance Deficit Description ventilatory response index level 1+/4 post amb "  Activity Tolerance   Activity Tolerance Patient limited by fatigue   Medical Staff Made Aware spoke to [facility] from [address] spoke to RN before session     Assessment:   Pt is a 82 y o  male seen for PT evaluation s/p admit to [facility] on 5/2/2023 w/ Acute on chronic diastolic congestive heart failure ([address] )  Order placed for PT  Prior to admission: Pt lived with significant other in a 1 floor set up with no stairs to enter  Patient reports not using any DME prior to admission and was going to outpatient PT for his right shoulder  Upon Eval: Pt Required no physical assistance for transfers nor ambulation, but displays gait deviations  Pt's clinical presentation is currently unstable/unpredictable given the functional mobility deficits above, especially (but not limited to) gait deviations, decreased functional mobility tolerance and SOB upon exertion, coupled with fall risks including hx of falls, impaired coordination and decreased cognition, and combined with medical complications of hypertension , abnormal renal lab values, abnormal H&H and abnormal sodium values  During this admission, pt would benefit from continued skilled inpatient PT in the acute care setting in order to address deficits as defined above to maximize function and mobility  Recommendations:   •  From a PT standpoint, recommend next several sessions focus on potential trials with DME to increase ambulation distance and improve gait quality      • Recommend Pt to continue performing some of his therex from OPPT including RUE  table slides in flexion/scaption position with instruction to minimize compensation  • Would recommend f/u with SLIM/cardiology to see if pt would be willing to trial GENTLE light pressure compression at LE's as he was unable to tolerate \"compression\" in past due to pain/cramping (family hx of cramping)    Nursing Recommendations:   • Mobility Plan as of 05/04/23: Pt is S/one " person Assist with no AD to/from recliner, BSC and bathroom  Encourage OOB for all meals  • Recommend nursing or spouse Amb w/ pt at least 3 times daily in halls, with quantity of trials > quantity of distances  Prognosis Fair   Problem List Decreased endurance; Impaired balance;Decreased range of motion;Decreased strength;Decreased mobility; Decreased cognition;Decreased safety awareness; Obesity; Decreased skin integrity  (gait deviations)   Barriers to Discharge Decreased caregiver support; Inaccessible home environment   Barriers to Discharge Comments Co-morbidities affecting pt's physical performance at time of assessment include: self reported muscular cramping unrelated to K, A-fib, Colon cancer (no treatment), heart failure, obesity, HTN (hypertension), R humeral fx w/ OPPT  [place] Personal factors affecting pt at time of IE include: advanced age, past experience, behavioral pattern, inability to navigate community distances, limited insight into impairments, recent fall(s) and negative perception of personally using rolling walker>cane  Goals   Patient Goals to get home, walk better and not need a walker ( pt resisent to using a walker even temporarily)   STG Expiration Date 05/14/23   Short Term Goal #1 Pt will: Perform bed mobility tasks with no more than min A to prepare for transfers and reposition in bed (as pt sleeps in recliner>10 years)  Perform transfers with modified I to improve ease of transfers  Perform ambulation with LRAD as agreeable for >50' with S to increase Indep in home environment and improve gait quality  Increase TUG score time to 25 seconds to demonstrate less risk for falls  Perform gait speed+/- device @ > 40m/s to promote limited community amb pace   PT Treatment Day 1   Plan   Treatment/Interventions Functional transfer training;LE strengthening/ROM; Therapeutic exercise; Endurance training;Cognitive reorientation;Patient/family training;Bed mobility;Gait training;Equipment eval/education;Spoke to nursing;OT   PT Frequency 3-5x/wk   Recommendation   PT Discharge Recommendation Home with outpatient rehabilitation  (including to resume RUE PT)   Equipment Recommended   (would recommend rolling walker, but pt has one, declines to use it )   AM-PAC Basic Mobility Inpatient   Turning in Flat Bed Without Bedrails 3   Lying on Back to Sitting on Edge of Flat Bed Without Bedrails 3   Moving Bed to Chair 3   Standing Up From Chair Using Arms 3   Walk in Room 3   Climb 3-5 Stairs With Railing 2   Basic Mobility Inpatient Raw Score 17   Basic Mobility Standardized Score 39 67   Highest Level Of Mobility   JH-HLM Goal 5: Stand one or more mins   JH-HLM Achieved 7: Walk 25 feet or more  (during treatment)   Additional Treatment Session   Start Time 1050   End Time 1100   Treatment Assessment Pt was able to amb  further distances as compared to initial evaluation, but needs to take self regulated standing rest breaks  Patient displaying guarded gait and would appear to benefit from use of rolling walker to increase ambulation distances, however continues to politely decline  Patient and significant other verbalized good understanding and differences with using no device versus single-point cane (stability) versus rolling walker (support/endurance)   Equipment Use none   Exercises   UE Exercise 10 reps; Sitting  (Therex R shoulder of table slides x10 reps each in flexion then scaption w/ verbal instruction on posture, minimizing compensation )   Neuro re-ed Transfers modified I with wide base of support  Ambulation 50 feet x 2 with more than 1 minute standing tolerance in between gait trials  comfortable gait speed  43m/s during first amb trial  Ventilatory response index 0+ out of 4 post ambulation, however patient reports increased shortness of breath approximately 2 to 3 minutes after ambulation trial with slight decrease SPO2 and increased heart rate while sitting     End of Consult   Patient "Position at End of Consult All needs within reach;Bed/Chair alarm activated; Bedside chair   (Please find full objective findings from PT assessment regarding body systems outlined above)  The patient's -[facility] Basic Mobility Inpatient Short Form Raw Score is 17  A Raw score of greater than 16 suggests the patient may benefit from discharge to home, which DOES coincide with CURRENT above PT recommendations  However please refer to therapist recommendation for discharge planning given other factors that may influence destination  Adapted from [employer] [facility] “6-Clicks” Basic Mobility and Daily Activity Scores With Discharge Destination  Physical Therapy, 2021;101:1-9  DOI: [email or web address]    Portions of the record may have been created with voice recognition software  Occasional wrong word or \"sound a like\" substitutions may have occurred due to the inherent limitations of voice recognition software    Read the chart carefully and recognize, using context, where substitutions have occurred    "

## 2023-05-04 NOTE — ASSESSMENT & PLAN NOTE
Lab Results   Component Value Date    HGBA1C 8 0 (H) 05/02/2023     Recent Labs     05/03/23  1603 05/03/23  2102 05/04/23  0712 05/04/23  1033   POCGLU 140 153* 129 209*       Blood Sugar Average: Last 72 hrs:  · (P) 183 875   · BG acceptable, A1c above goal    · On metformin 1000 mg twice daily at home   · Holding metformin  · Accu-Cheks ACHS with SSI coverage

## 2023-05-04 NOTE — PROGRESS NOTES
General Cardiology   Progress Note -  [employer]   Erica Haro 82 y o  male MRN: [id number]  Unit/Bed#: S -01 Encounter: [id number]    Assessment     1  Acute on chronic diastolic CHF  BNP 5246, CXR read as mild pulmonary vascular congestion  Home diuretic: Torsemide 40 mg twice daily  Current diuretic Lasix 80 mg IV TID  I/Os: limited intake documented  1,000 mL UO  Weight: 193 lb per standing scale (down 2 lb)  Dry weight: Was 187 pounds at office visit last year however he was felt to be overloaded then so dry weight is potentially lower  TTE showed LVEF 60% with dilated RV and mildly reduced RV systolic function  Mild MR, severe TR  Flattening of the intraventricular septum suggestive of RV volume overload  Appearance suggestive of possible amyloidosis- this was discussed with patient and significant other  Currently not interested in pursuing MRI as he has known adenocarcinoma of the colon which he is not treating  Primary cardiologist can obtain a cardiac MRI as outpatient if they change their minds       2   Permanent atrial fibrillation  Rates well controlled on metoprolol tartrate 50 mg twice daily  Telemetry reviewed-atrial fibrillation with rates 70s to 80s  On anticoagulation with Eliquis 2 5 mg twice daily  3  CKD 3-creatinine 1 8 which is up slightly but still within his recent baseline of 1 3-1 9   4   Chronic anemia-hemoglobin slowly downtrending since last year but overall stable at 7 7 today  No active bleeding noted on anticoagulation with Eliquis  5   HTN-mildly elevated, average /75  On doxazosin 8 mg at bedtime, losartan 50 mg daily, metoprolol tartrate 50 mg twice daily, and IV diuretics  6  HLD-LDL 54 last year on pravastatin 40 mg daily  7  Adenocarcinoma of the colon-not being treated per patient wishes  8  Type 2 DM-A1c 7 8% in February     Plan  1  Give metolazone prior to second dose of Lasix today   If does not have improved output then will plan to switch to IV "Bumex  2  Strict I/Os, daily standing weights, 2 g sodium diet with 1500 mL fluid restriction  3  Repeat BMP in the morning  4  Continue metoprolol tartrate 50 mg twice daily and Eliquis 2 5 mg twice daily    Subjective  No improvement in symptoms since yesterday  Review of Systems   Constitutional: Negative for chills, decreased appetite and malaise/fatigue  Cardiovascular: Positive for dyspnea on exertion, leg swelling and orthopnea  Negative for chest pain, palpitations and syncope  Respiratory: Negative for cough, sleep disturbances due to breathing and sputum production  Gastrointestinal: Positive for bloating  Negative for nausea and vomiting  Neurological: Negative for dizziness, light-headedness and weakness  Psychiatric/Behavioral: Negative for altered mental status  All other systems reviewed and are negative  Objective:   Vitals: Blood pressure 143/74, pulse 83, temperature 97 9 °F (36 6 °C), resp  rate 18, height 5' 6\" (1 676 m), weight 87 9 kg (193 lb 12 6 oz), SpO2 97 %  , Body mass index is 31 28 kg/m²  ,     Systolic (00APK), AJZ:334 , Min:123 , ODX:891     Diastolic (97YMC), ISY:76, Min:71, Max:79      Intake/Output Summary (Last 24 hours) at 5/4/2023 0854  Last data filed at 5/4/2023 0501  Gross per 24 hour   Intake 240 ml   Output 1000 ml   Net -760 ml     Wt Readings from Last 3 Encounters:   05/04/23 87 9 kg (193 lb 12 6 oz)   04/26/23 88 7 kg (195 lb 9 6 oz)   04/13/23 85 7 kg (189 lb)     Telemetry Review: Afib 70s-90s    Physical Exam  Vitals reviewed  Constitutional:       General: He is not in acute distress  Appearance: He is obese  Neck:      Vascular: JVD present  Cardiovascular:      Rate and Rhythm: Normal rate  Rhythm irregularly irregular  Heart sounds: Normal heart sounds  No murmur heard  No friction rub  No gallop  Comments: Bilateral LE edema extending to thighs  Pulmonary:      Effort: Pulmonary effort is normal  No respiratory distress   " Breath sounds: No rales  Comments: Decreased at bases, room air  Abdominal:      General: Bowel sounds are normal  There is distension  Palpations: Abdomen is soft  Tenderness: There is no abdominal tenderness  Musculoskeletal:         General: No tenderness  Normal range of motion  Cervical back: Neck supple  Right lower leg: 3+ Edema present  Left lower leg: 3+ Edema present  Skin:     General: Skin is warm and dry  Findings: No erythema  Neurological:      Mental Status: He is alert and oriented to person, place, and time     Psychiatric:         Mood and Affect: Mood normal          LABORATORY RESULTS      CBC with diff:   Results from last 7 days   Lab Units 05/04/23 0448 05/03/23 0454 05/02/23  1203   WBC Thousand/uL 6 24 6 85 7 74   HEMOGLOBIN g/dL 7 7* 7 8* 7 7*   HEMATOCRIT % 26 4* 26 0* 25 8*   MCV fL 78* 78* 78*   PLATELETS Thousands/uL 345 320 333   MCH pg 22 8* 23 3* 23 1*   MCHC g/dL 29 2* 30 0* 29 8*   RDW % 17 7* 17 6* 17 3*   MPV fL 10 9 10 4 10 3   NRBC AUTO /100 WBCs  --  0 0     CMP:  Results from last 7 days   Lab Units 05/04/23 0448 05/03/23 0454 05/02/23 2318 05/02/23  1203   POTASSIUM mmol/L 4 1 4 0 3 4* 3 9   CHLORIDE mmol/L 99 98 98 97   CO2 mmol/L 26 26 26 30   BUN mg/dL 35* 33* 35* 35*   CREATININE mg/dL 1 85* 1 66* 1 64* 1 68*   CALCIUM mg/dL 8 8 8 7 8 3* 8 5   AST U/L  --   --   --  18   ALT U/L  --   --   --  13   ALK PHOS U/L  --   --   --  243*   EGFR ml/min/1 73sq m 32 37 37 36       BMP:  Results from last 7 days   Lab Units 05/04/23 0448 05/03/23 0454 05/02/23 2318 05/02/23  1203   POTASSIUM mmol/L 4 1 4 0 3 4* 3 9   CHLORIDE mmol/L 99 98 98 97   CO2 mmol/L 26 26 26 30   BUN mg/dL 35* 33* 35* 35*   CREATININE mg/dL 1 85* 1 66* 1 64* 1 68*   CALCIUM mg/dL 8 8 8 7 8 3* 8 5       Lab Results   Component Value Date    CREATININE 1 85 (H) 05/04/2023    CREATININE 1 66 (H) 05/03/2023    CREATININE 1 64 (H) 05/02/2023       Lab Results Component Value Date    NTBNP 1,896 (H) 07/13/2022           Results from last 7 days   Lab Units 05/02/23  2318   MAGNESIUM mg/dL 1 7*                 Results from last 7 days   Lab Units 05/03/23  0454   TSH 3RD GENERATON uIU/mL 2 892             Lipid Profile:   No results found for: CHOL  Lab Results   Component Value Date    HDL 63 03/04/2022    HDL 60 01/31/2018     Lab Results   Component Value Date    LDLCALC 54 03/04/2022    LDLCALC 40 01/31/2018     Lab Results   Component Value Date    TRIG 69 03/04/2022    TRIG 81 01/31/2018     Meds/Allergies   all current active meds have been reviewed and current meds:   Current Facility-Administered Medications   Medication Dose Route Frequency   • acetaminophen (TYLENOL) tablet 650 mg  650 mg Oral Q4H PRN   • amiodarone tablet 100 mg  100 mg Oral Daily   • apixaban (ELIQUIS) tablet 2 5 mg  2 5 mg Oral BID   • doxazosin (CARDURA) tablet 8 mg  8 mg Oral HS   • ferrous sulfate tablet 325 mg  325 mg Oral Every Other Day   • furosemide (LASIX) injection 80 mg  80 mg Intravenous TID (diuretic)   • insulin lispro (HumaLOG) 100 units/mL subcutaneous injection 1-5 Units  1-5 Units Subcutaneous HS   • insulin lispro (HumaLOG) 100 units/mL subcutaneous injection 1-6 Units  1-6 Units Subcutaneous TID AC   • losartan (COZAAR) tablet 50 mg  50 mg Oral Daily   • metoprolol tartrate (LOPRESSOR) tablet 50 mg  50 mg Oral Q12H [address]   • potassium chloride (K-DUR,KLOR-CON) CR tablet 20 mEq  20 mEq Oral Daily   • pravastatin (PRAVACHOL) tablet 40 mg  40 mg Oral Daily With Dinner     Medications Prior to Admission   Medication   • amiodarone 100 mg tablet   • doxazosin (CARDURA) 8 MG tablet   • Eliquis 5 MG   • ferrous sulfate 324 (65 Fe) mg   • losartan (COZAAR) 50 mg tablet   • metFORMIN (GLUCOPHAGE) 1000 MG tablet   • metoprolol tartrate (LOPRESSOR) 50 mg tablet   • pantoprazole (PROTONIX) 40 mg tablet   • potassium chloride (K-DUR,KLOR-CON) 20 mEq tablet   • pravastatin (PRAVACHOL) 40 mg tablet   • torsemide (DEMADEX) 20 mg tablet     Counseling / Coordination of Care  Total floor / unit time spent today 20 minutes  Greater than 50% of total time was spent with the patient and / or family counseling and / or coordination of care  ** Please Note: Dragon 360 Dictation voice to text software may have been used in the creation of this document   **

## 2023-05-04 NOTE — ASSESSMENT & PLAN NOTE
Wt Readings from Last 3 Encounters:   05/04/23 87 9 kg (193 lb 12 6 oz)   04/26/23 88 7 kg (195 lb 9 6 oz)   04/13/23 85 7 kg (189 lb)     · POA, presents with progressively worsening shortness of breath and lower extremity swelling  · Chest x-ray showed prominent interstitial lung markings with possible pulmonary vascular congestion  · Most recent echocardiogram in July 2022 revealed EF 60%, mild to moderate TR, mild to moderately elevated PA systolic pressure, unable to assess diastolic function  · He is on torsemide 40 mg twice daily at home which was recently increased from 20 twice daily  · He received IV Lasix 40 mg in the ED and put out approximately 400 cc urine  · Now put out over 1000 mL   · 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 - patient would not want further work up for amyloid at this time   · Continued on IV Lasix 80 mg 3 times daily  · Add Metolazone today   · Monitor UOP closely next 24 hours, may need to consider Bumex vs Lasix Drip   · Cardiology consult appreciated   · Daily weights, I's and O's, 2 g sodium diet, 1800 cc fluid restriction

## 2023-05-05 ENCOUNTER — APPOINTMENT (OUTPATIENT)
Dept: PHYSICAL THERAPY | Facility: CLINIC | Age: 88
End: 2023-05-05
Payer: COMMERCIAL

## 2023-05-05 LAB
ANION GAP SERPL CALCULATED.3IONS-SCNC: 8 MMOL/L (ref 4–13)
BUN SERPL-MCNC: 36 MG/DL (ref 5–25)
CALCIUM SERPL-MCNC: 8.9 MG/DL (ref 8.4–10.2)
CHLORIDE SERPL-SCNC: 92 MMOL/L (ref 96–108)
CO2 SERPL-SCNC: 32 MMOL/L (ref 21–32)
CREAT SERPL-MCNC: 1.71 MG/DL (ref 0.6–1.3)
GFR SERPL CREATININE-BSD FRML MDRD: 35 ML/MIN/1.73SQ M
GLUCOSE SERPL-MCNC: 153 MG/DL (ref 65–140)
GLUCOSE SERPL-MCNC: 153 MG/DL (ref 65–140)
GLUCOSE SERPL-MCNC: 193 MG/DL (ref 65–140)
GLUCOSE SERPL-MCNC: 195 MG/DL (ref 65–140)
POTASSIUM SERPL-SCNC: 3.3 MMOL/L (ref 3.5–5.3)
SODIUM SERPL-SCNC: 132 MMOL/L (ref 135–147)

## 2023-05-05 RX ORDER — POTASSIUM CHLORIDE 20 MEQ/1
40 TABLET, EXTENDED RELEASE ORAL ONCE
Status: COMPLETED | OUTPATIENT
Start: 2023-05-05 | End: 2023-05-05

## 2023-05-05 RX ORDER — POTASSIUM CHLORIDE 20 MEQ/1
40 TABLET, EXTENDED RELEASE ORAL DAILY
Status: DISCONTINUED | OUTPATIENT
Start: 2023-05-05 | End: 2023-05-08 | Stop reason: HOSPADM

## 2023-05-05 RX ORDER — POTASSIUM CHLORIDE 20 MEQ/1
TABLET, EXTENDED RELEASE ORAL
Qty: 30 TABLET | Refills: 0 | Status: SHIPPED | OUTPATIENT
Start: 2023-05-05

## 2023-05-05 RX ADMIN — LOSARTAN POTASSIUM 50 MG: 50 TABLET, FILM COATED ORAL at 09:29

## 2023-05-05 RX ADMIN — APIXABAN 2.5 MG: 2.5 TABLET, FILM COATED ORAL at 17:49

## 2023-05-05 RX ADMIN — INSULIN LISPRO 1 UNITS: 100 INJECTION, SOLUTION INTRAVENOUS; SUBCUTANEOUS at 09:30

## 2023-05-05 RX ADMIN — POTASSIUM CHLORIDE 40 MEQ: 1500 TABLET, EXTENDED RELEASE ORAL at 17:50

## 2023-05-05 RX ADMIN — FUROSEMIDE 80 MG: 10 INJECTION, SOLUTION INTRAMUSCULAR; INTRAVENOUS at 05:47

## 2023-05-05 RX ADMIN — DOXAZOSIN 8 MG: 4 TABLET ORAL at 21:28

## 2023-05-05 RX ADMIN — PRAVASTATIN SODIUM 40 MG: 40 TABLET ORAL at 17:49

## 2023-05-05 RX ADMIN — INSULIN LISPRO 2 UNITS: 100 INJECTION, SOLUTION INTRAVENOUS; SUBCUTANEOUS at 13:19

## 2023-05-05 RX ADMIN — APIXABAN 2.5 MG: 2.5 TABLET, FILM COATED ORAL at 09:29

## 2023-05-05 RX ADMIN — INSULIN LISPRO 1 UNITS: 100 INJECTION, SOLUTION INTRAVENOUS; SUBCUTANEOUS at 22:57

## 2023-05-05 RX ADMIN — POTASSIUM CHLORIDE 40 MEQ: 1500 TABLET, EXTENDED RELEASE ORAL at 13:18

## 2023-05-05 RX ADMIN — AMIODARONE HYDROCHLORIDE 100 MG: 200 TABLET ORAL at 09:30

## 2023-05-05 RX ADMIN — FUROSEMIDE 80 MG: 10 INJECTION, SOLUTION INTRAMUSCULAR; INTRAVENOUS at 17:49

## 2023-05-05 RX ADMIN — FUROSEMIDE 80 MG: 10 INJECTION, SOLUTION INTRAMUSCULAR; INTRAVENOUS at 13:24

## 2023-05-05 RX ADMIN — POTASSIUM CHLORIDE 20 MEQ: 1500 TABLET, EXTENDED RELEASE ORAL at 09:30

## 2023-05-05 RX ADMIN — METOPROLOL TARTRATE 50 MG: 50 TABLET, FILM COATED ORAL at 09:29

## 2023-05-05 RX ADMIN — METOPROLOL TARTRATE 50 MG: 50 TABLET, FILM COATED ORAL at 21:28

## 2023-05-05 RX ADMIN — INSULIN LISPRO 1 UNITS: 100 INJECTION, SOLUTION INTRAVENOUS; SUBCUTANEOUS at 17:51

## 2023-05-05 NOTE — ASSESSMENT & PLAN NOTE
Lab Results   Component Value Date    EGFR 35 05/05/2023    EGFR 32 05/04/2023    EGFR 37 05/03/2023    CREATININE 1 71 (H) 05/05/2023    CREATININE 1 85 (H) 05/04/2023    CREATININE 1 66 (H) 05/03/2023     · Baseline creatinine appears to be around 1 5 although has been trending up recently  · Creatinine yoshi slightly, likely response to diuretics and appears to be trending down now again   · Dose of torsemide recently increased to 40 mg twice daily  · Continue to monitor renal function closely while on IV diuretics  · Avoid hypotension  · Continue losartan

## 2023-05-05 NOTE — OCCUPATIONAL THERAPY NOTE
Occupational Therapy Evaluation     Patient Name: Gerhardt Felts  QRUDF'S Date: 5/5/2023  Problem List  Principal Problem:    Acute on chronic diastolic congestive heart failure ([facility] )  Active Problems:    Atrial fibrillation ([facility] )    Stage 3 chronic kidney disease ([facility] )    Type 2 diabetes mellitus, without long-term current use of insulin (HCC)    Colon adenocarcinoma ([facility] )    Primary hypertension    Iron deficiency anemia due to chronic blood loss    Past Medical History  Past Medical History:   Diagnosis Date    A-fib [facility])     Colon cancer ([facility] )     Diastolic heart failure ([place] [address] )     Flu-like symptoms 12/13/2022    HTN (hypertension)     Postviral syndrome 01/26/2023     Past Surgical History  Past Surgical History:   Procedure Laterality Date    EXPLORATORY LAPAROTOMY      with gastric ulcer repair           05/05/23 1408   OT Last Visit   OT Visit Date 05/05/23   Note Type   Note type Evaluation   Pain Assessment   Pain Assessment Tool 0-10   Pain Score No Pain   Restrictions/Precautions   Weight Bearing Precautions Per Order No   Other Precautions Chair Alarm; Bed Alarm; Fall Risk;Cognitive;Telemetry   Home Living   Type of Home House  (condo)   Home Layout One level;Performs ADLs on one level; Able to live on main level with bedroom/bathroom; Access  (0 MELIDA)   Bathroom Shower/Tub Walk-in shower   Bathroom Toilet Standard   Bathroom Equipment Shower chair  (does not use)   [facility]  (no AD used at baseline,  endorses furniture walking)   Additional Comments sleeps in recliner at baseline   Prior Function   Level of [place] Independent with ADLs; Independent with functional mobility; Needs assistance with IADLS   Lives With Significant other  Maricruz Argueta Help From Family; Outpatient therapy  (OP PT currently)   IADLs Family/Friend/Other provides medication management; Family/Friend/Other provides meals; Family/Friend/Other provides transportation   Falls in the last 6 months 1 to 4  (1 per pt , tripped over curb)   Vocational Retired   Geoff manages meds, laundry, grocery shopping and transportation  Lifestyle   Autonomy At baseline ,pt is (I) with ADLs, no AD  A with all IADLs  Lives with S/O in [address] [place] [address], 0 MELIDA   Reciprocal Relationships Significant other, Curtis Gupta  Reports children are not local   Service to Others retired    General   Additional Pertinent History Admitted d/t acute on chronic CHF  PMHx: CKD, DM2, colon adenocarcinoma, HTN, iron deficiency anemia  Family/Caregiver Present No   Subjective   Subjective Pt states feeling good today   ADL   Eating Assistance 7  Independent   Grooming Assistance 6  Modified Independent   UB Bathing Assistance 5  Supervision/Setup   LB Bathing Assistance 5  Supervision/Setup   UB Dressing Assistance 5  Supervision/Setup   UB Dressing Deficit Supervision/safety; Increased time to complete;Pull around back  (donned/doffed gown)   LB Dressing Assistance 5  Supervision/Setup   LB Dressing Deficit   (declining wearing underwear, pants at this time )   Toileting Assistance  4  Minimal Assistance   Toileting Deficit   (inconintent of urine upon arrival, pt reporting spilling urinal  assistance for washing up )   Functional Assistance 5  Supervision/Setup   Additional Comments Did not observe eating, grooming, UB bathing, LB bathing and LB dressing at time of evaluation, with use of clinical reasoning, pt's performance throughout evaluation indicates that pt may be able to perform these tasks at the levels listed above   Bed Mobility   Additional Comments Pt seated OOB in recliner upon arrival/end of session   Transfers   Sit to Stand 5  Supervision   Additional items Increased time required;Verbal cues   Stand to Sit 5  Supervision   Additional items Increased time required;Verbal cues   Additional Comments demonstrates safe body mechanics during transfers   Stood 1 min at [employer]  [place] for cleaning self in stance Functional Mobility   Functional Mobility 5  Supervision  (variable CGA)   Additional Comments functional mobility household distance within hallway, min s/s of exertion   Additional items   (no AD)   Balance   Static Sitting Good   Dynamic Sitting Fair +   Static Standing Fair +   Dynamic Standing Fair   Activity Tolerance   Activity Tolerance Patient limited by fatigue   Medical Staff Made Aware  Hospital Loop   Nurse Made Aware RN prior to session   RUE Assessment   RUE Assessment X  (AROM ~90 degrees @ shoulder  MMT grossly 3+/5  Recent humeral fx Feb 2023)   LUE Assessment   LUE Assessment WFL  (Shoulder AROM ~100 , MMT grossly 4/5)   Hand Function   Gross Motor Coordination Functional   Fine Motor Coordination Functional   Sensation   Light Touch No apparent deficits   Vision-Basic Assessment   Current Vision Wears glasses only for reading   Cognition   Overall Cognitive Status Impaired   Arousal/Participation Alert; Cooperative   Attention Attends with cues to redirect   Orientation Level Oriented X4  (grossly + month/year)   Memory Decreased recall of precautions;Decreased recall of recent events   Following Commands Follows one step commands with increased time or repetition   Comments Pt agreeable to OT session, limited insight to deficits/safety awareness   Assessment   Limitation Decreased ADL status; Decreased UE strength;Decreased Safe judgement during ADL;Decreased endurance;Decreased self-care trans;Decreased high-level ADLs; Decreased cognition   Prognosis Good   Assessment Patient is a 82 y o  male seen for OT evaluation at [facility] following admission on 5/2/2023  s/p Acute on chronic diastolic congestive heart failure ([facility] )  Please see above for comprehensive list of comorbidities and significant PMHx impacting functional performance  Patient presents with active orders for OT eval and treat and up and OOB as tolerated   At baseline, pt is (I) with ADLs, no AD  A with IADLs   Upon initial evaluation, patient requires Mod independent  for UB ADLs, Min A  for LB ADLs, and Supervision  for transfers and functional mobility household distance with no AD  Based on functional eval, pt presents with intact  attention, impaired  safety awareness, impaired  problem solving skills, and intact   memory  Occupational performance is affected by the following deficits:  decreased muscular strength , decreased standing tolerance for self care tasks , decreased dynamic balance impacting functional reach and impaired safety awareness   Patient would benefit from OT services within the acute care setting to maximize level of functional independence in the following areas fall prevention , energy conservation , LB ADLs, self-care transfers, functional mobility and formal cognitive evaluation  From OT standpoint, recommendation at time of D/C would be return to previous environment with home health rehabilitation  Goals   Patient Goals Pt states no concerns re: functional status  Erik Cutler to return home, to maintain independence   Plan   Treatment Interventions ADL retraining;UE strengthening/ROM; Functional transfer training;Cognitive reorientation;Patient/family training; Endurance training;Energy conservation   Goal Expiration Date 05/15/23   OT Treatment Day 0   OT Frequency 2-3x/wk   Recommendation   OT Discharge Recommendation Home with home health rehabilitation   Additional Comments  The patient's raw score on the AM-PAC Daily Activity Inpatient Short Form is 19  A raw score of greater than or equal to 19 suggests the patient may benefit from discharge to home  Please refer to the recommendation of the Occupational Therapist for safe discharge planning     AM-PAC Daily Activity Inpatient   Lower Body Dressing 3   Bathing 3   Toileting 3   Upper Body Dressing 3   Grooming 3   Eating 4   Daily Activity Raw Score 19   Daily Activity Standardized Score (Calc for Raw Score >=11) 40 22   AM-PAC Applied Cognition Inpatient   Following a Speech/Presentation 3   Understanding Ordinary Conversation 4   Taking Medications 3   Remembering Where Things Are Placed or Put Away 3   Remembering List of 4-5 Errands 3   Taking Care of Complicated Tasks 3   Applied Cognition Raw Score 19   Applied Cognition Standardized Score 39 77   End of Consult   Education Provided Yes   Patient Position at End of Consult Bedside chair; All needs within reach;Bed/Chair alarm activated   Nurse Communication Nurse aware of consult     Pt will complete LB ADLs Mod independent   with use of LHAE as needed for increased ADL independence within 10 days  Pt will complete toileting Supervision  with use of DME for increased ADL independence within 10 days  Pt will demonstrate proper body mechanics to complete self-care transfers and functional mobility with Mod independent  and use of LRAD as appropriate for increased safety and functional independence within 10 days  Pt will demonstrate standing tolerance of 10 min with Supervision and use of AD PRN for increased activity tolerance during ADL/IADL tasks within 10 days  Pt will demonstrate proper body mechanics and fall prevention strategies during 100% of tx sessions for increased safety awareness during ADL/IADLs    Pt will demonstrate OOB sitting tolerance of 2-4 hr/day for increased activity tolerance and engagement in self care tasks within 10 days  Pt will participate in ongoing cognitive assessments to assist with safe D/C planning and supervision/assistance recommendations  Pt will verbalize and demonstrate understanding of energy conservation/deep breathing techniques for increased activity tolerance and endurance during meaningful activities  Pt will verbalize and demonstrate understanding of B UE HEP program increase strength and endurance during self care transfers within 10 days       Angela Robledo

## 2023-05-05 NOTE — ASSESSMENT & PLAN NOTE
Lab Results   Component Value Date    HGBA1C 8 0 (H) 05/02/2023     Recent Labs     05/04/23  1033 05/04/23  1638 05/04/23 2058 05/05/23  1105   POCGLU 209* 164* 150* 195*       Blood Sugar Average: Last 72 hrs:  · (P) 180   · BG acceptable, A1c above goal    · On metformin 1000 mg twice daily at home   · Holding metformin  · Accu-Cheks ACHS with SSI coverage

## 2023-05-05 NOTE — PROGRESS NOTES
[facility]  Progress Note  Name: Geovany Hernandez  MRN: [id number]  Unit/Bed#: S -27 I Date of Admission: 5/2/2023   Date of Service: 5/5/2023 I Hospital Day: 3    Assessment/Plan   * Acute on chronic diastolic congestive heart failure [facility])  Assessment & Plan  Wt Readings from Last 3 Encounters:   05/05/23 87 5 kg (192 lb 14 4 oz)   04/26/23 88 7 kg (195 lb 9 6 oz)   04/13/23 85 7 kg (189 lb)     · POA, presents with progressively worsening shortness of breath and lower extremity swelling  · Chest x-ray showed prominent interstitial lung markings with possible pulmonary vascular congestion  · Most recent echocardiogram in July 2022 revealed EF 60%, mild to moderate TR, mild to moderately elevated PA systolic pressure, unable to assess diastolic function  · He is on torsemide 40 mg twice daily at home which was recently increased from 20 twice daily  · He received IV Lasix 40 mg in the ED and put out approximately 400 cc urine  · Net output = 3,395 mL, good response to Metolazone   · 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 redose 5/5  · Monitor UOP closely next 24 hours, may need to consider Bumex vs Lasix Drip   · Cardiology consult appreciated   · Daily weights, I's and O's, 2 g sodium diet, 1800 cc fluid restriction        Atrial fibrillation (HCC)  Assessment & Plan  · EKG shows sinus rhythm, but now in rate controlled A   Fib on telemetry   · Eliquis dose decreased to 2 5 mg twice daily based on age and renal function  · Continue Metoprolol 50 mg PO BID     Stage 3 chronic kidney disease [facility])  Assessment & Plan  Lab Results   Component Value Date    EGFR 35 05/05/2023    EGFR 32 05/04/2023    EGFR 37 05/03/2023    CREATININE 1 71 (H) 05/05/2023    CREATININE 1 85 (H) 05/04/2023    CREATININE 1 66 (H) 05/03/2023     · Baseline creatinine appears to be around 1 5 although has been trending up recently  · Creatinine yoshi slightly, likely response to diuretics and appears to be trending down now again   · Dose of torsemide recently increased to 40 mg twice daily  · Continue to monitor renal function closely while on IV diuretics  · Avoid hypotension  · Continue losartan    Colon adenocarcinoma (HCC)  Assessment & Plan  · Has been referred to palliative care outpatient  · Has declined treatment    Iron deficiency anemia due to chronic blood loss  Assessment & Plan  · POA, baseline hemoglobin has fluctuated between 7 and 8 in the last couple of months  · Secondary to history of colon cancer for which patient has declined treatment  · He is on Eliquis for history of atrial fibrillation  · Hemoglobin currently stable at his baseline  · Continue iron supplements every other day  · Consider Venofer   · May improve with diuresis, remains to be seen    Type 2 diabetes mellitus, without long-term current use of insulin [facility])  Assessment & Plan  Lab Results   Component Value Date    HGBA1C 8 0 (H) 05/02/2023     Recent Labs     05/04/23  1033 05/04/23  1638 05/04/23  2058 05/05/23  1105   POCGLU 209* 164* 150* 195*       Blood Sugar Average: Last 72 hrs:  · (P) 180   · BG acceptable, A1c above goal    · On metformin 1000 mg twice daily at home   · Holding metformin  · Accu-Cheks ACHS with SSI coverage    Primary hypertension  Assessment & Plan  · Continue home medications             VTE Pharmacologic Prophylaxis: VTE Score: 3 Moderate Risk (Score 3-4) - Pharmacological DVT Prophylaxis Ordered: apixaban (Eliquis)  Patient Centered Rounds: I performed bedside rounds with nursing staff today  Discussions with Specialists or Other Care Team Provider: Discussed with Cardiology, RN, CM    Education and Discussions with Family / Patient: Updated  (significant other) at bedside      Total Time Spent on Date of Encounter in care of patient: 35 minutes This time was spent on one or more of the following: performing physical exam; counseling and coordination of care; obtaining or reviewing history; documenting in the medical record; reviewing/ordering tests, medications or procedures; communicating with other healthcare professionals and discussing with patient's family/caregivers  Current Length of Stay: 3 day(s)  Current Patient Status: Inpatient   Certification Statement: The patient will continue to require additional inpatient hospital stay due to further diuresis   Discharge Plan: Anticipate discharge in 48-72 hrs to home  Code Status: Level 3 - DNAR and DNI    Subjective:   Patient reports feeling better today  Making frequent urine  Denies pain  Objective:     Vitals:   Temp (24hrs), Av 8 °F (36 6 °C), Min:97 7 °F (36 5 °C), Max:97 9 °F (36 6 °C)    Temp:  [97 7 °F (36 5 °C)-97 9 °F (36 6 °C)] 97 9 °F (36 6 °C)  HR:  [80-94] 94  Resp:  [12-16] 16  BP: (119-154)/(60-76) 119/60  SpO2:  [93 %-97 %] 96 %  Body mass index is 31 14 kg/m²  Input and Output Summary (last 24 hours): Intake/Output Summary (Last 24 hours) at 2023 1158  Last data filed at 2023 1100  Gross per 24 hour   Intake 580 ml   Output 3700 ml   Net -3120 ml       Physical Exam:   Physical Exam  Constitutional:       General: He is not in acute distress  Appearance: Normal appearance  He is normal weight  He is not ill-appearing or diaphoretic  HENT:      Head: Normocephalic and atraumatic  Mouth/Throat:      Mouth: Mucous membranes are moist    Eyes:      General: No scleral icterus  Pupils: Pupils are equal, round, and reactive to light  Cardiovascular:      Rate and Rhythm: Normal rate and regular rhythm  Pulses: Normal pulses  Heart sounds: Normal heart sounds, S1 normal and S2 normal  No murmur heard  No systolic murmur is present  No diastolic murmur is present  No gallop  No S3 or S4 sounds     Pulmonary:      Effort: Pulmonary effort is normal  No accessory muscle usage or respiratory distress  Breath sounds: Normal breath sounds  No stridor  No decreased breath sounds, wheezing, rhonchi or rales  Chest:      Chest wall: No tenderness  Abdominal:      General: Bowel sounds are normal  There is no distension  Palpations: Abdomen is soft  Tenderness: There is no abdominal tenderness  There is no guarding  Musculoskeletal:      Right lower leg: 3+ Edema present  Left lower leg: 3+ Edema present  Skin:     General: Skin is warm and dry  Coloration: Skin is not jaundiced  Neurological:      General: No focal deficit present  Mental Status: He is alert  Mental status is at baseline  Motor: No tremor or seizure activity  Psychiatric:         Behavior: Behavior is cooperative  Additional Data:     Labs:  Results from last 7 days   Lab Units 05/04/23  0448 05/03/23  0454   WBC Thousand/uL 6 24 6 85   HEMOGLOBIN g/dL 7 7* 7 8*   HEMATOCRIT % 26 4* 26 0*   PLATELETS Thousands/uL 345 320   NEUTROS PCT %  --  72   LYMPHS PCT %  --  13*   MONOS PCT %  --  13*   EOS PCT %  --  1     Results from last 7 days   Lab Units 05/05/23  1054 05/02/23  2318 05/02/23  1203   SODIUM mmol/L 132*   < > 134*   POTASSIUM mmol/L 3 3*   < > 3 9   CHLORIDE mmol/L 92*   < > 97   CO2 mmol/L 32   < > 30   BUN mg/dL 36*   < > 35*   CREATININE mg/dL 1 71*   < > 1 68*   ANION GAP mmol/L 8   < > 7   CALCIUM mg/dL 8 9   < > 8 5   ALBUMIN g/dL  --   --  3 6   TOTAL BILIRUBIN mg/dL  --   --  0 81   ALK PHOS U/L  --   --  243*   ALT U/L  --   --  13   AST U/L  --   --  18   GLUCOSE RANDOM mg/dL 193*   < > 148*    < > = values in this interval not displayed           Results from last 7 days   Lab Units 05/05/23  1105 05/04/23  2058 05/04/23  1638 05/04/23  1033 05/04/23  0712 05/03/23  2102 05/03/23  1603 05/03/23  1045 05/03/23  0643 05/02/23  2114 05/02/23  1639   POC GLUCOSE mg/dl 195* 150* 164* 209* 129 153* 140 225* 123 225* 267*     Results from last 7 days   Lab Units 05/02/23  1203   HEMOGLOBIN A1C % 8 0*           Lines/Drains:  Invasive Devices     Peripheral Intravenous Line  Duration           Peripheral IV 05/02/23 Left Antecubital 2 days                  Telemetry:  Telemetry Orders (From admission, onward)             48 Hour Telemetry Monitoring  Continuous x 48 hours        References:    Telemetry Guidelines   Question:  Reason for 48 Hour Telemetry  Answer:  Acute Decompensated CHF (continuous diuretic infusion or total diuretic dose > 200 mg daily, associated electrolyte derangement, ionotropic drip, history of ventricular arrhythmia, or new EF <35%)                 Telemetry Reviewed: Normal Sinus Rhythm  Indication for Continued Telemetry Use: Acute CHF on >200 mg lasix/day or equivalent dose or with new reduced EF  Imaging: No pertinent imaging reviewed      Recent Cultures (last 7 days):         Last 24 Hours Medication List:   Current Facility-Administered Medications   Medication Dose Route Frequency Provider Last Rate   • acetaminophen  650 mg Oral Q4H PRN Katelyn Sanchez, MD     • amiodarone  100 mg Oral Daily Katelyn Sanchez MD     • apixaban  2 5 mg Oral BID Katelyn Sanchez MD     • doxazosin  8 mg Oral HS Katelyn Sanchez MD     • ferrous sulfate  325 mg Oral Every Other Day Jimmy Nyhan, MD     • furosemide  80 mg Intravenous TID (diuretic) Jimmy Nyhan, MD     • insulin lispro  1-5 Units Subcutaneous HS Katelyn Sanchez, MD     • insulin lispro  1-6 Units Subcutaneous TID AC Katelyn Sanchez, MD     • losartan  50 mg Oral Daily Katelyn Sanchez MD     • metoprolol tartrate  50 mg Oral Q12H [address] Katelyn Sanchez MD     • potassium chloride  40 mEq Oral Daily Von Hernandez PA-C     • pravastatin  40 mg Oral Daily With Juan Daniel See MD          Today, Patient Was Seen By: Marco Salmeron PA-C    **Please Note: This note may have been constructed using a voice recognition system  **

## 2023-05-05 NOTE — ASSESSMENT & PLAN NOTE
Wt Readings from Last 3 Encounters:   05/05/23 87 5 kg (192 lb 14 4 oz)   04/26/23 88 7 kg (195 lb 9 6 oz)   04/13/23 85 7 kg (189 lb)     · POA, presents with progressively worsening shortness of breath and lower extremity swelling  · Chest x-ray showed prominent interstitial lung markings with possible pulmonary vascular congestion  · Most recent echocardiogram in July 2022 revealed EF 60%, mild to moderate TR, mild to moderately elevated PA systolic pressure, unable to assess diastolic function  · He is on torsemide 40 mg twice daily at home which was recently increased from 20 twice daily  · He received IV Lasix 40 mg in the ED and put out approximately 400 cc urine  · Net output = 3,395 mL, good response to Metolazone   · 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  redose 5/5  · Monitor UOP closely next 24 hours, may need to consider Bumex vs Lasix Drip   · Cardiology consult appreciated   · Daily weights, I's and O's, 2 g sodium diet, 1800 cc fluid restriction

## 2023-05-05 NOTE — CASE MANAGEMENT
Case Management Assessment & Discharge Planning Note    Patient name Aloha Sicard  Location S /S -87 MRN [id number]  : 1935 Date 2023       Current Admission Date: 2023  Current Admission Diagnosis:Acute on chronic diastolic congestive heart failure [facility])   Patient Active Problem List    Diagnosis Date Noted   • Bilateral lower extremity edema 2023   • Ambulatory dysfunction 2023   • Humerus fracture 2023   • Seasonal allergic rhinitis 2022   • Shortness of breath 2022   • Iron deficiency anemia due to chronic blood loss 2022   • Gastritis 2022   • Colon adenocarcinoma ([facility] [address] ) 2022   • Cognitive dysfunction 2022   • Anemia 2022   • Acute on chronic diastolic congestive heart failure ([facility] [address] ) 2020   • Atrial fibrillation ([facility] [address] ) 03/10/2020   • Hypercholesterolemia 2018   • Stage 3 chronic kidney disease ([facility] [address] ) 2018   • 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   • Primary hypertension 2014   • Hypercalcemia 2013   • Type 2 diabetes mellitus, without long-term current use of insulin ([facility] [address] ) 2011   • Esophageal reflux 2011      LOS (days): 3  Geometric Mean LOS (GMLOS) (days): 3 90  Days to GMLOS:0 8     OBJECTIVE:    Risk of Unplanned Readmission Score: 16 58         Current admission status: Inpatient       Preferred Pharmacy:   [facility] [address], [address] [address]  Phone: [phone] Fax: [phone]    Primary Care Provider: Marcell Valdez MD    Primary Insurance: [facility]  Secondary Insurance:     ASSESSMENT:  Jovita Mir Proxies    There are no active Health Care Proxies on file                   Readmission Root Cause  30 Day Readmission: No    Patient Information  Admitted from[de-identified] Home  Mental Status: Alert  Assessment information provided by[de-identified] Spouse  Primary Caregiver: Self  Support Systems: Spouse/significant other  [place] of Residence: [address] do you live in?: [place] entry access options   Select all that apply : No steps to enter home  Type of Current Residence: [place]  In the last 12 months, was there a time when you were not able to pay the mortgage or rent on time?: No  In the last 12 months, how many places have you lived?: 1  In the last 12 months, was there a time when you did not have a steady place to sleep or slept in a shelter (including now)?: No  Homeless/housing insecurity resource given?: N/A  Living Arrangements: Lives w/ Spouse/significant other    Activities of Daily Living Prior to Admission  Functional Status: Independent  Completes ADLs independently?: Yes  Ambulates independently?: Yes  Does patient use assisted devices?: No  Does patient currently own DME?: Yes  What DME does the patient currently own?: [employer]  Does patient have a history of Outpatient Therapy (PT/OT)?: Yes (Pt was going to outpt PT prior to admission )  Does the patient have a history of Short-Term Rehab?: No  Does patient have a history of HHC?: Yes ([facility])  Does patient currently have [facility]?: No         Patient Information Continued  Income Source: Pension/nursing home  Does patient have prescription coverage?: Yes  Within the past 12 months, you worried that your food would run out before you got the money to buy more : Never true  Within the past 12 months, the food you bought just didn't last and you didn't have money to get more : Never true  Food insecurity resource given?: N/A  Does patient receive dialysis treatments?: No  Does patient have a history of substance abuse?: No  Does patient have a history of Mental Health Diagnosis?: No         Means of Transportation  Means of Transport to Appts[de-identified] Family transport  In the past 12 months, has lack of transportation kept you from medical appointments or from getting medications?: No  In the past 12 months, has lack of transportation kept you from meetings, work, or from getting things needed for daily living?: No  Was application for public transport provided?: N/A        DISCHARGE DETAILS:    Discharge planning discussed with[de-identified] spouse Paz Coy of Choice: Yes  Comments - Freedom of Choice: Pt was sleeping but wife at bedside and CM discussed if any discharge recommendations are made wife is open to it and call discuss further at that time with the pt  CM contacted family/caregiver?: Yes  Were Treatment Team discharge recommendations reviewed with patient/caregiver?: Yes  Did patient/caregiver verbalize understanding of patient care needs?: Yes  Were patient/caregiver advised of the risks associated with not following Treatment Team discharge recommendations?: Yes    Contacts  Patient Contacts: Wife Hannah Biggs  Relationship to Patient[de-identified] Family  Contact Method: In Person              Other Referral/Resources/Interventions Provided:  Referral Comments: Met with pt and spouse at bedside  Wife provided information for initial assessment as pt was sleeping  Pt and wife live together in a ranch home with 0STE in a [place] community  Prior to admission, wife reported pt being independent with ambulation and no use of assistive devices  Pt has a RW if needed but does not typically use it  No hx of STR  Hx of VNA with [facility] when pt was receiving home PT/OT  Pt currently was going to outpt PT  Wife denies pt having any barriers to obtaining or affording meds  Will continue to follow for discharge planning needs           Treatment Team Recommendation: Home  Discharge Destination Plan[de-identified] Home

## 2023-05-05 NOTE — PROGRESS NOTES
General Cardiology   Progress Note -  [facility]   Khloe Marinelli 82 y o  male MRN: [id number]  Unit/Bed#: S -01 Encounter: [id number]    Assessment     1  Acute on chronic diastolic CHF  BNP 9007, CXR read as mild pulmonary vascular congestion  Home diuretic: Torsemide 40 mg BID  Current diuretic Lasix 80 mg IV TID with 2 5 mg metolazone yesterday  I/Os:  2 2 L urine output yesterday, no intake documented to calculate net fluid balance  Has already put out 1800 mL of urine today  Weight: 192 lb per standing scale (down 2 lb)  Dry weight: Was 187 pounds at office visit last year however he was felt to be overloaded then so dry weight is potentially lower  TTE showed LVEF 60% with dilated RV and mildly reduced RV systolic function  Mild MR, severe TR  Flattening of the intraventricular septum suggestive of RV volume overload  Appearance suggestive of amyloidosis- this was discussed with patient and significant other  Currently not interested in pursuing additional testing as he has known untreated adenocarcinoma of the colon      2   Permanent atrial fibrillation  Rates well controlled on metoprolol tartrate 50 mg twice daily  Telemetry reviewed-atrial fibrillation with rates 70s to 80s  On anticoagulation with Eliquis 2 5 mg twice daily  3   CKD 3-creatinine 1 8 yesterday which is up slightly but still within his recent baseline of 1 3-1 9  BMP pending from this morning  4   Chronic anemia-hemoglobin slowly downtrending since last year but overall stable at 7 7   No active bleeding noted on anticoagulation with Eliquis  5   HTN-mildly elevated but improving with diuresis  Average /70 On doxazosin 8 mg at bedtime, losartan 50 mg daily, metoprolol tartrate 50 mg twice daily, and IV diuretics  6   HLD-LDL 54 last year on pravastatin 40 mg daily  7   Adenocarcinoma of the colon-not being treated per patient wishes  8  Type 2 DM-A1c 7 8% in February     Plan  1   Continue Lasix 80 mg IV TID    Consider "additional dose of metolazone today or tomorrow depending on urine output  2   Strict I/Os, daily standing weights, 2 g sodium diet with 1500 mL fluid restriction  3  BMP in the morning  4   Continue metoprolol tartrate 50 mg twice daily and Eliquis 2 5 mg twice daily    Subjective  Noticed a significant increase in urine output yesterday  Feels his edema is a little bit better  Review of Systems   Constitutional: Negative for chills, decreased appetite, diaphoresis and malaise/fatigue  Cardiovascular: Positive for dyspnea on exertion and leg swelling  Negative for chest pain, orthopnea, palpitations and syncope  Respiratory: Negative for cough, sleep disturbances due to breathing, sputum production and wheezing  Gastrointestinal: Positive for bloating  Negative for nausea and vomiting  Neurological: Negative for dizziness, light-headedness and weakness  Psychiatric/Behavioral: Negative for altered mental status  All other systems reviewed and are negative  Objective:   Vitals: Blood pressure 119/60, pulse 94, temperature 97 9 °F (36 6 °C), temperature source Oral, resp  rate 16, height 5' 6\" (1 676 m), weight 87 5 kg (192 lb 14 4 oz), SpO2 96 %  , Body mass index is 31 14 kg/m²  ,     Systolic (08JYF), KOM:473 , Min:119 , OYB:973     Diastolic (97NKU), YUO:59, Min:60, Max:76    Intake/Output Summary (Last 24 hours) at 5/5/2023 1108  Last data filed at 5/5/2023 1100  Gross per 24 hour   Intake 580 ml   Output 3700 ml   Net -3120 ml     Wt Readings from Last 3 Encounters:   05/05/23 87 5 kg (192 lb 14 4 oz)   04/26/23 88 7 kg (195 lb 9 6 oz)   04/13/23 85 7 kg (189 lb)     Telemetry Review: atrial fibrillation, rates 70s-80s    Physical Exam  Vitals reviewed  Constitutional:       General: He is not in acute distress  Appearance: He is obese  Neck:      Vascular: JVD present  Cardiovascular:      Rate and Rhythm: Normal rate  Rhythm irregularly irregular        Heart sounds: Normal heart " sounds  No murmur heard  No friction rub  No gallop  Comments: 2-3+ bilateral lower extremity edema extending to thighs  Pulmonary:      Effort: Pulmonary effort is normal  No respiratory distress  Breath sounds: No rales  Comments: Decreased at bases, room air  Abdominal:      General: Bowel sounds are normal  There is distension  Palpations: Abdomen is soft  Tenderness: There is no abdominal tenderness  Musculoskeletal:         General: No tenderness  Normal range of motion  Cervical back: Neck supple  Right lower le+ Pitting Edema present  Left lower le+ Pitting Edema present  Skin:     General: Skin is warm and dry  Findings: No erythema  Neurological:      Mental Status: He is alert and oriented to person, place, and time     Psychiatric:         Mood and Affect: Mood normal      LABORATORY RESULTS      CBC with diff:   Results from last 7 days   Lab Units 23  1203   WBC Thousand/uL 6 24 6 85 7 74   HEMOGLOBIN g/dL 7 7* 7 8* 7 7*   HEMATOCRIT % 26 4* 26 0* 25 8*   MCV fL 78* 78* 78*   PLATELETS Thousands/uL 345 320 333   MCH pg 22 8* 23 3* 23 1*   MCHC g/dL 29 2* 30 0* 29 8*   RDW % 17 7* 17 6* 17 3*   MPV fL 10 9 10 4 10 3   NRBC AUTO /100 WBCs  --  0 0       CMP:  Results from last 7 days   Lab Units 23  1203   POTASSIUM mmol/L 4 1 4 0 3 4* 3 9   CHLORIDE mmol/L 99 98 98 97   CO2 mmol/L 26 26 26 30   BUN mg/dL 35* 33* 35* 35*   CREATININE mg/dL 1 85* 1 66* 1 64* 1 68*   CALCIUM mg/dL 8 8 8 7 8 3* 8 5   AST U/L  --   --   --  18   ALT U/L  --   --   --  13   ALK PHOS U/L  --   --   --  243*   EGFR ml/min/1 73sq m 32 37 37 36       BMP:  Results from last 7 days   Lab Units 23  1203   POTASSIUM mmol/L 4 1 4 0 3 4* 3 9   CHLORIDE mmol/L 99 98 98 97   CO2 mmol/L 26 26 26 30   BUN mg/dL 35* 33* 35* 35*   CREATININE mg/dL 1 85* 1 66* 1 64* 1 68*   CALCIUM mg/dL 8 8 8 7 8 3* 8 5       Lab Results   Component Value Date    CREATININE 1 85 (H) 05/04/2023    CREATININE 1 66 (H) 05/03/2023    CREATININE 1 64 (H) 05/02/2023       Lab Results   Component Value Date    NTBNP 1,896 (H) 07/13/2022           Results from last 7 days   Lab Units 05/02/23  2318   MAGNESIUM mg/dL 1 7*          Results from last 7 days   Lab Units 05/02/23  1203   HEMOGLOBIN A1C % 8 0*          Results from last 7 days   Lab Units 05/03/23  0454   TSH 3RD GENERATON uIU/mL 2 892             Lipid Profile:   No results found for: CHOL  Lab Results   Component Value Date    HDL 63 03/04/2022    HDL 60 01/31/2018     Lab Results   Component Value Date    LDLCALC 54 03/04/2022    LDLCALC 40 01/31/2018     Lab Results   Component Value Date    TRIG 69 03/04/2022    TRIG 81 01/31/2018     Meds/Allergies   all current active meds have been reviewed and current meds:   Current Facility-Administered Medications   Medication Dose Route Frequency   • acetaminophen (TYLENOL) tablet 650 mg  650 mg Oral Q4H PRN   • amiodarone tablet 100 mg  100 mg Oral Daily   • apixaban (ELIQUIS) tablet 2 5 mg  2 5 mg Oral BID   • doxazosin (CARDURA) tablet 8 mg  8 mg Oral HS   • ferrous sulfate tablet 325 mg  325 mg Oral Every Other Day   • furosemide (LASIX) injection 80 mg  80 mg Intravenous TID (diuretic)   • insulin lispro (HumaLOG) 100 units/mL subcutaneous injection 1-5 Units  1-5 Units Subcutaneous HS   • insulin lispro (HumaLOG) 100 units/mL subcutaneous injection 1-6 Units  1-6 Units Subcutaneous TID AC   • losartan (COZAAR) tablet 50 mg  50 mg Oral Daily   • metoprolol tartrate (LOPRESSOR) tablet 50 mg  50 mg Oral Q12H [address]   • potassium chloride (K-DUR,KLOR-CON) CR tablet 20 mEq  20 mEq Oral Daily   • pravastatin (PRAVACHOL) tablet 40 mg  40 mg Oral Daily With Dinner     Medications Prior to Admission   Medication   • amiodarone 100 mg tablet   • doxazosin (CARDURA) 8 MG tablet • Eliquis 5 MG   • ferrous sulfate 324 (65 Fe) mg   • losartan (COZAAR) 50 mg tablet   • metFORMIN (GLUCOPHAGE) 1000 MG tablet   • metoprolol tartrate (LOPRESSOR) 50 mg tablet   • pantoprazole (PROTONIX) 40 mg tablet   • potassium chloride (K-DUR,KLOR-CON) 20 mEq tablet   • pravastatin (PRAVACHOL) 40 mg tablet   • torsemide (DEMADEX) 20 mg tablet     Counseling / Coordination of Care  Total floor / unit time spent today 20 minutes  Greater than 50% of total time was spent with the patient and / or family counseling and / or coordination of care  ** Please Note: Dragon 360 Dictation voice to text software may have been used in the creation of this document   **

## 2023-05-05 NOTE — PLAN OF CARE
Problem: OCCUPATIONAL THERAPY ADULT  Goal: Performs self-care activities at highest level of function for planned discharge setting  See evaluation for individualized goals  Description: Treatment Interventions: ADL retraining, UE strengthening/ROM, Functional transfer training, Cognitive reorientation, Patient/family training, Endurance training, Energy conservation          See flowsheet documentation for full assessment, interventions and recommendations  Note: Limitation: Decreased ADL status, Decreased UE strength, Decreased Safe judgement during ADL, Decreased endurance, Decreased self-care trans, Decreased high-level ADLs, Decreased cognition  Prognosis: Good  Assessment: Patient is a 82 y o  male seen for OT evaluation at [address] [place] [address] following admission on 5/2/2023  s/p Acute on chronic diastolic congestive heart failure ([facility] [address] )  Please see above for comprehensive list of comorbidities and significant PMHx impacting functional performance  Patient presents with active orders for OT eval and treat and up and OOB as tolerated   At baseline, pt is (I) with ADLs, no AD  A with IADLs  Upon initial evaluation, patient requires Mod independent  for UB ADLs, Min A  for LB ADLs, and Supervision  for transfers and functional mobility household distance with no AD  Based on functional eval, pt presents with intact  attention, impaired  safety awareness, impaired  problem solving skills, and intact   memory  Occupational performance is affected by the following deficits:  decreased muscular strength , decreased standing tolerance for self care tasks , decreased dynamic balance impacting functional reach and impaired safety awareness   Patient would benefit from OT services within the acute care setting to maximize level of functional independence in the following areas fall prevention , energy conservation , LB ADLs, self-care transfers, functional mobility and formal cognitive evaluation  From OT standpoint, recommendation at time of D/C would be return to previous environment with home health rehabilitation       OT Discharge Recommendation: Home with home health rehabilitation   Angela Robledo

## 2023-05-06 PROBLEM — K59.00 CONSTIPATION: Status: ACTIVE | Noted: 2023-05-06

## 2023-05-06 LAB
ANION GAP SERPL CALCULATED.3IONS-SCNC: 9 MMOL/L (ref 4–13)
BUN SERPL-MCNC: 41 MG/DL (ref 5–25)
CALCIUM SERPL-MCNC: 9 MG/DL (ref 8.4–10.2)
CHLORIDE SERPL-SCNC: 93 MMOL/L (ref 96–108)
CO2 SERPL-SCNC: 30 MMOL/L (ref 21–32)
CREAT SERPL-MCNC: 1.68 MG/DL (ref 0.6–1.3)
ERYTHROCYTE [DISTWIDTH] IN BLOOD BY AUTOMATED COUNT: 17.9 % (ref 11.6–15.1)
GFR SERPL CREATININE-BSD FRML MDRD: 36 ML/MIN/1.73SQ M
GLUCOSE SERPL-MCNC: 114 MG/DL (ref 65–140)
GLUCOSE SERPL-MCNC: 118 MG/DL (ref 65–140)
GLUCOSE SERPL-MCNC: 120 MG/DL (ref 65–140)
GLUCOSE SERPL-MCNC: 172 MG/DL (ref 65–140)
GLUCOSE SERPL-MCNC: 203 MG/DL (ref 65–140)
HCT VFR BLD AUTO: 27.1 % (ref 36.5–49.3)
HGB BLD-MCNC: 8.1 G/DL (ref 12–17)
MCH RBC QN AUTO: 23 PG (ref 26.8–34.3)
MCHC RBC AUTO-ENTMCNC: 29.9 G/DL (ref 31.4–37.4)
MCV RBC AUTO: 77 FL (ref 82–98)
PLATELET # BLD AUTO: 364 THOUSANDS/UL (ref 149–390)
PMV BLD AUTO: 10.9 FL (ref 8.9–12.7)
POTASSIUM SERPL-SCNC: 3.6 MMOL/L (ref 3.5–5.3)
RBC # BLD AUTO: 3.52 MILLION/UL (ref 3.88–5.62)
SODIUM SERPL-SCNC: 132 MMOL/L (ref 135–147)
WBC # BLD AUTO: 6.47 THOUSAND/UL (ref 4.31–10.16)

## 2023-05-06 RX ORDER — AMOXICILLIN 250 MG
2 CAPSULE ORAL DAILY
Status: DISCONTINUED | OUTPATIENT
Start: 2023-05-06 | End: 2023-05-08 | Stop reason: HOSPADM

## 2023-05-06 RX ORDER — POLYETHYLENE GLYCOL 3350 17 G/17G
17 POWDER, FOR SOLUTION ORAL ONCE
Status: COMPLETED | OUTPATIENT
Start: 2023-05-06 | End: 2023-05-06

## 2023-05-06 RX ADMIN — POTASSIUM CHLORIDE 40 MEQ: 1500 TABLET, EXTENDED RELEASE ORAL at 08:49

## 2023-05-06 RX ADMIN — FUROSEMIDE 80 MG: 10 INJECTION, SOLUTION INTRAMUSCULAR; INTRAVENOUS at 11:47

## 2023-05-06 RX ADMIN — PRAVASTATIN SODIUM 40 MG: 40 TABLET ORAL at 16:14

## 2023-05-06 RX ADMIN — AMIODARONE HYDROCHLORIDE 100 MG: 200 TABLET ORAL at 08:49

## 2023-05-06 RX ADMIN — FUROSEMIDE 80 MG: 10 INJECTION, SOLUTION INTRAMUSCULAR; INTRAVENOUS at 17:37

## 2023-05-06 RX ADMIN — SENNOSIDES AND DOCUSATE SODIUM 2 TABLET: 8.6; 5 TABLET ORAL at 10:47

## 2023-05-06 RX ADMIN — APIXABAN 2.5 MG: 2.5 TABLET, FILM COATED ORAL at 17:37

## 2023-05-06 RX ADMIN — METOPROLOL TARTRATE 50 MG: 50 TABLET, FILM COATED ORAL at 08:49

## 2023-05-06 RX ADMIN — LOSARTAN POTASSIUM 50 MG: 50 TABLET, FILM COATED ORAL at 08:49

## 2023-05-06 RX ADMIN — APIXABAN 2.5 MG: 2.5 TABLET, FILM COATED ORAL at 08:49

## 2023-05-06 RX ADMIN — METOPROLOL TARTRATE 50 MG: 50 TABLET, FILM COATED ORAL at 21:03

## 2023-05-06 RX ADMIN — DOXAZOSIN 8 MG: 4 TABLET ORAL at 21:03

## 2023-05-06 RX ADMIN — FERROUS SULFATE TAB 325 MG (65 MG ELEMENTAL FE) 325 MG: 325 (65 FE) TAB at 08:49

## 2023-05-06 RX ADMIN — INSULIN LISPRO 2 UNITS: 100 INJECTION, SOLUTION INTRAVENOUS; SUBCUTANEOUS at 11:47

## 2023-05-06 RX ADMIN — INSULIN LISPRO 1 UNITS: 100 INJECTION, SOLUTION INTRAVENOUS; SUBCUTANEOUS at 21:03

## 2023-05-06 RX ADMIN — POLYETHYLENE GLYCOL 3350 17 G: 17 POWDER, FOR SOLUTION ORAL at 10:47

## 2023-05-06 RX ADMIN — FUROSEMIDE 80 MG: 10 INJECTION, SOLUTION INTRAMUSCULAR; INTRAVENOUS at 06:07

## 2023-05-06 NOTE — PROGRESS NOTES
"Cardiology Progress Note - Ashley Garces 82 y o  male MRN: [id number]    Unit/Bed#: S -01 Encounter: [id number]      Assessment:  1  Acute HFpEF  2  Permanent atrial fibrillation   3  CKD III  4  Benign essential hypertension   5  Dyslipidemia   6  DM type 2  7  Adenocarcinoma of the colon     Plan:  1  Good urine output yesterday, weight down  2  Renal function stable  3  Continue IV diuretics today - hope to transition to oral in next 24 to 48 hours  4  Rates controlled, continue metoprolol and Eliquis   5  Remains on amiodarone - in atrial fibrillation ECG 2022, favor d/c  6   Blood pressure overall acceptable, continue current regimen   7  AM BMP     Subjective:   Patient seen and examined  No significant events overnight  Objective:     Vitals: Blood pressure 140/68, pulse 80, temperature 97 6 °F (36 4 °C), temperature source Oral, resp  rate 16, height 5' 6\" (1 676 m), weight 84 4 kg (186 lb 1 6 oz), SpO2 95 %  , Body mass index is 30 04 kg/m² ,   Orthostatic Blood Pressures    Flowsheet Row Most Recent Value   Blood Pressure 140/68 filed at 05/06/2023 4874   Patient Position - Orthostatic VS Sitting filed at 05/06/2023 2769            Intake/Output Summary (Last 24 hours) at 5/6/2023 0957  Last data filed at 5/6/2023 0856  Gross per 24 hour   Intake 780 ml   Output 3625 ml   Net -2845 ml         Physical Exam:    GEN: Ashley Garces appears well, alert and oriented x 3, pleasant and cooperative   HEENT: pupils equal, round, and reactive to light; extraocular muscles intact  NECK: supple, no carotid bruits   HEART: irregularly irregular, variable S1/S2, no murmur   LUNGS: clear to auscultation bilaterally; no wheezes, rales, or rhonchi   ABDOMEN: normal bowel sounds, soft, no tenderness, no distention  EXTREMITIES: 2+ pitting edema   NEURO: no focal findings   SKIN: normal without suspicious lesions on exposed skin    Medications:      Current Facility-Administered Medications:   •  " acetaminophen (TYLENOL) tablet 650 mg, 650 mg, Oral, Q4H PRN, Raquel Arciniega MD, 650 mg at 05/02/23 2117  •  amiodarone tablet 100 mg, 100 mg, Oral, Daily, Raquel Arciniega MD, 100 mg at 05/06/23 2058  •  apixaban (ELIQUIS) tablet 2 5 mg, 2 5 mg, Oral, BID, Raquel Arciniega MD, 2 5 mg at 05/06/23 0849  •  doxazosin (CARDURA) tablet 8 mg, 8 mg, Oral, HS, Raquel Arciniega MD, 8 mg at 05/05/23 2128  •  ferrous sulfate tablet 325 mg, 325 mg, Oral, Every Other Day, Raquel Arciniega MD, 325 mg at 05/06/23 0849  •  furosemide (LASIX) injection 80 mg, 80 mg, Intravenous, TID (diuretic), Raquel Arciniega MD, 80 mg at 05/06/23 0607  •  insulin lispro (HumaLOG) 100 units/mL subcutaneous injection 1-5 Units, 1-5 Units, Subcutaneous, HS, Katelyn Snyder MD, 1 Units at 05/05/23 2257  •  insulin lispro (HumaLOG) 100 units/mL subcutaneous injection 1-6 Units, 1-6 Units, Subcutaneous, TID AC, 1 Units at 05/05/23 1751 **AND** Fingerstick Glucose (POCT), , , TID AC, Katelyn Snyder MD  •  losartan (COZAAR) tablet 50 mg, 50 mg, Oral, Daily, Katelyn Snyder MD, 50 mg at 05/06/23 0849  •  metoprolol tartrate (LOPRESSOR) tablet 50 mg, 50 mg, Oral, Q12H [address], Katelyn Snyder MD, 50 mg at 05/06/23 0849  •  polyethylene glycol (MIRALAX) packet 17 g, 17 g, Oral, Once, Kahlil Emanuel MD  •  potassium chloride (K-DUR,KLOR-CON) CR tablet 40 mEq, 40 mEq, Oral, Daily, Von Gasca PA-C, 40 mEq at 05/06/23 8282  •  pravastatin (PRAVACHOL) tablet 40 mg, 40 mg, Oral, Daily With Courtney Whitaker MD, 40 mg at 05/05/23 9599  •  senna-docusate sodium (SENOKOT S) 8 6-50 mg per tablet 2 tablet, 2 tablet, Oral, Daily, Kahlil Emanuel MD     Labs & Results:        Results from last 7 days   Lab Units 05/06/23  0641 05/04/23  0448 05/03/23  0454   WBC Thousand/uL 6 47 6 24 6 85   HEMOGLOBIN g/dL 8 1* 7 7* 7 8*   HEMATOCRIT % 27 1* 26 4* 26 0*   PLATELETS Thousands/uL 364 345 320         Results from last 7 days   Lab Units 05/06/23  0641 05/05/23  1054 05/04/23  0448 05/02/23  2318 05/02/23  1203   POTASSIUM mmol/L 3 6 3 3* 4 1   < > 3 9   CHLORIDE mmol/L 93* 92* 99   < > 97   CO2 mmol/L 30 32 26   < > 30   BUN mg/dL 41* 36* 35*   < > 35*   CREATININE mg/dL 1 68* 1 71* 1 85*   < > 1 68*   CALCIUM mg/dL 9 0 8 9 8 8   < > 8 5   ALK PHOS U/L  --   --   --   --  243*   ALT U/L  --   --   --   --  13   AST U/L  --   --   --   --  18    < > = values in this interval not displayed  Results from last 7 days   Lab Units 05/02/23  2318   MAGNESIUM mg/dL 1 7*     Counseling / Coordination of Care  Total floor / unit time spent today 25 minutes  Greater than 50% of total time was spent with the patient and / or family counseling and / or coordination of care

## 2023-05-06 NOTE — ASSESSMENT & PLAN NOTE
Does report constipation, last bowel movement was May 3  He is passing gas  He is slightly distended on exam   We will start Senokot and MiraLAX today

## 2023-05-06 NOTE — ASSESSMENT & PLAN NOTE
Wt Readings from Last 3 Encounters:   05/06/23 84 4 kg (186 lb 1 6 oz)   04/26/23 88 7 kg (195 lb 9 6 oz)   04/13/23 85 7 kg (189 lb)     · On admission presented with progressing shortness of breath dyspnea on exertion and lower extremity swelling  Chest x-ray showed prominent interstitial lung markings with vascular congestion  Recent echocardiogram of July 2022 revealed EF 60% with mild to moderate TR and mild to moderate elevated PA systolic pressures  Repeat echocardiogram showed signs of amyloid  Cardiology was consulted who recommended IV diuresis   · Initially started on IV 40 mg of Lasix, home dose regimen was 40 mg twice daily, he has now been stable on 80 mg 3 times daily  · Creatinine has been stable   · He received 1 dose of metolazone on May 4  This has not been redosed   · I suspect because of his stable creatinine and that we will continue his IV diuresis  He remains with lower extremity edema and bibasilar crackles however his shortness of breath has improved and he admittedly feels his swelling has decreased

## 2023-05-06 NOTE — ASSESSMENT & PLAN NOTE
Lab Results   Component Value Date    HGBA1C 8 0 (H) 05/02/2023     Recent Labs     05/05/23  1105 05/05/23  1635 05/05/23  2141 05/06/23  0645   POCGLU 195* 153* 153* 120       Blood Sugar Average: Last 72 hrs:  · (P) 159 5   · His blood glucose has been acceptable  His metformin is on hold      · Continue Accu-Cheks and sliding scale

## 2023-05-06 NOTE — ASSESSMENT & PLAN NOTE
Lab Results   Component Value Date    EGFR 36 05/06/2023    EGFR 35 05/05/2023    EGFR 32 05/04/2023    CREATININE 1 68 (H) 05/06/2023    CREATININE 1 71 (H) 05/05/2023    CREATININE 1 85 (H) 05/04/2023     · Baseline creatinine 1 5 although has been trending up recently  Initially had rising creatinine with response to diuretics however now has been slowly declining     · Continue to trend creatinine

## 2023-05-06 NOTE — PLAN OF CARE
Problem: CARDIOVASCULAR - ADULT  Goal: Maintains optimal cardiac output and hemodynamic stability  Description: INTERVENTIONS:  - Monitor I/O, vital signs and rhythm  - Monitor for S/S and trends of decreased cardiac output  - Administer and titrate ordered vasoactive medications to optimize hemodynamic stability  - Assess quality of pulses, skin color and temperature  - Assess for signs of decreased coronary artery perfusion  - Instruct patient to report change in severity of symptoms  Outcome: Progressing  Goal: Absence of cardiac dysrhythmias or at baseline rhythm  Description: INTERVENTIONS:  - Continuous cardiac monitoring, vital signs, obtain 12 lead EKG if ordered  - Administer antiarrhythmic and heart rate control medications as ordered  - Monitor electrolytes and administer replacement therapy as ordered  Outcome: Progressing     Problem: RESPIRATORY - ADULT  Goal: Achieves optimal ventilation and oxygenation  Description: INTERVENTIONS:  - Assess for changes in respiratory status  - Assess for changes in mentation and behavior  - Position to facilitate oxygenation and minimize respiratory effort  - Oxygen administered by appropriate delivery if ordered  - Initiate smoking cessation education as indicated  - Encourage broncho-pulmonary hygiene including cough, deep breathe, Incentive Spirometry  - Assess the need for suctioning and aspirate as needed  - Assess and instruct to report SOB or any respiratory difficulty  - Respiratory Therapy support as indicated  Outcome: Progressing     Problem: METABOLIC, FLUID AND ELECTROLYTES - ADULT  Goal: Fluid balance maintained  Description: INTERVENTIONS:  - Monitor labs   - Monitor I/O and WT  - Instruct patient on fluid and nutrition as appropriate  - Assess for signs & symptoms of volume excess or deficit  Outcome: Progressing     Problem: Nutrition/Hydration-ADULT  Goal: Nutrient/Hydration intake appropriate for improving, restoring or maintaining nutritional needs  Description: Monitor and assess patient's nutrition/hydration status for malnutrition  Collaborate with interdisciplinary team and initiate plan and interventions as ordered  Monitor patient's weight and dietary intake as ordered or per policy  Utilize nutrition screening tool and intervene as necessary  Determine patient's food preferences and provide high-protein, high-caloric foods as appropriate       INTERVENTIONS:  - Monitor oral intake, urinary output, labs, and treatment plans  - Assess nutrition and hydration status and recommend course of action  - Evaluate amount of meals eaten  - Assist patient with eating if necessary   - Allow adequate time for meals  - Recommend/ encourage appropriate diets, oral nutritional supplements, and vitamin/mineral supplements  - Order, calculate, and assess calorie counts as needed  - Recommend, monitor, and adjust tube feedings and TPN/PPN based on assessed needs  - Assess need for intravenous fluids  - Provide specific nutrition/hydration education as appropriate  - Include patient/family/caregiver in decisions related to nutrition  Outcome: Progressing

## 2023-05-06 NOTE — ASSESSMENT & PLAN NOTE
· His hemoglobin at baseline has fluctuated between 7 and 8 over the last few months  This is felt to be secondary to colon cancer of which the patient had declined aggressive treatment  He is maintained on Eliquis without symptoms or signs of bleeding      · Continue iron supplements

## 2023-05-06 NOTE — PROGRESS NOTES
[facility]  Progress Note  Name: Geovany Hernandez  MRN: [id number]  Unit/Bed#: S -94 I Date of Admission: 5/2/2023   Date of Service: 5/6/2023 I Hospital Day: 4    Assessment/Plan   * Acute on chronic diastolic congestive heart failure [facility])  Assessment & Plan  Wt Readings from Last 3 Encounters:   05/06/23 84 4 kg (186 lb 1 6 oz)   04/26/23 88 7 kg (195 lb 9 6 oz)   04/13/23 85 7 kg (189 lb)     · On admission presented with progressing shortness of breath dyspnea on exertion and lower extremity swelling  Chest x-ray showed prominent interstitial lung markings with vascular congestion  Recent echocardiogram of July 2022 revealed EF 60% with mild to moderate TR and mild to moderate elevated PA systolic pressures  Repeat echocardiogram showed signs of amyloid  Cardiology was consulted who recommended IV diuresis   · Initially started on IV 40 mg of Lasix, home dose regimen was 40 mg twice daily, he has now been stable on 80 mg 3 times daily  · Creatinine has been stable   · He received 1 dose of metolazone on May 4  This has not been redosed   · I suspect because of his stable creatinine and that we will continue his IV diuresis  He remains with lower extremity edema and bibasilar crackles however his shortness of breath has improved and he admittedly feels his swelling has decreased  Constipation  Assessment & Plan  Does report constipation, last bowel movement was May 3  He is passing gas  He is slightly distended on exam   We will start Senokot and MiraLAX today  Iron deficiency anemia due to chronic blood loss  Assessment & Plan  · His hemoglobin at baseline has fluctuated between 7 and 8 over the last few months  This is felt to be secondary to colon cancer of which the patient had declined aggressive treatment  He is maintained on Eliquis without symptoms or signs of bleeding      · Continue iron supplements     Primary hypertension  Assessment & Plan  · Continue diuresis, losartan, metoprolol and Cardura    Colon adenocarcinoma ([facility] [address] )  Assessment & Plan  · Has declined aggressive treatment in the past, is currently following palliative care outpatient    Type 2 diabetes mellitus, without long-term current use of insulin [facility])  Assessment & Plan  Lab Results   Component Value Date    HGBA1C 8 0 (H) 05/02/2023     Recent Labs     05/05/23  1105 05/05/23  1635 05/05/23  2141 05/06/23  0645   POCGLU 195* 153* 153* 120       Blood Sugar Average: Last 72 hrs:  · (P) 159 5   · His blood glucose has been acceptable  His metformin is on hold  · Continue Accu-Cheks and sliding scale    Stage 3 chronic kidney disease [facility])  Assessment & Plan  Lab Results   Component Value Date    EGFR 36 05/06/2023    EGFR 35 05/05/2023    EGFR 32 05/04/2023    CREATININE 1 68 (H) 05/06/2023    CREATININE 1 71 (H) 05/05/2023    CREATININE 1 85 (H) 05/04/2023     · Baseline creatinine 1 5 although has been trending up recently  Initially had rising creatinine with response to diuretics however now has been slowly declining  · Continue to trend creatinine    Atrial fibrillation (HCC)  Assessment & Plan  · Continue rate control with metoprolol and anticoagulation with Eliquis             VTE Pharmacologic Prophylaxis: VTE Score: 3 Moderate Risk (Score 3-4) - Pharmacological DVT Prophylaxis Ordered: apixaban (Eliquis)  Patient Centered Rounds: I performed bedside rounds with nursing staff today  Discussions with Specialists or Other Care Team Provider: Cardiology notes reviewed    Education and Discussions with Family / Patient: Updated  (wife) at bedside      Total Time Spent on Date of Encounter in care of patient: 45 minutes This time was spent on one or more of the following: performing physical exam; counseling and coordination of care; obtaining or reviewing history; documenting in the medical record; reviewing/ordering tests, medications or procedures; communicating with other healthcare professionals and discussing with patient's family/caregivers  Current Length of Stay: 4 day(s)  Current Patient Status: Inpatient   Certification Statement: The patient will continue to require additional inpatient hospital stay due to Congestive heart failure  Discharge Plan: Anticipate discharge in 48-72 hrs to home with home services  Code Status: Level 3 - DNAR and DNI    Subjective:   He is sitting up in his chair, his wife is at the couch  He reports feeling improved  He still has dyspnea on exertion however feels this is getting better  He reports lower extremity edema is improving though still present  He denies any cough, fevers or chills  He has a good appetite  He denies abdominal pain  He does feel constipated, his last bowel movement was Wednesday  He is agreeable to an augmented bowel regimen today  We discussed getting out of bed and walking more frequently, he recently worked with occupational therapy who had recommended home with home health  Objective:     Vitals:   Temp (24hrs), Av 2 °F (36 8 °C), Min:97 6 °F (36 4 °C), Max:98 9 °F (37 2 °C)    Temp:  [97 6 °F (36 4 °C)-98 9 °F (37 2 °C)] 97 6 °F (36 4 °C)  HR:  [77-90] 80  Resp:  [16] 16  BP: (112-140)/(52-71) 140/68  SpO2:  [95 %-97 %] 95 %  Body mass index is 30 04 kg/m²  Input and Output Summary (last 24 hours): Intake/Output Summary (Last 24 hours) at 2023 1002  Last data filed at 2023 0856  Gross per 24 hour   Intake 780 ml   Output 3625 ml   Net -2845 ml       Physical Exam:   Physical Exam  Vitals and nursing note reviewed  Constitutional:       Appearance: Normal appearance  HENT:      Head: Normocephalic  Nose: Nose normal  No congestion  Mouth/Throat:      Mouth: Mucous membranes are moist    Eyes:      General: No scleral icterus       Conjunctiva/sclera: Conjunctivae normal    Cardiovascular:      Comments: Telemetry and placed  Pulmonary: Effort: Pulmonary effort is normal       Breath sounds: Rales present  Abdominal:      General: Bowel sounds are normal  There is distension  Palpations: Abdomen is soft  Tenderness: There is no abdominal tenderness  Genitourinary:     Comments: No Harris  Musculoskeletal:      Cervical back: Neck supple  No rigidity  Right lower leg: Edema present  Left lower leg: Edema present  Skin:     General: Skin is warm  Coloration: Skin is not jaundiced  Neurological:      General: No focal deficit present  Mental Status: He is alert  Psychiatric:         Mood and Affect: Mood normal           Additional Data:     Labs:  Results from last 7 days   Lab Units 05/06/23  0641 05/04/23  0448 05/03/23  0454   WBC Thousand/uL 6 47   < > 6 85   HEMOGLOBIN g/dL 8 1*   < > 7 8*   HEMATOCRIT % 27 1*   < > 26 0*   PLATELETS Thousands/uL 364   < > 320   NEUTROS PCT %  --   --  72   LYMPHS PCT %  --   --  13*   MONOS PCT %  --   --  13*   EOS PCT %  --   --  1    < > = values in this interval not displayed  Results from last 7 days   Lab Units 05/06/23  0641 05/02/23  2318 05/02/23  1203   SODIUM mmol/L 132*   < > 134*   POTASSIUM mmol/L 3 6   < > 3 9   CHLORIDE mmol/L 93*   < > 97   CO2 mmol/L 30   < > 30   BUN mg/dL 41*   < > 35*   CREATININE mg/dL 1 68*   < > 1 68*   ANION GAP mmol/L 9   < > 7   CALCIUM mg/dL 9 0   < > 8 5   ALBUMIN g/dL  --   --  3 6   TOTAL BILIRUBIN mg/dL  --   --  0 81   ALK PHOS U/L  --   --  243*   ALT U/L  --   --  13   AST U/L  --   --  18   GLUCOSE RANDOM mg/dL 118   < > 148*    < > = values in this interval not displayed           Results from last 7 days   Lab Units 05/06/23  0645 05/05/23  2141 05/05/23  1635 05/05/23  1105 05/04/23  2058 05/04/23  1638 05/04/23  1033 05/04/23  0712 05/03/23  2102 05/03/23  1603 05/03/23  1045 05/03/23  0643   POC GLUCOSE mg/dl 120 153* 153* 195* 150* 164* 209* 129 153* 140 225* 123     Results from last 7 days   Lab Units 23  1203   HEMOGLOBIN A1C % 8 0*           Lines/Drains:  Invasive Devices     Peripheral Intravenous Line  Duration           Peripheral IV 23 Left Antecubital 3 days                  Telemetry:  Telemetry Orders (From admission, onward)             48 Hour Telemetry Monitoring  Continuous x 48 hours           References:    Telemetry Guidelines   Question:  Reason for 48 Hour Telemetry  Answer:  Acute Decompensated CHF (continuous diuretic infusion or total diuretic dose > 200 mg daily, associated electrolyte derangement, ionotropic drip, history of ventricular arrhythmia, or new EF <35%)                 Telemetry Reviewed: Atrial fibrillation  HR averaging 70-80  Indication for Continued Telemetry Use: Acute CHF on >200 mg lasix/day or equivalent dose or with new reduced EF  Imaging: No pertinent imaging reviewed      Recent Cultures (last 7 days):         Last 24 Hours Medication List:   Current Facility-Administered Medications   Medication Dose Route Frequency Provider Last Rate   • acetaminophen  650 mg Oral Q4H PRN Bolanle Sherryll Harada, MD     • amiodarone  100 mg Oral Daily Bolanle Sherryll Harada, MD     • apixaban  2 5 mg Oral BID Bolanle Sherryll Harada, MD     • doxazosin  8 mg Oral HS Bolanle Sherryll Harada, MD     • ferrous sulfate  325 mg Oral Every Other Day Leobardo Muro MD     • furosemide  80 mg Intravenous TID (diuretic) Leobardo Muro MD     • insulin lispro  1-5 Units Subcutaneous HS Bolanle Sherryll Harada, MD     • insulin lispro  1-6 Units Subcutaneous TID AC Bolanle Sherryll Harada, MD     • losartan  50 mg Oral Daily Bolanle Sherryll Harada, MD     • metoprolol tartrate  50 mg Oral Q12H [facility] Bolanle Sherryll Harada, MD     • polyethylene glycol  17 g Oral Once Leroy Bhatti MD     • potassium chloride  40 mEq Oral Daily Von Deng PA-C     • pravastatin  40 mg Oral Daily With Marcio Guaman MD     • senna-docusate sodium  2 tablet Oral Daily Avni Mancilla MD          Today, Patient Was Seen By: Avni Mancilla MD    **Please Note: This note may have been constructed using a voice recognition system  **

## 2023-05-07 LAB
ANION GAP SERPL CALCULATED.3IONS-SCNC: 8 MMOL/L (ref 4–13)
BUN SERPL-MCNC: 40 MG/DL (ref 5–25)
CALCIUM SERPL-MCNC: 8.8 MG/DL (ref 8.4–10.2)
CHLORIDE SERPL-SCNC: 95 MMOL/L (ref 96–108)
CO2 SERPL-SCNC: 31 MMOL/L (ref 21–32)
CREAT SERPL-MCNC: 1.72 MG/DL (ref 0.6–1.3)
GFR SERPL CREATININE-BSD FRML MDRD: 35 ML/MIN/1.73SQ M
GLUCOSE SERPL-MCNC: 117 MG/DL (ref 65–140)
GLUCOSE SERPL-MCNC: 136 MG/DL (ref 65–140)
GLUCOSE SERPL-MCNC: 138 MG/DL (ref 65–140)
GLUCOSE SERPL-MCNC: 223 MG/DL (ref 65–140)
GLUCOSE SERPL-MCNC: 234 MG/DL (ref 65–140)
POTASSIUM SERPL-SCNC: 3.7 MMOL/L (ref 3.5–5.3)
SODIUM SERPL-SCNC: 134 MMOL/L (ref 135–147)

## 2023-05-07 RX ORDER — FUROSEMIDE 10 MG/ML
80 INJECTION INTRAMUSCULAR; INTRAVENOUS
Status: COMPLETED | OUTPATIENT
Start: 2023-05-07 | End: 2023-05-07

## 2023-05-07 RX ADMIN — INSULIN LISPRO 2 UNITS: 100 INJECTION, SOLUTION INTRAVENOUS; SUBCUTANEOUS at 21:57

## 2023-05-07 RX ADMIN — POTASSIUM CHLORIDE 40 MEQ: 1500 TABLET, EXTENDED RELEASE ORAL at 08:22

## 2023-05-07 RX ADMIN — APIXABAN 2.5 MG: 2.5 TABLET, FILM COATED ORAL at 17:16

## 2023-05-07 RX ADMIN — LOSARTAN POTASSIUM 50 MG: 50 TABLET, FILM COATED ORAL at 10:23

## 2023-05-07 RX ADMIN — SENNOSIDES AND DOCUSATE SODIUM 2 TABLET: 8.6; 5 TABLET ORAL at 08:22

## 2023-05-07 RX ADMIN — PRAVASTATIN SODIUM 40 MG: 40 TABLET ORAL at 16:51

## 2023-05-07 RX ADMIN — APIXABAN 2.5 MG: 2.5 TABLET, FILM COATED ORAL at 08:22

## 2023-05-07 RX ADMIN — INSULIN LISPRO 2 UNITS: 100 INJECTION, SOLUTION INTRAVENOUS; SUBCUTANEOUS at 11:12

## 2023-05-07 RX ADMIN — FUROSEMIDE 80 MG: 10 INJECTION, SOLUTION INTRAMUSCULAR; INTRAVENOUS at 11:18

## 2023-05-07 RX ADMIN — METOPROLOL TARTRATE 50 MG: 50 TABLET, FILM COATED ORAL at 10:24

## 2023-05-07 RX ADMIN — FUROSEMIDE 80 MG: 10 INJECTION, SOLUTION INTRAMUSCULAR; INTRAVENOUS at 17:16

## 2023-05-07 RX ADMIN — FUROSEMIDE 80 MG: 10 INJECTION, SOLUTION INTRAMUSCULAR; INTRAVENOUS at 05:16

## 2023-05-07 NOTE — ASSESSMENT & PLAN NOTE
Wt Readings from Last 3 Encounters:   05/07/23 83 8 kg (184 lb 11 9 oz)   04/26/23 88 7 kg (195 lb 9 6 oz)   04/13/23 85 7 kg (189 lb)     · On admission presented with progressing shortness of breath dyspnea on exertion and lower extremity swelling  Chest x-ray showed prominent interstitial lung markings with vascular congestion  Recent echocardiogram of July 2022 revealed EF 60% with mild to moderate TR and mild to moderate elevated PA systolic pressures  Repeat echocardiogram showed signs of amyloid  Cardiology was consulted who recommended IV diuresis   · Initially started on IV 40 mg of Lasix, home dose regimen was 40 mg twice daily, he has now been stable on 80 mg 3 times daily  · Creatinine has been stable   · He received 1 dose of metolazone on May 4    This has not been redosed   · Net output 9,265 mL  · Discussed with Cardiology, appreciate input  · Continue IV diuretics today, transition to PO tomorrow   · Can likely be discharged home at that time   · Trend BMP  · I/O, daily weights

## 2023-05-07 NOTE — PLAN OF CARE
Problem: CARDIOVASCULAR - ADULT  Goal: Maintains optimal cardiac output and hemodynamic stability  Description: INTERVENTIONS:  - Monitor I/O, vital signs and rhythm  - Monitor for S/S and trends of decreased cardiac output  - Administer and titrate ordered vasoactive medications to optimize hemodynamic stability  - Assess quality of pulses, skin color and temperature  - Assess for signs of decreased coronary artery perfusion  - Instruct patient to report change in severity of symptoms  Outcome: Progressing  Goal: Absence of cardiac dysrhythmias or at baseline rhythm  Description: INTERVENTIONS:  - Continuous cardiac monitoring, vital signs, obtain 12 lead EKG if ordered  - Administer antiarrhythmic and heart rate control medications as ordered  - Monitor electrolytes and administer replacement therapy as ordered  Outcome: Progressing     Problem: RESPIRATORY - ADULT  Goal: Achieves optimal ventilation and oxygenation  Description: INTERVENTIONS:  - Assess for changes in respiratory status  - Assess for changes in mentation and behavior  - Position to facilitate oxygenation and minimize respiratory effort  - Oxygen administered by appropriate delivery if ordered  - Initiate smoking cessation education as indicated  - Encourage broncho-pulmonary hygiene including cough, deep breathe, Incentive Spirometry  - Assess the need for suctioning and aspirate as needed  - Assess and instruct to report SOB or any respiratory difficulty  - Respiratory Therapy support as indicated  Outcome: Progressing     Problem: METABOLIC, FLUID AND ELECTROLYTES - ADULT  Goal: Fluid balance maintained  Description: INTERVENTIONS:  - Monitor labs   - Monitor I/O and WT  - Instruct patient on fluid and nutrition as appropriate  - Assess for signs & symptoms of volume excess or deficit  Outcome: Progressing     Problem: Nutrition/Hydration-ADULT  Goal: Nutrient/Hydration intake appropriate for improving, restoring or maintaining nutritional needs  Description: Monitor and assess patient's nutrition/hydration status for malnutrition  Collaborate with interdisciplinary team and initiate plan and interventions as ordered  Monitor patient's weight and dietary intake as ordered or per policy  Utilize nutrition screening tool and intervene as necessary  Determine patient's food preferences and provide high-protein, high-caloric foods as appropriate       INTERVENTIONS:  - Monitor oral intake, urinary output, labs, and treatment plans  - Assess nutrition and hydration status and recommend course of action  - Evaluate amount of meals eaten  - Assist patient with eating if necessary   - Allow adequate time for meals  - Recommend/ encourage appropriate diets, oral nutritional supplements, and vitamin/mineral supplements  - Order, calculate, and assess calorie counts as needed  - Assess need for intravenous fluids  - Provide specific nutrition/hydration education as appropriate  - Include patient/family/caregiver in decisions related to nutrition  Outcome: Progressing

## 2023-05-07 NOTE — PROGRESS NOTES
"Cardiology Progress Note - Alem Ziegler 82 y o  male MRN: [id number]    Unit/Bed#: S -01 Encounter: [id number]      Assessment:  1  Acute HFpEF  2  Permanent atrial fibrillation   3  CKD III  4  Benign essential hypertension   5  Dyslipidemia   6  DM type 2  7  Adenocarcinoma of the colon     Plan:  1  Good urine output yesterday, weight down - needs standing weights, discussed with nursing   2  Renal function stable  3  Continue IV diuretics today with transition to oral torsemide in a m  60 mg twice daily  4  Rates controlled, continue metoprolol and Eliquis   5  Remains on amiodarone - in atrial fibrillation ECG 2022, discontinue   6  Blood pressure overall acceptable, continue current regimen   7  AM BMP     Subjective:   Patient seen and examined  No significant events overnight  Objective:     Vitals: Blood pressure 106/51, pulse (!) 121, temperature 97 5 °F (36 4 °C), temperature source Oral, resp  rate 18, height 5' 6\" (1 676 m), weight 83 8 kg (184 lb 11 9 oz), SpO2 96 %  , Body mass index is 29 82 kg/m² ,   Orthostatic Blood Pressures    Flowsheet Row Most Recent Value   Blood Pressure 106/51 filed at 05/07/2023 1259   Patient Position - Orthostatic VS Lying filed at 05/07/2023 0600            Intake/Output Summary (Last 24 hours) at 5/7/2023 1006  Last data filed at 5/7/2023 4660  Gross per 24 hour   Intake 960 ml   Output 3650 ml   Net -2690 ml         Physical Exam:    GEN: Alem Ziegler appears well, alert and oriented x 3, pleasant and cooperative   HEENT: pupils equal, round, and reactive to light; extraocular muscles intact  NECK: supple, no carotid bruits   HEART: irregularly irregular, variable S1/S2, no murmur   LUNGS: clear to auscultation bilaterally; no wheezes, rales, or rhonchi   ABDOMEN: normal bowel sounds, soft, no tenderness, no distention  EXTREMITIES: 1+ pitting edema  NEURO: no focal findings   SKIN: normal without suspicious lesions on exposed " skin    Medications:      Current Facility-Administered Medications:   •  acetaminophen (TYLENOL) tablet 650 mg, 650 mg, Oral, Q4H PRN, Erica Trotter MD, 650 mg at 05/02/23 2117  •  amiodarone tablet 100 mg, 100 mg, Oral, Daily, Erica Trotter MD, 100 mg at 05/06/23 6394  •  apixaban (ELIQUIS) tablet 2 5 mg, 2 5 mg, Oral, BID, Erica Trotter MD, 2 5 mg at 05/07/23 1201  •  doxazosin (CARDURA) tablet 8 mg, 8 mg, Oral, HS, Katelyn Christensen MD, 8 mg at 05/06/23 2103  •  ferrous sulfate tablet 325 mg, 325 mg, Oral, Every Other Day, Erica Trotter MD, 325 mg at 05/06/23 0849  •  furosemide (LASIX) injection 80 mg, 80 mg, Intravenous, TID (diuretic), Erica Trotter MD, 80 mg at 05/07/23 0516  •  insulin lispro (HumaLOG) 100 units/mL subcutaneous injection 1-5 Units, 1-5 Units, Subcutaneous, HS, Katelyn Christensen MD, 1 Units at 05/06/23 2103  •  insulin lispro (HumaLOG) 100 units/mL subcutaneous injection 1-6 Units, 1-6 Units, Subcutaneous, TID AC, 2 Units at 05/06/23 1147 **AND** Fingerstick Glucose (POCT), , , TID AC, Katelyn Christensen MD  •  losartan (COZAAR) tablet 50 mg, 50 mg, Oral, Daily, Katelyn Christensen MD, 50 mg at 05/06/23 0849  •  metoprolol tartrate (LOPRESSOR) tablet 50 mg, 50 mg, Oral, Q12H [facility], Katelyn Christensen MD, 50 mg at 05/06/23 2103  •  potassium chloride (K-DUR,KLOR-CON) CR tablet 40 mEq, 40 mEq, Oral, Daily, Nidhi Aguilar PA-C, 40 mEq at 05/07/23 8648  •  pravastatin (PRAVACHOL) tablet 40 mg, 40 mg, Oral, Daily With Jules Wynn MD, 40 mg at 05/06/23 1614  •  senna-docusate sodium (SENOKOT S) 8 6-50 mg per tablet 2 tablet, 2 tablet, Oral, Daily, Cathy Zimmerman MD, 2 tablet at 05/07/23 0822     Labs & Results:        Results from last 7 days   Lab Units 05/06/23  0641 05/04/23  0448 05/03/23  0454   WBC Thousand/uL 6 47 6 24 6 85   HEMOGLOBIN g/dL 8 1* 7 7* 7 8*   HEMATOCRIT % 27 1* 26 4* 26 0*   PLATELETS Thousands/uL 364 345 320         Results from last 7 days   Lab Units 05/07/23  0437 05/06/23  0641 05/05/23  1054 05/02/23  2318 05/02/23  1203   POTASSIUM mmol/L 3 7 3 6 3 3*   < > 3 9   CHLORIDE mmol/L 95* 93* 92*   < > 97   CO2 mmol/L 31 30 32   < > 30   BUN mg/dL 40* 41* 36*   < > 35*   CREATININE mg/dL 1 72* 1 68* 1 71*   < > 1 68*   CALCIUM mg/dL 8 8 9 0 8 9   < > 8 5   ALK PHOS U/L  --   --   --   --  243*   ALT U/L  --   --   --   --  13   AST U/L  --   --   --   --  18    < > = values in this interval not displayed  Results from last 7 days   Lab Units 05/02/23  2318   MAGNESIUM mg/dL 1 7*     Counseling / Coordination of Care  Total floor / unit time spent today 25 minutes  Greater than 50% of total time was spent with the patient and / or family counseling and / or coordination of care

## 2023-05-07 NOTE — ASSESSMENT & PLAN NOTE
Lab Results   Component Value Date    HGBA1C 8 0 (H) 05/02/2023     Recent Labs     05/06/23  1616 05/06/23  2101 05/07/23  0720 05/07/23  1046   POCGLU 114 172* 136 223*       Blood Sugar Average: Last 72 hrs:  · (P) 063 [id number]   · His blood glucose has been acceptable  His Metformin is on hold      · Continue Accu-Cheks and sliding scale

## 2023-05-07 NOTE — ASSESSMENT & PLAN NOTE
Lab Results   Component Value Date    EGFR 35 05/07/2023    EGFR 36 05/06/2023    EGFR 35 05/05/2023    CREATININE 1 72 (H) 05/07/2023    CREATININE 1 68 (H) 05/06/2023    CREATININE 1 71 (H) 05/05/2023     · Baseline creatinine 1 5 although has been trending up recently  Initially had rising creatinine with response to diuretics however now has been slowly declining     · Continue to trend creatinine

## 2023-05-07 NOTE — PROGRESS NOTES
[facility]  Progress Note  Name: Byron Cortes  MRN: [id number]  Unit/Bed#: S -18 I Date of Admission: 5/2/2023   Date of Service: 5/7/2023 I Hospital Day: 5    Assessment/Plan   * Acute on chronic diastolic congestive heart failure [facility])  Assessment & Plan  Wt Readings from Last 3 Encounters:   05/07/23 83 8 kg (184 lb 11 9 oz)   04/26/23 88 7 kg (195 lb 9 6 oz)   04/13/23 85 7 kg (189 lb)     · On admission presented with progressing shortness of breath dyspnea on exertion and lower extremity swelling  Chest x-ray showed prominent interstitial lung markings with vascular congestion  Recent echocardiogram of July 2022 revealed EF 60% with mild to moderate TR and mild to moderate elevated PA systolic pressures  Repeat echocardiogram showed signs of amyloid  Cardiology was consulted who recommended IV diuresis   · Initially started on IV 40 mg of Lasix, home dose regimen was 40 mg twice daily, he has now been stable on 80 mg 3 times daily  · Creatinine has been stable   · He received 1 dose of metolazone on May 4  This has not been redosed   · Net output 9,265 mL  · Discussed with Cardiology, appreciate input  · Continue IV diuretics today, transition to PO tomorrow   · Can likely be discharged home at that time   · Trend BMP  · I/O, daily weights         Atrial fibrillation ([facility] [address] )  Assessment & Plan  · Rate controlled   · Continue rate control with metoprolol and anticoagulation with Eliquis    Stage 3 chronic kidney disease [facility])  Assessment & Plan  Lab Results   Component Value Date    EGFR 35 05/07/2023    EGFR 36 05/06/2023    EGFR 35 05/05/2023    CREATININE 1 72 (H) 05/07/2023    CREATININE 1 68 (H) 05/06/2023    CREATININE 1 71 (H) 05/05/2023     · Baseline creatinine 1 5 although has been trending up recently  Initially had rising creatinine with response to diuretics however now has been slowly declining     · Continue to trend creatinine    Colon adenocarcinoma [facility])  Assessment & Plan  · Has declined aggressive treatment in the past, is currently following palliative care outpatient    Iron deficiency anemia due to chronic blood loss  Assessment & Plan  · His hemoglobin at baseline has fluctuated between 7 and 8 over the last few months  This is felt to be secondary to colon cancer of which the patient had declined aggressive treatment  He is maintained on Eliquis without symptoms or signs of bleeding  · Continue iron supplements     Type 2 diabetes mellitus, without long-term current use of insulin [facility])  Assessment & Plan  Lab Results   Component Value Date    HGBA1C 8 0 (H) 05/02/2023     Recent Labs     05/06/23  1616 05/06/23  2101 05/07/23  0720 05/07/23  1046   POCGLU 114 172* 136 223*       Blood Sugar Average: Last 72 hrs:  · (P) 819 [id number]   · His blood glucose has been acceptable  His Metformin is on hold  · Continue Accu-Cheks and sliding scale    Primary hypertension  Assessment & Plan  · BP acceptable   · Continue diuresis, losartan, metoprolol and Cardura             VTE Pharmacologic Prophylaxis: VTE Score: 3 Moderate Risk (Score 3-4) - Pharmacological DVT Prophylaxis Ordered: apixaban (Eliquis)  Patient Centered Rounds: I performed bedside rounds with nursing staff today  Discussions with Specialists or Other Care Team Provider: Discussed with Cardiology, RN, CM    Education and Discussions with Family / Patient: Updated  (daughter and significant other) at bedside  Total Time Spent on Date of Encounter in care of patient: 35 minutes This time was spent on one or more of the following: performing physical exam; counseling and coordination of care; obtaining or reviewing history; documenting in the medical record; reviewing/ordering tests, medications or procedures; communicating with other healthcare professionals and discussing with patient's family/caregivers      Current Length of Stay: 5 day(s)  Current Patient Status: Inpatient   Certification Statement: The patient will continue to require additional inpatient hospital stay due to on going IV diuresis today  Discharge Plan: Anticipate discharge tomorrow to home  Code Status: Level 3 - DNAR and DNI    Subjective:   Patient reports feeling better today  Denies SOB, states breathing and swelling has improved  Hopeful to go home soon  Objective:     Vitals:   Temp (24hrs), Av 7 °F (36 5 °C), Min:97 5 °F (36 4 °C), Max:97 9 °F (36 6 °C)    Temp:  [97 5 °F (36 4 °C)-97 9 °F (36 6 °C)] 97 5 °F (36 4 °C)  HR:  [] 100  Resp:  [16-20] 18  BP: (106-137)/(51-68) 135/68  SpO2:  [87 %-96 %] 87 %  Body mass index is 29 82 kg/m²  Input and Output Summary (last 24 hours): Intake/Output Summary (Last 24 hours) at 2023 1203  Last data filed at 2023 1125  Gross per 24 hour   Intake 900 ml   Output 3925 ml   Net -3025 ml       Physical Exam:   Physical Exam  Constitutional:       General: He is not in acute distress  Appearance: Normal appearance  He is normal weight  He is not ill-appearing or diaphoretic  HENT:      Head: Normocephalic and atraumatic  Mouth/Throat:      Mouth: Mucous membranes are moist    Eyes:      General: No scleral icterus  Pupils: Pupils are equal, round, and reactive to light  Cardiovascular:      Rate and Rhythm: Normal rate  Rhythm irregularly irregular  Pulses: Normal pulses  Heart sounds: Normal heart sounds, S1 normal and S2 normal  No murmur heard  No systolic murmur is present  No diastolic murmur is present  No gallop  No S3 or S4 sounds  Pulmonary:      Effort: Pulmonary effort is normal  No accessory muscle usage or respiratory distress  Breath sounds: Normal breath sounds  No stridor  No decreased breath sounds, wheezing, rhonchi or rales  Chest:      Chest wall: No tenderness  Abdominal:      General: Bowel sounds are normal  There is no distension        Palpations: Abdomen is soft       Tenderness: There is no abdominal tenderness  There is no guarding  Musculoskeletal:      Right lower le+ Pitting Edema present  Left lower le+ Pitting Edema present  Skin:     General: Skin is warm and dry  Coloration: Skin is not jaundiced  Neurological:      General: No focal deficit present  Mental Status: He is alert  Mental status is at baseline  Motor: No tremor or seizure activity  Psychiatric:         Behavior: Behavior is cooperative  Additional Data:     Labs:  Results from last 7 days   Lab Units 23  0641 23  0448 23  0454   WBC Thousand/uL 6 47   < > 6 85   HEMOGLOBIN g/dL 8 1*   < > 7 8*   HEMATOCRIT % 27 1*   < > 26 0*   PLATELETS Thousands/uL 364   < > 320   NEUTROS PCT %  --   --  72   LYMPHS PCT %  --   --  13*   MONOS PCT %  --   --  13*   EOS PCT %  --   --  1    < > = values in this interval not displayed  Results from last 7 days   Lab Units 23  0437 23  2318 23  1203   SODIUM mmol/L 134*   < > 134*   POTASSIUM mmol/L 3 7   < > 3 9   CHLORIDE mmol/L 95*   < > 97   CO2 mmol/L 31   < > 30   BUN mg/dL 40*   < > 35*   CREATININE mg/dL 1 72*   < > 1 68*   ANION GAP mmol/L 8   < > 7   CALCIUM mg/dL 8 8   < > 8 5   ALBUMIN g/dL  --   --  3 6   TOTAL BILIRUBIN mg/dL  --   --  0 81   ALK PHOS U/L  --   --  243*   ALT U/L  --   --  13   AST U/L  --   --  18   GLUCOSE RANDOM mg/dL 117   < > 148*    < > = values in this interval not displayed           Results from last 7 days   Lab Units 23  1046 23  0720 23  2101 23  1616 23  1035 23  0645 23  2141 23  1635 23  1105 23  2058 23  1638 23  1033   POC GLUCOSE mg/dl 223* 136 172* 114 203* 120 153* 153* 195* 150* 164* 209*     Results from last 7 days   Lab Units 23  1203   HEMOGLOBIN A1C % 8 0*           Lines/Drains:  Invasive Devices     Peripheral Intravenous Line  Duration Peripheral IV 05/07/23 Dorsal (posterior); Left Forearm <1 day                  Telemetry:  Telemetry Orders (From admission, onward)             48 Hour Telemetry Monitoring  Continuous x 48 hours        References:    Telemetry Guidelines   Question:  Reason for 48 Hour Telemetry  Answer:  Acute Decompensated CHF (continuous diuretic infusion or total diuretic dose > 200 mg daily, associated electrolyte derangement, ionotropic drip, history of ventricular arrhythmia, or new EF <35%)                 Telemetry Reviewed: Atrial fibrillation  HR averaging 70-80  Indication for Continued Telemetry Use: Acute CHF on >200 mg lasix/day or equivalent dose or with new reduced EF  Imaging: No pertinent imaging reviewed  Recent Cultures (last 7 days):         Last 24 Hours Medication List:   Current Facility-Administered Medications   Medication Dose Route Frequency Provider Last Rate   • acetaminophen  650 mg Oral Q4H PRN Katelyn Doyle MD     • apixaban  2 5 mg Oral BID Katelyn Doyle MD     • doxazosin  8 mg Oral HS Katelyn Doyle MD     • ferrous sulfate  325 mg Oral Every Other Day Doug Prader, MD     • furosemide  80 mg Intravenous TID (diuretic) Reanna Valdez DO     • insulin lispro  1-5 Units Subcutaneous HS Katelyn Doyle MD     • insulin lispro  1-6 Units Subcutaneous TID AC Katelyn Doyle MD     • losartan  50 mg Oral Daily Katelyn Doyle MD     • metoprolol tartrate  50 mg Oral Q12H [address] Katelyn Doyle MD     • potassium chloride  40 mEq Oral Daily Von Calloway PA-C     • pravastatin  40 mg Oral Daily With Susannah Harris MD     • senna-docusate sodium  2 tablet Oral Daily Evgeny Sue MD          Today, Patient Was Seen By: Skyler Rogers PA-C    **Please Note: This note may have been constructed using a voice recognition system  **

## 2023-05-08 VITALS
OXYGEN SATURATION: 95 % | SYSTOLIC BLOOD PRESSURE: 141 MMHG | HEART RATE: 89 BPM | TEMPERATURE: 97.7 F | WEIGHT: 176.15 LBS | DIASTOLIC BLOOD PRESSURE: 70 MMHG | HEIGHT: 66 IN | BODY MASS INDEX: 28.31 KG/M2 | RESPIRATION RATE: 18 BRPM

## 2023-05-08 LAB
ANION GAP SERPL CALCULATED.3IONS-SCNC: 8 MMOL/L (ref 4–13)
BUN SERPL-MCNC: 39 MG/DL (ref 5–25)
CALCIUM SERPL-MCNC: 8.8 MG/DL (ref 8.4–10.2)
CHLORIDE SERPL-SCNC: 94 MMOL/L (ref 96–108)
CO2 SERPL-SCNC: 30 MMOL/L (ref 21–32)
CREAT SERPL-MCNC: 1.62 MG/DL (ref 0.6–1.3)
GFR SERPL CREATININE-BSD FRML MDRD: 38 ML/MIN/1.73SQ M
GLUCOSE SERPL-MCNC: 117 MG/DL (ref 65–140)
GLUCOSE SERPL-MCNC: 125 MG/DL (ref 65–140)
POTASSIUM SERPL-SCNC: 3.7 MMOL/L (ref 3.5–5.3)
SODIUM SERPL-SCNC: 132 MMOL/L (ref 135–147)

## 2023-05-08 RX ORDER — DOXAZOSIN MESYLATE 4 MG/1
4 TABLET ORAL
Qty: 30 TABLET | Refills: 0 | Status: SHIPPED | OUTPATIENT
Start: 2023-05-08 | End: 2023-06-07

## 2023-05-08 RX ORDER — TORSEMIDE 20 MG/1
60 TABLET ORAL 2 TIMES DAILY
Status: DISCONTINUED | OUTPATIENT
Start: 2023-05-08 | End: 2023-05-08 | Stop reason: HOSPADM

## 2023-05-08 RX ORDER — DOXAZOSIN MESYLATE 4 MG/1
4 TABLET ORAL
Status: DISCONTINUED | OUTPATIENT
Start: 2023-05-08 | End: 2023-05-08 | Stop reason: HOSPADM

## 2023-05-08 RX ORDER — TORSEMIDE 20 MG/1
60 TABLET ORAL 2 TIMES DAILY
Qty: 180 TABLET | Refills: 0 | Status: SHIPPED | OUTPATIENT
Start: 2023-05-08 | End: 2023-05-08 | Stop reason: SDUPTHER

## 2023-05-08 RX ORDER — TORSEMIDE 20 MG/1
60 TABLET ORAL 2 TIMES DAILY
Qty: 180 TABLET | Refills: 0 | Status: SHIPPED | OUTPATIENT
Start: 2023-05-08 | End: 2023-05-15 | Stop reason: SDUPTHER

## 2023-05-08 RX ORDER — POTASSIUM CHLORIDE 20 MEQ/1
40 TABLET, EXTENDED RELEASE ORAL DAILY
Qty: 60 TABLET | Refills: 0 | Status: SHIPPED | OUTPATIENT
Start: 2023-05-08 | End: 2023-05-15 | Stop reason: SDUPTHER

## 2023-05-08 RX ADMIN — TORSEMIDE 60 MG: 20 TABLET ORAL at 08:50

## 2023-05-08 RX ADMIN — SENNOSIDES AND DOCUSATE SODIUM 2 TABLET: 8.6; 5 TABLET ORAL at 08:50

## 2023-05-08 RX ADMIN — LOSARTAN POTASSIUM 50 MG: 50 TABLET, FILM COATED ORAL at 08:50

## 2023-05-08 RX ADMIN — APIXABAN 2.5 MG: 2.5 TABLET, FILM COATED ORAL at 08:51

## 2023-05-08 RX ADMIN — METOPROLOL TARTRATE 50 MG: 50 TABLET, FILM COATED ORAL at 08:51

## 2023-05-08 RX ADMIN — POTASSIUM CHLORIDE 40 MEQ: 1500 TABLET, EXTENDED RELEASE ORAL at 08:50

## 2023-05-08 RX ADMIN — FERROUS SULFATE TAB 325 MG (65 MG ELEMENTAL FE) 325 MG: 325 (65 FE) TAB at 08:51

## 2023-05-08 NOTE — ASSESSMENT & PLAN NOTE
Wt Readings from Last 3 Encounters:   05/08/23 79 9 kg (176 lb 2 4 oz)   04/26/23 88 7 kg (195 lb 9 6 oz)   04/13/23 85 7 kg (189 lb)     · On admission presented with progressing shortness of breath dyspnea on exertion and lower extremity swelling  Chest x-ray showed prominent interstitial lung markings with vascular congestion  Recent echocardiogram of July 2022 revealed EF 60% with mild to moderate TR and mild to moderate elevated PA systolic pressures  Repeat echocardiogram showed signs of amyloid  Cardiology was consulted who recommended IV diuresis   · Initially started on IV 40 mg of Lasix, home dose regimen was 40 mg twice daily, he has now been stable on 80 mg 3 times daily  · Creatinine has been stable   · He received 1 dose of metolazone on May 4    This has not been redosed   · Net output 9,265 mL  · Discussed with Cardiology, appreciate input  · Transition to torsemide 60 mg BID

## 2023-05-08 NOTE — ASSESSMENT & PLAN NOTE
Lab Results   Component Value Date    HGBA1C 8 0 (H) 05/02/2023     Recent Labs     05/07/23  1046 05/07/23  1629 05/07/23  2133 05/08/23  0707   POCGLU 223* 138 234* 125       Blood Sugar Average: Last 72 hrs:  · (P) 351 [id number]   · His blood glucose has been acceptable     · Resume metformin

## 2023-05-08 NOTE — ASSESSMENT & PLAN NOTE
Lab Results   Component Value Date    EGFR 38 05/08/2023    EGFR 35 05/07/2023    EGFR 36 05/06/2023    CREATININE 1 62 (H) 05/08/2023    CREATININE 1 72 (H) 05/07/2023    CREATININE 1 68 (H) 05/06/2023     · Baseline creatinine 1 5 although has been trending up recently  Initially had rising creatinine with response to diuretics however now has been slowly declining     · Continue to trend creatinine

## 2023-05-08 NOTE — DISCHARGE INSTR - AVS FIRST PAGE
Dear Giselle Miguel,     It was our pleasure to care for you here at [facility]  It is our hope that we were always able to exceed the expected standards for your care during your stay  You were hospitalized due to CHF  You were cared for on the 4th floor by Marita Mehta PA-C under the service of Terry Noriega MD with the [address] [place] [address] [facility] who covers for your primary care physician (PCP), Markos Gastelum MD, while you were hospitalized  If you have any questions or concerns related to this hospitalization, you may contact us at [phone]  For follow up as well as any medication refills, we recommend that you follow up with your primary care physician  A registered nurse will reach out to you by phone within a few days after your discharge to answer any additional questions that you may have after going home  However, at this time we provide for you here, the most important instructions / recommendations at discharge:     Notable Medication Adjustments -   Increase your torsemide to 60 mg twice daily  Stop taking amiodarone  Testing Required after Discharge -   We recommend that you get your kidney function checked with a BMP in 1 week  ** Please contact your PCP to request testing orders for any of the testing recommended here **  Important follow up information -   Please follow up with your cardiologist in 2-3 weeks  Other Instructions -   Return to the ER if you have shortness of breath of leg swelling  Please review this entire after visit summary as additional general instructions including medication list, appointments, activity, diet, any pertinent wound care, and other additional recommendations from your care team that may be provided for you        Sincerely,     Marita Mehta PA-C

## 2023-05-08 NOTE — PROGRESS NOTES
General Cardiology Progress Note   Jyothi Beat 82 y o  male MRN: [id number]  Unit/Bed#: S -06 Encounter: [id number]      Assessment:  Principal Problem:    Acute on chronic diastolic congestive heart failure (HCC)  Active Problems:    Atrial fibrillation (HCC)    Stage 3 chronic kidney disease (HCC)    Type 2 diabetes mellitus, without long-term current use of insulin (HCC)    Colon adenocarcinoma (HCC)    Primary hypertension    Iron deficiency anemia due to chronic blood loss      Impression:    • Acute Diastolic CHF  o Decompensated on torsemide 40 mg p o  twice daily  o Effective diuresis with furosemide 80 mg IV 3 times daily  o He has had a 20 pound diuresis during this hospital stay  • Atrial fibrillation- persistent, rates controlled  • CKD 3 -stable  • Hypertension - controlled  • Hyperlipidemia    Plan:    • Transitioning to torsemide 60 mg p o  twice daily, a 50% increase from his prior home dose on which he decompensated  • Declines treatment for colonic adenocarcinoma  • We will not pursue more aggressive diastolic CHF work-up to include possibility of these like amyloidosis on account of the above medical issues and palliative decisions  • 1 week f u made and on chart  • 40oz fluid restriction  • Daily weights, call office for 3 lbs weight gain in 24-48hours  • Okay with DC home      Subjective:   Patient seen and examined  20lbs diuresis, no complaints today, overall improved, afib rates controlled    Review of Systems   Constitutional: Negative for diaphoresis, fever, malaise/fatigue and night sweats  Cardiovascular: Negative for chest pain, dyspnea on exertion, leg swelling, orthopnea, palpitations and syncope  Respiratory: Negative for cough, shortness of breath, sputum production and wheezing  Skin: Negative for itching and rash  Gastrointestinal: Negative for abdominal pain, change in bowel habit and diarrhea     Neurological: Negative for dizziness, focal weakness, "light-headedness and weakness  Objective   Vitals: Blood pressure 141/70, pulse 89, temperature 97 7 °F (36 5 °C), resp  rate 18, height 5' 6\" (1 676 m), weight 79 9 kg (176 lb 2 4 oz), SpO2 95 %  , Body mass index is 28 43 kg/m² , I/O last 3 completed shifts: In: 840 [P O :840]  Out: 4575 [Urine:4575]  No intake/output data recorded  Wt Readings from Last 3 Encounters:   05/08/23 79 9 kg (176 lb 2 4 oz)   04/26/23 88 7 kg (195 lb 9 6 oz)   04/13/23 85 7 kg (189 lb)       Intake/Output Summary (Last 24 hours) at 5/8/2023 0832  Last data filed at 5/8/2023 0601  Gross per 24 hour   Intake 600 ml   Output 2500 ml   Net -1900 ml     I/O last 3 completed shifts: In: 840 [P O :840]  Out: 4575 [Urine:4575]    No significant arrhythmias seen on telemetry review  Physical Exam  Constitutional:       General: He is not in acute distress  Appearance: He is not diaphoretic  HENT:      Head: Normocephalic and atraumatic  Neck:      Vascular: Hepatojugular reflux present  No JVD  Cardiovascular:      Rate and Rhythm: Normal rate  Rhythm irregular  Heart sounds: Normal heart sounds  No murmur heard  Pulmonary:      Effort: Pulmonary effort is normal  No respiratory distress  Breath sounds: Normal breath sounds  No wheezing or rales  Abdominal:      General: Bowel sounds are normal  There is no distension  Palpations: Abdomen is soft  Tenderness: There is no abdominal tenderness  Musculoskeletal:      Cervical back: Normal range of motion and neck supple  Right lower leg: No edema  Left lower leg: No edema  Skin:     General: Skin is warm and dry  Neurological:      Mental Status: He is alert and oriented to person, place, and time           Meds/Allergies   No Known Allergies    Current Facility-Administered Medications:   •  acetaminophen (TYLENOL) tablet 650 mg, 650 mg, Oral, Q4H PRN, Jimmy Nyhan, MD, 650 mg at 05/02/23 2117  •  apixaban (ELIQUIS) tablet 2 5 " mg, 2 5 mg, Oral, BID, Josh Matthews MD, 2 5 mg at 05/07/23 1716  •  doxazosin (CARDURA) tablet 8 mg, 8 mg, Oral, HS, Josh Matthews MD, 8 mg at 05/06/23 2103  •  ferrous sulfate tablet 325 mg, 325 mg, Oral, Every Other Day, Josh Matthews MD, 325 mg at 05/06/23 0849  •  insulin lispro (HumaLOG) 100 units/mL subcutaneous injection 1-5 Units, 1-5 Units, Subcutaneous, HS, Josh Matthews MD, 2 Units at 05/07/23 2157  •  insulin lispro (HumaLOG) 100 units/mL subcutaneous injection 1-6 Units, 1-6 Units, Subcutaneous, TID AC, 2 Units at 05/07/23 1112 **AND** Fingerstick Glucose (POCT), , , TID AC, Katelyn Dave MD  •  losartan (COZAAR) tablet 50 mg, 50 mg, Oral, Daily, Josh Matthews MD, 50 mg at 05/07/23 1023  •  metoprolol tartrate (LOPRESSOR) tablet 50 mg, 50 mg, Oral, Q12H [facility], Katelyn Dave MD, 50 mg at 05/07/23 1024  •  potassium chloride (K-DUR,KLOR-CON) CR tablet 40 mEq, 40 mEq, Oral, Daily, Von Blandon PA-C, 40 mEq at 05/07/23 2655  •  pravastatin (PRAVACHOL) tablet 40 mg, 40 mg, Oral, Daily With Patric Aguirre MD, 40 mg at 05/07/23 1651  •  senna-docusate sodium (SENOKOT S) 8 6-50 mg per tablet 2 tablet, 2 tablet, Oral, Daily, Jacob Weeks MD, 2 tablet at 05/07/23 4911  •  torsemide (DEMADEX) tablet 60 mg, 60 mg, Oral, BID, Winifred James PA-C    Laboratory Results:        CBC with diff:   Results from last 7 days   Lab Units 05/06/23  0641 05/04/23  0448 05/03/23  0454 05/02/23  1203   WBC Thousand/uL 6 47 6 24 6 85 7 74   HEMOGLOBIN g/dL 8 1* 7 7* 7 8* 7 7*   HEMATOCRIT % 27 1* 26 4* 26 0* 25 8*   MCV fL 77* 78* 78* 78*   PLATELETS Thousands/uL 364 345 320 333   MCH pg 23 0* 22 8* 23 3* 23 1*   MCHC g/dL 29 9* 29 2* 30 0* 29 8*   RDW % 17 9* 17 7* 17 6* 17 3*   MPV fL 10 9 10 9 10 4 10 3   NRBC AUTO /100 WBCs  --   --  0 0       CMP:  Results from last 7 days   Lab Units 05/08/23  0620 05/07/23  0437 05/06/23  4692 05/05/23  1054 05/04/23  0448 05/03/23  0454 05/02/23  2318 05/02/23  1203   SODIUM mmol/L 132* 134* 132* 132* 134* 133* 133* 134*   POTASSIUM mmol/L 3 7 3 7 3 6 3 3* 4 1 4 0 3 4* 3 9   CHLORIDE mmol/L 94* 95* 93* 92* 99 98 98 97   CO2 mmol/L 30 31 30 32 26 26 26 30   BUN mg/dL 39* 40* 41* 36* 35* 33* 35* 35*   CREATININE mg/dL 1 62* 1 72* 1 68* 1 71* 1 85* 1 66* 1 64* 1 68*   CALCIUM mg/dL 8 8 8 8 9 0 8 9 8 8 8 7 8 3* 8 5   AST U/L  --   --   --   --   --   --   --  18   ALT U/L  --   --   --   --   --   --   --  13   ALK PHOS U/L  --   --   --   --   --   --   --  243*   EGFR ml/min/1 73sq m 38 35 36 35 32 37 37 36       BMP:  Results from last 7 days   Lab Units 05/08/23  0620 05/07/23  0437 05/06/23  0641 05/05/23  1054 05/04/23 0448 05/03/23  0454 05/02/23  2318   SODIUM mmol/L 132* 134* 132* 132* 134* 133* 133*   POTASSIUM mmol/L 3 7 3 7 3 6 3 3* 4 1 4 0 3 4*   CHLORIDE mmol/L 94* 95* 93* 92* 99 98 98   CO2 mmol/L 30 31 30 32 26 26 26   BUN mg/dL 39* 40* 41* 36* 35* 33* 35*   CREATININE mg/dL 1 62* 1 72* 1 68* 1 71* 1 85* 1 66* 1 64*   CALCIUM mg/dL 8 8 8 8 9 0 8 9 8 8 8 7 8 3*       NT-proBNP: No results for input(s): NTBNP in the last 72 hours       Magnesium:   Results from last 7 days   Lab Units 05/02/23  2318   MAGNESIUM mg/dL 1 7*       Coags:       TSH:    Results from last 7 days   Lab Units 05/03/23  0454   TSH 3RD GENERATON uIU/mL 2 892       Hemoglobin A1C )  Results from last 7 days   Lab Units 05/02/23  1203   HEMOGLOBIN A1C % 8 0*       Lipid Profile:   No results found for: CHOL  Lab Results   Component Value Date    HDL 63 03/04/2022    HDL 60 01/31/2018     Lab Results   Component Value Date    LDLCALC 54 03/04/2022    LDLCALC 40 01/31/2018     No results found for: LDLDIRECT  Lab Results   Component Value Date    TRIG 69 03/04/2022    TRIG 81 01/31/2018       Cardiac testing:   EKG personally reviewed by Antolin Mac MD      Cath:  ECHO:  Stress TEST:  Other:        Antolin Mac, "MD    Portions of the record may have been created with voice recognition software  Occasional wrong word or \"sound a like\" substitutions may have occurred due to the inherent limitations of voice recognition software  Read the chart carefully and recognize, using context, where substitutions have occurred          "

## 2023-05-08 NOTE — DISCHARGE SUMMARY
[facility]  Discharge- Mel Cardenas 1935, 82 y o  male MRN: [id number]  Unit/Bed#: S -17 Encounter: [id number]  Primary Care Provider: Wenceslao Roldan MD   Date and time admitted to hospital: 5/2/2023 10:51 AM    * Acute on chronic diastolic congestive heart failure [facility])  Assessment & Plan  Wt Readings from Last 3 Encounters:   05/08/23 79 9 kg (176 lb 2 4 oz)   04/26/23 88 7 kg (195 lb 9 6 oz)   04/13/23 85 7 kg (189 lb)     · On admission presented with progressing shortness of breath dyspnea on exertion and lower extremity swelling  Chest x-ray showed prominent interstitial lung markings with vascular congestion  Recent echocardiogram of July 2022 revealed EF 60% with mild to moderate TR and mild to moderate elevated PA systolic pressures  Repeat echocardiogram showed signs of amyloid  Cardiology was consulted who recommended IV diuresis   · Initially started on IV 40 mg of Lasix, home dose regimen was 40 mg twice daily, he has now been stable on 80 mg 3 times daily  · Creatinine has been stable   · He received 1 dose of metolazone on May 4  This has not been redosed   · Net output 9,265 mL  · Discussed with Cardiology, appreciate input  · Transition to torsemide 60 mg BID      Iron deficiency anemia due to chronic blood loss  Assessment & Plan  · His hemoglobin at baseline has fluctuated between 7 and 8 over the last few months  This is felt to be secondary to colon cancer of which the patient had declined aggressive treatment  He is maintained on Eliquis without symptoms or signs of bleeding      · Continue iron supplements     Primary hypertension  Assessment & Plan  · BP acceptable   · Continue diuresis, losartan, metoprolol and Cardura    Colon adenocarcinoma (HCC)  Assessment & Plan  · Has declined aggressive treatment in the past, is currently following palliative care outpatient    Type 2 diabetes mellitus, without long-term current use of insulin [facility])  Assessment & Plan  Lab Results   Component Value Date    HGBA1C 8 0 (H) 05/02/2023     Recent Labs     05/07/23  1046 05/07/23  1629 05/07/23  2133 05/08/23  0707   POCGLU 223* 138 234* 125       Blood Sugar Average: Last 72 hrs:  · (P) 151 [id number]   · His blood glucose has been acceptable  · Resume metformin    Stage 3 chronic kidney disease [facility])  Assessment & Plan  Lab Results   Component Value Date    EGFR 38 05/08/2023    EGFR 35 05/07/2023    EGFR 36 05/06/2023    CREATININE 1 62 (H) 05/08/2023    CREATININE 1 72 (H) 05/07/2023    CREATININE 1 68 (H) 05/06/2023     · Baseline creatinine 1 5 although has been trending up recently  Initially had rising creatinine with response to diuretics however now has been slowly declining  · Continue to trend creatinine    Atrial fibrillation (HCC)  Assessment & Plan  · Rate controlled   · Continue rate control with metoprolol and anticoagulation with Eliquis    Medical Problems     Resolved Problems  Date Reviewed: 5/8/2023   None       Discharging Physician / Practitioner: Griselda Ruiz PA-C  PCP: Felice Geller MD  Admission Date:   Admission Orders (From admission, onward)     Ordered        05/02/23 [address] [place] [address]  Once                      Discharge Date: 05/08/23    Consultations During Hospital Stay:  · Cardiology    Procedures Performed:   · none    Significant Findings / Test Results:   · Echo 60%    Incidental Findings:   · none     Test Results Pending at Discharge (will require follow up):   · none     Outpatient Tests Requested:  · BMP 1 week    Complications:  None     Reason for Admission: CHF Exacerbation    Hospital Course:   Erica Haro is a 82 y o  male patient who originally presented to the hospital on 5/2/2023 due to SOB  Patient presented with shortness of breath  Was found to be in CHF exacerbation and diuresed with IV Lasix 40 mg twice daily  This regimen was escalated to 80 mg of Lasix 3 times daily  "Diuresed well, no function stable  Cardiology following throughout his stay  There is suspicion for amyloid heart disease however patient declined further work-up at this time  He will be transition to torsemide 60 mg twice daily and will follow-up as an outpatient with cardiology  Please see above list of diagnoses and related plan for additional information  Condition at Discharge: stable    Discharge Day Visit / Exam:   Subjective:   patient examined eating breakfast   He is doing well  Denies shortness of breath  Ready to go home today  Vitals: Blood Pressure: 141/70 (05/08/23 0707)  Pulse: 89 (05/08/23 0707)  Temperature: 97 7 °F (36 5 °C) (05/08/23 0707)  Temp Source: Oral (05/07/23 0600)  Respirations: 18 (05/08/23 0707)  Height: 5' 6\" (167 6 cm) (05/03/23 0843)  Weight - Scale: 79 9 kg (176 lb 2 4 oz) (05/08/23 0600)  SpO2: 95 % (05/08/23 0707)  Exam:   Physical Exam  Vitals and nursing note reviewed  Constitutional:       General: He is not in acute distress  Appearance: Normal appearance  HENT:      Head: Normocephalic  Mouth/Throat:      Mouth: Mucous membranes are moist    Eyes:      Pupils: Pupils are equal, round, and reactive to light  Cardiovascular:      Rate and Rhythm: Normal rate and regular rhythm  Heart sounds: No murmur heard  Pulmonary:      Effort: Pulmonary effort is normal  No respiratory distress  Breath sounds: Normal breath sounds  No wheezing, rhonchi or rales  Abdominal:      General: Bowel sounds are normal  There is no distension  Palpations: Abdomen is soft  Tenderness: There is no abdominal tenderness  There is no guarding  Musculoskeletal:         General: No deformity  Cervical back: Normal range of motion  Right lower leg: No edema  Left lower leg: No edema  Skin:     Capillary Refill: Capillary refill takes less than 2 seconds  Neurological:      General: No focal deficit present        Mental Status: He " is alert and oriented to person, place, and time  Mental status is at baseline  Discussion with Family: Patient declined call to   Discharge instructions/Information to patient and family:   See after visit summary for information provided to patient and family  Provisions for Follow-Up Care:  See after visit summary for information related to follow-up care and any pertinent home health orders  Disposition:   Home    Planned Readmission: no     Discharge Statement:  I spent 45 minutes discharging the patient  This time was spent on the day of discharge  I had direct contact with the patient on the day of discharge  Greater than 50% of the total time was spent examining patient, answering all patient questions, arranging and discussing plan of care with patient as well as directly providing post-discharge instructions  Additional time then spent on discharge activities  Discharge Medications:  See after visit summary for reconciled discharge medications provided to patient and/or family        **Please Note: This note may have been constructed using a voice recognition system**

## 2023-05-09 ENCOUNTER — APPOINTMENT (OUTPATIENT)
Dept: PHYSICAL THERAPY | Facility: CLINIC | Age: 88
End: 2023-05-09
Payer: COMMERCIAL

## 2023-05-09 NOTE — UTILIZATION REVIEW
NOTIFICATION OF ADMISSION DISCHARGE   This is a Notification of Discharge from [facility]  Please be advised that this patient has been discharge from our facility  Below you will find the admission and discharge date and time including the patient’s disposition  UTILIZATION REVIEW CONTACT:  Tessa Tierney MA  Utilization   Network Utilization Review Department  Phone: [phone] x carefully listen to the prompts  All voicemails are confidential   Email: [email or web address]     ADMISSION INFORMATION  PRESENTATION DATE: 5/2/2023 10:51 AM  OBERVATION ADMISSION DATE:   INPATIENT ADMISSION DATE: 5/2/23  1:50 PM   DISCHARGE DATE: 5/8/2023 11:14 AM   DISPOSITION:Home/Self Care    IMPORTANT INFORMATION:  Send all requests for admission clinical reviews, approved or denied determinations and any other requests to dedicated fax number below belonging to the campus where the patient is receiving treatment   List of dedicated fax numbers:  [address] DENIALS (Administrative/Medical Necessity) [phone]   [address] (Maternity/NICU/Pediatrics) [phone]   [facility] [phone]   [facility] 87 [phone]   [address] [phone]   220 [facility] [phone]   90 [facility] [phone]   [address] [place], [place] [address] [phone]   [facility]  [phone]   4053 [facility] [phone]   412 [facility] 850 [facility] [phone]

## 2023-05-10 ENCOUNTER — TRANSITIONAL CARE MANAGEMENT (OUTPATIENT)
Dept: INTERNAL MEDICINE CLINIC | Facility: CLINIC | Age: 88
End: 2023-05-10

## 2023-05-11 ENCOUNTER — OFFICE VISIT (OUTPATIENT)
Dept: INTERNAL MEDICINE CLINIC | Facility: CLINIC | Age: 88
End: 2023-05-11

## 2023-05-11 ENCOUNTER — APPOINTMENT (OUTPATIENT)
Dept: PHYSICAL THERAPY | Facility: CLINIC | Age: 88
End: 2023-05-11
Payer: COMMERCIAL

## 2023-05-11 VITALS
OXYGEN SATURATION: 96 % | WEIGHT: 176.8 LBS | HEART RATE: 108 BPM | BODY MASS INDEX: 28.54 KG/M2 | SYSTOLIC BLOOD PRESSURE: 110 MMHG | TEMPERATURE: 97.4 F | DIASTOLIC BLOOD PRESSURE: 50 MMHG

## 2023-05-11 DIAGNOSIS — D63.1 ANEMIA DUE TO CHRONIC KIDNEY DISEASE, UNSPECIFIED CKD STAGE: ICD-10-CM

## 2023-05-11 DIAGNOSIS — I10 PRIMARY HYPERTENSION: ICD-10-CM

## 2023-05-11 DIAGNOSIS — I48.91 ATRIAL FIBRILLATION, UNSPECIFIED TYPE (HCC): ICD-10-CM

## 2023-05-11 DIAGNOSIS — N18.9 ANEMIA DUE TO CHRONIC KIDNEY DISEASE, UNSPECIFIED CKD STAGE: ICD-10-CM

## 2023-05-11 DIAGNOSIS — I50.33 ACUTE ON CHRONIC DIASTOLIC CONGESTIVE HEART FAILURE (HCC): Primary | ICD-10-CM

## 2023-05-11 DIAGNOSIS — R26.2 AMBULATORY DYSFUNCTION: ICD-10-CM

## 2023-05-11 NOTE — PROGRESS NOTES
INTERNAL MEDICINE TRANSITION OF CARE OFFICE VISIT  [facility] INTERNAL MEDICINE [place]    NAME: Ana Huber  AGE: 82 y o  SEX: male  : 1935     DATE: 2023     Assessment and Plan:     1  Acute on chronic diastolic congestive heart failure (HCC)  Assessment & Plan:  Wt Readings from Last 3 Encounters:   23 80 2 kg (176 lb 12 8 oz)   23 79 9 kg (176 lb 2 4 oz)   23 88 7 kg (195 lb 9 6 oz)     Patient was recently admitted to the hospital with acute exacerbation of chronic diastolic heart failure  Initially presented with shortness of breath and bilateral lower extremity swelling  Home diuretic dose was increased to torsemide 60 twice daily  Patient is compliant with the medication  No complaints  SOB has improved  Still having +1 ankle edema bilaterally  No orthopnea or PND  Plan  Continue torsemide 60 twice daily  Restrict fluids to 1 5 L/day  Salt restriction  Take extra dose of torsemide 40 mg if weight increase > 3 pounds/3 days  Following up with cardiology  2  Atrial fibrillation, unspecified type [facility])  Assessment & Plan:  Heart rate: 92 , irregularly irregular  Denies chest pain, SOB or palpitations  No active bleeding  Plan  Continue Eliquis and metoprolol tartrate  Follow-up with cardiology      3  Primary hypertension  Assessment & Plan:  Blood pressure is well controlled with medications  Plan  Continue losartan 50 mg  Continue Lopressor and torsemide  Measure blood pressure at home  4  Anemia due to chronic kidney disease, unspecified CKD stage  Assessment & Plan:  Likely secondary to CKD and underlying colon cancer  Patient is on Eliquis, but no signs of active bleeding  Most recent Hb: 8 1, 1-week ago  Plan  Continue iron supplements  5  Ambulatory dysfunction  Assessment & Plan:  Patient complains of difficulty with balance while walking  Likely secondary to neuropathy    Plan  Ambulatory referral to PT     Orders:  -     Ambulatory Referral to Physical Therapy; Future       Counseling:     · Medication Side Effects: Adverse side effects of medications were reviewed with the patient/guardian today  · I have spent 30 minutes with Patient and family today in which greater than 50% of this time was spent in counseling/coordination of care regarding Diagnostic results, Instructions for management, Patient and family education, Importance of tx compliance, Risk factor reductions, Counseling / Coordination of care, Documenting in the medical record, Reviewing / ordering tests, medicine, procedures   and Obtaining or reviewing history    · Barriers to treatment include: No identified barriers     Transitional Care Management Review:     Tate Justin is a 82 y o  male here for TCM follow-up    During the TCM phone call patient stated:    TCM Call     Date and time call was made  5/10/2023 10:40 AM    Hospital care reviewed  Records not available    Patient was hospitialized at  [facility]    Date of Admission  05/02/23    Date of discharge  05/08/23    Diagnosis  chf    Disposition  Home    Were the patients medications reviewed and updated  Yes    Current Symptoms  Weakness    Right side arm pain severity  Moderate    Arm pain, right side, onset  Ongoing      TCM Call     Post hospital issues  Reduced activity    Should patient be enrolled in anticoag monitoring? No    Scheduled for follow up?   Yes    Patients specialists  Other (comment)    Other specialists names  Colon Rectal Surgery    Did you obtain your prescribed medications  Yes    Do you need help managing your prescriptions or medications  Yes    Why type of assitance do you need  Has help from spouse managing medications    Is transportation to your appointment needed  No    Living Arrangements  Family members    Support System  Family    The type of support provided  Physical    Do you have social support  Yes, as much as I need    Are you recieving any outpatient services  No    Are you recieving home care services  No    Are you using any community resources  No    Current waiver services  No    Have you fallen in the last 12 months  Yes    How many times  1    Interperter language line needed  No    Counseling  Family    Counseling topics  Activities of daily living           HPI:       Mr Malena Sabillon is a 82year old male with PMH of chronic diastolic heart failure, HTN, DM-2, CKD-3, atrial fibrillation, iron deficiency anemia and colon adenocarcinoma  He was admitted to the hospital 1 week ago for acute exacerbation of diastolic heart failure  Patient presented to the hospital with SOB and bilateral ankle edema  Patient was initially treated with IV Lasix 40 mg twice daily, later the regimen was escalated to 80 mg IV 3 times daily  Patient diuresed well and lost 20 pounds  There was suspicion for amyloid heart disease, but the patient refused further work-up  Echocardiogram showed ejection fraction of 60%, Unable to assess diastolic function due to atrial fibrillation  Patient was initially on torsemide 40 twice daily home regimen  It was increased to torsemide 60 twice daily on discharge  On today's visit patient does not have any complaints  SOB has improved  Denies orthopnea, paroxysmal nocturnal dyspnea  Patient still has +1 edema in bilateral ankles  Lungs clear on examination  No JVD  Patient is compliant with medications  He is on 1 5 L fluid restriction  Also on his salt restriction  Following up with cardiology outpatient      The following portions of the patient's history were reviewed and updated as appropriate: allergies, current medications, past family history, past medical history, past social history, past surgical history and problem list      Review of Systems:     Review of Systems     Problem List:     Patient Active Problem List   Diagnosis   • Acute on chronic diastolic congestive heart failure ([facility] [address] ) • Atrial fibrillation (HCC)   • Anemia   • Stage 3 chronic kidney disease (HCC)   • Type 2 diabetes mellitus, without long-term current use of insulin (HCC)   • Colon adenocarcinoma (HCC)   • Cognitive dysfunction   • Gastritis   • Primary hypertension   • Esophageal reflux   • Hypercalcemia   • Hypercholesterolemia   • 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   • Shortness of breath   • Iron deficiency anemia due to chronic blood loss   • Seasonal allergic rhinitis   • Ambulatory dysfunction   • Humerus fracture   • Bilateral lower extremity edema   • Constipation        Objective:     /50 (BP Location: Right arm, Patient Position: Sitting, Cuff Size: Large)   Pulse (!) 108   Temp (!) 97 4 °F (36 3 °C)   Wt 80 2 kg (176 lb 12 8 oz)   SpO2 96%   BMI 28 54 kg/m²     Physical Exam    Laboratory Results: I have personally reviewed the pertinent laboratory results/reports     Radiology/Other Diagnostic Testing Results: I have personally reviewed pertinent reports  XR chest 1 view portable    Result Date: 5/2/2023  CHEST INDICATION:   sob  COMPARISON: Chest radiograph April 27, 2023 EXAM PERFORMED/VIEWS:  XR CHEST PORTABLE FINDINGS: Cardiomediastinal silhouette appears unremarkable  Bibasilar linear atelectasis  Increased prominence of the interstitial markings  No pleural effusion or pneumothorax  No acute osseous abnormality identified within limitations of portable radiography,     Possible pulmonary vascular congestion  Workstation performed: [id number]     Echo complete    Result Date: 5/3/2023  •  Left Ventricle: Left ventricular cavity size is normal  Wall thickness is severely increased  There is severe concentric hypertrophy  The left ventricular ejection fraction is 60%  Systolic function is normal  Wall motion is normal  Unable to assess diastolic function due to atrial fibrillation, but does follow a restrictive pattern with a significantly elevated E and very low e' velocity  •  IVS: There is both systolic and diastolic flattening of the interventricular septum consistent with right ventricle pressure and volume overload  •  Right Ventricle: Right ventricular cavity size is dilated  Systolic function is mildly reduced  Wall thickness is increased with significantly increased trabeculation in the mid to apical wall, 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  •  Left Atrium: The atrium is dilated  •  Right Atrium: The atrium is dilated  •  Mitral Valve: There is mild regurgitation  •  Tricuspid Valve: There is severe regurgitation  The right ventricular systolic pressure is severely elevated  The estimated right ventricular systolic pressure is 03 70 mmHg  •  Aorta: The aortic root is normal in size  The aortic root exhibited moderate fibrocalcific change  Pattern of left ventricular findings suggest a diagnosis of amyloidosis, clinical correlation advised  Right ventricular findings 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, clinical correlation and possible cardiac MRI suggested for further evaluation and clarification          Current Medications:     Outpatient Medications Prior to Visit   Medication Sig Dispense Refill   • apixaban (ELIQUIS) 2 5 mg Take 1 tablet (2 5 mg total) by mouth 2 (two) times a day 60 tablet 0   • doxazosin (CARDURA) 4 mg tablet Take 1 tablet (4 mg total) by mouth daily at bedtime 30 tablet 0   • ferrous sulfate 324 (65 Fe) mg Take 1 tablet (324 mg total) by mouth every other day 90 tablet 0   • losartan (COZAAR) 50 mg tablet Take 1 tablet (50 mg total) by mouth daily 90 tablet 1   • metFORMIN (GLUCOPHAGE) 1000 MG tablet TAKE ONE TABLET BY MOUTH TWICE A DAY WITH MEALS 180 tablet 0   • metoprolol tartrate (LOPRESSOR) 50 mg tablet Take 1 tablet (50 mg total) by mouth every 12 (twelve) hours 60 tablet 2   • potassium chloride (K-DUR,KLOR-CON) 20 mEq tablet Take 2 tablets (40 mEq total) by mouth daily 60 tablet 0   • pravastatin (PRAVACHOL) 40 mg tablet TAKE ONE TABLET BY MOUTH EVERY DAY 90 tablet 1   • torsemide (DEMADEX) 20 mg tablet Take 3 tablets (60 mg total) by mouth 2 (two) times a day 180 tablet 0   • pantoprazole (PROTONIX) 40 mg tablet TAKE ONE TABLET BY MOUTH TWICE A DAY BEFORE MEALS (Patient taking differently: As needed) 60 tablet 1     No facility-administered medications prior to visit         [address] [facility] Scarlet Hernandez MD  [facility] INTERNAL MEDICINE Yobani Concepcion

## 2023-05-11 NOTE — ASSESSMENT & PLAN NOTE
Patient complains of difficulty with balance while walking  Likely secondary to neuropathy    Plan  Ambulatory referral to PT

## 2023-05-11 NOTE — ASSESSMENT & PLAN NOTE
Likely secondary to CKD and underlying colon cancer  Patient is on Eliquis, but no signs of active bleeding  Most recent Hb: 8 1, 1-week ago  Plan  Continue iron supplements

## 2023-05-11 NOTE — ASSESSMENT & PLAN NOTE
Wt Readings from Last 3 Encounters:   05/11/23 80 2 kg (176 lb 12 8 oz)   05/08/23 79 9 kg (176 lb 2 4 oz)   04/26/23 88 7 kg (195 lb 9 6 oz)     Patient was recently admitted to the hospital with acute exacerbation of chronic diastolic heart failure  Initially presented with shortness of breath and bilateral lower extremity swelling  Home diuretic dose was increased to torsemide 60 twice daily  Patient is compliant with the medication  No complaints  SOB has improved  Still having +1 ankle edema bilaterally  No orthopnea or PND  Plan  Continue torsemide 60 twice daily  Restrict fluids to 1 5 L/day  Salt restriction  Take extra dose of torsemide 40 mg if weight increase > 3 pounds/3 days  Following up with cardiology

## 2023-05-11 NOTE — ASSESSMENT & PLAN NOTE
Heart rate: 92 , irregularly irregular  Denies chest pain, SOB or palpitations  No active bleeding  Plan  Continue Eliquis and metoprolol tartrate    Follow-up with cardiology

## 2023-05-11 NOTE — ASSESSMENT & PLAN NOTE
Blood pressure is well controlled with medications  Plan  Continue losartan 50 mg  Continue Lopressor and torsemide  Measure blood pressure at home

## 2023-05-12 ENCOUNTER — OFFICE VISIT (OUTPATIENT)
Dept: PHYSICAL THERAPY | Facility: CLINIC | Age: 88
End: 2023-05-12

## 2023-05-12 DIAGNOSIS — S42.291D CLOSED FRACTURE OF HEAD OF RIGHT HUMERUS WITH ROUTINE HEALING, SUBSEQUENT ENCOUNTER: Primary | ICD-10-CM

## 2023-05-12 DIAGNOSIS — G89.29 CHRONIC RIGHT SHOULDER PAIN: ICD-10-CM

## 2023-05-12 DIAGNOSIS — M25.511 CHRONIC RIGHT SHOULDER PAIN: ICD-10-CM

## 2023-05-12 NOTE — PROGRESS NOTES
Daily Note and Discharge    Today's date: 2023  Patient name: Tia Nicole  : 1935  MRN: [id number]  Referring provider: Wale De La Fuente DO  Dx:   Encounter Diagnosis     ICD-10-CM    1  Closed fracture of head of right humerus with routine healing, subsequent encounter  S42 291D       2  Chronic right shoulder pain  M25 511     G89 29           Start Time: 915  Stop Time: 955  Total time in clinic (min): 40 minutes    Subjective: Patient reports that his shoulder is feeling better for the most part  He feels like his motion has improved, but he is still having some pain  He has a script for balance and would like to end shoulder treatment at this time and focus on balance and LE strengthening  Objective: See treatment diary below  See previous re-evaluation  Assessment: Tolerated treatment well  Patient has been able to demonstrate improvements in shoulder ROM and functional use  He has less pain overall, but still bothersome at times  He was recently hospitalized for an exacerbation of CHF and was given a script for physical therapy  He would like to be evaluated for his balance and work on that moving forward  He will be discharged from PT for his shoulder at this time  He will continue with HEP for his shoulder  Plan: Patient will be discharged from PT for his shoulder at this time  He will be evaluated for his balance as he would like to focus on this at this time        Diagnosis: s/p R humeral head fracture 23   Precautions: T2BM, heart failure, kidney failure, a-fib, HTN, fall risk   Primary Goals: R shoulder A/PROM, strength   *asterisks by exercise = given for HEP   Manuals    R shoulder PROM LB SK LB LB LB   R shoulder joint mobs gr 3 LB SK LB LB LB                           There Ex        pulleys 5 mins flexion/scaption 5 min  Flexion/scaption  5 min  Flexion/scaption  5 min  Flexion/scaption 5 mins flexion/scaption   Table slides* 20x flexion  20x scaption 20x flexion  20x scaption 20x flexion  20x scaption 20x flexion  20x scaption 20x flexion  20x scaption   Cane AAROM        IR strap stretch        TB rows                                        Neuro Re-Ed        scap retraction                                                                                         Education   POC moving forward      Re-evaluation   LB       Ther Act                              Modalities          Heat PRN deferred deferred

## 2023-05-15 ENCOUNTER — OFFICE VISIT (OUTPATIENT)
Dept: CARDIOLOGY CLINIC | Facility: CLINIC | Age: 88
End: 2023-05-15

## 2023-05-15 VITALS
SYSTOLIC BLOOD PRESSURE: 136 MMHG | HEART RATE: 93 BPM | HEIGHT: 66 IN | WEIGHT: 177 LBS | DIASTOLIC BLOOD PRESSURE: 60 MMHG | OXYGEN SATURATION: 97 % | BODY MASS INDEX: 28.45 KG/M2

## 2023-05-15 DIAGNOSIS — I48.91 ATRIAL FIBRILLATION, UNSPECIFIED TYPE (HCC): ICD-10-CM

## 2023-05-15 DIAGNOSIS — I50.33 ACUTE ON CHRONIC DIASTOLIC CONGESTIVE HEART FAILURE (HCC): ICD-10-CM

## 2023-05-15 DIAGNOSIS — E78.00 HYPERCHOLESTEROLEMIA: ICD-10-CM

## 2023-05-15 DIAGNOSIS — I50.32 CHRONIC DIASTOLIC CONGESTIVE HEART FAILURE (HCC): ICD-10-CM

## 2023-05-15 DIAGNOSIS — N18.31 STAGE 3A CHRONIC KIDNEY DISEASE (HCC): ICD-10-CM

## 2023-05-15 DIAGNOSIS — Z09 HOSPITAL DISCHARGE FOLLOW-UP: Primary | ICD-10-CM

## 2023-05-15 DIAGNOSIS — I48.21 PERMANENT ATRIAL FIBRILLATION (HCC): ICD-10-CM

## 2023-05-15 DIAGNOSIS — R06.09 DOE (DYSPNEA ON EXERTION): ICD-10-CM

## 2023-05-15 DIAGNOSIS — E11.9 TYPE 2 DIABETES MELLITUS, WITHOUT LONG-TERM CURRENT USE OF INSULIN (HCC): ICD-10-CM

## 2023-05-15 DIAGNOSIS — I10 PRIMARY HYPERTENSION: ICD-10-CM

## 2023-05-15 RX ORDER — LOSARTAN POTASSIUM 50 MG/1
50 TABLET ORAL DAILY
Qty: 90 TABLET | Refills: 1 | Status: SHIPPED | OUTPATIENT
Start: 2023-05-15 | End: 2023-11-11

## 2023-05-15 RX ORDER — POTASSIUM CHLORIDE 20 MEQ/1
40 TABLET, EXTENDED RELEASE ORAL DAILY
Qty: 180 TABLET | Refills: 1 | Status: SHIPPED | OUTPATIENT
Start: 2023-05-15 | End: 2023-05-16 | Stop reason: SDUPTHER

## 2023-05-15 RX ORDER — TORSEMIDE 20 MG/1
60 TABLET ORAL 2 TIMES DAILY
Qty: 180 TABLET | Refills: 3 | Status: SHIPPED | OUTPATIENT
Start: 2023-05-15 | End: 2023-05-16 | Stop reason: SDUPTHER

## 2023-05-15 RX ORDER — PANTOPRAZOLE SODIUM 40 MG/1
40 TABLET, DELAYED RELEASE ORAL DAILY
COMMUNITY

## 2023-05-15 NOTE — PROGRESS NOTES
Cardiology  Heart Failure   Follow Up Office Visit Note      Tia Nicole   82 y o    male   MRN: [id number]  [address]  [address]  [address] [place] [address] Kira Louis  [id number] [phone]    PCP: George Rodas MD  Cardiologist : will be Dr Aziza Guillen              Summary of Recommendations  Low-sodium diet, Heart failure education as below  I reviewed how to read food labels to facilitate adherence  His significant other tells me he laughs when advised to be on a low-salt diet   BMP, magnesium, proBNP  Has been referred to Palliative care by several people in the past   We will try to facilitate an appointment today  Follow-up with me 1 to 2 weeks  Follow up will be scheduled with Dr Aziza Guillen in a month  Colon Ca screenin2022, up-to-date        Impression/plan  Chronic HFpEF, recent adm -23  · Echocardiogram suggestive of amyloidosis  · would not pursue a more aggressive diastolic heart failure work-up to include possibility of amyloidosis on account of medical issues, palliative decisions  Wt Readings from Last 3 Encounters:   05/15/23 80 3 kg (177 lb)   23 80 2 kg (176 lb 12 8 oz)   23 79 9 kg (176 lb 2 4 oz)     23:  195 LB  --beta-blocker:   Metoprolol tartrate 50 mg every 12  --Diuretic:   Torsemide 60 mg every 12 starting 2023 + potassium 40 meq daily  Prior to admission was on torsemide 40 twice daily  --ACE/ARB/ARNI:   Losartan 50 mg daily  --MRA:   --SLGT2I  --2 g sodium diet, 1800 cc fluid restriction  Daily weights, call weight gain 2-3 lb in 1 day or 5 lb in 5 days  Permanent atrial fibrillation  · Rate controlled with metoprolol tartrate 50 mg every 12     · On oral AC with Eliquis 2 5 mg every 12  · Type 2 diabetes mellitus  23: Hemoglobin A1c 8 0 , on metformin  Hypertension, essential  BP  136/60   On doxazosin, losartan 50 mg daily, Toprol tartrate 50 mg every 12, loop diuretic  CKD3   baseline Cr 1 5,lastly 1 62  Hyperlipidemia  Pravastatin 40 mg daily   Latest Reference Range & Units 01/31/18 07:54 03/04/22 07:32   Cholesterol See Comment mg/dL 116 131   Triglycerides See Comment mg/dL 81 69   HDL >=40 mg/dL 60 63   LDL Calculated 0 - 100 mg/dL 40 54   Colon cancer, untreated per patient's wishes  He has been referred to palliative care  First appointment pending  Chronic anemia  Hemoglobin around 8 1  Cardiac testing  • TTE 5/3/23 LVEF 60%  Wall thickness is severely increased  There is severe concentric hypertrophy  No WMA  Unable to assess diastolic function due to atrial fibrillation, but does follow a restrictive pattern with a significantly elevated E and very low e' velocity  IVS: There is both systolic and diastolic flattening of the interventricular septum consistent with right ventricle pressure and volume overload  RV dilated, systolic function mildly reduced  Wall thickness is increased with significantly increased trabeculation in the mid to apical wall, 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  JOSE MARIA  Mild MR  Severe TR  PASP 66 mm HgThe aortic root is normal in size  The aortic root exhibited moderate fibrocalcific change  Pattern of left ventricular findings suggest a diagnosis of amyloidosis, clinical correlation advised  Right ventricular findings 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, clinical correlation and possible cardiac MRI suggested for further evaluation and clarification                        HPI:   Jammie Webb is an 83 yo male with chronic HFpEF,  permanent atrial fibrillation, hypertension, dyslipidemia and colon cancer which he has chosen not to treat  Maintenance diuretic: Torsemide 40 mg twice daily      Adm 5/2-5/8/23  CC: Shortness of breath, and worsening lower extremity edema at least 1 month  Found to be volume overload  Diuresed with furosemide 80 mg 3 times daily    20 pound weight loss  Maintained persistent A-fib, rate controlled  Followed by cardiology  Echo  Discharge weight : 176 lb  Discharge creatinine: 1 62  Discharge diuretics: Torsemide 60 mg twice daily  Previously 40 twice daily  Patient declines treatment for his colon CA  Cardiology notes would not pursue a more aggressive diastolic heart failure work-up to include possibility of amyloidosis on account of medical issues, palliative decisions    5/15/23  (PMH: chroinc HFpEF, chronic A fib, HTN, HL, CKD3, colon CA- untreated, by choice  Recent adm HF)  Hospital follow-up  He is accompanied by his significant other with whom he resides  She adds to the history  Review of systems: He reports some days his shortness of breath is good some days not so good  He is not adhering to a salt restricted diet  He has been sleeping in a chair, chronically given chronic back problems  He denies any current PND orthopnea  He has chronic lower extremity edema which is improved compared to prior to his hospitalization but is present to some degree today  Weight stable at 177 pounds  Blood pressure stable 136/60  We will help facilitate an appointment to palliative care, and follow-up with me in 2 weeks, Dr Kimberly You 1 month  We will schedule follow-up labs in the near future  I reiterated the importance of adhering to a salt restricted diet        I have spent 40 minutes with Patient and family today in which greater than 50% of this time was spent in counseling/coordination of care regarding Diagnostic results, Instructions for management, Patient and family education, Importance of tx compliance, Risk factor reductions, Documenting in the medical record, Reviewing / ordering tests, medicine, procedures   and Obtaining or reviewing history    Assessment  Diagnoses and all orders for this visit:    Hospital discharge follow-up    Chronic diastolic congestive heart failure (HCC)  -     potassium chloride (K-DUR,KLOR-CON) 20 mEq tablet;  Take 2 tablets (40 mEq total) by mouth daily  -     losartan (COZAAR) 50 mg tablet; Take 1 tablet (50 mg total) by mouth daily    Hypercholesterolemia    Stage 3a chronic kidney disease (HCC)    Primary hypertension    Permanent atrial fibrillation (HCC)  -     POCT ECG    POLK (dyspnea on exertion)  -     Basic metabolic panel; Future  -     NT-BNP PRO; Future    Acute on chronic diastolic congestive heart failure (HCC)  -     torsemide (DEMADEX) 20 mg tablet; Take 3 tablets (60 mg total) by mouth 2 (two) times a day    Atrial fibrillation, unspecified type (HCC)  -     apixaban (ELIQUIS) 2 5 mg; Take 1 tablet (2 5 mg total) by mouth 2 (two) times a day    Type 2 diabetes mellitus, without long-term current use of insulin (HCC)  -     losartan (COZAAR) 50 mg tablet; Take 1 tablet (50 mg total) by mouth daily    Other orders  -     pantoprazole (PROTONIX) 40 mg tablet; Take 40 mg by mouth daily        Past Medical History:   Diagnosis Date   • A-fib [facility])    • Colon cancer ([facility] [address] )    • Diastolic heart failure (HCC)    • Flu-like symptoms 12/13/2022   • HTN (hypertension)    • Postviral syndrome 01/26/2023       Review of Systems   Constitutional: Negative for chills  Cardiovascular: Negative for chest pain, claudication, cyanosis, dyspnea on exertion, irregular heartbeat, leg swelling, near-syncope, orthopnea, palpitations, paroxysmal nocturnal dyspnea and syncope  Respiratory: Negative for cough and shortness of breath  Gastrointestinal: Negative for heartburn and nausea  Neurological: Negative for dizziness, focal weakness, headaches, light-headedness and weakness  All other systems reviewed and are negative  No Known Allergies        Current Outpatient Medications:   •  apixaban (ELIQUIS) 2 5 mg, Take 1 tablet (2 5 mg total) by mouth 2 (two) times a day, Disp: 180 tablet, Rfl: 1  •  doxazosin (CARDURA) 4 mg tablet, Take 1 tablet (4 mg total) by mouth daily at bedtime, Disp: 30 tablet, Rfl: 0  •  ferrous sulfate 324 (65 Fe) mg, Take 1 tablet (324 mg total) by mouth every other day, Disp: 90 tablet, Rfl: 0  •  losartan (COZAAR) 50 mg tablet, Take 1 tablet (50 mg total) by mouth daily, Disp: 90 tablet, Rfl: 1  •  metFORMIN (GLUCOPHAGE) 1000 MG tablet, TAKE ONE TABLET BY MOUTH TWICE A DAY WITH MEALS, Disp: 180 tablet, Rfl: 0  •  metoprolol tartrate (LOPRESSOR) 50 mg tablet, Take 1 tablet (50 mg total) by mouth every 12 (twelve) hours, Disp: 60 tablet, Rfl: 2  •  pantoprazole (PROTONIX) 40 mg tablet, Take 40 mg by mouth daily, Disp: , Rfl:   •  potassium chloride (K-DUR,KLOR-CON) 20 mEq tablet, Take 2 tablets (40 mEq total) by mouth daily, Disp: 180 tablet, Rfl: 1  •  pravastatin (PRAVACHOL) 40 mg tablet, TAKE ONE TABLET BY MOUTH EVERY DAY, Disp: 90 tablet, Rfl: 1  •  torsemide (DEMADEX) 20 mg tablet, Take 3 tablets (60 mg total) by mouth 2 (two) times a day, Disp: 180 tablet, Rfl: 3    Social History     Socioeconomic History   • Marital status: /Civil Union     Spouse name: Not on file   • Number of children: Not on file   • Years of education: Not on file   • Highest education level: Not on file   Occupational History   • Not on file   Tobacco Use   • Smoking status: Former     Types: Cigarettes     Start date: 1953     Quit date: 1966     Years since quittin 2     Passive exposure: Past   • Smokeless tobacco: Never   Vaping Use   • Vaping Use: Never used   Substance and Sexual Activity   • Alcohol use: Yes     Comment: Rarely   • Drug use: Never   • Sexual activity: Not on file   Other Topics Concern   • Not on file   Social History Narrative   • Not on file     Social Determinants of Health     Financial Resource Strain: Low Risk    • Difficulty of Paying Living Expenses: Not hard at all   Food Insecurity: No Food Insecurity   • Worried About Running Out of Food in the Last Year: Never true   • Ran Out of Food in the Last Year: Never true   Transportation Needs: No Transportation Needs   • Lack of Transportation (Medical):  No   • Lack of Transportation "(Non-Medical): No   Physical Activity: Inactive   • Days of Exercise per Week: 0 days   • Minutes of Exercise per Session: 0 min   Stress: No Stress Concern Present   • Feeling of Stress : Not at all   Social Connections: Unknown   • Frequency of Communication with Friends and Family: Three times a week   • Frequency of Social Gatherings with Friends and Family: Three times a week   • Attends Yazidism Services: Never   • Active Member of Clubs or Organizations: Yes   • Attends Club or Organization Meetings: Never   • Marital Status: Not on file   Intimate Partner Violence: Not At Risk   • Fear of Current or Ex-Partner: No   • Emotionally Abused: No   • Physically Abused: No   • Sexually Abused: No   Housing Stability: Low Risk    • Unable to Pay for Housing in the Last Year: No   • Number of Places Lived in the Last Year: 1   • Unstable Housing in the Last Year: No       Family History   Problem Relation Age of Onset   • Colon cancer Neg Hx        Physical Exam  Vitals and nursing note reviewed  Constitutional:       General: He is not in acute distress  HENT:      Head: Normocephalic and atraumatic  Eyes:      Conjunctiva/sclera: Conjunctivae normal    Cardiovascular:      Rate and Rhythm: Normal rate  Rhythm irregularly irregular  Pulses: Intact distal pulses  Heart sounds: Normal heart sounds  Pulmonary:      Effort: Pulmonary effort is normal       Breath sounds: Normal breath sounds  Abdominal:      General: Bowel sounds are normal       Palpations: Abdomen is soft  Musculoskeletal:         General: Normal range of motion  Cervical back: Normal range of motion and neck supple  Skin:     General: Skin is warm and dry  Neurological:      Mental Status: He is alert and oriented to person, place, and time  Vitals: Blood pressure 136/60, pulse 93, height 5' 6\" (1 676 m), weight 80 3 kg (177 lb), SpO2 97 %     Wt Readings from Last 3 Encounters:   05/15/23 80 3 kg (177 lb) " 05/11/23 80 2 kg (176 lb 12 8 oz)   05/08/23 79 9 kg (176 lb 2 4 oz)         Labs & Results:  Lab Results   Component Value Date    WBC 6 47 05/06/2023    HGB 8 1 (L) 05/06/2023    HCT 27 1 (L) 05/06/2023    MCV 77 (L) 05/06/2023     05/06/2023     BNP   Date Value Ref Range Status   05/02/2023 1,397 (H) 0 - 100 pg/mL Final     No components found for: CHEM    No results found for this or any previous visit  No results found for this or any previous visit  This note was completed in part utilizing [employer] direct voice recognition software  Grammatical errors, random word insertion, spelling mistakes, and incomplete sentences may be an occasional consequence of the system secondary to software limitations, ambient noise and hardware issues  At the time of dictation, efforts were made to edit, clarify and /or correct errors  Please read the chart carefully and recognize, using context, where substitutions have occurred    If you have any questions or concerns about the context, text or information contained within the body of this dictation, please contact myself, the provider, for further clarification

## 2023-05-15 NOTE — LETTER
May 15, 2023     Leigh Ceballos MD  [id number]    Patient: Diann Mosley   YOB: 1935   Date of Visit: 5/15/2023       Dear Dr Darwin Canales: Thank you for referring Kym Crawford to me for evaluation  Below are my notes for this consultation  If you have questions, please do not hesitate to call me  I look forward to following your patient along with you  Sincerely,        JESUS Jerry        CC: No Recipients  JESUS Jerry  5/15/2023 12:07 PM  Sign when Signing Visit  Cardiology  Heart Failure   Follow Up Office Visit Note      Diann Mosley   82 y o    male   MRN: [id number]  [address]  [address] [facility] [address] [address]  [phone]  [phone]    PCP: Leigh Ceballos MD  Cardiologist : will be Dr Naresh Stark              Summary of Recommendations  Low-sodium diet, Heart failure education as below  I reviewed how to read food labels to facilitate adherence  His significant other tells me he laughs when advised to be on a low-salt diet   BMP, magnesium, proBNP  Has been referred to Palliative care by several people in the past   We will try to facilitate an appointment today  Follow-up with me 1 to 2 weeks  Follow up will be scheduled with Dr Naresh Stark in a month  Colon Ca screenin2022, up-to-date        Impression/plan  Chronic HFpEF, recent adm -23  · Echocardiogram suggestive of amyloidosis  · would not pursue a more aggressive diastolic heart failure work-up to include possibility of amyloidosis on account of medical issues, palliative decisions  Wt Readings from Last 3 Encounters:   05/15/23 80 3 kg (177 lb)   23 80 2 kg (176 lb 12 8 oz)   23 79 9 kg (176 lb 2 4 oz)     23:  195 LB  --beta-blocker:   Metoprolol tartrate 50 mg every 12  --Diuretic:   Torsemide 60 mg every 12 starting 2023 + potassium 40 meq daily    Prior to admission was on torsemide 40 twice daily  --ACE/ARB/ARNI:   Losartan 50 mg daily  --MRA:   --SLGT2I  --2 g sodium diet, 1800 cc fluid restriction  Daily weights, call weight gain 2-3 lb in 1 day or 5 lb in 5 days  Permanent atrial fibrillation  · Rate controlled with metoprolol tartrate 50 mg every 12     · On oral AC with Eliquis 2 5 mg every 12  · Type 2 diabetes mellitus  5/2/23: Hemoglobin A1c 8 0 , on metformin  Hypertension, essential  BP  136/60   On doxazosin, losartan 50 mg daily, Toprol tartrate 50 mg every 12, loop diuretic  CKD3  baseline Cr 1 5,lastly  1 62  Hyperlipidemia  Pravastatin 40 mg daily   Latest Reference Range & Units 01/31/18 07:54 03/04/22 07:32   Cholesterol See Comment mg/dL 116 131   Triglycerides See Comment mg/dL 81 69   HDL >=40 mg/dL 60 63   LDL Calculated 0 - 100 mg/dL 40 54   Colon cancer, untreated per patient's wishes  He has been referred to palliative care  First appointment pending  Chronic anemia  Hemoglobin around 8 1  Cardiac testing  • TTE 5/3/23 LVEF 60%  Wall thickness is severely increased  There is severe concentric hypertrophy  No WMA  Unable to assess diastolic function due to atrial fibrillation, but does follow a restrictive pattern with a significantly elevated E and very low e' velocity  IVS: There is both systolic and diastolic flattening of the interventricular septum consistent with right ventricle pressure and volume overload  RV dilated, systolic function mildly reduced  Wall thickness is increased with significantly increased trabeculation in the mid to apical wall, 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  JOSE MARIA  Mild MR  Severe TR  PASP 66 mm HgThe aortic root is normal in size  The aortic root exhibited moderate fibrocalcific change  Pattern of left ventricular findings suggest a diagnosis of amyloidosis, clinical correlation advised    Right ventricular findings 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, clinical correlation and possible cardiac MRI suggested for further evaluation and clarification                        HPI:   Kristy Tierney is an 81 yo male with chronic HFpEF,  permanent atrial fibrillation, hypertension, dyslipidemia and colon cancer which he has chosen not to treat  Maintenance diuretic: Torsemide 40 mg twice daily      Adm 5/2-5/8/23  CC: Shortness of breath, and worsening lower extremity edema at least 1 month  Found to be volume overload  Diuresed with furosemide 80 mg 3 times daily  20 pound weight loss  Maintained persistent A-fib, rate controlled  Followed by cardiology  Echo  Discharge weight : 176 lb  Discharge creatinine: 1 62  Discharge diuretics: Torsemide 60 mg twice daily  Previously 40 twice daily  Patient declines treatment for his colon CA  Cardiology notes would not pursue a more aggressive diastolic heart failure work-up to include possibility of amyloidosis on account of medical issues, palliative decisions    5/15/23  (PMH: chroinc HFpEF, chronic A fib, HTN, HL, CKD3, colon CA- untreated, by choice  Recent adm HF)  Hospital follow-up  He is accompanied by his significant other with whom he resides  She adds to the history  Review of systems: He reports some days his shortness of breath is good some days not so good  He is not adhering to a salt restricted diet  He has been sleeping in a chair, chronically given chronic back problems  He denies any current PND orthopnea  He has chronic lower extremity edema which is improved compared to prior to his hospitalization but is present to some degree today  Weight stable at 177 pounds  Blood pressure stable 136/60  We will help facilitate an appointment to palliative care, and follow-up with me in 2 weeks, Dr Jae Sanders 1 month  We will schedule follow-up labs in the near future    I reiterated the importance of adhering to a salt restricted diet        I have spent 40 minutes with Patient and family today in which greater than 50% of this time was spent in counseling/coordination of care regarding Diagnostic results, Instructions for management, Patient and family education, Importance of tx compliance, Risk factor reductions, Documenting in the medical record, Reviewing / ordering tests, medicine, procedures   and Obtaining or reviewing history    Assessment  Diagnoses and all orders for this visit:    Hospital discharge follow-up    Chronic diastolic congestive heart failure (HCC)  -     potassium chloride (K-DUR,KLOR-CON) 20 mEq tablet; Take 2 tablets (40 mEq total) by mouth daily  -     losartan (COZAAR) 50 mg tablet; Take 1 tablet (50 mg total) by mouth daily    Hypercholesterolemia    Stage 3a chronic kidney disease (HCC)    Primary hypertension    Permanent atrial fibrillation (HCC)  -     POCT ECG    POLK (dyspnea on exertion)  -     Basic metabolic panel; Future  -     NT-BNP PRO; Future    Acute on chronic diastolic congestive heart failure (HCC)  -     torsemide (DEMADEX) 20 mg tablet; Take 3 tablets (60 mg total) by mouth 2 (two) times a day    Atrial fibrillation, unspecified type ([facility])  -     apixaban (ELIQUIS) 2 5 mg; Take 1 tablet (2 5 mg total) by mouth 2 (two) times a day    Type 2 diabetes mellitus, without long-term current use of insulin (HCC)  -     losartan (COZAAR) 50 mg tablet; Take 1 tablet (50 mg total) by mouth daily    Other orders  -     pantoprazole (PROTONIX) 40 mg tablet; Take 40 mg by mouth daily        Past Medical History:   Diagnosis Date   • A-fib [facility])    • Colon cancer ([facility] [address] )    • Diastolic heart failure (HCC)    • Flu-like symptoms 12/13/2022   • HTN (hypertension)    • Postviral syndrome 01/26/2023       Review of Systems   Constitutional: Negative for chills  Cardiovascular: Negative for chest pain, claudication, cyanosis, dyspnea on exertion, irregular heartbeat, leg swelling, near-syncope, orthopnea, palpitations, paroxysmal nocturnal dyspnea and syncope  Respiratory: Negative for cough and shortness of breath      Gastrointestinal: Negative for heartburn and nausea  Neurological: Negative for dizziness, focal weakness, headaches, light-headedness and weakness  All other systems reviewed and are negative  No Known Allergies        Current Outpatient Medications:   •  apixaban (ELIQUIS) 2 5 mg, Take 1 tablet (2 5 mg total) by mouth 2 (two) times a day, Disp: 180 tablet, Rfl: 1  •  doxazosin (CARDURA) 4 mg tablet, Take 1 tablet (4 mg total) by mouth daily at bedtime, Disp: 30 tablet, Rfl: 0  •  ferrous sulfate 324 (65 Fe) mg, Take 1 tablet (324 mg total) by mouth every other day, Disp: 90 tablet, Rfl: 0  •  losartan (COZAAR) 50 mg tablet, Take 1 tablet (50 mg total) by mouth daily, Disp: 90 tablet, Rfl: 1  •  metFORMIN (GLUCOPHAGE) 1000 MG tablet, TAKE ONE TABLET BY MOUTH TWICE A DAY WITH MEALS, Disp: 180 tablet, Rfl: 0  •  metoprolol tartrate (LOPRESSOR) 50 mg tablet, Take 1 tablet (50 mg total) by mouth every 12 (twelve) hours, Disp: 60 tablet, Rfl: 2  •  pantoprazole (PROTONIX) 40 mg tablet, Take 40 mg by mouth daily, Disp: , Rfl:   •  potassium chloride (K-DUR,KLOR-CON) 20 mEq tablet, Take 2 tablets (40 mEq total) by mouth daily, Disp: 180 tablet, Rfl: 1  •  pravastatin (PRAVACHOL) 40 mg tablet, TAKE ONE TABLET BY MOUTH EVERY DAY, Disp: 90 tablet, Rfl: 1  •  torsemide (DEMADEX) 20 mg tablet, Take 3 tablets (60 mg total) by mouth 2 (two) times a day, Disp: 180 tablet, Rfl: 3    Social History     Socioeconomic History   • Marital status: /Civil Union     Spouse name: Not on file   • Number of children: Not on file   • Years of education: Not on file   • Highest education level: Not on file   Occupational History   • Not on file   Tobacco Use   • Smoking status: Former     Types: Cigarettes     Start date: 1953     Quit date: 1966     Years since quittin 2     Passive exposure: Past   • Smokeless tobacco: Never   Vaping Use   • Vaping Use: Never used   Substance and Sexual Activity   • Alcohol use: Yes     Comment: Rarely   • Drug use: Never   • Sexual activity: Not on file   Other Topics Concern   • Not on file   Social History Narrative   • Not on file     Social Determinants of Health     Financial Resource Strain: Low Risk    • Difficulty of Paying Living Expenses: Not hard at all   Food Insecurity: No Food Insecurity   • Worried About [address] in the Last Year: Never true   • Ran Out of Food in the Last Year: Never true   Transportation Needs: No Transportation Needs   • Lack of Transportation (Medical): No   • Lack of Transportation (Non-Medical): No   Physical Activity: Inactive   • Days of Exercise per Week: 0 days   • Minutes of Exercise per Session: 0 min   Stress: No Stress Concern Present   • Feeling of Stress : Not at all   Social Connections: Unknown   • Frequency of Communication with Friends and Family: Three times a week   • Frequency of Social Gatherings with Friends and Family: Three times a week   • Attends Zoroastrianism Services: Never   • Active Member of Clubs or Organizations: Yes   • Attends Club or Organization Meetings: Never   • Marital Status: Not on file   Intimate Partner Violence: Not At Risk   • Fear of Current or Ex-Partner: No   • Emotionally Abused: No   • Physically Abused: No   • Sexually Abused: No   Housing Stability: Low Risk    • Unable to Pay for Housing in the Last Year: No   • Number of Places Lived in the Last Year: 1   • Unstable Housing in the Last Year: No       Family History   Problem Relation Age of Onset   • Colon cancer Neg Hx        Physical Exam  Vitals and nursing note reviewed  Constitutional:       General: He is not in acute distress  HENT:      Head: Normocephalic and atraumatic  Eyes:      Conjunctiva/sclera: Conjunctivae normal    Cardiovascular:      Rate and Rhythm: Normal rate  Rhythm irregularly irregular  Pulses: Intact distal pulses  Heart sounds: Normal heart sounds     Pulmonary:      Effort: Pulmonary effort is normal       Breath sounds: Normal "breath sounds  Abdominal:      General: Bowel sounds are normal       Palpations: Abdomen is soft  Musculoskeletal:         General: Normal range of motion  Cervical back: Normal range of motion and neck supple  Skin:     General: Skin is warm and dry  Neurological:      Mental Status: He is alert and oriented to person, place, and time  Vitals: Blood pressure 136/60, pulse 93, height 5' 6\" (1 676 m), weight 80 3 kg (177 lb), SpO2 97 %  Wt Readings from Last 3 Encounters:   05/15/23 80 3 kg (177 lb)   05/11/23 80 2 kg (176 lb 12 8 oz)   05/08/23 79 9 kg (176 lb 2 4 oz)         Labs & Results:  Lab Results   Component Value Date    WBC 6 47 05/06/2023    HGB 8 1 (L) 05/06/2023    HCT 27 1 (L) 05/06/2023    MCV 77 (L) 05/06/2023     05/06/2023     BNP   Date Value Ref Range Status   05/02/2023 1,397 (H) 0 - 100 pg/mL Final     No components found for: CHEM    No results found for this or any previous visit  No results found for this or any previous visit  This note was completed in part utilizing [employer]gif[employer] direct voice recognition software  Grammatical errors, random word insertion, spelling mistakes, and incomplete sentences may be an occasional consequence of the system secondary to software limitations, ambient noise and hardware issues  At the time of dictation, efforts were made to edit, clarify and /or correct errors  Please read the chart carefully and recognize, using context, where substitutions have occurred    If you have any questions or concerns about the context, text or information contained within the body of this dictation, please contact myself, the provider, for further clarification        "

## 2023-05-16 ENCOUNTER — LAB (OUTPATIENT)
Dept: LAB | Facility: CLINIC | Age: 88
End: 2023-05-16

## 2023-05-16 ENCOUNTER — EVALUATION (OUTPATIENT)
Dept: PHYSICAL THERAPY | Facility: CLINIC | Age: 88
End: 2023-05-16

## 2023-05-16 DIAGNOSIS — R26.89 BALANCE PROBLEM: Primary | ICD-10-CM

## 2023-05-16 DIAGNOSIS — I50.33 ACUTE ON CHRONIC DIASTOLIC CONGESTIVE HEART FAILURE (HCC): ICD-10-CM

## 2023-05-16 DIAGNOSIS — R53.1 WEAKNESS: ICD-10-CM

## 2023-05-16 DIAGNOSIS — R06.09 DOE (DYSPNEA ON EXERTION): ICD-10-CM

## 2023-05-16 DIAGNOSIS — I50.32 CHRONIC DIASTOLIC CONGESTIVE HEART FAILURE (HCC): ICD-10-CM

## 2023-05-16 DIAGNOSIS — R26.81 GAIT INSTABILITY: ICD-10-CM

## 2023-05-16 LAB
ANION GAP SERPL CALCULATED.3IONS-SCNC: 7 MMOL/L (ref 4–13)
BNP SERPL-MCNC: 1084 PG/ML (ref 0–100)
BUN SERPL-MCNC: 52 MG/DL (ref 5–25)
CALCIUM SERPL-MCNC: 9 MG/DL (ref 8.3–10.1)
CHLORIDE SERPL-SCNC: 102 MMOL/L (ref 96–108)
CO2 SERPL-SCNC: 24 MMOL/L (ref 21–32)
CREAT SERPL-MCNC: 1.91 MG/DL (ref 0.6–1.3)
GFR SERPL CREATININE-BSD FRML MDRD: 31 ML/MIN/1.73SQ M
GLUCOSE SERPL-MCNC: 164 MG/DL (ref 65–140)
POTASSIUM SERPL-SCNC: 3.9 MMOL/L (ref 3.5–5.3)
SODIUM SERPL-SCNC: 133 MMOL/L (ref 135–147)

## 2023-05-16 RX ORDER — TORSEMIDE 20 MG/1
80 TABLET ORAL 2 TIMES DAILY
Qty: 180 TABLET | Refills: 3 | Status: SHIPPED | OUTPATIENT
Start: 2023-05-16 | End: 2023-06-23 | Stop reason: SDUPTHER

## 2023-05-16 RX ORDER — POTASSIUM CHLORIDE 20 MEQ/1
40 TABLET, EXTENDED RELEASE ORAL 2 TIMES DAILY
Qty: 180 TABLET | Refills: 1 | Status: SHIPPED | OUTPATIENT
Start: 2023-05-16 | End: 2023-05-25

## 2023-05-16 NOTE — PROGRESS NOTES
"PT Evaluation     Today's date: 2023  Patient name: Erik Mcmahon  : 1935  MRN: [id number]  Referring provider: Deep Kumar, *  Dx:   Encounter Diagnosis     ICD-10-CM    1  Balance problem  R26 89       2  Gait instability  R26 81       3  Weakness  R53 1           Start Time: 1000  Stop Time: 1043  Total time in clinic (min): 43 minutes    Assessment  Assessment details: Erik Mcmahon is a pleasant 82 y o  male who presents with balance, gait and strength deficits  The patient and his spouses greatest concerns are future falls, concern at no signs of improvement, fear of not being able to keep active and future ill health (and wanting to prevent it)  No further referral appears necessary at this time based upon examination results  Primary movement impairment diagnosis of decreased endurance/strength, balance deficits and weight bearing intolerance, resulting in pathoanatomical symptoms of falls and inability to ambulate for prolonged periods and perform transitional movements without UE assistance and limiting his ability to get out of a chair, squat to  objects from the floor, stand and walk without limitation  Primary Impairments:  1) decreased endurance/strength  2)  balance deficits  3) weight bearing intolerance    Etiologic factors include none recalled by the patient  Impairments: abnormal gait, activity intolerance, impaired balance, impaired physical strength and lacks appropriate home exercise program    Symptom irritability: moderateUnderstanding of Dx/Px/POC: good   Prognosis: good  Prognosis details: Positive prognostic indicators include positive attitude toward recovery  Negative prognostic indicators include multiple concurrent orthopedic and health problems, obesity and reliance on transportation        Goals  (STG) Impairment Goals 4-6 weeks  - Improve TUG by 2-3\"  - Improve balance testing by 5\"   - Improve 5x sit to stand time by 2-3\"  - Be able to " "perform sit to stand without use of UE's    (LTG) Functional Goals 6-8 weeks  - Return to Prior Level of Function- No more falls  - Increase Functional Status Measure (FOTO) to: >predicted outcome  - Patient will be independent with HEP  - Improve balance testing by 10\"   - Improve 5x sit to stand time by 5-8\"  - Improve TUG by 10\"          Plan  Patient would benefit from: skilled physical therapy  Planned modality interventions: Modalities PRN  Planned therapy interventions: activity modification, manual therapy, neuromuscular re-education, patient education, therapeutic activities, therapeutic exercise, home exercise program, behavior modification, self care, balance, body mechanics training, gait training, graded activity and strengthening  Frequency: 2x week  Duration in weeks: 8  Treatment plan discussed with: patient and family        Subjective Evaluation    History of Present Illness  Mechanism of injury: Patient had a recent fall at [employer] where he went to get up from a chair and fell down when he started to walk  He also had another fall when he broke him shoulder this year  Patient reports that he does feel unsteady when walking  He does not have any stairs at home and his wife reports difficulty with stairs in the community even with a hand rail  He has used a walker in the past and has one at home, but is not currently using it  He does not report feeling weakness in his legs  He is not doing much walking at home, only to get up and go to the bathroom  His wife expresses interest in using a walker to reduce the risk of falling     Pain  No pain reported    Social Support  Steps to enter house: no  Stairs in house: no   Lives in: [facility]  Lives with: spouse    Employment status: not working  Patient Goals  Patient goals for therapy: improved balance and increased strength          Objective     Strength/Myotome Testing     Left Hip   Planes of Motion   Flexion: 4  Abduction: 4+  Adduction: " "4+    Right Hip   Planes of Motion   Flexion: 4  Abduction: 4+  Adduction: 4+    Left Knee   Flexion: 4+  Extension: 4+    Right Knee   Flexion: 4+  Extension: 4+    Left Ankle/Foot   Dorsiflexion: 5  Plantar flexion: 4+    Right Ankle/Foot   Dorsiflexion: 5  Plantar flexion: 4+    Functional Assessment        Comments  5x sit to stand 29 seconds with use of UE's to push up and mild LOB backwards    TUG 23 seconds with use of UE's to push up, slow and unsteady with turns    Semi-tandem stance: 30\" R LE lead, 5\" L LE lead    FTEO: 30 seconds  FTEC: 30 seconds  FTEO on foam: 30 seconds  FTEC on foam: 4 seconds    Step up to 6\" step: able to perform with use of 1 UE             Diagnosis: Balance/gait   Precautions: fall risk, decreased endurance, CHF, T2DM, kidney failure, active colon CA   Primary Goals: LE strength, balance, endurance    *asterisks by exercise = given for HEP   Manuals 5/16                                               There Ex        Bike        HR/TR* 20x        Standing marches* 20x       Standing hip abd* 20x       Standing knee flexion* 20x        Sidestepping with TB        Mini squats        Bridges        SLR        Leg press         Clamshells         Neuro Re-Ed        FTEO on foam        FTEC         Tandem stance        Sidestepping on foam        Standing cone taps                                                                 Re-evaluation              Ther Act              sit to stands                           Modalities                                                        "

## 2023-05-17 ENCOUNTER — PATIENT OUTREACH (OUTPATIENT)
Dept: CASE MANAGEMENT | Facility: HOSPITAL | Age: 88
End: 2023-05-17

## 2023-05-17 ENCOUNTER — TELEPHONE (OUTPATIENT)
Dept: CARDIOLOGY CLINIC | Facility: CLINIC | Age: 88
End: 2023-05-17

## 2023-05-17 DIAGNOSIS — I50.33 ACUTE ON CHRONIC DIASTOLIC CONGESTIVE HEART FAILURE (HCC): Primary | ICD-10-CM

## 2023-05-17 NOTE — TELEPHONE ENCOUNTER
Labs noted BNP elevated at 1084   Cr 1 91 BUN 52  K 3 9     Please call pt and advise to increase torsemide to 80 mg q12h, and increase potassium to 40 meq BID   Recheck non fasting labs in 1 clemencia    Called and spoke to wife , and advised, verbally understood, read back instructions of increase of medication, aware of bw in one week

## 2023-05-17 NOTE — TELEPHONE ENCOUNTER
Hi, the patient's name is González Galvez, His birthday is [birth year] and his phone number is [phone] Call award Isaura Angel yesterday with the results of his blood lab work and she wanted us to call  So please give a call so we can find out what the results were  Thank you  Rian harris      Pt is returning your call to discuss lab results   C/b [phone]

## 2023-05-19 ENCOUNTER — APPOINTMENT (OUTPATIENT)
Dept: LAB | Facility: CLINIC | Age: 88
End: 2023-05-19

## 2023-05-19 ENCOUNTER — OFFICE VISIT (OUTPATIENT)
Dept: PHYSICAL THERAPY | Facility: CLINIC | Age: 88
End: 2023-05-19

## 2023-05-19 DIAGNOSIS — I50.33 ACUTE ON CHRONIC DIASTOLIC CONGESTIVE HEART FAILURE (HCC): ICD-10-CM

## 2023-05-19 DIAGNOSIS — R53.1 WEAKNESS: ICD-10-CM

## 2023-05-19 DIAGNOSIS — R26.89 BALANCE PROBLEM: Primary | ICD-10-CM

## 2023-05-19 DIAGNOSIS — R06.09 DOE (DYSPNEA ON EXERTION): ICD-10-CM

## 2023-05-19 DIAGNOSIS — R26.81 GAIT INSTABILITY: ICD-10-CM

## 2023-05-19 LAB
ANION GAP SERPL CALCULATED.3IONS-SCNC: 5 MMOL/L (ref 4–13)
BNP SERPL-MCNC: 1002 PG/ML (ref 0–100)
BUN SERPL-MCNC: 52 MG/DL (ref 5–25)
CALCIUM SERPL-MCNC: 8.8 MG/DL (ref 8.3–10.1)
CHLORIDE SERPL-SCNC: 100 MMOL/L (ref 96–108)
CO2 SERPL-SCNC: 28 MMOL/L (ref 21–32)
CREAT SERPL-MCNC: 2.02 MG/DL (ref 0.6–1.3)
GFR SERPL CREATININE-BSD FRML MDRD: 29 ML/MIN/1.73SQ M
GLUCOSE SERPL-MCNC: 209 MG/DL (ref 65–140)
POTASSIUM SERPL-SCNC: 3.9 MMOL/L (ref 3.5–5.3)
SODIUM SERPL-SCNC: 133 MMOL/L (ref 135–147)

## 2023-05-19 NOTE — PROGRESS NOTES
"Daily Note     Today's date: 2023  Patient name: Alem Ziegler  : 1935  MRN: [id number]  Referring provider: Nargis Obrien, *  Dx:   Encounter Diagnosis     ICD-10-CM    1  Balance problem  R26 89       2  Gait instability  R26 81       3  Weakness  R53 1           Start Time: 1000  Stop Time: 1041  Total time in clinic (min): 41 minutes    Subjective: Patient's wife reports that he is having difficulty with walking and balance when they are out in the community  Patient reports \"I'm alright today\"  Objective: See treatment diary below      Assessment: Tolerated treatment well  Patient demonstrated fatigue post treatment and would benefit from continued PT  Patient had good tolerance for all exercises, requiring very few breaks  Did require CGA assist for balance exercises  Had fatigue post session but no pain  I encouraged him to perform HEP as prescribed  Will continue to progress as tolerated  Plan: Continue per plan of care  Progress treatment as tolerated         Diagnosis: Balance/gait   Precautions: fall risk, decreased endurance, CHF, T2DM, kidney failure, active colon CA   Primary Goals: LE strength, balance, endurance    *asterisks by exercise = given for HEP   Manuals                                               There Ex        Bike  5 mins recumbent      HR/TR* 20x  20x      Standing marches* 20x 20x       Standing hip abd* 20x 30x      Standing knee flexion* 20x  20x      Sidestepping with TB        Mini squats        Bridges        SLR        Leg press         Yonatan         Neuro Re-Ed        FTEO on foam  1 min 2x      FTEC   30\" 2x      Tandem stance  30\" 2x ea      Sidestepping on foam  3 short laps at mirror      Standing cone taps  10x ea from foam                                                               Re-evaluation              Ther Act              sit to stands              step ups    8\" 5x ea with 1 hand on rail         Modalities         "

## 2023-05-21 NOTE — PROGRESS NOTES
Cardiology  Heart Failure   Follow Up Office Visit Note      Negrita Valera   82 y o    male   MRN: [id number]  [address]  [address]  [address] [place] [address] Kira Louis  [id number] [phone]    PCP: Michelene Boeck, MD  Cardiologist :       HF adm -23          Summary of Recommendations  -Low-sodium diet, Heart failure education as below  I reviewed how to read food labels to facilitate adherence  His significant other tells me he laughs when advised to be on a low-salt diet   -Today he is decompensated  His significant other has not been giving the p m  dose of the torsemide given urinary frequency at HS and during sleep I asked her to start giving it earlier in the p m  We will give Lasix 80 mg IV in the office  S/O will give supplemental potassium this afternoon, and restart the torsemide 80 twice daily starting tomorrow  -I recommend palliative care  His significant other said that they declined the appointment  I had a discussion today about the benefit  I encouraged that they meet with palliative care  Re: goals, symptom management  -Follow-up with me 1 week, BMP, mag, BNP prior to  -Follow up will be scheduled with a cardiologist in a few weeks  Colon Ca screenin2022, up-to-date        Impression/plan  Acute on Chronic HFpEF, recent adm -23  · Echocardiogram suggestive of amyloidosis  · would not pursue a more aggressive diastolic heart failure work-up to include possibility of amyloidosis on account of medical issues, palliative decisions  Wt Readings from Last 3 Encounters:   23 82 kg (180 lb 12 8 oz)   05/15/23 80 3 kg (177 lb)   23 80 2 kg (176 lb 12 8 oz)     23:  195 LB  --beta-blocker:   Metoprolol tartrate 50 mg every 12  --Diuretic:   Torsemide 80 mg every 12 starting + potassium 40 meq BID  Prior to admission was on torsemide 40 twice daily    Today I learned his significant other has not been giving the p m  dose of the torsemide given urinary frequency  --ACE/ARB/ARNI:   Losartan 50 mg daily  --MRA:   --SLGT2I  --2 g sodium diet, 1800 cc fluid restriction  Daily weights, call weight gain 2-3 lb in 1 day or 5 lb in 5 days  Permanent atrial fibrillation  · Rate controlled with metoprolol tartrate 50 mg every 12     · On oral AC with Eliquis 2 5 mg every 12  Hypertension, essential  BP  148/88  On doxazosin, losartan 50 mg daily,metoprolol tartrate 50 mg every 12, loop diuretic  CKD3  baseline Cr 1 5  Hyperlipidemia  Pravastatin 40 mg daily  Not addressed today   Latest Reference Range & Units 01/31/18 07:54 03/04/22 07:32   Cholesterol See Comment mg/dL 116 131   Triglycerides See Comment mg/dL 81 69   HDL >=40 mg/dL 60 63   LDL Calculated 0 - 100 mg/dL 40 54   Type 2 diabetes mellitus  5/2/23: Hemoglobin A1c 8 0 , on metformin  Colon cancer, untreated per patient's wishes  He has been referred to palliative care  I recommend the initial appointment be scheduled  They now are in agreement  Chronic anemia  Hemoglobin around 8 1  Cardiac testing  • TTE 5/3/23 LVEF 60%  Wall thickness is severely increased  There is severe concentric hypertrophy  No WMA  Unable to assess diastolic function due to atrial fibrillation, but does follow a restrictive pattern with a significantly elevated E and very low e' velocity  IVS: There is both systolic and diastolic flattening of the interventricular septum consistent with right ventricle pressure and volume overload  RV dilated, systolic function mildly reduced  Wall thickness is increased with significantly increased trabeculation in the mid to apical wall, 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  JOSE MARIA  Mild MR  Severe TR  PASP 66 mm HgThe aortic root is normal in size  The aortic root exhibited moderate fibrocalcific change  Pattern of left ventricular findings suggest a diagnosis of amyloidosis, clinical correlation advised    Right ventricular findings 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, clinical correlation and possible cardiac MRI suggested for further evaluation and clarification                        HPI:   Ramos Cowart is an 81 yo male with chronic HFpEF,  permanent atrial fibrillation, hypertension, dyslipidemia and colon cancer which he has chosen not to treat  Maintenance diuretic: Torsemide 40 mg twice daily      Adm 5/2-5/8/23  ADHF  CC: Shortness of breath, and worsening lower extremity edema at least 1 month  Found to be volume overload  Diuresed with furosemide 80 mg 3 times daily  20 pound weight loss  Maintained persistent A-fib, rate controlled  Followed by cardiology  Echo  Discharge weight : 176 lb  Discharge creatinine: 1 62  Discharge diuretics: Torsemide 60 mg twice daily  Previously 40 twice daily  Patient declines treatment for his colon CA  Cardiology notes would not pursue a more aggressive diastolic heart failure work-up to include possibility of amyloidosis on account of medical issues, palliative decisions    5/15/23  (PMH: chroinc HFpEF, chronic A fib, HTN, HL, CKD3, colon CA- untreated, by choice  Recent adm HF)  Hospital follow-up  He is accompanied by his significant other with whom he resides  She adds to the history  Review of systems: He reports some days his shortness of breath is good some days not so good  He is not adhering to a salt restricted diet  He has been sleeping in a chair, chronically given chronic back problems  He denies any current PND orthopnea  He has chronic lower extremity edema which is improved compared to prior to his hospitalization but is present to some degree today  Weight stable at 177 pounds  Blood pressure stable 136/60  We will help facilitate an appointment to palliative care, and follow-up with me in 2 weeks, Dr Erica Ramos 1 month  We will schedule follow-up labs in the near future    I reiterated the importance of adhering to a salt restricted diet      Interval history  Labs 5/16/2023 creatinine 1 91 BUN 52 potassium 3 9 sodium 133   Latest Reference Range & Units 05/02/23 12:03 05/16/23 10:42 05/19/23 09:43   BNP 0 - 100 pg/mL 1,397 (H) 1,084 (H) 1,002 (H)           5/22/23  (PMH: chroinc HFpEF, chronic A fib, HTN, HL, CKD3, colon CA- untreated, by choice  Recent adm HF)  Close follow up heart failure  He is accompanied by his significant other who provides much of the history  ROS: Shortness of breath persists  He has lower extremity edema  No CP  Wt 180 lb  was 177 pounds May 15, and 176 discharge 5/8/2023  /88  Last labs 5/19/2023: Creatinine 2 02 BUN 52 potassium 3 9, sodium 133  Currently prescribed torsemide 80 mg twice daily since 5/16/2023, potassium 40 meq bid  However, his significant other has not been giving him the evening dose of torsemide as she was giving it every 12 hours-- too close to bedtime, and he has been up urinating  Today he is volume overloaded  His labs show cardiorenal syndrome with an elevated creatinine and BUN, in the setting of volume overload, 2-3+ bilateral pitting edema, basilar crackles  Today we will give Lasix 80 mg IV in the office  Continue supplemental potassium this afternoon,; hold this afternoon's torsemide and resume current diuretics and potassium starting tomorrow morning  Labs next week/ follow-up with me in 1 week        I have spent 40 minutes with Patient and family today in which greater than 50% of this time was spent in counseling/coordination of care regarding Diagnostic results, Instructions for management, Patient and family education, Importance of tx compliance, Risk factor reductions, Documenting in the medical record, Reviewing / ordering tests, medicine, procedures   and Obtaining or reviewing history      Assessment  Diagnoses and all orders for this visit:    Chronic heart failure with preserved ejection fraction (HFpEF) ([facility])  -     furosemide (LASIX) injection 80 mg    Permanent atrial fibrillation ([facility] [address] )    Primary hypertension    Hypercholesterolemia    Stage 3a chronic kidney disease (HCC)    Acute on chronic heart failure with preserved ejection fraction (HFpEF) ([facility])  -     Basic metabolic panel; Future  -     Magnesium; Future  -     B-Type Natriuretic Peptide(BNP); Future        Past Medical History:   Diagnosis Date   • A-fib [facility])    • Colon cancer ([facility] [address] )    • Diastolic heart failure (HCC)    • Flu-like symptoms 12/13/2022   • HTN (hypertension)    • Postviral syndrome 01/26/2023       Review of Systems   Constitutional: Positive for weight gain  Negative for chills  Cardiovascular: Positive for dyspnea on exertion and leg swelling  Negative for chest pain, claudication, cyanosis, irregular heartbeat, near-syncope, orthopnea, palpitations, paroxysmal nocturnal dyspnea and syncope  Respiratory: Positive for shortness of breath  Negative for cough  Gastrointestinal: Negative for heartburn and nausea  Neurological: Negative for dizziness, focal weakness, headaches, light-headedness and weakness  All other systems reviewed and are negative  No Known Allergies        Current Outpatient Medications:   •  apixaban (ELIQUIS) 2 5 mg, Take 1 tablet (2 5 mg total) by mouth 2 (two) times a day, Disp: 180 tablet, Rfl: 1  •  doxazosin (CARDURA) 4 mg tablet, Take 1 tablet (4 mg total) by mouth daily at bedtime, Disp: 30 tablet, Rfl: 0  •  ferrous sulfate 324 (65 Fe) mg, Take 1 tablet (324 mg total) by mouth every other day, Disp: 90 tablet, Rfl: 0  •  losartan (COZAAR) 50 mg tablet, Take 1 tablet (50 mg total) by mouth daily, Disp: 90 tablet, Rfl: 1  •  metFORMIN (GLUCOPHAGE) 1000 MG tablet, TAKE ONE TABLET BY MOUTH TWICE A DAY WITH MEALS, Disp: 180 tablet, Rfl: 0  •  metoprolol tartrate (LOPRESSOR) 50 mg tablet, Take 1 tablet (50 mg total) by mouth every 12 (twelve) hours, Disp: 60 tablet, Rfl: 2  •  pantoprazole (PROTONIX) 40 mg tablet, Take 40 mg by mouth daily, Disp: , Rfl:   •  potassium chloride (K-DUR,KLOR-CON) 20 mEq tablet, Take 2 tablets (40 mEq total) by mouth 2 (two) times a day, Disp: 180 tablet, Rfl: 1  •  pravastatin (PRAVACHOL) 40 mg tablet, TAKE ONE TABLET BY MOUTH EVERY DAY, Disp: 90 tablet, Rfl: 1  •  torsemide (DEMADEX) 20 mg tablet, Take 4 tablets (80 mg total) by mouth 2 (two) times a day, Disp: 180 tablet, Rfl: 3    Current Facility-Administered Medications:   •  furosemide (LASIX) injection 80 mg, 80 mg, Intravenous, Once, JESUS Deleon    Social History     Socioeconomic History   • Marital status: /Civil Union     Spouse name: Not on file   • Number of children: Not on file   • Years of education: Not on file   • Highest education level: Not on file   Occupational History   • Not on file   Tobacco Use   • Smoking status: Former     Types: Cigarettes     Start date: 1953     Quit date: 1966     Years since quittin 2     Passive exposure: Past   • Smokeless tobacco: Never   Vaping Use   • Vaping Use: Never used   Substance and Sexual Activity   • Alcohol use: Yes     Comment: Rarely   • Drug use: Never   • Sexual activity: Not on file   Other Topics Concern   • Not on file   Social History Narrative   • Not on file     Social Determinants of Health     Financial Resource Strain: Low Risk    • Difficulty of Paying Living Expenses: Not hard at all   Food Insecurity: No Food Insecurity   • Worried About Running Out of Food in the Last Year: Never true   • Ran Out of Food in the Last Year: Never true   Transportation Needs: No Transportation Needs   • Lack of Transportation (Medical): No   • Lack of Transportation (Non-Medical): No   Physical Activity: Inactive   • Days of Exercise per Week: 0 days   • Minutes of Exercise per Session: 0 min   Stress: No Stress Concern Present   • Feeling of Stress : Not at all   Social Connections: Unknown   • Frequency of Communication with Friends and Family: Three times a week   • Frequency of Social Gatherings with Friends and Family:  Three times "a week   • Attends Gnosticism Services: Never   • Active Member of Clubs or Organizations: Yes   • Attends Club or Organization Meetings: Never   • Marital Status: Not on file   Intimate Partner Violence: Not At Risk   • Fear of Current or Ex-Partner: No   • Emotionally Abused: No   • Physically Abused: No   • Sexually Abused: No   Housing Stability: Low Risk    • Unable to Pay for Housing in the Last Year: No   • Number of Places Lived in the Last Year: 1   • Unstable Housing in the Last Year: No       Family History   Problem Relation Age of Onset   • Colon cancer Neg Hx        Physical Exam  Vitals and nursing note reviewed  Constitutional:       General: He is not in acute distress  HENT:      Head: Normocephalic and atraumatic  Eyes:      Conjunctiva/sclera: Conjunctivae normal    Cardiovascular:      Rate and Rhythm: Normal rate  Rhythm irregularly irregular  Pulses: Intact distal pulses  Heart sounds: Heart sounds are distant  Pulmonary:      Effort: Pulmonary effort is normal       Breath sounds: Examination of the right-lower field reveals rales  Examination of the left-lower field reveals rales  Rales present  Abdominal:      General: Bowel sounds are normal       Palpations: Abdomen is soft  Musculoskeletal:         General: Normal range of motion  Cervical back: Normal range of motion and neck supple  Right lower leg: Edema present  Left lower leg: Edema present  Skin:     General: Skin is warm and dry  Neurological:      Mental Status: He is alert and oriented to person, place, and time  Vitals: Blood pressure 148/88, pulse 59, height 5' 6\" (1 676 m), weight 82 kg (180 lb 12 8 oz), SpO2 96 %     Wt Readings from Last 3 Encounters:   05/22/23 82 kg (180 lb 12 8 oz)   05/15/23 80 3 kg (177 lb)   05/11/23 80 2 kg (176 lb 12 8 oz)         Labs & Results:  Lab Results   Component Value Date    WBC 6 47 05/06/2023    HGB 8 1 (L) 05/06/2023    HCT 27 1 (L) " 05/06/2023    MCV 77 (L) 05/06/2023     05/06/2023     BNP   Date Value Ref Range Status   05/19/2023 1,002 (H) 0 - 100 pg/mL Final   05/16/2023 1,084 (H) 0 - 100 pg/mL Final   05/02/2023 1,397 (H) 0 - 100 pg/mL Final     No components found for: CHEM    No results found for this or any previous visit  No results found for this or any previous visit  This note was completed in part utilizing m-[employer] fluency direct voice recognition software  Grammatical errors, random word insertion, spelling mistakes, and incomplete sentences may be an occasional consequence of the system secondary to software limitations, ambient noise and hardware issues  At the time of dictation, efforts were made to edit, clarify and /or correct errors  Please read the chart carefully and recognize, using context, where substitutions have occurred    If you have any questions or concerns about the context, text or information contained within the body of this dictation, please contact myself, the provider, for further clarification

## 2023-05-22 ENCOUNTER — OFFICE VISIT (OUTPATIENT)
Dept: CARDIOLOGY CLINIC | Facility: CLINIC | Age: 88
End: 2023-05-22

## 2023-05-22 VITALS
HEART RATE: 88 BPM | DIASTOLIC BLOOD PRESSURE: 76 MMHG | OXYGEN SATURATION: 96 % | SYSTOLIC BLOOD PRESSURE: 142 MMHG | BODY MASS INDEX: 29.06 KG/M2 | HEIGHT: 66 IN | WEIGHT: 180.8 LBS

## 2023-05-22 DIAGNOSIS — I50.32 CHRONIC HEART FAILURE WITH PRESERVED EJECTION FRACTION (HFPEF) (HCC): Primary | ICD-10-CM

## 2023-05-22 DIAGNOSIS — I48.21 PERMANENT ATRIAL FIBRILLATION (HCC): ICD-10-CM

## 2023-05-22 DIAGNOSIS — I10 PRIMARY HYPERTENSION: ICD-10-CM

## 2023-05-22 DIAGNOSIS — N18.31 STAGE 3A CHRONIC KIDNEY DISEASE (HCC): ICD-10-CM

## 2023-05-22 DIAGNOSIS — E78.00 HYPERCHOLESTEROLEMIA: ICD-10-CM

## 2023-05-22 DIAGNOSIS — I50.33 ACUTE ON CHRONIC HEART FAILURE WITH PRESERVED EJECTION FRACTION (HFPEF) (HCC): ICD-10-CM

## 2023-05-22 RX ORDER — FUROSEMIDE 10 MG/ML
80 INJECTION INTRAMUSCULAR; INTRAVENOUS ONCE
Status: COMPLETED | OUTPATIENT
Start: 2023-05-22 | End: 2023-05-22

## 2023-05-22 RX ADMIN — FUROSEMIDE 80 MG: 10 INJECTION INTRAMUSCULAR; INTRAVENOUS at 11:54

## 2023-05-22 NOTE — LETTER
May 22, 2023     Nilay Babb MD  [address] [id number]    Patient: Mode Bauer   YOB: 1935   Date of Visit: 2023       Dear Dr See Gould: Thank you for referring Deshawn Dietz to me for evaluation  Below are my notes for this consultation  If you have questions, please do not hesitate to call me  I look forward to following your patient along with you  Sincerely,        JESUS Albright        CC: No Recipients  JESUS Albright  2023 11:58 AM  Sign when Signing Visit  Cardiology  Heart Failure   Follow Up Office Visit Note      Mode Bauer   82 y o    male   MRN: [id number]  [address]  [address]  [address] [place] [id number] [phone]    PCP: Nilay Babb MD  Cardiologist :       HF adm -23          Summary of Recommendations  -Low-sodium diet, Heart failure education as below  I reviewed how to read food labels to facilitate adherence  His significant other tells me he laughs when advised to be on a low-salt diet   -Today he is decompensated  His significant other has not been giving the p m  dose of the torsemide given urinary frequency at HS and during sleep I asked her to start giving it earlier in the p m  We will give Lasix 80 mg IV in the office  S/O will give supplemental potassium this afternoon, and restart the torsemide 80 twice daily starting tomorrow  -I recommend palliative care  His significant other said that they declined the appointment  I had a discussion today about the benefit  I encouraged that they meet with palliative care  Re: goals, symptom management  -Follow-up with me 1 week, BMP, mag, BNP prior to  -Follow up will be scheduled with a cardiologist in a few weeks  Colon Ca screenin2022, up-to-date        Impression/plan  Acute on Chronic HFpEF, recent adm -23      · Echocardiogram suggestive of amyloidosis  · would not pursue a more aggressive diastolic heart failure work-up to include possibility of amyloidosis on account of medical issues, palliative decisions  Wt Readings from Last 3 Encounters:   05/22/23 82 kg (180 lb 12 8 oz)   05/15/23 80 3 kg (177 lb)   05/11/23 80 2 kg (176 lb 12 8 oz)     4/26/23:  195 LB  --beta-blocker:   Metoprolol tartrate 50 mg every 12  --Diuretic:   Torsemide 80 mg every 12 starting + potassium 40 meq BID  Prior to admission was on torsemide 40 twice daily  Today I learned his significant other has not been giving the p m  dose of the torsemide given urinary frequency  --ACE/ARB/ARNI:   Losartan 50 mg daily  --MRA:   --SLGT2I  --2 g sodium diet, 1800 cc fluid restriction  Daily weights, call weight gain 2-3 lb in 1 day or 5 lb in 5 days  Permanent atrial fibrillation  · Rate controlled with metoprolol tartrate 50 mg every 12     · On oral AC with Eliquis 2 5 mg every 12  Hypertension, essential  BP  148/88  On doxazosin, losartan 50 mg daily,metoprolol tartrate 50 mg every 12, loop diuretic  CKD3  baseline Cr 1 5  Hyperlipidemia  Pravastatin 40 mg daily  Not addressed today   Latest Reference Range & Units 01/31/18 07:54 03/04/22 07:32   Cholesterol See Comment mg/dL 116 131   Triglycerides See Comment mg/dL 81 69   HDL >=40 mg/dL 60 63   LDL Calculated 0 - 100 mg/dL 40 54   Type 2 diabetes mellitus  5/2/23: Hemoglobin A1c 8 0 , on metformin  Colon cancer, untreated per patient's wishes  He has been referred to palliative care  I recommend the initial appointment be scheduled  They now are in agreement  Chronic anemia  Hemoglobin around 8 1  Cardiac testing  • TTE 5/3/23 LVEF 60%  Wall thickness is severely increased  There is severe concentric hypertrophy  No WMA  Unable to assess diastolic function due to atrial fibrillation, but does follow a restrictive pattern with a significantly elevated E and very low e' velocity   IVS: There is both systolic and diastolic flattening of the interventricular septum consistent with right ventricle pressure and volume overload  RV dilated, systolic function mildly reduced  Wall thickness is increased with significantly increased trabeculation in the mid to apical wall, 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  JOSE MARIA  Mild MR  Severe TR  PASP 66 mm HgThe aortic root is normal in size  The aortic root exhibited moderate fibrocalcific change  Pattern of left ventricular findings suggest a diagnosis of amyloidosis, clinical correlation advised  Right ventricular findings 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, clinical correlation and possible cardiac MRI suggested for further evaluation and clarification                        HPI:   Sheila Enrique is an 83 yo male with chronic HFpEF,  permanent atrial fibrillation, hypertension, dyslipidemia and colon cancer which he has chosen not to treat  Maintenance diuretic: Torsemide 40 mg twice daily      Adm 5/2-5/8/23  ADHF  CC: Shortness of breath, and worsening lower extremity edema at least 1 month  Found to be volume overload  Diuresed with furosemide 80 mg 3 times daily  20 pound weight loss  Maintained persistent A-fib, rate controlled  Followed by cardiology  Echo  Discharge weight : 176 lb  Discharge creatinine: 1 62  Discharge diuretics: Torsemide 60 mg twice daily  Previously 40 twice daily  Patient declines treatment for his colon CA  Cardiology notes would not pursue a more aggressive diastolic heart failure work-up to include possibility of amyloidosis on account of medical issues, palliative decisions    5/15/23  (PMH: chroinc HFpEF, chronic A fib, HTN, HL, CKD3, colon CA- untreated, by choice  Recent adm HF)  Hospital follow-up  He is accompanied by his significant other with whom he resides  She adds to the history  Review of systems: He reports some days his shortness of breath is good some days not so good  He is not adhering to a salt restricted diet    He has been sleeping in a chair, chronically given chronic back problems  He denies any current PND orthopnea  He has chronic lower extremity edema which is improved compared to prior to his hospitalization but is present to some degree today  Weight stable at 177 pounds  Blood pressure stable 136/60  We will help facilitate an appointment to palliative care, and follow-up with me in 2 weeks, Dr Jackelyn Farnswroth 1 month  We will schedule follow-up labs in the near future  I reiterated the importance of adhering to a salt restricted diet      Interval history  Labs 5/16/2023 creatinine 1 91 BUN 52 potassium 3 9 sodium 133   Latest Reference Range & Units 05/02/23 12:03 05/16/23 10:42 05/19/23 09:43   BNP 0 - 100 pg/mL 1,397 (H) 1,084 (H) 1,002 (H)           5/22/23  (PMH: chroinc HFpEF, chronic A fib, HTN, HL, CKD3, colon CA- untreated, by choice  Recent adm HF)  Close follow up heart failure  He is accompanied by his significant other who provides much of the history  ROS: Shortness of breath persists  He has lower extremity edema  No CP  Wt 180 lb  was 177 pounds May 15, and 176 discharge 5/8/2023  /88  Last labs 5/19/2023: Creatinine 2 02 BUN 52 potassium 3 9, sodium 133  Currently prescribed torsemide 80 mg twice daily since 5/16/2023, potassium 40 meq bid  However, his significant other has not been giving him the evening dose of torsemide as she was giving it every 12 hours-- too close to bedtime, and he has been up urinating  Today he is volume overloaded  His labs show cardiorenal syndrome with an elevated creatinine and BUN, in the setting of volume overload, 2-3+ bilateral pitting edema, basilar crackles    Today we will give Lasix 80 mg IV in the office  Continue supplemental potassium this afternoon,; hold this afternoon's torsemide and resume current diuretics and potassium starting tomorrow morning  Labs next week/ follow-up with me in 1 week        I have spent 40 minutes with Patient and family today in which greater than 50% of this time was spent in counseling/coordination of care regarding Diagnostic results, Instructions for management, Patient and family education, Importance of tx compliance, Risk factor reductions, Documenting in the medical record, Reviewing / ordering tests, medicine, procedures   and Obtaining or reviewing history    Assessment  Diagnoses and all orders for this visit:    Chronic heart failure with preserved ejection fraction (HFpEF) (HCC)  -     furosemide (LASIX) injection 80 mg    Permanent atrial fibrillation (HCC)    Primary hypertension    Hypercholesterolemia    Stage [facility] )    Acute on chronic heart failure with preserved ejection fraction (HFpEF) ([facility])  -     Basic metabolic panel; Future  -     Magnesium; Future  -     B-Type Natriuretic Peptide(BNP); Future        Past Medical History:   Diagnosis Date   • A-fib [facility])    • Colon cancer ([facility] )    • Diastolic heart failure (HCC)    • Flu-like symptoms 12/13/2022   • HTN (hypertension)    • Postviral syndrome 01/26/2023       Review of Systems   Constitutional: Positive for weight gain  Negative for chills  Cardiovascular: Positive for dyspnea on exertion and leg swelling  Negative for chest pain, claudication, cyanosis, irregular heartbeat, near-syncope, orthopnea, palpitations, paroxysmal nocturnal dyspnea and syncope  Respiratory: Positive for shortness of breath  Negative for cough  Gastrointestinal: Negative for heartburn and nausea  Neurological: Negative for dizziness, focal weakness, headaches, light-headedness and weakness  All other systems reviewed and are negative  No Known Allergies        Current Outpatient Medications:   •  apixaban (ELIQUIS) 2 5 mg, Take 1 tablet (2 5 mg total) by mouth 2 (two) times a day, Disp: 180 tablet, Rfl: 1  •  doxazosin (CARDURA) 4 mg tablet, Take 1 tablet (4 mg total) by mouth daily at bedtime, Disp: 30 tablet, Rfl: 0  •  ferrous sulfate 324 (65 Fe) mg, Take 1 tablet (324 mg total) by mouth every other day, Disp: 90 tablet, Rfl: 0  •  losartan (COZAAR) 50 mg tablet, Take 1 tablet (50 mg total) by mouth daily, Disp: 90 tablet, Rfl: 1  •  metFORMIN (GLUCOPHAGE) 1000 MG tablet, TAKE ONE TABLET BY MOUTH TWICE A DAY WITH MEALS, Disp: 180 tablet, Rfl: 0  •  metoprolol tartrate (LOPRESSOR) 50 mg tablet, Take 1 tablet (50 mg total) by mouth every 12 (twelve) hours, Disp: 60 tablet, Rfl: 2  •  pantoprazole (PROTONIX) 40 mg tablet, Take 40 mg by mouth daily, Disp: , Rfl:   •  potassium chloride (K-DUR,KLOR-CON) 20 mEq tablet, Take 2 tablets (40 mEq total) by mouth 2 (two) times a day, Disp: 180 tablet, Rfl: 1  •  pravastatin (PRAVACHOL) 40 mg tablet, TAKE ONE TABLET BY MOUTH EVERY DAY, Disp: 90 tablet, Rfl: 1  •  torsemide (DEMADEX) 20 mg tablet, Take 4 tablets (80 mg total) by mouth 2 (two) times a day, Disp: 180 tablet, Rfl: 3    Current Facility-Administered Medications:   •  furosemide (LASIX) injection 80 mg, 80 mg, Intravenous, Once, JESUS Green    Social History     Socioeconomic History   • Marital status: /Civil Union     Spouse name: Not on file   • Number of children: Not on file   • Years of education: Not on file   • Highest education level: Not on file   Occupational History   • Not on file   Tobacco Use   • Smoking status: Former     Types: Cigarettes     Start date: 1953     Quit date: 1966     Years since quittin 2     Passive exposure: Past   • Smokeless tobacco: Never   Vaping Use   • Vaping Use: Never used   Substance and Sexual Activity   • Alcohol use: Yes     Comment: Rarely   • Drug use: Never   • Sexual activity: Not on file   Other Topics Concern   • Not on file   Social History Narrative   • Not on file     Social Determinants of Health     Financial Resource Strain: Low Risk    • Difficulty of Paying Living Expenses: Not hard at all   Food Insecurity: No Food Insecurity   • Worried About Running Out of Food in the Last Year: Never true   • Ran Out of Food in the Last Year: Never true   Transportation Needs: No Transportation Needs   • Lack of Transportation (Medical): No   • Lack of Transportation (Non-Medical): No   Physical Activity: Inactive   • Days of Exercise per Week: 0 days   • Minutes of Exercise per Session: 0 min   Stress: No Stress Concern Present   • Feeling of Stress : Not at all   Social Connections: Unknown   • Frequency of Communication with Friends and Family: Three times a week   • Frequency of Social Gatherings with Friends and Family: Three times a week   • Attends Tenri[employer] Services: Never   • Active Member of Clubs or Organizations: Yes   • Attends Club or Organization Meetings: Never   • Marital Status: Not on file   Intimate Partner Violence: Not At Risk   • Fear of Current or Ex-Partner: No   • Emotionally Abused: No   • Physically Abused: No   • Sexually Abused: No   Housing Stability: Low Risk    • Unable to Pay for Housing in the Last Year: No   • Number of Places Lived in the Last Year: 1   • Unstable Housing in the Last Year: No       Family History   Problem Relation Age of Onset   • Colon cancer Neg Hx        Physical Exam  Vitals and nursing note reviewed  Constitutional:       General: He is not in acute distress  HENT:      Head: Normocephalic and atraumatic  Eyes:      Conjunctiva/sclera: Conjunctivae normal    Cardiovascular:      Rate and Rhythm: Normal rate  Rhythm irregularly irregular  Pulses: Intact distal pulses  Heart sounds: Heart sounds are distant  Pulmonary:      Effort: Pulmonary effort is normal       Breath sounds: Examination of the right-lower field reveals rales  Examination of the left-lower field reveals rales  Rales present  Abdominal:      General: Bowel sounds are normal       Palpations: Abdomen is soft  Musculoskeletal:         General: Normal range of motion  Cervical back: Normal range of motion and neck supple  Right lower leg: Edema present        Left "lower leg: Edema present  Skin:     General: Skin is warm and dry  Neurological:      Mental Status: He is alert and oriented to person, place, and time  Vitals: Blood pressure 148/88, pulse 59, height 5' 6\" (1 676 m), weight 82 kg (180 lb 12 8 oz), SpO2 96 %  Wt Readings from Last 3 Encounters:   05/22/23 82 kg (180 lb 12 8 oz)   05/15/23 80 3 kg (177 lb)   05/11/23 80 2 kg (176 lb 12 8 oz)         Labs & Results:  Lab Results   Component Value Date    WBC 6 47 05/06/2023    HGB 8 1 (L) 05/06/2023    HCT 27 1 (L) 05/06/2023    MCV 77 (L) 05/06/2023     05/06/2023     BNP   Date Value Ref Range Status   05/19/2023 1,002 (H) 0 - 100 pg/mL Final   05/16/2023 1,084 (H) 0 - 100 pg/mL Final   05/02/2023 1,397 (H) 0 - 100 pg/mL Final     No components found for: CHEM    No results found for this or any previous visit  No results found for this or any previous visit  This note was completed in part utilizing [employer] fluency direct voice recognition software  Grammatical errors, random word insertion, spelling mistakes, and incomplete sentences may be an occasional consequence of the system secondary to software limitations, ambient noise and hardware issues  At the time of dictation, efforts were made to edit, clarify and /or correct errors  Please read the chart carefully and recognize, using context, where substitutions have occurred    If you have any questions or concerns about the context, text or information contained within the body of this dictation, please contact myself, the provider, for further clarification        "

## 2023-05-22 NOTE — PATIENT INSTRUCTIONS

## 2023-05-22 NOTE — PROGRESS NOTES
80 mg IV Lasix given in left arm without any difficulty  Cath d/c'd intact, dry dsg placed  BP post injection 142/76  Denies any complaints  Patient voided 200 ml of clear yellow urine  Discussed checking daily weights and contact myself at [phone] if they gain 3 lbs in one day or 5 lbs in 5-7 days  Read labels  Limit daily sodium intake to 2000 mg daily  Limit daily fluid intake to 2000 mL (about 60 ounces) daily

## 2023-05-23 ENCOUNTER — TELEPHONE (OUTPATIENT)
Dept: CARDIOLOGY CLINIC | Facility: CLINIC | Age: 88
End: 2023-05-23

## 2023-05-23 ENCOUNTER — OFFICE VISIT (OUTPATIENT)
Dept: PHYSICAL THERAPY | Facility: CLINIC | Age: 88
End: 2023-05-23

## 2023-05-23 DIAGNOSIS — R53.1 WEAKNESS: ICD-10-CM

## 2023-05-23 DIAGNOSIS — R26.81 GAIT INSTABILITY: ICD-10-CM

## 2023-05-23 DIAGNOSIS — R26.89 BALANCE PROBLEM: Primary | ICD-10-CM

## 2023-05-23 NOTE — PROGRESS NOTES
"Daily Note     Today's date: 2023  Patient name: Negrita Valera  : 1935  MRN: [id number]  Referring provider: Akanksha Arnold, *  Dx:   Encounter Diagnosis     ICD-10-CM    1  Balance problem  R26 89       2  Gait instability  R26 81       3  Weakness  R53 1           Start Time: 1000  Stop Time: 1043  Total time in clinic (min): 43 minutes    Subjective: Patient's wife reports that his balance and walking is still bad, particularly when he stands up and take his initial first few steps  Patient reports that he is performing HEP but his wife claims that he is not  Objective: See treatment diary below      Assessment: Tolerated treatment well  Patient demonstrated fatigue post treatment and would benefit from continued PT  Patient was able to progress steadily through exercise set this session, making a few progressions, including adding weight for some of the standing exercises  He was heavily fatigued post session and short of breath  Was the most challenged by the step up and balance exercises  Introduced leg press this session  I encouraged him to perform HEP  Was cued during session to use high knees and pick his feet off the ground to better walk  Plan: Continue per plan of care  Progress treatment as tolerated         Diagnosis: Balance/gait   Precautions: fall risk, decreased endurance, CHF, T2DM, kidney failure, active colon CA   Primary Goals: LE strength, balance, endurance    *asterisks by exercise = given for HEP   Manuals                                              There Ex        Bike  5 mins recumbent 5 mins recumbent     HR/TR* 20x  20x      Standing marches* 20x 20x  20x 2#      Standing hip abd* 20x 30x 20x 2#     Standing knee flexion* 20x  20x 20x 2#     Sidestepping with TB        Mini squats        Bridges        Cone taps   30\" 3x with 1 rail     SLR        Leg press    65# 1x20, 1x10     [facility]         Neuro Re-Ed        FTEO on foam  1 min 2x " "1 min 2x     FTEC   30\" 2x 30\" 2x     Tandem stance  30\" 2x ea      Sidestepping on foam  3 short laps at mirror      Standing cone taps  10x ea from foam                                                               Re-evaluation              Ther Act              sit to stands              step ups    8\" 5x ea with 1 hand on rail  8\" 5x ea with 1 hand on rail       Modalities                                                            "

## 2023-05-23 NOTE — TELEPHONE ENCOUNTER
Call to patient left voicemail   F/u call to ask patient how is doing after receiving IV lasix in the office yesterday

## 2023-05-24 NOTE — TELEPHONE ENCOUNTER
Patient's wife returned my call, his weight yesterday was 175 4 with a light pair of pants and no shoes  Today his weight was 178 with jeans and shoes  He still has pitting edema in bilateral lower legs  Urine output is not increased on current diuretics  She states she is giving him torsemide 80mg in the early am and then again at 3pm  He does not have any worsening sob  She does not monitor vital signs at home  She will obtain BNP and magnesium 1 day before his f/u appointment with you next week   Please advise

## 2023-05-25 ENCOUNTER — OFFICE VISIT (OUTPATIENT)
Dept: CARDIOLOGY CLINIC | Facility: CLINIC | Age: 88
End: 2023-05-25

## 2023-05-25 ENCOUNTER — APPOINTMENT (OUTPATIENT)
Dept: LAB | Facility: CLINIC | Age: 88
End: 2023-05-25

## 2023-05-25 VITALS
SYSTOLIC BLOOD PRESSURE: 150 MMHG | HEIGHT: 66 IN | HEART RATE: 91 BPM | OXYGEN SATURATION: 97 % | DIASTOLIC BLOOD PRESSURE: 62 MMHG | WEIGHT: 176.2 LBS | BODY MASS INDEX: 28.32 KG/M2

## 2023-05-25 DIAGNOSIS — N18.32 STAGE 3B CHRONIC KIDNEY DISEASE (HCC): ICD-10-CM

## 2023-05-25 DIAGNOSIS — I50.32 CHRONIC HEART FAILURE WITH PRESERVED EJECTION FRACTION (HFPEF) (HCC): Primary | ICD-10-CM

## 2023-05-25 DIAGNOSIS — I50.32 CHRONIC HEART FAILURE WITH PRESERVED EJECTION FRACTION (HFPEF) (HCC): ICD-10-CM

## 2023-05-25 DIAGNOSIS — I48.21 PERMANENT ATRIAL FIBRILLATION (HCC): ICD-10-CM

## 2023-05-25 DIAGNOSIS — I51.7 LEFT VENTRICULAR HYPERTROPHY: ICD-10-CM

## 2023-05-25 DIAGNOSIS — I50.32 CHRONIC DIASTOLIC CONGESTIVE HEART FAILURE (HCC): ICD-10-CM

## 2023-05-25 DIAGNOSIS — I10 HYPERTENSION, UNSPECIFIED TYPE: ICD-10-CM

## 2023-05-25 LAB
ANION GAP SERPL CALCULATED.3IONS-SCNC: 10 MMOL/L (ref 4–13)
BNP SERPL-MCNC: 1092 PG/ML (ref 0–100)
BUN SERPL-MCNC: 49 MG/DL (ref 5–25)
CALCIUM SERPL-MCNC: 9.1 MG/DL (ref 8.4–10.2)
CHLORIDE SERPL-SCNC: 100 MMOL/L (ref 96–108)
CO2 SERPL-SCNC: 25 MMOL/L (ref 21–32)
CREAT SERPL-MCNC: 1.85 MG/DL (ref 0.6–1.3)
GFR SERPL CREATININE-BSD FRML MDRD: 32 ML/MIN/1.73SQ M
GLUCOSE SERPL-MCNC: 142 MG/DL (ref 65–140)
MAGNESIUM SERPL-MCNC: 1.8 MG/DL (ref 1.9–2.7)
POTASSIUM SERPL-SCNC: 4.2 MMOL/L (ref 3.5–5.3)
SODIUM SERPL-SCNC: 135 MMOL/L (ref 135–147)

## 2023-05-25 RX ORDER — METOPROLOL TARTRATE 50 MG/1
50 TABLET, FILM COATED ORAL EVERY 12 HOURS SCHEDULED
Qty: 60 TABLET | Refills: 2 | Status: SHIPPED | OUTPATIENT
Start: 2023-05-25 | End: 2023-06-24

## 2023-05-25 RX ORDER — SPIRONOLACTONE 25 MG/1
25 TABLET ORAL DAILY
Qty: 30 TABLET | Refills: 3 | Status: SHIPPED | OUTPATIENT
Start: 2023-05-25

## 2023-05-25 RX ORDER — POTASSIUM CHLORIDE 20 MEQ/1
20 TABLET, EXTENDED RELEASE ORAL 2 TIMES DAILY
Qty: 180 TABLET | Refills: 1
Start: 2023-05-25

## 2023-05-25 NOTE — PATIENT INSTRUCTIONS
Obtain blood tests as ordered  Plan on starting spironolactone pending labs today  2g sodium diet  2L fluid restriction  Daily weights  Physical activities as tolerated

## 2023-05-25 NOTE — PROGRESS NOTES
Advanced Heart Failure Outpatient Consult Note - Sonu Briton 82 y o  male MRN: [id number]    Encounter: [id number]      Assessment/Plan:    Patient Active Problem List    Diagnosis Date Noted   • Chronic heart failure with preserved ejection fraction (HFpEF) ([place] [id number] ) 05/15/2023   • Constipation 05/06/2023   • Bilateral lower extremity edema 03/20/2023   • Ambulatory dysfunction 02/11/2023   • Humerus fracture 02/09/2023   • Seasonal allergic rhinitis 09/20/2022   • Shortness of breath 06/27/2022   • Iron deficiency anemia due to chronic blood loss 06/27/2022   • Gastritis 02/19/2022   • Colon adenocarcinoma ([place] [address] ) 02/18/2022   • Cognitive dysfunction 02/18/2022   • Anemia 02/11/2022   • Acute on chronic diastolic congestive heart failure ([place] [address] ) 03/11/2020   • Atrial fibrillation ([place] [address] ) 03/10/2020   • Hypercholesterolemia 09/21/2018   • Stage 3 chronic kidney disease ([place] [address] ) 07/27/2018   • 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 12/30/2014   • Primary hypertension 04/03/2014   • Hypercalcemia 07/08/2013   • Type 2 diabetes mellitus, without long-term current use of insulin ([place] [address] ) 09/13/2011   • Esophageal reflux 09/13/2011       # Heart failure with preserved ejection fraction  # Severely increased LV wall thickness, 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  Voltage to mass mismatch  Volume up    Weight: 180 lbs 5/22/23; 176 lbs 5/25/23  NT proBNP: 1002 5/19/23; 1092 5/25/23    Studies- personally reviewed by me    Echocardiogram 5/3/23  LVEF: 60%  LVIDd: 3 8cm, severely increased wall thickness, 1 8-1 9cm  RV: dilated, mildly reduced systolic function, prominent trabeculations  MR: mild  PASP: 66mmHg  RVOT: no notching  Other: restrictive diastology    Echo 7/9/22: LVEF 60%  Moderately increased wall thickness  Mild MR  Normal RV size and systolic function  Mild to moderate TR  PASP 49mmHg    Echo 3/11/20 [facility]): LVEF 50%  Severe concentric hypertrophy  Enlarged RV, normal systolic function   Moderate MR    # Permanent atrial fibrillation  Rate: metoprolol as above  AC: apixaban 2 5mg BID  # Hypertension, controlled  # CKD 3, recent creatinine 1 6-1 9, 2 02 5/19/23 1 85 5/26/23  # Hyperlipidemia  3/4/22 TG 69 HDl 63 LDL 54, on pravastatin  # DM type 2  # Colon cancer, untreated per patient's wishes, decline   # Chronic anemia, recent hemoglobin 7 6-8 1, 8 1 5/6/23    Today's Plan:  Continue torsemide 80mg BID  Start spironolactone 25mg daily  Decrease potassium to 20 meq BID  Daily weights  2g sodium diet  2L fluid restriction  Physical activities as tolerated  To revisit ACP on follow up    HPI:   82year old male with PMH of chronic HFPEF, severely increased wall thickness since 3/2020, permanent atrial fibrillation, hypertension, dyslipidemia and untreated colon cancer who is referred for heart failure evaluation and management  Admitted 5/2/23 for decompensated heart failure  Diuresed, discharged on torsemide 60mg BID with discharge weight of 176 lbs  Seen in follow up by Sana Amezcua, noted to have weight gain, shortness of breath and leg swelling  Torsemide increased to 80mg po BID, and given lasix 80mg IV on last visit  He is here with significant other  He reports overall improvement  Weight is down to 171 lbs on home scale this morning  No worsening leg swelling  He chronically sleeps on the recliner  No abdominal distension  No dizziness or lightheadedness  5/25/23 Na 135 K 4 2 creatinine 1 85  Past Medical History:   Diagnosis Date   • A-fib [facility])    • Colon cancer ([facility] )    • Diastolic heart failure ([facility] )    • Flu-like symptoms 12/13/2022   • HTN (hypertension)    • Postviral syndrome 01/26/2023       Review of Systems   Constitutional: Negative for chills and fever  HENT: Negative for ear pain and sore throat  Eyes: Negative for pain and visual disturbance  Respiratory: Positive for shortness of breath  Negative for cough  Cardiovascular: Positive for leg swelling   Negative for chest pain and palpitations  Gastrointestinal: Negative for abdominal distention, abdominal pain and vomiting  Genitourinary: Negative for dysuria and hematuria  Musculoskeletal: Negative for arthralgias and back pain  Skin: Negative for color change and rash  Neurological: Negative for dizziness, seizures, syncope and light-headedness  All other systems reviewed and are negative  No Known Allergies        Current Outpatient Medications:   •  apixaban (ELIQUIS) 2 5 mg, Take 1 tablet (2 5 mg total) by mouth 2 (two) times a day, Disp: 180 tablet, Rfl: 1  •  doxazosin (CARDURA) 4 mg tablet, Take 1 tablet (4 mg total) by mouth daily at bedtime, Disp: 30 tablet, Rfl: 0  •  ferrous sulfate 324 (65 Fe) mg, Take 1 tablet (324 mg total) by mouth every other day, Disp: 90 tablet, Rfl: 0  •  losartan (COZAAR) 50 mg tablet, Take 1 tablet (50 mg total) by mouth daily, Disp: 90 tablet, Rfl: 1  •  metFORMIN (GLUCOPHAGE) 1000 MG tablet, TAKE ONE TABLET BY MOUTH TWICE A DAY WITH MEALS, Disp: 180 tablet, Rfl: 0  •  metoprolol tartrate (LOPRESSOR) 50 mg tablet, Take 1 tablet (50 mg total) by mouth every 12 (twelve) hours, Disp: 60 tablet, Rfl: 2  •  pantoprazole (PROTONIX) 40 mg tablet, Take 40 mg by mouth daily, Disp: , Rfl:   •  pravastatin (PRAVACHOL) 40 mg tablet, TAKE ONE TABLET BY MOUTH EVERY DAY, Disp: 90 tablet, Rfl: 1  •  torsemide (DEMADEX) 20 mg tablet, Take 4 tablets (80 mg total) by mouth 2 (two) times a day, Disp: 180 tablet, Rfl: 3  •  potassium chloride (K-DUR,KLOR-CON) 20 mEq tablet, Take 1 tablet (20 mEq total) by mouth 2 (two) times a day, Disp: 180 tablet, Rfl: 1  •  spironolactone (ALDACTONE) 25 mg tablet, Take 1 tablet (25 mg total) by mouth daily, Disp: 30 tablet, Rfl: 3    Social History     Socioeconomic History   • Marital status: /Civil Union     Spouse name: Not on file   • Number of children: Not on file   • Years of education: Not on file   • Highest education level: Not on file Occupational History   • Not on file   Tobacco Use   • Smoking status: Former     Types: Cigarettes     Start date: 1953     Quit date: 1966     Years since quittin 2     Passive exposure: Past   • Smokeless tobacco: Never   Vaping Use   • Vaping Use: Never used   Substance and Sexual Activity   • Alcohol use: Yes     Comment: Rarely   • Drug use: Never   • Sexual activity: Not on file   Other Topics Concern   • Not on file   Social History Narrative   • Not on file     Social Determinants of Health     Financial Resource Strain: Low Risk  (2023)    Overall Financial Resource Strain (CARDIA)    • Difficulty of Paying Living Expenses: Not hard at all   Food Insecurity: No Food Insecurity (2023)    Hunger Vital Sign    • Worried About Running Out of Food in the Last Year: Never true    • Ran Out of Food in the Last Year: Never true   Transportation Needs: No Transportation Needs (2023)    PRAPARE - Transportation    • Lack of Transportation (Medical): No    • Lack of Transportation (Non-Medical): No   Physical Activity: Inactive (2023)    Exercise Vital Sign    • Days of Exercise per Week: 0 days    • Minutes of Exercise per Session: 0 min   Stress: No Stress Concern Present (2023)    [address]    • Feeling of Stress : Not at all   Social Connections: Unknown (2023)    Social Connection and Isolation Panel [NHANES]    • Frequency of Communication with Friends and Family: Three times a week    • Frequency of Social Gatherings with Friends and Family: Three times a week    • Attends Caodaism Services: Never    • Active Member of Clubs or Organizations: Yes    • Attends Club or Organization Meetings: Never    • Marital Status: Not on file   Intimate Partner Violence: Not At Risk (2023)    Humiliation, Afraid, Rape, and Kick questionnaire    • Fear of Current or Ex-Partner: No    • Emotionally Abused:  No "• Physically Abused: No    • Sexually Abused: No   Housing Stability: Low Risk  (5/5/2023)    Housing Stability Vital Sign    • Unable to Pay for Housing in the Last Year: No    • Number of Places Lived in the Last Year: 1    • Unstable Housing in the Last Year: No       Family History   Problem Relation Age of Onset   • Colon cancer Neg Hx        Physical Exam:    Vitals: Blood pressure 150/62, pulse 91, height 5' 6\" (1 676 m), weight 79 9 kg (176 lb 3 2 oz), SpO2 97 %  , Body mass index is 28 44 kg/m² ,   Wt Readings from Last 3 Encounters:   05/25/23 79 9 kg (176 lb 3 2 oz)   05/22/23 82 kg (180 lb 12 8 oz)   05/15/23 80 3 kg (177 lb)       Physical Exam  Constitutional:       General: He is not in acute distress  Appearance: Normal appearance  HENT:      Head: Normocephalic and atraumatic  Mouth/Throat:      Mouth: Mucous membranes are moist    Eyes:      General: No scleral icterus  Extraocular Movements: Extraocular movements intact  Cardiovascular:      Rate and Rhythm: Normal rate  Rhythm regularly irregular  Pulses: Normal pulses  Heart sounds: S1 normal and S2 normal  No murmur heard  No friction rub  No gallop  Comments: JVP 1cm above the clavicle sitting upright  Trace pitting edema bilaterally  Pulmonary:      Breath sounds: Normal breath sounds  Abdominal:      General: There is no distension  Palpations: Abdomen is soft  Tenderness: There is no abdominal tenderness  There is no guarding or rebound  Musculoskeletal:         General: Normal range of motion  Cervical back: Neck supple  Skin:     General: Skin is warm and dry  Capillary Refill: Capillary refill takes less than 2 seconds  Neurological:      General: No focal deficit present  Mental Status: He is alert and oriented to person, place, and time     Psychiatric:         Mood and Affect: Mood normal          Labs & Results:    Lab Results   Component Value Date    HCT 27 1 (L) " "05/06/2023    HGB 8 1 (L) 05/06/2023    MCV 77 (L) 05/06/2023     05/06/2023    WBC 6 47 05/06/2023     Lab Results   Component Value Date    BUN 49 (H) 05/25/2023    CALCIUM 9 1 05/25/2023     05/25/2023    CO2 25 05/25/2023    CREATININE 1 85 (H) 05/25/2023    GLUC 142 (H) 05/25/2023    K 4 2 05/25/2023    SODIUM 135 05/25/2023     Lab Results   Component Value Date    NTBNP 1,896 (H) 07/13/2022      Lab Results   Component Value Date    CHOLESTEROL 131 03/04/2022    CHOLESTEROL 116 01/31/2018     Lab Results   Component Value Date    HDL 63 03/04/2022    HDL 60 01/31/2018     Lab Results   Component Value Date    TRIG 69 03/04/2022    TRIG 81 01/31/2018     No results found for: \"NONHDLC\"    EKG personally reviewed by Oscar Rashid MD      Counseling / Coordination of Care  Time spent today 40 minutes  Greater than 50% of total time was spent with the patient and / or family counseling and / or coordination of care  We discussed diagnoses, most recent studies and any changes in treatment    Thank you for the opportunity to participate in the care of this patient      Mone Ayoub MD  ADVANCED HEART FAILURE AND MECHANICAL CIRCULATORY SUPPORT  [facility]    "

## 2023-05-26 ENCOUNTER — OFFICE VISIT (OUTPATIENT)
Dept: PHYSICAL THERAPY | Facility: CLINIC | Age: 88
End: 2023-05-26

## 2023-05-26 DIAGNOSIS — R53.1 WEAKNESS: ICD-10-CM

## 2023-05-26 DIAGNOSIS — R26.81 GAIT INSTABILITY: ICD-10-CM

## 2023-05-26 DIAGNOSIS — R26.89 BALANCE PROBLEM: Primary | ICD-10-CM

## 2023-05-26 NOTE — PROGRESS NOTES
"Daily Note     Today's date: 2023  Patient name: Bryon Gonzalez  : 1935  MRN: [id number]  Referring provider: James Crews, *  Dx:   Encounter Diagnosis     ICD-10-CM    1  Balance problem  R26 89       2  Gait instability  R26 81       3  Weakness  R53 1           Start Time: 1000  Stop Time: 1045  Total time in clinic (min): 45 minutes    Subjective: Patient reports no new updates  Objective: See treatment diary below      Assessment: Tolerated treatment well  Patient demonstrated fatigue post treatment and would benefit from continued PT  Patient was able to demonstrate some minor improvements in balance and endurance this session  Was able to increase weight used for many exercises  Continues to remain very fatigued post session but getting around better than a few sessions ago  Will continue to progress functional strengthening and balance as tolerated  Plan: Continue per plan of care  Progress treatment as tolerated         Diagnosis: Balance/gait   Precautions: fall risk, decreased endurance, CHF, T2DM, kidney failure, active colon CA   Primary Goals: LE strength, balance, endurance    *asterisks by exercise = given for HEP   Manuals                                             There Ex        Bike  5 mins recumbent 5 mins recumbent 5 mins recumbent    HR/TR* 20x  20x      Standing marches* 20x 20x  20x 2#  2x15 2 5#    Standing hip abd* 20x 30x 20x 2# 2x15 2 5#    Standing knee flexion* 20x  20x 20x 2# 2x15 2 5#    Sidestepping with TB        Mini squats        Bridges        Cone taps   30\" 3x with 1 rail     SLR        Leg press    65# 1x20, 1x10 75# 2x20    Clamshells         Neuro Re-Ed        FTEO on foam  1 min 2x 1 min 2x 1 min 2x    FTEC   30\" 2x 30\" 2x 30\" 2x    Tandem stance  30\" 2x ea  30\" 1x ea    Sidestepping on foam  3 short laps at mirror  3 long laps at mirror    Standing cone taps  10x ea from foam      Heel to toe walk    3 long laps at " "mirror                                                     Re-evaluation              Ther Act              sit to stands              step ups    8\" 5x ea with 1 hand on rail  8\" 5x ea with 1 hand on rail       Modalities                                                              "

## 2023-05-26 NOTE — RESULT ENCOUNTER NOTE
Spoke with pts wife and notified of results and med changes per Dr Zambrano Base    She verbalized understanding

## 2023-05-30 ENCOUNTER — APPOINTMENT (OUTPATIENT)
Dept: LAB | Facility: CLINIC | Age: 88
End: 2023-05-30

## 2023-05-30 DIAGNOSIS — I50.32 CHRONIC HEART FAILURE WITH PRESERVED EJECTION FRACTION (HFPEF) (HCC): ICD-10-CM

## 2023-05-30 LAB
ANION GAP SERPL CALCULATED.3IONS-SCNC: 4 MMOL/L (ref 4–13)
BUN SERPL-MCNC: 57 MG/DL (ref 5–25)
CALCIUM SERPL-MCNC: 9 MG/DL (ref 8.3–10.1)
CHLORIDE SERPL-SCNC: 103 MMOL/L (ref 96–108)
CO2 SERPL-SCNC: 23 MMOL/L (ref 21–32)
CREAT SERPL-MCNC: 2.22 MG/DL (ref 0.6–1.3)
GFR SERPL CREATININE-BSD FRML MDRD: 26 ML/MIN/1.73SQ M
GLUCOSE P FAST SERPL-MCNC: 107 MG/DL (ref 65–99)
POTASSIUM SERPL-SCNC: 4.1 MMOL/L (ref 3.5–5.3)
SODIUM SERPL-SCNC: 130 MMOL/L (ref 135–147)

## 2023-05-31 ENCOUNTER — APPOINTMENT (OUTPATIENT)
Dept: PHYSICAL THERAPY | Facility: CLINIC | Age: 88
End: 2023-05-31
Payer: COMMERCIAL

## 2023-05-31 ENCOUNTER — OFFICE VISIT (OUTPATIENT)
Dept: CARDIOLOGY CLINIC | Facility: CLINIC | Age: 88
End: 2023-05-31

## 2023-05-31 VITALS
OXYGEN SATURATION: 99 % | HEART RATE: 89 BPM | DIASTOLIC BLOOD PRESSURE: 62 MMHG | RESPIRATION RATE: 18 BRPM | BODY MASS INDEX: 27.16 KG/M2 | SYSTOLIC BLOOD PRESSURE: 144 MMHG | HEIGHT: 66 IN | WEIGHT: 169 LBS

## 2023-05-31 DIAGNOSIS — I50.32 CHRONIC HEART FAILURE WITH PRESERVED EJECTION FRACTION (HFPEF) (HCC): Primary | ICD-10-CM

## 2023-05-31 DIAGNOSIS — N18.32 STAGE 3B CHRONIC KIDNEY DISEASE (HCC): ICD-10-CM

## 2023-05-31 DIAGNOSIS — I51.7 LEFT VENTRICULAR HYPERTROPHY: ICD-10-CM

## 2023-05-31 DIAGNOSIS — I10 HYPERTENSION, UNSPECIFIED TYPE: ICD-10-CM

## 2023-05-31 DIAGNOSIS — I48.21 PERMANENT ATRIAL FIBRILLATION (HCC): ICD-10-CM

## 2023-05-31 NOTE — PATIENT INSTRUCTIONS
Continue current medications  Obtain BMP in 1 week  Daily weights  2g sodium diet  2L fluid restriction  Physical activities as tolerated  Palliative care team consultation as scheduled

## 2023-05-31 NOTE — PROGRESS NOTES
Advanced Heart Failure Outpatient Progress Note - Laila Vaughn 82 y o  male MRN: [id number]    Encounter: [id number]      Assessment/Plan:    Patient Active Problem List    Diagnosis Date Noted   • Chronic heart failure with preserved ejection fraction (HFpEF) ([place] [id number] ) 05/15/2023   • Constipation 05/06/2023   • Bilateral lower extremity edema 03/20/2023   • Ambulatory dysfunction 02/11/2023   • Humerus fracture 02/09/2023   • Seasonal allergic rhinitis 09/20/2022   • Shortness of breath 06/27/2022   • Iron deficiency anemia due to chronic blood loss 06/27/2022   • Gastritis 02/19/2022   • Colon adenocarcinoma ([place] [id number] ) 02/18/2022   • Cognitive dysfunction 02/18/2022   • Anemia 02/11/2022   • Acute on chronic diastolic congestive heart failure ([place] [address] ) 03/11/2020   • Atrial fibrillation ([place] [address] ) 03/10/2020   • Hypercholesterolemia 09/21/2018   • Stage 3 chronic kidney disease ([place] [address] ) 07/27/2018   • 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 12/30/2014   • Primary hypertension 04/03/2014   • Hypercalcemia 07/08/2013   • Type 2 diabetes mellitus, without long-term current use of insulin ([place] [address] ) 09/13/2011   • Esophageal reflux 09/13/2011       # Heart failure with preserved ejection fraction  # Severely increased LV wall thickness, 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  Voltage to mass mismatch  Diuretic: torsemide 80mg BID with potassium 20 meq BID; spironolactone 25mg daily  Euvolemic on exam    Weight: 180 lbs 5/22/23; 176 lbs 5/25/23  BNP: 1002 5/19/23; 1092 5/25/23    Studies- personally reviewed by me    Echocardiogram 5/3/23  LVEF: 60%  LVIDd: 3 8cm, severely increased wall thickness, 1 8-1 9cm  RV: dilated, mildly reduced systolic function, prominent trabeculations  MR: mild  PASP: 66mmHg  RVOT: no notching  Other: restrictive diastology    Echo 7/9/22: LVEF 60%  Moderately increased wall thickness  Mild MR  Normal RV size and systolic function  Mild to moderate TR  PASP 49mmHg    Echo 3/11/20 [facility]): LVEF 50%  Severe concentric hypertrophy  Enlarged RV, normal systolic function  Moderate MR    # Permanent atrial fibrillation  Rate: metoprolol as above  AC: apixaban 2 5mg BID  # Hypertension, acceptable for his age  Rx: losartan; metoprolol and spironolactone as above  # CKD 3, recent creatinine 1 6-1 9, 2 02 5/19/23 1 85 5/26/23; 2 22 5/30/23  # Hyperlipidemia  3/4/22 TG 69 HDl 63 LDL 54, on pravastatin  # DM type 2  # Colon cancer, possible liver mets, untreated per patient's wishes, scheduled to follow up with palliative care team  [facility] C/A/P 2/9/23: Heterogeneous appearing enhancing mass in segment 4 of the liver  Considerations include primary liver neoplasm versus metastatic disease  Correlation with an MRI examination recommended  # Chronic anemia, recent hemoglobin 7 6-8 1, 8 1 5/6/23    Today's Plan:  Euvolemic on exam  Continue current doses of torsemide and spironolactone, will accept higher creatinine for euvolemia  BMP in 1 week  Continue daily weights, advised to call office if weight continues to trend down from 169 lbs  Patient reports fluctuating weights with variable oral fluid intake  Will not pursue further work up for cardiac amyloidosis given untreated colon cancer  Palliative care team follow up as scheduled  2g sodium diet  2L fluid restriction  Physical activities as tolerated      HPI:   82year old male with PMH of chronic HFPEF, severely increased wall thickness since 3/2020, permanent atrial fibrillation, hypertension, dyslipidemia and untreated colon cancer who is referred for heart failure evaluation and management  Admitted 5/2/23 for decompensated heart failure  Diuresed, discharged on torsemide 60mg BID with discharge weight of 176 lbs  Seen in follow up by Anaid Julien, noted to have weight gain, shortness of breath and leg swelling  Torsemide increased to 80mg po BID, and given lasix 80mg IV on last visit  He is here with significant other  He reports overall improvement  Weight is down to 171 lbs on home scale this morning  No worsening leg swelling  He chronically sleeps on the recliner  No abdominal distension  No dizziness or lightheadedness  5/25/23 Na 135 K 4 2 creatinine 1 85  Interval History:  5/31/23: here for follow up  Continued torsemide 80mg BID, started spironolactone 25mg daily and decreased potassium to 20 meq BID on last visit  5/30/23 Na 130 K 4 1 creatinine 2 22 from 1 85  Leg swelling improved and weight down to 169 lb son home scale today  Balance and strength deteriorating - physical therapy twice a week for a few weeks now and no improvement noted  Of note, patient does not do exercises at home  Stable shortness of breath with bending over or exertion  No chest pain/tightness, dizziness or lightheadedness  Past Medical History:   Diagnosis Date   • A-fib [facility])    • Colon cancer ([facility] [address] )    • Diastolic heart failure ([facility] )    • Flu-like symptoms 12/13/2022   • HTN (hypertension)    • Postviral syndrome 01/26/2023       Review of Systems   Constitutional: Negative for chills and fever  HENT: Negative for ear pain and sore throat  Eyes: Negative for pain and visual disturbance  Respiratory: Positive for shortness of breath  Negative for cough  Cardiovascular: Positive for leg swelling  Negative for chest pain and palpitations  Gastrointestinal: Negative for abdominal distention, abdominal pain and vomiting  Genitourinary: Negative for dysuria and hematuria  Musculoskeletal: Negative for arthralgias and back pain  Skin: Negative for color change and rash  Neurological: Negative for dizziness, seizures, syncope and light-headedness  All other systems reviewed and are negative  No Known Allergies        Current Outpatient Medications:   •  apixaban (ELIQUIS) 2 5 mg, Take 1 tablet (2 5 mg total) by mouth 2 (two) times a day, Disp: 180 tablet, Rfl: 1  •  doxazosin (CARDURA) 4 mg tablet, Take 1 tablet (4 mg total) by mouth daily at bedtime, Disp: 30 tablet, Rfl: 0  •  ferrous sulfate 324 (65 Fe) mg, Take 1 tablet (324 mg total) by mouth every other day, Disp: 90 tablet, Rfl: 0  •  losartan (COZAAR) 50 mg tablet, Take 1 tablet (50 mg total) by mouth daily, Disp: 90 tablet, Rfl: 1  •  metFORMIN (GLUCOPHAGE) 1000 MG tablet, TAKE ONE TABLET BY MOUTH TWICE A DAY WITH MEALS, Disp: 180 tablet, Rfl: 0  •  metoprolol tartrate (LOPRESSOR) 50 mg tablet, Take 1 tablet (50 mg total) by mouth every 12 (twelve) hours, Disp: 60 tablet, Rfl: 2  •  potassium chloride (K-DUR,KLOR-CON) 20 mEq tablet, Take 1 tablet (20 mEq total) by mouth 2 (two) times a day, Disp: 180 tablet, Rfl: 1  •  pravastatin (PRAVACHOL) 40 mg tablet, TAKE ONE TABLET BY MOUTH EVERY DAY, Disp: 90 tablet, Rfl: 1  •  spironolactone (ALDACTONE) 25 mg tablet, Take 1 tablet (25 mg total) by mouth daily, Disp: 30 tablet, Rfl: 3  •  torsemide (DEMADEX) 20 mg tablet, Take 4 tablets (80 mg total) by mouth 2 (two) times a day, Disp: 180 tablet, Rfl: 3  •  pantoprazole (PROTONIX) 40 mg tablet, Take 40 mg by mouth daily (Patient not taking: Reported on 2023), Disp: , Rfl:     Social History     Socioeconomic History   • Marital status: /Civil Union     Spouse name: Not on file   • Number of children: Not on file   • Years of education: Not on file   • Highest education level: Not on file   Occupational History   • Not on file   Tobacco Use   • Smoking status: Former     Types: Cigarettes     Start date: 1953     Quit date: 1966     Years since quittin 3     Passive exposure: Past   • Smokeless tobacco: Never   Vaping Use   • Vaping Use: Never used   Substance and Sexual Activity   • Alcohol use: Yes     Comment: Rarely   • Drug use: Never   • Sexual activity: Not on file   Other Topics Concern   • Not on file   Social History Narrative   • Not on file     Social Determinants of Health     Financial Resource Strain: Low Risk  (2023)    Overall Financial Resource Strain "(CARDIA)    • Difficulty of Paying Living Expenses: Not hard at all   Food Insecurity: No Food Insecurity (5/5/2023)    Hunger Vital Sign    • Worried About Running Out of Food in the Last Year: Never true    • Ran Out of Food in the Last Year: Never true   Transportation Needs: No Transportation Needs (5/5/2023)    PRAPARE - Transportation    • Lack of Transportation (Medical): No    • Lack of Transportation (Non-Medical): No   Physical Activity: Inactive (1/26/2023)    Exercise Vital Sign    • Days of Exercise per Week: 0 days    • Minutes of Exercise per Session: 0 min   Stress: No Stress Concern Present (1/26/2023)    [address]    • Feeling of Stress : Not at all   Social Connections: Unknown (1/26/2023)    Social Connection and Isolation Panel [NHANES]    • Frequency of Communication with Friends and Family: Three times a week    • Frequency of Social Gatherings with Friends and Family: Three times a week    • Attends Druze Services: Never    • Active Member of Clubs or Organizations: Yes    • Attends Club or Organization Meetings: Never    • Marital Status: Not on file   Intimate Partner Violence: Not At Risk (1/26/2023)    Humiliation, Afraid, Rape, and Kick questionnaire    • Fear of Current or Ex-Partner: No    • Emotionally Abused: No    • Physically Abused: No    • Sexually Abused: No   Housing Stability: Low Risk  (5/5/2023)    Housing Stability Vital Sign    • Unable to Pay for Housing in the Last Year: No    • Number of Places Lived in the Last Year: 1    • Unstable Housing in the Last Year: No       Family History   Problem Relation Age of Onset   • Colon cancer Neg Hx        Physical Exam:    Vitals: Blood pressure 144/62, pulse 89, resp  rate 18, height 5' 6\" (1 676 m), weight 76 7 kg (169 lb), SpO2 99 %  , Body mass index is 27 28 kg/m² ,   Wt Readings from Last 3 Encounters:   05/31/23 76 7 kg (169 lb)   05/25/23 79 9 kg (176 lb " 3 2 oz)   05/22/23 82 kg (180 lb 12 8 oz)       Physical Exam  Constitutional:       General: He is not in acute distress  Appearance: Normal appearance  HENT:      Head: Normocephalic and atraumatic  Mouth/Throat:      Mouth: Mucous membranes are moist    Eyes:      General: No scleral icterus  Extraocular Movements: Extraocular movements intact  Cardiovascular:      Rate and Rhythm: Normal rate  Rhythm irregularly irregular  Pulses: Normal pulses  Heart sounds: S1 normal and S2 normal  No murmur heard  No friction rub  No gallop  Comments: JVP at the clavicle sitting upright  Trace pitting edema bilaterally  Pulmonary:      Breath sounds: Normal breath sounds  Abdominal:      General: There is no distension  Palpations: Abdomen is soft  Tenderness: There is no abdominal tenderness  There is no guarding or rebound  Musculoskeletal:         General: Normal range of motion  Cervical back: Neck supple  Skin:     General: Skin is warm and dry  Capillary Refill: Capillary refill takes less than 2 seconds  Neurological:      General: No focal deficit present  Mental Status: He is alert and oriented to person, place, and time     Psychiatric:         Mood and Affect: Mood normal          Labs & Results:    Lab Results   Component Value Date    HCT 27 1 (L) 05/06/2023    HGB 8 1 (L) 05/06/2023    MCV 77 (L) 05/06/2023     05/06/2023    WBC 6 47 05/06/2023     Lab Results   Component Value Date    BUN 57 (H) 05/30/2023    CALCIUM 9 0 05/30/2023     05/30/2023    CO2 23 05/30/2023    CREATININE 2 22 (H) 05/30/2023    GLUC 142 (H) 05/25/2023    K 4 1 05/30/2023    SODIUM 130 (L) 05/30/2023     Lab Results   Component Value Date    NTBNP 1,896 (H) 07/13/2022      Lab Results   Component Value Date    CHOLESTEROL 131 03/04/2022    CHOLESTEROL 116 01/31/2018     Lab Results   Component Value Date    HDL 63 03/04/2022    HDL 60 01/31/2018     Lab "Results   Component Value Date    TRIG 69 03/04/2022    TRIG 81 01/31/2018     No results found for: \"NONHDLC\"    EKG personally reviewed by Dorcas Huizar MD      Counseling / Coordination of Care  Time spent today 25 minutes  Greater than 50% of total time was spent with the patient and / or family counseling and / or coordination of care  We discussed diagnoses, most recent studies and any changes in treatment    Thank you for the opportunity to participate in the care of this patient      Everton Maldonado MD  ADVANCED HEART FAILURE AND MECHANICAL CIRCULATORY SUPPORT  [facility]"

## 2023-06-01 DIAGNOSIS — I50.32 CHRONIC HEART FAILURE WITH PRESERVED EJECTION FRACTION (HFPEF) (HCC): Primary | ICD-10-CM

## 2023-06-02 ENCOUNTER — PATIENT OUTREACH (OUTPATIENT)
Dept: CASE MANAGEMENT | Facility: HOSPITAL | Age: 88
End: 2023-06-02

## 2023-06-02 NOTE — PROGRESS NOTES
Heart Failure Outpatient Care Coordinator Note: Chart notes reviewed and call made to patient home and left message

## 2023-06-05 ENCOUNTER — TELEPHONE (OUTPATIENT)
Dept: INTERNAL MEDICINE CLINIC | Facility: CLINIC | Age: 88
End: 2023-06-05

## 2023-06-05 DIAGNOSIS — I48.91 ATRIAL FIBRILLATION, UNSPECIFIED TYPE (HCC): ICD-10-CM

## 2023-06-05 RX ORDER — DOXAZOSIN MESYLATE 4 MG/1
4 TABLET ORAL
Qty: 90 TABLET | Refills: 1 | Status: SHIPPED | OUTPATIENT
Start: 2023-06-05 | End: 2023-12-02

## 2023-06-05 NOTE — TELEPHONE ENCOUNTER
Patient needs refill on doxsosin, was written on may from dr Nohemy Davis but now the pharmacy wont refill, we are his primary doctors, can it be reordered from us

## 2023-06-06 ENCOUNTER — APPOINTMENT (OUTPATIENT)
Dept: PHYSICAL THERAPY | Facility: CLINIC | Age: 88
End: 2023-06-06
Payer: COMMERCIAL

## 2023-06-08 ENCOUNTER — APPOINTMENT (OUTPATIENT)
Dept: LAB | Facility: CLINIC | Age: 88
End: 2023-06-08
Payer: COMMERCIAL

## 2023-06-08 ENCOUNTER — APPOINTMENT (OUTPATIENT)
Dept: PHYSICAL THERAPY | Facility: CLINIC | Age: 88
End: 2023-06-08
Payer: COMMERCIAL

## 2023-06-08 ENCOUNTER — TELEPHONE (OUTPATIENT)
Dept: CARDIOLOGY CLINIC | Facility: CLINIC | Age: 88
End: 2023-06-08

## 2023-06-08 DIAGNOSIS — I50.32 CHRONIC HEART FAILURE WITH PRESERVED EJECTION FRACTION (HFPEF) (HCC): ICD-10-CM

## 2023-06-08 LAB
ANION GAP SERPL CALCULATED.3IONS-SCNC: 8 MMOL/L (ref 4–13)
BNP SERPL-MCNC: 619 PG/ML (ref 0–100)
BUN SERPL-MCNC: 63 MG/DL (ref 5–25)
CALCIUM SERPL-MCNC: 9.4 MG/DL (ref 8.3–10.1)
CHLORIDE SERPL-SCNC: 101 MMOL/L (ref 96–108)
CO2 SERPL-SCNC: 23 MMOL/L (ref 21–32)
CREAT SERPL-MCNC: 2.19 MG/DL (ref 0.6–1.3)
GFR SERPL CREATININE-BSD FRML MDRD: 26 ML/MIN/1.73SQ M
GLUCOSE P FAST SERPL-MCNC: 116 MG/DL (ref 65–99)
POTASSIUM SERPL-SCNC: 3.9 MMOL/L (ref 3.5–5.3)
SODIUM SERPL-SCNC: 132 MMOL/L (ref 135–147)

## 2023-06-08 PROCEDURE — 80048 BASIC METABOLIC PNL TOTAL CA: CPT

## 2023-06-08 PROCEDURE — 36415 COLL VENOUS BLD VENIPUNCTURE: CPT

## 2023-06-08 PROCEDURE — 83880 ASSAY OF NATRIURETIC PEPTIDE: CPT

## 2023-06-08 NOTE — TELEPHONE ENCOUNTER
Spoke with wife and he is doing well  Legs are thin, appetite good  Wt is 162 lbs today  He is weak in the legs but that has been on going  She was asking about the HBG result  Advised that was not ordered  Advised to call PCP   She states he is more pale and has increased fatigue  He has OV with Pallative next week  Will continue meds as ordered      Thank you

## 2023-06-08 NOTE — TELEPHONE ENCOUNTER
MD Jane Julian, RN  Please check how he is doing  Renal function stable and BNP improved  Continue current medications if weight stable

## 2023-06-09 ENCOUNTER — APPOINTMENT (OUTPATIENT)
Dept: PHYSICAL THERAPY | Facility: CLINIC | Age: 88
End: 2023-06-09
Payer: COMMERCIAL

## 2023-06-09 ENCOUNTER — PATIENT OUTREACH (OUTPATIENT)
Dept: CASE MANAGEMENT | Facility: HOSPITAL | Age: 88
End: 2023-06-09

## 2023-06-09 NOTE — PROGRESS NOTES
Heart Failure Outpatient Care Coordinator Note:Chart notes reviewed  Call made from HF RN Mika First to patient's wife yesterday and patient with stable weight and lab work  Will call and follow up next week

## 2023-06-13 ENCOUNTER — OFFICE VISIT (OUTPATIENT)
Dept: PHYSICAL THERAPY | Facility: CLINIC | Age: 88
End: 2023-06-13
Payer: COMMERCIAL

## 2023-06-13 DIAGNOSIS — R53.1 WEAKNESS: ICD-10-CM

## 2023-06-13 DIAGNOSIS — R26.81 GAIT INSTABILITY: ICD-10-CM

## 2023-06-13 DIAGNOSIS — R26.89 BALANCE PROBLEM: Primary | ICD-10-CM

## 2023-06-13 PROCEDURE — 97112 NEUROMUSCULAR REEDUCATION: CPT | Performed by: PHYSICAL THERAPIST

## 2023-06-13 PROCEDURE — 97530 THERAPEUTIC ACTIVITIES: CPT | Performed by: PHYSICAL THERAPIST

## 2023-06-13 NOTE — PROGRESS NOTES
Daily Note and Discharge    Today's date: 2023  Patient name: Marc Mak  : 1935  MRN: [id number]  Referring provider: Madeleine Hernandez, *  Dx:   Encounter Diagnosis     ICD-10-CM    1  Balance problem  R26 89       2  Gait instability  R26 81       3  Weakness  R53 1           Start Time: 930  Stop Time: 1015  Total time in clinic (min): 45 minutes    Subjective: Patient and his wife report that he is feeling very weak and tired  They will be meeting with palliative care this week  Would like to stop coming to outpatient PT at this time  Objective: See treatment diary below      Assessment: Tolerated treatment fair  Patient demonstrated fatigue post treatment  Patient was able to progress through majority of exercise set today with decent tolerance  He remains very fatigued and displays functional weakness and gait instability  Close supervision is required throughout session to make sure no LOB occurs  Due to many other comorbidities he is struggling with, patient and his wife wish to discontinue outpatient PT at this time  I recommended that they inquire about home health PT to ensure patient stays active and moving  They were in agreement with plan  Plan: Patient will be discharged at this time  I would be happy to see him in the future if indicated        Diagnosis: Balance/gait   Precautions: fall risk, decreased endurance, CHF, T2DM, kidney failure, active colon CA   Primary Goals: LE strength, balance, endurance    *asterisks by exercise = given for HEP   Manuals                                            There Ex        Bike  5 mins recumbent 5 mins recumbent 5 mins recumbent 5 mins recumbent   HR/TR* 20x  20x      Standing marches* 20x 20x  20x 2#  2x15 2 5# 20x 2 5#   Standing hip abd* 20x 30x 20x 2# 2x15 2 5# 20x 2 5#   Standing knee flexion* 20x  20x 20x 2# 2x15 2 5# 20x 2 5#   Sidestepping with TB        Mini squats        Bridges        Cone "taps   30\" 3x with 1 rail     SLR        Leg press    65# 1x20, 1x10 75# 2x20    Clamshells         Neuro Re-Ed        FTEO on foam  1 min 2x 1 min 2x 1 min 2x 1 min 2x   FTEC   30\" 2x 30\" 2x 30\" 2x    Tandem stance  30\" 2x ea  30\" 1x ea 1 min ea   Sidestepping on foam  3 short laps at mirror  3 long laps at mirror    Standing cone taps  10x ea from foam   10x ea   Heel to toe walk    3 long laps at mirror                                                     Re-evaluation          Discharge    Ther Act              sit to stands              step ups    8\" 5x ea with 1 hand on rail  8\" 5x ea with 1 hand on rail   6\" 5x ea with rail    Modalities                                                                "

## 2023-06-14 ENCOUNTER — SOCIAL WORK (OUTPATIENT)
Dept: PALLIATIVE MEDICINE | Facility: CLINIC | Age: 88
End: 2023-06-14

## 2023-06-14 ENCOUNTER — CONSULT (OUTPATIENT)
Dept: PALLIATIVE MEDICINE | Facility: CLINIC | Age: 88
End: 2023-06-14

## 2023-06-14 VITALS
WEIGHT: 167 LBS | TEMPERATURE: 99.5 F | HEIGHT: 66 IN | HEART RATE: 134 BPM | SYSTOLIC BLOOD PRESSURE: 130 MMHG | OXYGEN SATURATION: 95 % | DIASTOLIC BLOOD PRESSURE: 52 MMHG | BODY MASS INDEX: 26.84 KG/M2

## 2023-06-14 DIAGNOSIS — Z71.89 ADVANCED CARE PLANNING/COUNSELING DISCUSSION: ICD-10-CM

## 2023-06-14 DIAGNOSIS — K21.9 ESOPHAGEAL REFLUX: ICD-10-CM

## 2023-06-14 DIAGNOSIS — K59.00 CONSTIPATION, UNSPECIFIED CONSTIPATION TYPE: ICD-10-CM

## 2023-06-14 DIAGNOSIS — C18.9 COLON ADENOCARCINOMA (HCC): Primary | ICD-10-CM

## 2023-06-14 DIAGNOSIS — I50.32 CHRONIC HEART FAILURE WITH PRESERVED EJECTION FRACTION (HFPEF) (HCC): ICD-10-CM

## 2023-06-14 DIAGNOSIS — Z71.89 COUNSELING AND COORDINATION OF CARE: Primary | ICD-10-CM

## 2023-06-14 DIAGNOSIS — Z71.89 GOALS OF CARE, COUNSELING/DISCUSSION: ICD-10-CM

## 2023-06-14 DIAGNOSIS — I48.21 PERMANENT ATRIAL FIBRILLATION (HCC): ICD-10-CM

## 2023-06-14 DIAGNOSIS — R26.2 AMBULATORY DYSFUNCTION: ICD-10-CM

## 2023-06-14 DIAGNOSIS — D50.0 IRON DEFICIENCY ANEMIA DUE TO CHRONIC BLOOD LOSS: ICD-10-CM

## 2023-06-14 DIAGNOSIS — R63.4 UNINTENTIONAL WEIGHT LOSS: ICD-10-CM

## 2023-06-14 DIAGNOSIS — R06.02 SHORTNESS OF BREATH: ICD-10-CM

## 2023-06-14 DIAGNOSIS — Z51.5 PALLIATIVE CARE ENCOUNTER: ICD-10-CM

## 2023-06-14 DIAGNOSIS — R60.0 BILATERAL LOWER EXTREMITY EDEMA: ICD-10-CM

## 2023-06-14 DIAGNOSIS — N18.4 STAGE 4 CHRONIC KIDNEY DISEASE (HCC): ICD-10-CM

## 2023-06-14 PROCEDURE — NC001 PR NO CHARGE

## 2023-06-14 NOTE — Clinical Note
PT not improving his condition   Offered referral to Cardiac Rehab today, they declined as they would prefer to discuss w/ Dr Olga Park of Cardiology later this week

## 2023-06-14 NOTE — PATIENT INSTRUCTIONS
"It was good to see you today  Thank you for coming in  I strongly recommend using your walker to reduce your fall risk - and a fll could put you back in the hospital   I also urge you to discuss Cardiac Rehabilitation (a different form of PT) in your upcoming Cardiology visit on 6/16/23  If constipation is an issue:  Drink PLENTY of water  This is important to keep the gut moving  Some people have success w/ using prunes, prune juice, certain fruits or vegetables (apples, bananas, prunes, pears, raspberries, and vegetables like string beans, broccoli, spinach, kale, squash, lentils, peas, and beans), or fiber gummies  Try a probiotic  This could be yogurt or kefir, or fermented beverages such as kombucha, but probiotics are also available in capsule form  Aim for 10-15 billion colony-forming-units, w/ bacteria such as Lactobacillus / Saccharomyces / Actinomyces  Osmotic laxatives (Miralax, magnesium citrate, Milk of Magnesia) can be very useful for opioid-induced constipation (OIC); take daily to prevent OIC  Bulk laxatives (Citrucel, Metamucil, Fibercon, Benefiber, wheat germ) are useful for constipation in patients who are not taking opioids, but are not recommended if you are taking opioids  Colace is good for softening hard stools, or preventing constipation when opioids are being used - but does not stimulate the bowel to move things along once constipation has occurred  You can use senna, 1 to 2 tabs, once or twice daily as needed for constipation  Use as directed on the box/bottle  Senna is also available in a tea (\"Smooth Move\")  Should that not be enough for your constipation, you can try Dulcolax  Should that not be enough, consider an enema  All of these medications are available over-the-counter  If diarrhea is an issue:  Try Bananatrol (banana flakes)  Use as directed / as-needed for diarrhea  This is something you may safely take every day   If you become constipated you may wish to stop " using it, although it can treat both constipation and diarrhea in some patients  This available over-the-counter at some pharmacies, but you may have more success looking online (Ablexis)  Stay hydrated! If needed, it is okay to take Imodium for diarrhea  Use as directed, available over-the-counter

## 2023-06-14 NOTE — PROGRESS NOTES
Consult to Palliative and [facility] 82 y o  male [id number]    ASSESSMENT & PLAN:  1  Col[facility] ([facility] )    2  Chronic heart failure with preserved ejection fraction (HFpEF) ([facility] )    3  Iron deficiency anemia due to chronic blood loss    4  Permanent atrial fibrillation ([facility] )    5  Esophageal reflux    6  Stage 4 chronic kidney disease ([facility] )    7  Shortness of breath    8  Ambulatory dysfunction    9  Unintentional weight loss    10  Bilateral lower extremity edema    11  Constipation, unspecified constipation type    12  Advanced care planning/counseling discussion    13  Goals of care, counseling/discussion    14  Palliative care encounter          • Time spent introducing the concept of palliative care in general, and the [facility] offering in specific  • Continue disease-directed cares  While patient does not wish to receive further workup or treatment for his metastatic colon cancer, he does not wish to limit his access to healthcare, providers, or hospitalization  o Discussed Medicare hospice benefit; patient Declan Ling feel comfort cares are not appropriate at this time  • ACP: Patient has completed advanced directives (Power of Lorna) naming his significant other Declan Ling as default surrogate healthcare decision-maker(s)  In EMR, reviewed today  Advanced directive counseling given  • Some history provided by his significant other Declan Ling, who helps manage his care  • Patient denies pain, denies mood issues, denies sleep issues, denies appetite issues  • Patient endorses dyspnea on minimal exertion, bilateral LE weakness, dependent edema, fatigue   o Likely multifactorial: metastatic malignancy, GI bleed leading to symptomatic anemia, heart failure, Afib, deconditioning  o PT not improving his condition  Offered referral to Cardiac Rehab today, they declined as they would prefer to discuss w/ Dr Mikey Baca of Cardiology later this week    o Unfortunately I do not have any method of reversing his condition  He may need a transfusion if the cause is symptomatic anemia, in which case he may benefit from a referral to Hematology  o Recommended use of his Rollator walker whenever ambulating to reduce fall risk; a fall in 02/2023 brought him to the hospital   • Counseled on bowel regimens for occasional constipation / occasional diarrhea  • CKD seems to have progressed to stage IV; PCP may wish to adjust metformin dosing  • Messages sent to PCP and Cardiology  • Reviewed notes (PT, DC Summary, Colorectal Surgery), labs (6/8/23 Na 132, Cr 2 19, eGFR 26, BUN 63; 5/30/23 Na 130, BUN 57, Cr 2 22, eGFR 26; Hb 8 1 on 5/6/23), imaging + procedures (2/9/23 CTCAP, 5/2/23 CXR)  • RTO PRN, to discuss goals, or if symptoms decline to the point he would like medications for mood / sleep / cancer-related pain  • Emotional support provided  • Medication safety issues addressed - no driving under the influence of narcotics (including opioids), watch for adverse effects including AMS or respiratory depression (slowed breathing), keep medications stored in a safe/locked environment, do not use alcohol while opioids or other narcotics are in one's system  Requested Prescriptions      No prescriptions requested or ordered in this encounter       There are no discontinued medications  Representatives have queried the patient's controlled substance dispensing history in the Prescription Drug Monitoring Program in compliance with regulations before I have prescribed any controlled substances  The prescription history is consistent with prescribed therapy and our practice policies        40+ minutes were spent in this ambulatory visit with greater than 50% of the time spent face to face with patient and his significant other Delvis Vivar in counseling or coordination of care including symptom assessment and management, medication review, psychosocial support, chart review, imaging review, lab review, advanced directives, "goals of care, hospice services, supportive listening and anticipatory guidance  All of the patient's questions were answered during this discussion  SUBJECTIVE:  Chief Complaint   Patient presents with   • Consult   • Shortness of Breath   • Cancer   • Congestive Heart Failure   • Counseling   • Goals        LIZ    Lisbeth Dalal is a 82 y o  male w/ 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 who declines systemic or localized treatment; chronic HFpEF, ambulatory dysfunction, cognitive dysfunction, Afib on Eliquis, CKD, diabetes, GERd  He follows w/ Dr Darci Álvarez (Cardiology / Heart Failure),     Patient is new to [facility] clinic; referred by PCP for “colon cancer, doesn't want treatment, wants symptomatic treatment”  He has never before been evaluated by Sunil Riley  Plan had included R hemicolectomy in 05/2022 but patient declined surgery and has since declined workup or treatment for malignancy  No Colorectal Surgery notes since 03/2022  Patient denies pain, denies anxiety/depression, denies N/V, reports a \"good\" appetite  Sleep is \"good\"  He ans Raleigh Wooten are unsure why he has lost 28lb in the past 6 weeks, other than perhaps successful diuresis  His primary complaint is dyspnea on minimal exertion (\"he can barely walk 10 feet without stopping to rest\"), along w/ fatigue, decline in exercise capacity, and bilateral leg weakness  He has a Rollator walker but has not been using it  He has been going to PT but it has not improved his condition  He does not have any anxiousness / air hunger when he feels short of breath  Raleigh Wooten states his breathing is \"a little bit worse\" than when he was in the hospital (most recent admission was early May 2023, CHF exacerbation)  Patient states quite clearly that he does not wish to receive any further workup or treatment for his metastatic colon cancer  He endorses frequent dark stools, though he feels this is s/t iron supplementation      Patient has been with " Nelda Hernández for about 14 years; they are not   He has two adult children from a previous union  He has a POA on file naming Nelda Hernández as his surrogate healthcare decision-maker  Nelda Hernández is very supportive, as is his local community ([place]  PDMP shows no concerns  The following portions of the medical history were reviewed: past medical history, surgical history, problem list, medication list, family history, and social history  Past Medical History:   Diagnosis Date   • A-fib [facility])    • Colon cancer ([facility] )    • Diastolic heart failure ([facility] )    • Flu-like symptoms 2022   • HTN (hypertension)    • Postviral syndrome 2023     Past Surgical History:   Procedure Laterality Date   • EXPLORATORY LAPAROTOMY      with gastric ulcer repair     Social History     Socioeconomic History   • Marital status: /Civil Union     Spouse name: None   • Number of children: None   • Years of education: None   • Highest education level: None   Occupational History   • None   Tobacco Use   • Smoking status: Former     Types: Cigarettes     Start date: 1953     Quit date: 1966     Years since quittin 3     Passive exposure: Past   • Smokeless tobacco: Never   Vaping Use   • Vaping Use: Never used   Substance and Sexual Activity   • Alcohol use: Yes     Comment: Rarely   • Drug use: Never   • Sexual activity: None   Other Topics Concern   • None   Social History Narrative    From 23  note:    Primary Caregiver: Self    Support Systems: Spouse/significant other    [place] of Residence: [address] [place] [address] do you live in?: [place] entry access options   Select all that apply : No steps to enter home    Type of Current Residence: [facility]    Living Arrangements: Lives w/ Spouse/significant other    Functional Status: Independent    Completes ADLs independently?: Yes    Ambulates independently?: Yes    Does patient use assisted devices?: No    Does patient currently own DME?: Yes    Income Source: Pension/[employer]    Does patient have prescription coverage?: Yes    Means of Transport to Appts[de-identified] Family transport     Social Determinants of Health     Financial Resource Strain: Low Risk  (1/26/2023)    Overall Financial Resource Strain (CARDIA)    • Difficulty of Paying Living Expenses: Not hard at all   Food Insecurity: No Food Insecurity (5/5/2023)    Hunger Vital Sign    • Worried About Running Out of Food in the Last Year: Never true    • Ran Out of Food in the Last Year: Never true   Transportation Needs: No Transportation Needs (5/5/2023)    PRAPARE - Transportation    • Lack of Transportation (Medical): No    • Lack of Transportation (Non-Medical): No   Physical Activity: Inactive (1/26/2023)    Exercise Vital Sign    • Days of Exercise per Week: 0 days    • Minutes of Exercise per Session: 0 min   Stress: No Stress Concern Present (1/26/2023)    [address]    • Feeling of Stress : Not at all   Social Connections: Unknown (1/26/2023)    Social Connection and Isolation Panel [NHANES]    • Frequency of Communication with Friends and Family: Three times a week    • Frequency of Social Gatherings with Friends and Family: Three times a week    • Attends Latter-day Services: Never    • Active Member of Clubs or Organizations:  Yes    • Attends Club or Organization Meetings: Never    • Marital Status: Not on file   Intimate Partner Violence: Not At Risk (1/26/2023)    Humiliation, Afraid, Rape, and Kick questionnaire    • Fear of Current or Ex-Partner: No    • Emotionally Abused: No    • Physically Abused: No    • Sexually Abused: No   Housing Stability: Low Risk  (5/5/2023)    Housing Stability Vital Sign    • Unable to Pay for Housing in the Last Year: No    • Number of Places Lived in the Last Year: 1    • Unstable Housing in the Last Year: No     Family History   Problem Relation Age of Onset   • Colon cancer Neg Hx        Current Outpatient Medications:   •  apixaban (ELIQUIS) 2 5 mg, Take 1 tablet (2 5 mg total) by mouth 2 (two) times a day, Disp: 180 tablet, Rfl: 1  •  doxazosin (CARDURA) 4 mg tablet, Take 1 tablet (4 mg total) by mouth daily at bedtime, Disp: 90 tablet, Rfl: 1  •  losartan (COZAAR) 50 mg tablet, Take 1 tablet (50 mg total) by mouth daily, Disp: 90 tablet, Rfl: 1  •  metFORMIN (GLUCOPHAGE) 1000 MG tablet, TAKE ONE TABLET BY MOUTH TWICE A DAY WITH MEALS, Disp: 180 tablet, Rfl: 0  •  metoprolol tartrate (LOPRESSOR) 50 mg tablet, Take 1 tablet (50 mg total) by mouth every 12 (twelve) hours, Disp: 60 tablet, Rfl: 2  •  potassium chloride (K-DUR,KLOR-CON) 20 mEq tablet, Take 1 tablet (20 mEq total) by mouth 2 (two) times a day, Disp: 180 tablet, Rfl: 1  •  pravastatin (PRAVACHOL) 40 mg tablet, TAKE ONE TABLET BY MOUTH EVERY DAY, Disp: 90 tablet, Rfl: 1  •  spironolactone (ALDACTONE) 25 mg tablet, Take 1 tablet (25 mg total) by mouth daily, Disp: 30 tablet, Rfl: 3  •  torsemide (DEMADEX) 20 mg tablet, Take 4 tablets (80 mg total) by mouth 2 (two) times a day, Disp: 180 tablet, Rfl: 3  •  ferrous sulfate 324 (65 Fe) mg, Take 1 tablet (324 mg total) by mouth every other day, Disp: 90 tablet, Rfl: 0  •  pantoprazole (PROTONIX) 40 mg tablet, Take 40 mg by mouth daily (Patient not taking: Reported on 5/31/2023), Disp: , Rfl:     Review of Systems   Constitutional: Positive for activity change, fatigue and unexpected weight change  HENT: Positive for hearing loss (patient denies, but Yany Gonzalez thinks he may have some mild hearing loss)  Respiratory: Positive for shortness of breath (when walking 10' or more)  Cardiovascular: Positive for leg swelling (improved w/ diuresis)  Gastrointestinal: Positive for constipation (occasional) and diarrhea (occasional)  Allergic/Immunologic: Positive for immunocompromised state  Neurological: Positive for weakness (in b/l LE)     All other systems reviewed and are "negative  OBJECTIVE:  /52 (BP Location: Left arm, Patient Position: Sitting, Cuff Size: Standard)   Pulse (!) 134   Temp 99 5 °F (37 5 °C) (Temporal)   Ht 5' 6\" (1 676 m)   Wt 75 8 kg (167 lb)   SpO2 95%   BMI 26 95 kg/m²   Physical Exam  Vitals reviewed  Constitutional:       General: He is not in acute distress  Appearance: He is ill-appearing (chronically)  He is not toxic-appearing  HENT:      Head: Normocephalic and atraumatic  Right Ear: External ear normal       Left Ear: External ear normal    Eyes:      General: No scleral icterus  Right eye: No discharge  Left eye: No discharge  Extraocular Movements: Extraocular movements intact  Conjunctiva/sclera: Conjunctivae normal       Pupils: Pupils are equal, round, and reactive to light  Pulmonary:      Effort: Pulmonary effort is normal  No tachypnea, bradypnea, accessory muscle usage or respiratory distress  Comments: Able to speak comfortably in short phrases on room air at rest   Abdominal:      General: There is no distension  Tenderness: There is no guarding  Musculoskeletal:      Cervical back: Normal range of motion  Right lower leg: No edema  Left lower leg: No edema  Skin:     General: Skin is dry  Coloration: Skin is not pale  Neurological:      Mental Status: He is alert and oriented to person, place, and time  Cranial Nerves: No dysarthria or facial asymmetry  Comments: Using no assistive devices for ambulation  Psychiatric:         Attention and Perception: Attention normal          Mood and Affect: Mood and affect normal          Speech: Speech normal          Behavior: Behavior normal  Behavior is cooperative  Thought Content: Thought content normal          Cognition and Memory: Cognition normal  He exhibits impaired recent memory             Renaldo Parham MD  [facility] Support[facility]      Portions of this " "document may have been created using dictation software and as such some \"sound alike\" terms may have been generated by the system  Do not hesitate to contact me with any questions or clarifications     "

## 2023-06-14 NOTE — PROGRESS NOTES
Palliative Outpatient Assessment of Need    LSW completed an assessment of need which was completed with (patient, family, or both) in the office or via phone/video conference    Relationship status: In a relationship  Duration of relationship: 15 years   Name of significant other: Maria Eugenia Rouse and Ages: 1 dtr and 1 son  Pets: None  Other important family information:  Living situation (where and whom): Resides with wife    Patient's primary caregiver: Self    Any limitations of caregiver:  Environmental concerns or barriers:     history: [employer]  Employment history/source of income: Worked for [employer] prior to jail   Disability:    Concerns regarding literacy: None   Spirituality/ Yazidism: None    Patient's strengths, social supports, and resources: Supportive family  Cultural information:   [address] current or previous: None  Substance use or history: None  Sleep: No concerns   Exercise: Concerns with balance--had been working with PT prior  Diet/nutrition: No concerns  Durable Medical Equipment needs: RW  Transportation: SO transports  Financial concerns: None  Advanced Directive: AD completed prior designating [facility] as substitute decision-maker, with dtr Deyanira Mora and SO's dtalessandra Browne as alternates  Other medical or social work providers involved: Cardiology  Patient/caregiver current level of coping: Pt appears to be coping well emotionally at this time    Understanding: Pt appears understanding of current medical status  Patient/family concerns and areas of need: Shortness of breath; Loss of balance  Patient's interests: Going out to eat; Watching Western movies     I have spent 25 minutes with Patient and family today in which greater than 50% of this time was spent in counseling/coordination of care regarding Counseling / Coordination of care  *All questions may not be answered due to constraints    Follow-up discussions may need to occur    [facility] Newsletter reviewed and provided to pt

## 2023-06-15 ENCOUNTER — TELEPHONE (OUTPATIENT)
Dept: INTERNAL MEDICINE CLINIC | Facility: CLINIC | Age: 88
End: 2023-06-15

## 2023-06-15 ENCOUNTER — APPOINTMENT (OUTPATIENT)
Dept: PHYSICAL THERAPY | Facility: CLINIC | Age: 88
End: 2023-06-15
Payer: COMMERCIAL

## 2023-06-15 ENCOUNTER — TELEPHONE (OUTPATIENT)
Dept: CARDIOLOGY CLINIC | Facility: CLINIC | Age: 88
End: 2023-06-15

## 2023-06-15 ENCOUNTER — PATIENT OUTREACH (OUTPATIENT)
Dept: CASE MANAGEMENT | Facility: HOSPITAL | Age: 88
End: 2023-06-15

## 2023-06-15 DIAGNOSIS — N18.9 ANEMIA DUE TO CHRONIC KIDNEY DISEASE, UNSPECIFIED CKD STAGE: Primary | ICD-10-CM

## 2023-06-15 DIAGNOSIS — D63.1 ANEMIA DUE TO CHRONIC KIDNEY DISEASE, UNSPECIFIED CKD STAGE: Primary | ICD-10-CM

## 2023-06-15 NOTE — TELEPHONE ENCOUNTER
Hi, this is Blanka Wasserman calling  I'd like to know if you can fax in a script over to the lab for [address] to have his blood count done his hemoglobin  We would appreciate it because we asked that the lab and they can't do it without authorization  Anyway, my phone number is [phone]  Thank you   Rian Modi

## 2023-06-15 NOTE — TELEPHONE ENCOUNTER
Ed wife called asking if a cbc and be ordered  Called and spoke to her and she advised that pcp usually orders, advised to call pcp office for order    Gave resultsPlease check how he is doing  Renal function stable and BNP improved  Continue current medications if weight stable     Wife advised he feel good, swelling is down, gone bilaterally states wt today is 161 00 lbs

## 2023-06-15 NOTE — PROGRESS NOTES
Heart Failure Outpatient Care Coordinator Note: Chart notes reviewed and call made to home and left message for patient's s o /caregiver Chad Gutiérrez

## 2023-06-16 ENCOUNTER — PATIENT OUTREACH (OUTPATIENT)
Dept: CASE MANAGEMENT | Facility: HOSPITAL | Age: 88
End: 2023-06-16

## 2023-06-16 ENCOUNTER — APPOINTMENT (OUTPATIENT)
Dept: PHYSICAL THERAPY | Facility: CLINIC | Age: 88
End: 2023-06-16
Payer: COMMERCIAL

## 2023-06-19 ENCOUNTER — APPOINTMENT (OUTPATIENT)
Dept: LAB | Facility: CLINIC | Age: 88
End: 2023-06-19
Payer: COMMERCIAL

## 2023-06-19 DIAGNOSIS — N18.9 ANEMIA DUE TO CHRONIC KIDNEY DISEASE, UNSPECIFIED CKD STAGE: ICD-10-CM

## 2023-06-19 DIAGNOSIS — D63.1 ANEMIA DUE TO CHRONIC KIDNEY DISEASE, UNSPECIFIED CKD STAGE: ICD-10-CM

## 2023-06-19 LAB
ERYTHROCYTE [DISTWIDTH] IN BLOOD BY AUTOMATED COUNT: 18.3 % (ref 11.6–15.1)
HCT VFR BLD AUTO: 30.1 % (ref 36.5–49.3)
HGB BLD-MCNC: 9.4 G/DL (ref 12–17)
MCH RBC QN AUTO: 23 PG (ref 26.8–34.3)
MCHC RBC AUTO-ENTMCNC: 31.2 G/DL (ref 31.4–37.4)
MCV RBC AUTO: 74 FL (ref 82–98)
PLATELET # BLD AUTO: 512 THOUSANDS/UL (ref 149–390)
PMV BLD AUTO: 11.3 FL (ref 8.9–12.7)
RBC # BLD AUTO: 4.09 MILLION/UL (ref 3.88–5.62)
WBC # BLD AUTO: 7.45 THOUSAND/UL (ref 4.31–10.16)

## 2023-06-19 PROCEDURE — 36415 COLL VENOUS BLD VENIPUNCTURE: CPT

## 2023-06-19 PROCEDURE — 85027 COMPLETE CBC AUTOMATED: CPT

## 2023-06-20 ENCOUNTER — APPOINTMENT (OUTPATIENT)
Dept: PHYSICAL THERAPY | Facility: CLINIC | Age: 88
End: 2023-06-20
Payer: COMMERCIAL

## 2023-06-23 ENCOUNTER — APPOINTMENT (OUTPATIENT)
Dept: PHYSICAL THERAPY | Facility: CLINIC | Age: 88
End: 2023-06-23
Payer: COMMERCIAL

## 2023-06-23 DIAGNOSIS — I50.33 ACUTE ON CHRONIC DIASTOLIC CONGESTIVE HEART FAILURE (HCC): ICD-10-CM

## 2023-06-23 RX ORDER — TORSEMIDE 20 MG/1
80 TABLET ORAL 2 TIMES DAILY
Qty: 180 TABLET | Refills: 3 | Status: SHIPPED | OUTPATIENT
Start: 2023-06-23 | End: 2023-09-21

## 2023-06-23 NOTE — TELEPHONE ENCOUNTER
The patient's name is Gaby Bangura  His date of birth is [birth year]  We need a refill on prescription [phone]  It's for Torsemide 20 milligram tablet he takes  He takes 4A day and we need a refill for them because they go pretty fast  Anyway, if you could call in to join [address] [place] [address]  In [place]  We need a refill for Torsemide 20 milligram tablets  Thank you  My phone number is [phone]  Thank you  Rian Modi

## 2023-06-27 ENCOUNTER — APPOINTMENT (OUTPATIENT)
Dept: PHYSICAL THERAPY | Facility: CLINIC | Age: 88
End: 2023-06-27
Payer: COMMERCIAL

## 2023-06-30 ENCOUNTER — APPOINTMENT (OUTPATIENT)
Dept: PHYSICAL THERAPY | Facility: CLINIC | Age: 88
End: 2023-06-30
Payer: COMMERCIAL

## 2023-07-05 ENCOUNTER — TELEPHONE (OUTPATIENT)
Dept: INTERNAL MEDICINE CLINIC | Facility: CLINIC | Age: 88
End: 2023-07-05

## 2023-07-05 DIAGNOSIS — C18.9 COLON ADENOCARCINOMA (HCC): Primary | ICD-10-CM

## 2023-07-05 NOTE — TELEPHONE ENCOUNTER
Needs lab order for cbc, hemoglobin check. He is weak but she feels that the last hemoglobin reading is incorrect.

## 2023-07-07 ENCOUNTER — TELEPHONE (OUTPATIENT)
Dept: LAB | Facility: HOSPITAL | Age: 88
End: 2023-07-07

## 2023-07-10 ENCOUNTER — TELEPHONE (OUTPATIENT)
Dept: LAB | Facility: HOSPITAL | Age: 88
End: 2023-07-10

## 2023-07-10 ENCOUNTER — APPOINTMENT (OUTPATIENT)
Dept: LAB | Facility: HOSPITAL | Age: 88
End: 2023-07-10
Payer: COMMERCIAL

## 2023-07-10 DIAGNOSIS — C18.9 COLON ADENOCARCINOMA (HCC): ICD-10-CM

## 2023-07-10 LAB
BASOPHILS # BLD AUTO: 0.05 THOUSANDS/ÂΜL (ref 0–0.1)
BASOPHILS NFR BLD AUTO: 1 % (ref 0–1)
EOSINOPHIL # BLD AUTO: 0.13 THOUSAND/ÂΜL (ref 0–0.61)
EOSINOPHIL NFR BLD AUTO: 2 % (ref 0–6)
ERYTHROCYTE [DISTWIDTH] IN BLOOD BY AUTOMATED COUNT: 20.9 % (ref 11.6–15.1)
HCT VFR BLD AUTO: 29.6 % (ref 36.5–49.3)
HGB BLD-MCNC: 9.1 G/DL (ref 12–17)
IMM GRANULOCYTES # BLD AUTO: 0.03 THOUSAND/UL (ref 0–0.2)
IMM GRANULOCYTES NFR BLD AUTO: 0 % (ref 0–2)
LYMPHOCYTES # BLD AUTO: 0.78 THOUSANDS/ÂΜL (ref 0.6–4.47)
LYMPHOCYTES NFR BLD AUTO: 11 % (ref 14–44)
MCH RBC QN AUTO: 22.8 PG (ref 26.8–34.3)
MCHC RBC AUTO-ENTMCNC: 30.7 G/DL (ref 31.4–37.4)
MCV RBC AUTO: 74 FL (ref 82–98)
MONOCYTES # BLD AUTO: 0.7 THOUSAND/ÂΜL (ref 0.17–1.22)
MONOCYTES NFR BLD AUTO: 10 % (ref 4–12)
NEUTROPHILS # BLD AUTO: 5.2 THOUSANDS/ÂΜL (ref 1.85–7.62)
NEUTS SEG NFR BLD AUTO: 76 % (ref 43–75)
NRBC BLD AUTO-RTO: 0 /100 WBCS
PLATELET # BLD AUTO: 395 THOUSANDS/UL (ref 149–390)
PMV BLD AUTO: 11.2 FL (ref 8.9–12.7)
RBC # BLD AUTO: 3.99 MILLION/UL (ref 3.88–5.62)
WBC # BLD AUTO: 6.89 THOUSAND/UL (ref 4.31–10.16)

## 2023-07-10 PROCEDURE — 36415 COLL VENOUS BLD VENIPUNCTURE: CPT

## 2023-07-10 PROCEDURE — 85025 COMPLETE CBC W/AUTO DIFF WBC: CPT

## 2023-07-11 DIAGNOSIS — N18.32 TYPE 2 DIABETES MELLITUS WITH STAGE 3B CHRONIC KIDNEY DISEASE, WITHOUT LONG-TERM CURRENT USE OF INSULIN (HCC): ICD-10-CM

## 2023-07-11 DIAGNOSIS — E11.22 TYPE 2 DIABETES MELLITUS WITH STAGE 3B CHRONIC KIDNEY DISEASE, WITHOUT LONG-TERM CURRENT USE OF INSULIN (HCC): ICD-10-CM

## 2023-07-11 NOTE — TELEPHONE ENCOUNTER
Hi Dr. Laurel Doshi -  I refused refill request for metformin with patient's eGFR <30. He really should not be on Metformin due to risk for lactic acidosis. Can you please discuss alternatives with patient.

## 2023-08-08 ENCOUNTER — TELEPHONE (OUTPATIENT)
Dept: LAB | Facility: HOSPITAL | Age: 88
End: 2023-08-08

## 2023-08-08 DIAGNOSIS — N18.4 STAGE 4 CHRONIC KIDNEY DISEASE (HCC): ICD-10-CM

## 2023-08-08 DIAGNOSIS — C18.9 COLON ADENOCARCINOMA (HCC): Primary | ICD-10-CM

## 2023-08-10 ENCOUNTER — APPOINTMENT (OUTPATIENT)
Dept: LAB | Facility: HOSPITAL | Age: 88
End: 2023-08-10
Payer: COMMERCIAL

## 2023-08-10 DIAGNOSIS — C18.9 COLON ADENOCARCINOMA (HCC): ICD-10-CM

## 2023-08-10 DIAGNOSIS — N18.4 STAGE 4 CHRONIC KIDNEY DISEASE (HCC): ICD-10-CM

## 2023-08-10 LAB
ANION GAP SERPL CALCULATED.3IONS-SCNC: 9 MMOL/L
BASOPHILS # BLD AUTO: 0.05 THOUSANDS/ÂΜL (ref 0–0.1)
BASOPHILS NFR BLD AUTO: 1 % (ref 0–1)
BUN SERPL-MCNC: 68 MG/DL (ref 5–25)
CALCIUM SERPL-MCNC: 8.7 MG/DL (ref 8.3–10.1)
CHLORIDE SERPL-SCNC: 106 MMOL/L (ref 96–108)
CO2 SERPL-SCNC: 21 MMOL/L (ref 21–32)
CREAT SERPL-MCNC: 3.4 MG/DL (ref 0.6–1.3)
EOSINOPHIL # BLD AUTO: 0.14 THOUSAND/ÂΜL (ref 0–0.61)
EOSINOPHIL NFR BLD AUTO: 2 % (ref 0–6)
ERYTHROCYTE [DISTWIDTH] IN BLOOD BY AUTOMATED COUNT: 23.6 % (ref 11.6–15.1)
GFR SERPL CREATININE-BSD FRML MDRD: 15 ML/MIN/1.73SQ M
GLUCOSE P FAST SERPL-MCNC: 102 MG/DL (ref 65–99)
HCT VFR BLD AUTO: 24.2 % (ref 36.5–49.3)
HGB BLD-MCNC: 7.7 G/DL (ref 12–17)
IMM GRANULOCYTES # BLD AUTO: 0.02 THOUSAND/UL (ref 0–0.2)
IMM GRANULOCYTES NFR BLD AUTO: 0 % (ref 0–2)
LYMPHOCYTES # BLD AUTO: 0.67 THOUSANDS/ÂΜL (ref 0.6–4.47)
LYMPHOCYTES NFR BLD AUTO: 10 % (ref 14–44)
MCH RBC QN AUTO: 23.9 PG (ref 26.8–34.3)
MCHC RBC AUTO-ENTMCNC: 31.8 G/DL (ref 31.4–37.4)
MCV RBC AUTO: 75 FL (ref 82–98)
MONOCYTES # BLD AUTO: 0.69 THOUSAND/ÂΜL (ref 0.17–1.22)
MONOCYTES NFR BLD AUTO: 10 % (ref 4–12)
NEUTROPHILS # BLD AUTO: 5.15 THOUSANDS/ÂΜL (ref 1.85–7.62)
NEUTS SEG NFR BLD AUTO: 77 % (ref 43–75)
NRBC BLD AUTO-RTO: 0 /100 WBCS
PLATELET # BLD AUTO: 385 THOUSANDS/UL (ref 149–390)
PMV BLD AUTO: 11.2 FL (ref 8.9–12.7)
POTASSIUM SERPL-SCNC: 2.9 MMOL/L (ref 3.5–5.3)
RBC # BLD AUTO: 3.22 MILLION/UL (ref 3.88–5.62)
SODIUM SERPL-SCNC: 136 MMOL/L (ref 135–147)
WBC # BLD AUTO: 6.72 THOUSAND/UL (ref 4.31–10.16)

## 2023-08-10 PROCEDURE — 36415 COLL VENOUS BLD VENIPUNCTURE: CPT

## 2023-08-10 PROCEDURE — 80048 BASIC METABOLIC PNL TOTAL CA: CPT

## 2023-08-10 PROCEDURE — 85025 COMPLETE CBC W/AUTO DIFF WBC: CPT

## 2023-08-11 ENCOUNTER — TELEPHONE (OUTPATIENT)
Dept: NEPHROLOGY | Facility: CLINIC | Age: 88
End: 2023-08-11

## 2023-08-11 ENCOUNTER — OFFICE VISIT (OUTPATIENT)
Dept: INTERNAL MEDICINE CLINIC | Facility: CLINIC | Age: 88
End: 2023-08-11

## 2023-08-11 VITALS
TEMPERATURE: 98.5 F | SYSTOLIC BLOOD PRESSURE: 137 MMHG | HEART RATE: 104 BPM | BODY MASS INDEX: 26.96 KG/M2 | HEIGHT: 65 IN | DIASTOLIC BLOOD PRESSURE: 64 MMHG | OXYGEN SATURATION: 97 % | WEIGHT: 161.8 LBS

## 2023-08-11 DIAGNOSIS — E11.22 TYPE 2 DIABETES MELLITUS WITH STAGE 3B CHRONIC KIDNEY DISEASE, WITHOUT LONG-TERM CURRENT USE OF INSULIN (HCC): Primary | ICD-10-CM

## 2023-08-11 DIAGNOSIS — N18.32 TYPE 2 DIABETES MELLITUS WITH STAGE 3B CHRONIC KIDNEY DISEASE, WITHOUT LONG-TERM CURRENT USE OF INSULIN (HCC): Primary | ICD-10-CM

## 2023-08-11 DIAGNOSIS — D50.0 IRON DEFICIENCY ANEMIA DUE TO CHRONIC BLOOD LOSS: ICD-10-CM

## 2023-08-11 DIAGNOSIS — N18.4 STAGE 4 CHRONIC KIDNEY DISEASE (HCC): ICD-10-CM

## 2023-08-11 RX ORDER — GLUCOSAMINE HCL/CHONDROITIN SU 500-400 MG
CAPSULE ORAL 3 TIMES DAILY
Qty: 100 EACH | Refills: 1 | Status: SHIPPED | OUTPATIENT
Start: 2023-08-11

## 2023-08-11 RX ORDER — DIPHENHYDRAMINE HYDROCHLORIDE 25 MG/1
CAPSULE, LIQUID FILLED ORAL
Qty: 1 KIT | Refills: 0 | Status: SHIPPED | OUTPATIENT
Start: 2023-08-11

## 2023-08-11 RX ORDER — BLOOD SUGAR DIAGNOSTIC
1 STRIP MISCELLANEOUS 3 TIMES DAILY
Qty: 100 STRIP | Refills: 1 | Status: SHIPPED | OUTPATIENT
Start: 2023-08-11

## 2023-08-11 NOTE — ASSESSMENT & PLAN NOTE
Lab Results   Component Value Date    EGFR 15 08/10/2023    EGFR 26 06/08/2023    EGFR 26 05/30/2023    CREATININE 3.40 (H) 08/10/2023    CREATININE 2.19 (H) 06/08/2023    CREATININE 2.22 (H) 05/30/2023   Patients' creatinine has increased likely due to combination of diuretics and watery stools 1-2 times a day. Has lost 6 lb over the last tow months. Patient's partner Tatianna Andrews is managing his medications. She noted his LE edema had improved and he was not short of breath and discontinued spironolactone and he is currently on torsemide 40 mg BID. Patient's potassium was noted to be low at 2.9, likely due to diuretics, diarrhea and the fact that patient has been taking K 20 mEq once a day instead of BID. Continue torsemide 40 mg BID. And is advised to  increase to 60 mg BID if there is increased fluid retention and weight gain. Ambulatory referral to nephrology. Potassium chloride 20 mEq BID and repeat BMP in one week. Patient advised to follow up with cardiology.

## 2023-08-11 NOTE — TELEPHONE ENCOUNTER
New Patient Intake Form   Patient Details   Tamera Juarez     1935     [id number]     Insurance Information   Name of [facility] [facility] [employer]    Does the patient need an insurance referral? no   If patient has Pitney Deepthi, please ask if they will be using their [employer]ney [employer]. Appointment Information   Who is calling to schedule? If not patient, what is callers name? Physician Office   [facility] internal Med in [place] ([place])    Referring Provider Dr. Beni Griffiths    Reason for Appt (Diagnosis) N18.4 (ICD-10-CM) - Stage 4 chronic kidney disease ([address])   Does Patient have labs/urine done at [facility]? If not, where do they go? List the date of last lab / urine  *Please try to get labs 2 years back if not at  yes   Has patient been hospitalized recently? If yes, list name and location of hospital they were in no   Has patient been seen by a Nephrologist before? If yes, list name, location and phone number Yes  Many Years ago Dr. Hansa Abbasi 2015    Has the patient had renal imaging done? If so, list the most recent date and type of imagineg CT OF CHEST AND ABDOMEN   [facility]    Does patient have a history of Kidney Stones? Yes  ( 5 stones)    Appointment Details   Is there a referral on file?  yes    Appointment Date Dr. Lisseth Sandy at 1:00 9-    Location  [facility] [address]

## 2023-08-11 NOTE — PATIENT INSTRUCTIONS
Torsemide 2 pills (40 mg) twice a day. If increased swelling or dyspnea then can increase to torsemide 3 pills (60 mg) twice a day. Take potassium twice a day    DISCONTINUE  Metformin. Follow up with Nephrology  Repeat CBC and Bmp (LAB WORK) in one week.

## 2023-08-11 NOTE — ASSESSMENT & PLAN NOTE
Hb is 7.7 declined from 9.1 a month ago. Anemia is due to a combination of CKD stage 5 and colon cancer. Patient has refused any treatment for colon cancer. Takes iron every other day, he refuses to take it daily as it results in constipation. Continue Ferrous sulfate 324 mg every other day. Repeat CBC in one week.   Transfuse if patient becomes symptomatic or if Hb <7

## 2023-08-11 NOTE — ASSESSMENT & PLAN NOTE
Lab Results   Component Value Date    HGBA1C 8.0 (H) 05/02/2023     Fasting BG on most recent BMP was 102. Patient is avoiding sugary beverages, has switched to zero sugar flavored water and occasional diet soda. He has been having BM twice a day with one of them being watery, possible side effect of metformin. GFR is 15 at this time will discontinue the metformin. Patient is refusing insulin at this time. Will monitor off of medications. Blood glucose monitoring kit prescribed. Patient is advised to check blood glucose at least once a day and ideally 3 times a day (once fasting, before lunch and before dinner). Repeat BMP in one week. Bring blood glucose readings to follow-up appointment in one month.

## 2023-08-11 NOTE — PROGRESS NOTES
Name: Javier Jernigan      : 1935      MRN: [id number]  Encounter Provider: Nette Jose MD  Encounter Date: 2023   Encounter department: [address] [place] [address]     1. Type 2 diabetes mellitus with stage 3b chronic kidney disease, without long-term current use of insulin ([facility])  Assessment & Plan:    Lab Results   Component Value Date    HGBA1C 8.0 (H) 2023     Fasting BG on most recent BMP was 102. Patient is avoiding sugary beverages, has switched to zero sugar flavored water and occasional diet soda. He has been having BM twice a day with one of them being watery, possible side effect of metformin. GFR is 15 at this time will discontinue the metformin. Patient is refusing insulin at this time. Will monitor off of medications. Blood glucose monitoring kit prescribed. Patient is advised to check blood glucose at least once a day and ideally 3 times a day (once fasting, before lunch and before dinner). Repeat BMP in one week. Bring blood glucose readings to follow-up appointment in one month. Orders:  -     Basic metabolic panel; Future; Expected date: 2023  -     Blood Glucose Monitoring Suppl (Blood Glucose Monitor System) w/Device KIT; Please check your blood sugar in when you first wake up in the morning, before lunch and before dinner. 2. Stage 4 chronic kidney disease [facility])  Assessment & Plan:  Lab Results   Component Value Date    EGFR 15 08/10/2023    EGFR 26 2023    EGFR 26 2023    CREATININE 3.40 (H) 08/10/2023    CREATININE 2.19 (H) 2023    CREATININE 2.22 (H) 2023   Patients' creatinine has increased likely due to combination of diuretics and watery stools 1-2 times a day. Patient's partner Campuzano Andrews is managing his medications. She noted his LE edema had improved and he was not short of breath and discontinued spironolactone and he is currently on torsemide 40 mg BID.     Patient's potassium was noted to be low at 2.9, likely due to diuretics, diarrhea and the fact that patient has been taking K 20 mEq once a day instead of BID. Continue torsemide 40 mg BID. And is advised to  increase to 60 mg BID if there is increased fluid retention and weight gain. Ambulatory referral to nephrology. Potassium chloride 20 mEq BID and repeat BMP in one week. Patient advised to follow up with cardiology. Orders:  -     Ambulatory Referral to Nephrology; Future; Expected date: 08/18/2023  -     CBC and differential; Future; Expected date: 08/18/2023  -     Basic metabolic panel; Future; Expected date: 08/18/2023    3. Iron deficiency anemia due to chronic blood loss  Assessment & Plan:  Hb is 7.7 declined from 9.1 a month ago. Anemia is due to a combination of CKD stage 5 and colon cancer. Patient has refused any treatment for colon cancer. Takes iron every other day, he refuses to take it daily as it results in constipation. Continue Ferrous sulfate 324 mg every other day. Repeat CBC in one week. Transfuse if patient becomes symptomatic or if Hb <7    Orders:  -     CBC and differential; Future; Expected date: 08/18/2023         Subjective      Mr. Tova Garcia is a 82 Y. O. male who presents to the clinic for a follow-up to review his labs and make adjustments to his diabetes medications due to his progressive CKD. He has been on metformin 1000 mg BID, has been having watery BM twice a day and feels weak after diarrhea. He denies any shortness of breath, change in appetite, fevers or chills. He endorses improvement in LE edema at this time. Review of Systems   Constitutional: Negative for activity change, chills and fever. HENT: Positive for rhinorrhea. Negative for congestion, postnasal drip and trouble swallowing. Respiratory: Negative for choking and shortness of breath. Cardiovascular: Negative for chest pain and palpitations. Gastrointestinal: Positive for diarrhea.  Negative for blood in stool, constipation, nausea and vomiting. Genitourinary: Positive for difficulty urinating (weak stream). Negative for dysuria, frequency, hematuria and urgency. Musculoskeletal: Negative for arthralgias and myalgias. Neurological: Positive for weakness (after having diarrhea). Negative for headaches. Current Outpatient Medications on File Prior to Visit   Medication Sig   • apixaban (ELIQUIS) 2.5 mg Take 1 tablet (2.5 mg total) by mouth 2 (two) times a day   • doxazosin (CARDURA) 4 mg tablet Take 1 tablet (4 mg total) by mouth daily at bedtime   • ferrous sulfate 324 (65 Fe) mg Take 1 tablet (324 mg total) by mouth every other day   • losartan (COZAAR) 50 mg tablet Take 1 tablet (50 mg total) by mouth daily   • metoprolol tartrate (LOPRESSOR) 50 mg tablet Take 1 tablet (50 mg total) by mouth every 12 (twelve) hours   • potassium chloride (K-DUR,KLOR-CON) 20 mEq tablet Take 1 tablet (20 mEq total) by mouth 2 (two) times a day   • pravastatin (PRAVACHOL) 40 mg tablet TAKE ONE TABLET BY MOUTH EVERY DAY   • torsemide (DEMADEX) 20 mg tablet Take 4 tablets (80 mg total) by mouth 2 (two) times a day   • [DISCONTINUED] metFORMIN (GLUCOPHAGE) 1000 MG tablet TAKE ONE TABLET BY MOUTH TWICE A DAY WITH MEALS (Patient not taking: Reported on 8/11/2023)   • [DISCONTINUED] pantoprazole (PROTONIX) 40 mg tablet Take 40 mg by mouth daily (Patient not taking: Reported on 5/31/2023)   • [DISCONTINUED] spironolactone (ALDACTONE) 25 mg tablet Take 1 tablet (25 mg total) by mouth daily (Patient not taking: Reported on 8/11/2023)       Objective     /64 (BP Location: Left arm, Patient Position: Sitting, Cuff Size: Large)   Pulse 104   Temp 98.5 °F (36.9 °C) (Tympanic)   Ht 5' 5" (1.651 m)   Wt 73.4 kg (161 lb 12.8 oz)   SpO2 97%   BMI 26.92 kg/m²     Physical Exam  Vitals reviewed. Constitutional:       General: He is not in acute distress. Appearance: Normal appearance. He is obese. He is not ill-appearing.    HENT: Nose: Rhinorrhea present. Mouth/Throat:      Mouth: Mucous membranes are moist.      Pharynx: Oropharynx is clear. Eyes:      General: No scleral icterus. Extraocular Movements: Extraocular movements intact. Conjunctiva/sclera: Conjunctivae normal.   Cardiovascular:      Rate and Rhythm: Normal rate and regular rhythm. Pulses: Normal pulses. Pulmonary:      Effort: No respiratory distress. Breath sounds: Normal breath sounds. No stridor. No wheezing, rhonchi or rales. Abdominal:      General: Bowel sounds are normal. There is no distension. Palpations: Abdomen is soft. There is no mass. Musculoskeletal:         General: No tenderness. Right lower leg: Edema (trace) present. Left lower leg: Edema (trace) present. Skin:     General: Skin is warm. Comments: Chronic venous stasis changes in bilateral LE, varicose veins more prominent in LLE than right. Neurological:      Mental Status: He is alert and oriented to person, place, and time. Mental status is at baseline. Psychiatric:         Mood and Affect: Mood normal.         Behavior: Behavior normal.         Thought Content:  Thought content normal.         Judgment: Judgment normal.       Brigid Boone MD

## 2023-08-11 NOTE — PROGRESS NOTES
Serious Illness Conversation    1. What is your understanding now of where you are with your illness? Prognostic Understanding: appropriate understanding of prognosis     2. How much information about what is likely to be ahead with your illness would you like to have? Information: patient wants to be fully informed     3. What did you (clinician) communicate to the patient? Prognostic Communication: Uncertain - It can be difficult to predict what will happen with your illness. I hope you will continue to live well for a long time but I’m worried that you could get sick quickly, and I think it is important to prepare for that possibility. 4. If your health situation worsens, what are your most important goals? Goals: be mentally aware, have my medical decisions respected, be physically comfortable, not be a burden     5. What are the biggest fears and worries about the future and your health? Fears/Worries: loss of control, being a financial burden, being dependent, loss of dignity     6. What abilities are so critical to your life that you cannot imagine living without them? Unacceptable Function: being chronically confused or not being myself, being in pain or very uncomfortable, being unconscious, being in chronic severe pain     7. What gives you strength as you think about the future with your illness? Shandra Graves my significant other she has 5 wishes on the previous admission and for she had     8. If you become sicker, how much are you willing to go through for the possibility of gaining more time? Be in the hospital: Yes Have a feeding tube: No   Be in the ICU: No Live in a nursing home: No   Be on a ventilator: No Be uncomfortable: No   Be on dialysis: No Undergo aggressive test and/or procedures: No   9. How much does your proxy and family know about your priorities and wishes? Discussion Discussion: extensive discussion with family about goals and wishes        How does this plan sound to you?  I will do everything I can to help you through this. Patient verbalized understanding of the plan        Advanced directives  Five Wishes: Patient has Five WIshes in chart       Discussed with the patient that his condition could progress and if kidney functions worsens he might end up needing dialysis. Patient indicated that he did not want any dialysis in addition he also did not want any intubation or CPR he was aware that his condition was serious and would like to try medical therapy to keep him stable his main concern was to remain comfortable.   He has 5 wishes on chart

## 2023-08-22 ENCOUNTER — APPOINTMENT (OUTPATIENT)
Dept: LAB | Facility: HOSPITAL | Age: 88
End: 2023-08-22
Payer: COMMERCIAL

## 2023-08-22 DIAGNOSIS — N18.4 STAGE 4 CHRONIC KIDNEY DISEASE (HCC): ICD-10-CM

## 2023-08-22 DIAGNOSIS — E11.22 TYPE 2 DIABETES MELLITUS WITH STAGE 3B CHRONIC KIDNEY DISEASE, WITHOUT LONG-TERM CURRENT USE OF INSULIN (HCC): ICD-10-CM

## 2023-08-22 DIAGNOSIS — D50.0 IRON DEFICIENCY ANEMIA DUE TO CHRONIC BLOOD LOSS: ICD-10-CM

## 2023-08-22 DIAGNOSIS — N18.32 TYPE 2 DIABETES MELLITUS WITH STAGE 3B CHRONIC KIDNEY DISEASE, WITHOUT LONG-TERM CURRENT USE OF INSULIN (HCC): ICD-10-CM

## 2023-08-22 LAB
ANION GAP SERPL CALCULATED.3IONS-SCNC: 12 MMOL/L
BASOPHILS # BLD AUTO: 0.06 THOUSANDS/ÂΜL (ref 0–0.1)
BASOPHILS NFR BLD AUTO: 1 % (ref 0–1)
BUN SERPL-MCNC: 54 MG/DL (ref 5–25)
CALCIUM SERPL-MCNC: 8.3 MG/DL (ref 8.3–10.1)
CHLORIDE SERPL-SCNC: 103 MMOL/L (ref 96–108)
CO2 SERPL-SCNC: 19 MMOL/L (ref 21–32)
CREAT SERPL-MCNC: 3.63 MG/DL (ref 0.6–1.3)
EOSINOPHIL # BLD AUTO: 0.15 THOUSAND/ÂΜL (ref 0–0.61)
EOSINOPHIL NFR BLD AUTO: 2 % (ref 0–6)
ERYTHROCYTE [DISTWIDTH] IN BLOOD BY AUTOMATED COUNT: 23.5 % (ref 11.6–15.1)
GFR SERPL CREATININE-BSD FRML MDRD: 14 ML/MIN/1.73SQ M
GLUCOSE P FAST SERPL-MCNC: 94 MG/DL (ref 65–99)
HCT VFR BLD AUTO: 23.1 % (ref 36.5–49.3)
HGB BLD-MCNC: 7.3 G/DL (ref 12–17)
IMM GRANULOCYTES # BLD AUTO: 0.04 THOUSAND/UL (ref 0–0.2)
IMM GRANULOCYTES NFR BLD AUTO: 1 % (ref 0–2)
LYMPHOCYTES # BLD AUTO: 1.07 THOUSANDS/ÂΜL (ref 0.6–4.47)
LYMPHOCYTES NFR BLD AUTO: 15 % (ref 14–44)
MCH RBC QN AUTO: 24.2 PG (ref 26.8–34.3)
MCHC RBC AUTO-ENTMCNC: 31.6 G/DL (ref 31.4–37.4)
MCV RBC AUTO: 77 FL (ref 82–98)
MONOCYTES # BLD AUTO: 0.87 THOUSAND/ÂΜL (ref 0.17–1.22)
MONOCYTES NFR BLD AUTO: 12 % (ref 4–12)
NEUTROPHILS # BLD AUTO: 4.88 THOUSANDS/ÂΜL (ref 1.85–7.62)
NEUTS SEG NFR BLD AUTO: 69 % (ref 43–75)
NRBC BLD AUTO-RTO: 0 /100 WBCS
PLATELET # BLD AUTO: 357 THOUSANDS/UL (ref 149–390)
PMV BLD AUTO: 10.7 FL (ref 8.9–12.7)
POTASSIUM SERPL-SCNC: 3 MMOL/L (ref 3.5–5.3)
RBC # BLD AUTO: 3.02 MILLION/UL (ref 3.88–5.62)
SODIUM SERPL-SCNC: 134 MMOL/L (ref 135–147)
WBC # BLD AUTO: 7.07 THOUSAND/UL (ref 4.31–10.16)

## 2023-08-22 PROCEDURE — 80048 BASIC METABOLIC PNL TOTAL CA: CPT

## 2023-08-22 PROCEDURE — 85025 COMPLETE CBC W/AUTO DIFF WBC: CPT

## 2023-08-22 PROCEDURE — 36415 COLL VENOUS BLD VENIPUNCTURE: CPT

## 2023-08-30 DIAGNOSIS — I50.32 CHRONIC HEART FAILURE WITH PRESERVED EJECTION FRACTION (HFPEF) (HCC): ICD-10-CM

## 2023-08-30 RX ORDER — POTASSIUM CHLORIDE 20 MEQ/1
20 TABLET, EXTENDED RELEASE ORAL 2 TIMES DAILY
Qty: 180 TABLET | Refills: 1 | Status: SHIPPED | OUTPATIENT
Start: 2023-08-30

## 2023-08-30 NOTE — TELEPHONE ENCOUNTER
Hi, we need a refill for Holly Tejada [id number], prescription number [id number], potassium chloride 20, whatever. I guess 20 milligrams or whatever they are. Anyway, it's to be sent to the [facility] pharmacy, [facility]. [place] ([place] And anyway that that's what he needs. My phone number is [phone]. Thank you.  steven Modi

## 2023-09-05 PROBLEM — N18.5 STAGE 5 CHRONIC KIDNEY DISEASE NOT ON CHRONIC DIALYSIS (HCC): Status: ACTIVE | Noted: 2023-09-05

## 2023-09-05 PROBLEM — N18.5 HYPERTENSIVE KIDNEY DISEASE WITH CHRONIC KIDNEY DISEASE STAGE V (HCC): Status: ACTIVE | Noted: 2023-09-05

## 2023-09-05 PROBLEM — E87.20 METABOLIC ACIDOSIS: Status: ACTIVE | Noted: 2023-09-05

## 2023-09-05 PROBLEM — E87.6 HYPOKALEMIA: Status: ACTIVE | Noted: 2023-09-05

## 2023-09-05 PROBLEM — I12.0 HYPERTENSIVE KIDNEY DISEASE WITH CHRONIC KIDNEY DISEASE STAGE V (HCC): Status: ACTIVE | Noted: 2023-09-05

## 2023-09-05 PROBLEM — N18.4 STAGE 4 CHRONIC KIDNEY DISEASE (HCC): Status: RESOLVED | Noted: 2018-07-27 | Resolved: 2023-09-05

## 2023-09-05 PROBLEM — E11.21 DIABETIC NEPHROPATHY ASSOCIATED WITH TYPE 2 DIABETES MELLITUS (HCC): Status: ACTIVE | Noted: 2023-09-05

## 2023-09-06 ENCOUNTER — RA CDI HCC (OUTPATIENT)
Dept: OTHER | Facility: HOSPITAL | Age: 88
End: 2023-09-06

## 2023-09-06 ENCOUNTER — TELEPHONE (OUTPATIENT)
Dept: OTHER | Facility: HOSPITAL | Age: 88
End: 2023-09-06

## 2023-09-06 ENCOUNTER — TELEPHONE (OUTPATIENT)
Dept: LAB | Facility: HOSPITAL | Age: 88
End: 2023-09-06

## 2023-09-06 DIAGNOSIS — C18.9 COLON ADENOCARCINOMA (HCC): ICD-10-CM

## 2023-09-06 DIAGNOSIS — N18.5 STAGE 5 CHRONIC KIDNEY DISEASE NOT ON CHRONIC DIALYSIS (HCC): Primary | ICD-10-CM

## 2023-09-06 NOTE — PROGRESS NOTES
[facility] coding opportunities          Chart Reviewed number of suggestions sent to Provider: 2   I13.0  E11.65    Patients Insurance     Medicare Insurance: [facility]

## 2023-09-06 NOTE — TELEPHONE ENCOUNTER
Called patient to review his lab-work, patient was unavailable but had a discussion with patient's partner and Madyson Salamanca. Discussed worsening renal function and the need for dialysis in the in the near future. Patient's partner feels that he will not be open to pursuing dialysis. Patient's partner advised to get repeat lab work and will have further discussion regarding dialysis in person during follow up appointment in the clinic next week. All questions were answered to the best of my ability.

## 2023-09-09 DIAGNOSIS — I50.32 CHRONIC HEART FAILURE WITH PRESERVED EJECTION FRACTION (HFPEF) (HCC): ICD-10-CM

## 2023-09-09 DIAGNOSIS — I50.33 ACUTE ON CHRONIC DIASTOLIC CONGESTIVE HEART FAILURE (HCC): ICD-10-CM

## 2023-09-11 ENCOUNTER — APPOINTMENT (OUTPATIENT)
Dept: LAB | Facility: HOSPITAL | Age: 88
End: 2023-09-11
Payer: COMMERCIAL

## 2023-09-11 DIAGNOSIS — C18.9 COLON ADENOCARCINOMA (HCC): ICD-10-CM

## 2023-09-11 DIAGNOSIS — N18.5 STAGE 5 CHRONIC KIDNEY DISEASE NOT ON CHRONIC DIALYSIS (HCC): ICD-10-CM

## 2023-09-11 LAB
ANION GAP SERPL CALCULATED.3IONS-SCNC: 14 MMOL/L
BASOPHILS # BLD AUTO: 0.06 THOUSANDS/ÂΜL (ref 0–0.1)
BASOPHILS NFR BLD AUTO: 1 % (ref 0–1)
BUN SERPL-MCNC: 49 MG/DL (ref 5–25)
CALCIUM SERPL-MCNC: 8.4 MG/DL (ref 8.4–10.2)
CHLORIDE SERPL-SCNC: 96 MMOL/L (ref 96–108)
CO2 SERPL-SCNC: 22 MMOL/L (ref 21–32)
CREAT SERPL-MCNC: 3.57 MG/DL (ref 0.6–1.3)
EOSINOPHIL # BLD AUTO: 0.11 THOUSAND/ÂΜL (ref 0–0.61)
EOSINOPHIL NFR BLD AUTO: 2 % (ref 0–6)
ERYTHROCYTE [DISTWIDTH] IN BLOOD BY AUTOMATED COUNT: 20.8 % (ref 11.6–15.1)
FERRITIN SERPL-MCNC: 40 NG/ML (ref 24–336)
GFR SERPL CREATININE-BSD FRML MDRD: 14 ML/MIN/1.73SQ M
GLUCOSE P FAST SERPL-MCNC: 73 MG/DL (ref 65–99)
HCT VFR BLD AUTO: 24.4 % (ref 36.5–49.3)
HGB BLD-MCNC: 7.2 G/DL (ref 12–17)
IMM GRANULOCYTES # BLD AUTO: 0.03 THOUSAND/UL (ref 0–0.2)
IMM GRANULOCYTES NFR BLD AUTO: 0 % (ref 0–2)
IRON SATN MFR SERPL: 8 % (ref 15–50)
IRON SERPL-MCNC: 27 UG/DL (ref 50–212)
LYMPHOCYTES # BLD AUTO: 0.66 THOUSANDS/ÂΜL (ref 0.6–4.47)
LYMPHOCYTES NFR BLD AUTO: 9 % (ref 14–44)
MAGNESIUM SERPL-MCNC: 2 MG/DL (ref 1.9–2.7)
MCH RBC QN AUTO: 24 PG (ref 26.8–34.3)
MCHC RBC AUTO-ENTMCNC: 29.5 G/DL (ref 31.4–37.4)
MCV RBC AUTO: 81 FL (ref 82–98)
MONOCYTES # BLD AUTO: 0.73 THOUSAND/ÂΜL (ref 0.17–1.22)
MONOCYTES NFR BLD AUTO: 10 % (ref 4–12)
NEUTROPHILS # BLD AUTO: 5.51 THOUSANDS/ÂΜL (ref 1.85–7.62)
NEUTS SEG NFR BLD AUTO: 78 % (ref 43–75)
NRBC BLD AUTO-RTO: 0 /100 WBCS
PHOSPHATE SERPL-MCNC: 5.2 MG/DL (ref 2.3–4.1)
PLATELET # BLD AUTO: 350 THOUSANDS/UL (ref 149–390)
PMV BLD AUTO: 11.1 FL (ref 8.9–12.7)
POTASSIUM SERPL-SCNC: 3.3 MMOL/L (ref 3.5–5.3)
RBC # BLD AUTO: 3 MILLION/UL (ref 3.88–5.62)
SODIUM SERPL-SCNC: 132 MMOL/L (ref 135–147)
TIBC SERPL-MCNC: 323 UG/DL (ref 250–450)
UIBC SERPL-MCNC: 296 UG/DL (ref 155–355)
WBC # BLD AUTO: 7.1 THOUSAND/UL (ref 4.31–10.16)

## 2023-09-11 PROCEDURE — 82728 ASSAY OF FERRITIN: CPT

## 2023-09-11 PROCEDURE — 80048 BASIC METABOLIC PNL TOTAL CA: CPT

## 2023-09-11 PROCEDURE — 85025 COMPLETE CBC W/AUTO DIFF WBC: CPT

## 2023-09-11 PROCEDURE — 36415 COLL VENOUS BLD VENIPUNCTURE: CPT

## 2023-09-11 PROCEDURE — 83540 ASSAY OF IRON: CPT

## 2023-09-11 PROCEDURE — 84100 ASSAY OF PHOSPHORUS: CPT

## 2023-09-11 PROCEDURE — 83550 IRON BINDING TEST: CPT

## 2023-09-11 PROCEDURE — 83735 ASSAY OF MAGNESIUM: CPT

## 2023-09-11 RX ORDER — TORSEMIDE 20 MG/1
TABLET ORAL
Qty: 180 TABLET | Refills: 3 | Status: SHIPPED | OUTPATIENT
Start: 2023-09-11 | End: 2023-09-12 | Stop reason: ALTCHOICE

## 2023-09-11 RX ORDER — METOPROLOL TARTRATE 50 MG/1
50 TABLET, FILM COATED ORAL EVERY 12 HOURS
Qty: 60 TABLET | Refills: 2 | Status: SHIPPED | OUTPATIENT
Start: 2023-09-11

## 2023-09-12 ENCOUNTER — OFFICE VISIT (OUTPATIENT)
Dept: INTERNAL MEDICINE CLINIC | Facility: CLINIC | Age: 88
End: 2023-09-12
Payer: COMMERCIAL

## 2023-09-12 ENCOUNTER — TELEPHONE (OUTPATIENT)
Dept: INTERNAL MEDICINE CLINIC | Facility: CLINIC | Age: 88
End: 2023-09-12

## 2023-09-12 VITALS
HEIGHT: 65 IN | BODY MASS INDEX: 28.99 KG/M2 | SYSTOLIC BLOOD PRESSURE: 120 MMHG | TEMPERATURE: 97.7 F | HEART RATE: 111 BPM | DIASTOLIC BLOOD PRESSURE: 50 MMHG | WEIGHT: 174 LBS | OXYGEN SATURATION: 96 %

## 2023-09-12 DIAGNOSIS — Z71.89 ENCOUNTER FOR HOSPICE CARE DISCUSSION: ICD-10-CM

## 2023-09-12 DIAGNOSIS — E11.9 TYPE 2 DIABETES MELLITUS, WITHOUT LONG-TERM CURRENT USE OF INSULIN (HCC): ICD-10-CM

## 2023-09-12 DIAGNOSIS — C18.9 COLON ADENOCARCINOMA (HCC): Primary | ICD-10-CM

## 2023-09-12 DIAGNOSIS — I50.33 ACUTE ON CHRONIC DIASTOLIC CONGESTIVE HEART FAILURE (HCC): ICD-10-CM

## 2023-09-12 DIAGNOSIS — N18.5 STAGE 5 CHRONIC KIDNEY DISEASE NOT ON CHRONIC DIALYSIS (HCC): ICD-10-CM

## 2023-09-12 DIAGNOSIS — R60.0 BILATERAL LOWER EXTREMITY EDEMA: ICD-10-CM

## 2023-09-12 DIAGNOSIS — D50.0 IRON DEFICIENCY ANEMIA DUE TO CHRONIC BLOOD LOSS: ICD-10-CM

## 2023-09-12 PROCEDURE — 99215 OFFICE O/P EST HI 40 MIN: CPT

## 2023-09-12 RX ORDER — TORSEMIDE 20 MG/1
40 TABLET ORAL 2 TIMES DAILY
COMMUNITY

## 2023-09-12 NOTE — ASSESSMENT & PLAN NOTE
Diagnosed with adenocarcinoma of the colon in 2022, he refused to pursue any treatment. He denies any hematochezia or melena at this time. He would like to shift focus towards treatment with a goal of comfort in mind. He established care with palliative care earlier this year. Follow up appointment with palliative care tomorrow do discuss hospice care.

## 2023-09-12 NOTE — PATIENT INSTRUCTIONS
If increased leg swelling or shortness of breath is present can increase 3 pills of torsemide (60 mg) in the morning.   Stop: Pravastatin

## 2023-09-12 NOTE — ASSESSMENT & PLAN NOTE
Lab Results   Component Value Date    EGFR 14 09/11/2023    EGFR 14 08/22/2023    EGFR 15 08/10/2023    CREATININE 3.57 (H) 09/11/2023    CREATININE 3.63 (H) 08/22/2023    CREATININE 3.40 (H) 08/10/2023     Referred to nephrology during last visit, patient has canceled the appointment as he does not want to pursue dialysis. Had a detailed discussion the role and importance of the kidney and what to expect with progression renal disease. Patient endorsed understanding and would not like to pursue dialysis, would like comfort focused treatment. Will follow up with Palliative care tomorrow for further discussion of hospice care.

## 2023-09-12 NOTE — ASSESSMENT & PLAN NOTE
Patient continues to have 2+ lower extremity edema, he states that the edema does not bother him, he is able to ambulate well but does complain of shortness of breath with ambulation. He was prescribed 80 mg of torsemide BID. Patient's partner Kim Basurto states she has been giving him 40 mg BID. Patient's lungs sounded clear to auscultation, his shortness of breath is likely due to a combination of deconditioning and anemia. In light of patient's worsening renal function he is advised to continue torsemide 40 mg BID at this time and take an extra 20 mg in the morning if he has worsening shortness of breath or further increase in LE edema and weight gain (3 lb or more in a week).

## 2023-09-12 NOTE — TELEPHONE ENCOUNTER
Patient wife called and had questions regarding medications torsemide dosing and atorvastatin use. Please call wife to review.  Thank you

## 2023-09-12 NOTE — PROGRESS NOTES
Name: Malini Coffman      : 1935      MRN: [id number]  Encounter Provider: Souleymane Jacob MD  Encounter Date: 2023   Encounter department: [address] [place] [address] [place]     1. Colon adenocarcinoma [facility])  Assessment & Plan:  Diagnosed with adenocarcinoma of the colon in , he refused to pursue any treatment. He denies any hematochezia or melena at this time. He would like to shift focus towards treatment with a goal of comfort in mind. He established care with palliative care earlier this year. Follow up appointment with palliative care tomorrow do discuss hospice care. Orders:  -     Ambulatory Referral to Palliative Care; Future    2. Stage 5 chronic kidney disease not on chronic dialysis [facility])  Assessment & Plan:  Lab Results   Component Value Date    EGFR 14 2023    EGFR 14 2023    EGFR 15 08/10/2023    CREATININE 3.57 (H) 2023    CREATININE 3.63 (H) 2023    CREATININE 3.40 (H) 08/10/2023     Referred to nephrology during last visit, patient has canceled the appointment as he does not want to pursue dialysis. Had a detailed discussion the role and importance of the kidney and what to expect with progression renal disease. Patient endorsed understanding and would not like to pursue dialysis, would like comfort focused treatment. Will follow up with Palliative care tomorrow for further discussion of hospice care. Orders:  -     Ambulatory Referral to Palliative Care; Future    3. Iron deficiency anemia due to chronic blood loss  Assessment & Plan:  Hb is 7.2, appears pale. Iron panel shows low iron and iron saturation with normal ferritin. He denies any melena or hematochezia. Anemia is likely due to slow blood loss secondary to his colon cancer. He endorses shortness of breath with ambulation and leaning forward to put on socks and shoes.     Patient has refused any treatment for cancer, will be following up with palliative care for discussion of transitioning to hospice care. Continue Iron supplementation at this time. Orders:  -     Ambulatory Referral to Palliative Care; Future    4. Type 2 diabetes mellitus, without long-term current use of insulin ([facility])  Assessment & Plan:    Lab Results   Component Value Date    HGBA1C 8.0 (H) 05/02/2023     Patient is currently not on any medications for diabetes. Metformin was discontinued due to worsening renal function, he refused to use Insulin. Patient's does report intermittently checking his sugars and with fasting BG being around 85. He does endorse having a good appetite. Continue to monitor off of antidiabetic medications. Palliative care discussion of goals of care an possible transition to Hospice tomorrow. 5. Acute on chronic diastolic congestive heart failure ([address])    6. Bilateral lower extremity edema  Assessment & Plan:  Patient continues to have 2+ lower extremity edema, he states that the edema does not bother him, he is able to ambulate well but does complain of shortness of breath with ambulation. He was prescribed 80 mg of torsemide BID. Patient's partner Vivien Carter states she has been giving him 40 mg BID. Patient's lungs sounded clear to auscultation, his shortness of breath is likely due to a combination of deconditioning and anemia. In light of patient's worsening renal function he is advised to continue torsemide 40 mg BID at this time and take an extra 20 mg in the morning if he has worsening shortness of breath or further increase in LE edema and weight gain (3 lb or more in a week). Subjective      Mr. Nemesio Szymanski is a 82 Y. O. male with PMH diabetes, HTN, CKD stage 5, Colon cancer, Anemia who presents to the clinic for follow-up. Patient states his diarrhea has improved since stopping the metformin, he is going 1-3 times a day at this time.   Continues to have swelling in his legs but states it does not bother him as much as shortness of breath which comes on with reaching forward to put on his socks and shoes and also with ambulation. Patient denies any fevers or chills he endorses a good appetite. We had a long discussion about his worsening renal function and likely need for dialysis in the near future. He has declined to see nephrology and would not like to pursue dialysis. After discussion of goals of care he would like to focus on being comfortable and would not like to pursue aggressive treatment at this time. Review of Systems   Constitutional: Negative for appetite change, chills and fever. HENT: Positive for rhinorrhea. Negative for sore throat. Respiratory: Positive for shortness of breath. Negative for cough, choking and wheezing. Cardiovascular: Positive for palpitations (intermittent). Negative for chest pain. Gastrointestinal: Negative for abdominal pain, blood in stool, constipation, diarrhea, nausea and vomiting. Genitourinary: Negative for difficulty urinating, dysuria and urgency. Neurological: Negative for light-headedness. Psychiatric/Behavioral: Negative for confusion.        Current Outpatient Medications on File Prior to Visit   Medication Sig   • apixaban (ELIQUIS) 2.5 mg Take 1 tablet (2.5 mg total) by mouth 2 (two) times a day   • doxazosin (CARDURA) 4 mg tablet Take 1 tablet (4 mg total) by mouth daily at bedtime   • ferrous sulfate 324 (65 Fe) mg Take 1 tablet (324 mg total) by mouth every other day   • losartan (COZAAR) 50 mg tablet Take 1 tablet (50 mg total) by mouth daily   • metoprolol tartrate (LOPRESSOR) 50 mg tablet TAKE 1 TABLET BY MOUTH EVERY 12 HOURS   • potassium chloride (K-DUR,KLOR-CON) 20 mEq tablet Take 1 tablet (20 mEq total) by mouth 2 (two) times a day   • torsemide (DEMADEX) 20 mg tablet Take 40 mg by mouth 2 (two) times a day   • [DISCONTINUED] Alcohol Swabs 70 % PADS Use 3 (three) times a day   • [DISCONTINUED] Blood Glucose Monitoring Suppl (Blood Glucose Monitor System) w/Device KIT Please check your blood sugar in when you first wake up in the morning, before lunch and before dinner. • [DISCONTINUED] Glucose Blood (Blood Glucose Test Strips 333) STRP Test 1 strip 3 (three) times a day 3 glucose strips a day to monitor BG TID   • [DISCONTINUED] pravastatin (PRAVACHOL) 40 mg tablet TAKE ONE TABLET BY MOUTH EVERY DAY   • [DISCONTINUED] torsemide (DEMADEX) 20 mg tablet TAKE FOUR TABLETS BY MOUTH TWICE A DAY       Objective     /50 (BP Location: Left arm, Patient Position: Sitting, Cuff Size: Adult)   Pulse (!) 111   Temp 97.7 °F (36.5 °C) (Tympanic)   Ht 5' 5" (1.651 m)   Wt 78.9 kg (174 lb)   SpO2 96%   BMI 28.96 kg/m²     Physical Exam  Vitals reviewed. Constitutional:       General: He is not in acute distress. Appearance: He is ill-appearing. HENT:      Head: Normocephalic and atraumatic. Nose: Nose normal.      Mouth/Throat:      Mouth: Mucous membranes are moist.      Pharynx: Oropharynx is clear. No oropharyngeal exudate or posterior oropharyngeal erythema. Eyes:      General: No scleral icterus. Extraocular Movements: Extraocular movements intact. Conjunctiva/sclera: Conjunctivae normal.   Cardiovascular:      Rate and Rhythm: Regular rhythm. Tachycardia present. Pulses: Normal pulses. Heart sounds: Murmur heard. Pulmonary:      Effort: Pulmonary effort is normal. No respiratory distress. Breath sounds: Normal breath sounds. No stridor. No wheezing. Abdominal:      General: Abdomen is flat. Bowel sounds are normal. There is no distension. Palpations: There is no mass. Tenderness: There is no abdominal tenderness. Musculoskeletal:         General: No tenderness. Right lower leg: Edema (2+) present. Left lower leg: Edema (2+) present. Skin:     Coloration: Skin is pale. Neurological:      General: No focal deficit present. Mental Status: He is alert and oriented to person, place, and time.    Psychiatric: Mood and Affect: Mood normal.         Behavior: Behavior normal.       Susan Zhu MD

## 2023-09-12 NOTE — ASSESSMENT & PLAN NOTE
Lab Results   Component Value Date    HGBA1C 8.0 (H) 05/02/2023     Patient is currently not on any medications for diabetes. Metformin was discontinued due to worsening renal function, he refused to use Insulin. Patient's does report intermittently checking his sugars and with fasting BG being around 85. He does endorse having a good appetite. Continue to monitor off of antidiabetic medications. Palliative care discussion of goals of care an possible transition to Hospice tomorrow.

## 2023-09-12 NOTE — ASSESSMENT & PLAN NOTE
Hb is 7.2, appears pale. Iron panel shows low iron and iron saturation with normal ferritin. He denies any melena or hematochezia. Anemia is likely due to slow blood loss secondary to his colon cancer. He endorses shortness of breath with ambulation and leaning forward to put on socks and shoes. Patient has refused any treatment for cancer, will be following up with palliative care for discussion of transitioning to hospice care. Continue Iron supplementation at this time.

## 2023-09-13 ENCOUNTER — TELEPHONE (OUTPATIENT)
Dept: PALLIATIVE MEDICINE | Facility: CLINIC | Age: 88
End: 2023-09-13

## 2023-09-13 NOTE — TELEPHONE ENCOUNTER
I spoke to his wife she stated if someone can come to his home instead. He is sick and she doesn't know how far in advanced she can schedule. I told her its when ever she wanted. She stated she will call back.

## 2023-09-13 NOTE — TELEPHONE ENCOUNTER
I called patient to see what happened, and denise said she told her he doesn't feel well, she said he didn't eat  Dinner last nite, she was able to get him to eat breakfast today and she gave him a small glass of soda and she said he color is better and he said he feel little better.  She was wondering if maybe his sugar goes low??

## 2023-09-27 ENCOUNTER — TELEPHONE (OUTPATIENT)
Dept: INTERNAL MEDICINE CLINIC | Facility: CLINIC | Age: 88
End: 2023-09-27

## 2023-09-27 NOTE — TELEPHONE ENCOUNTER
Hi baby, this is Eletha Leaks. And it's in regards to [address] [place] [address] [address] and the phone number. You can call on [phone] or my cell phone [phone]. She would like some kind of help for him to care for him, as she is not always able to take him to bathroom and bath him , he has fallen twice now.

## 2023-09-28 ENCOUNTER — TELEPHONE (OUTPATIENT)
Age: 88
End: 2023-09-28

## 2023-09-28 ENCOUNTER — HOME HEALTH ADMISSION (OUTPATIENT)
Dept: HOME HEALTH SERVICES | Facility: HOME HEALTHCARE | Age: 88
End: 2023-09-28
Payer: COMMERCIAL

## 2023-09-28 DIAGNOSIS — R29.898 WEAKNESS OF BOTH LOWER EXTREMITIES: ICD-10-CM

## 2023-09-28 DIAGNOSIS — R06.02 SHORTNESS OF BREATH: ICD-10-CM

## 2023-09-28 DIAGNOSIS — R60.0 BILATERAL LOWER EXTREMITY EDEMA: ICD-10-CM

## 2023-09-28 DIAGNOSIS — R53.83 FATIGUE: ICD-10-CM

## 2023-09-28 DIAGNOSIS — C18.9 COLON ADENOCARCINOMA (HCC): ICD-10-CM

## 2023-09-28 DIAGNOSIS — R26.2 AMBULATORY DYSFUNCTION: ICD-10-CM

## 2023-09-28 DIAGNOSIS — I50.32 CHRONIC HEART FAILURE WITH PRESERVED EJECTION FRACTION (HFPEF) (HCC): Primary | ICD-10-CM

## 2023-09-28 NOTE — PROGRESS NOTES
Placing order for DME: bedside commode. Submitted to [employer]. I have not seen this patient in over 3 months, though I can attest that at that time the patient had "dyspnea on minimal exertion, bilateral LE weakness, dependent edema, fatigue". Per chart review debility is ongoing.

## 2023-09-28 NOTE — PROGRESS NOTES
Spoke with Pascual Granados over the phone, she mentioned that mr. Jillian Smith is requiring help at home. She stated that he had several falls and that she is unable to take care of him at home by herself. Also, she mentioned that pt has upcoming appt with palliative care. Home health referral will be sent.

## 2023-09-28 NOTE — TELEPHONE ENCOUNTER
Spoke with patient's partner, Carlos Johns. Scheduled [facility] Palliative home consult. Provided contact information for [employer] referral service. Will mail additional contact info for non-skilled home help services. Carlos Johns asked about referral for medical equipment. Reported I will speak to providers about putting in referral for -commode chair.

## 2023-09-29 ENCOUNTER — HOME CARE VISIT (OUTPATIENT)
Dept: HOME HEALTH SERVICES | Facility: HOME HEALTHCARE | Age: 88
End: 2023-09-29
Payer: COMMERCIAL

## 2023-09-29 VITALS
OXYGEN SATURATION: 95 % | TEMPERATURE: 97.3 F | HEART RATE: 89 BPM | SYSTOLIC BLOOD PRESSURE: 158 MMHG | RESPIRATION RATE: 22 BRPM | DIASTOLIC BLOOD PRESSURE: 70 MMHG

## 2023-09-29 LAB
DME PARACHUTE DELIVERY DATE REQUESTED: NORMAL
DME PARACHUTE ITEM DESCRIPTION: NORMAL
DME PARACHUTE ORDER STATUS: NORMAL
DME PARACHUTE SUPPLIER NAME: NORMAL
DME PARACHUTE SUPPLIER PHONE: NORMAL

## 2023-09-29 PROCEDURE — G0299 HHS/HOSPICE OF RN EA 15 MIN: HCPCS

## 2023-09-29 PROCEDURE — 10330081 VN NO-PAY CLAIM PROCEDURE

## 2023-09-29 PROCEDURE — 400013 VN SOC

## 2023-10-03 ENCOUNTER — HOME CARE VISIT (OUTPATIENT)
Dept: HOME HEALTH SERVICES | Facility: HOME HEALTHCARE | Age: 88
End: 2023-10-03
Payer: COMMERCIAL

## 2023-10-03 ENCOUNTER — TELEPHONE (OUTPATIENT)
Age: 88
End: 2023-10-03

## 2023-10-03 VITALS — HEART RATE: 88 BPM | SYSTOLIC BLOOD PRESSURE: 118 MMHG | DIASTOLIC BLOOD PRESSURE: 54 MMHG | OXYGEN SATURATION: 98 %

## 2023-10-03 PROCEDURE — G0299 HHS/HOSPICE OF RN EA 15 MIN: HCPCS

## 2023-10-03 PROCEDURE — G0151 HHCP-SERV OF PT,EA 15 MIN: HCPCS

## 2023-10-03 NOTE — TELEPHONE ENCOUNTER
Safety checklist prior to [facility] will visit:    "Thank you for inviting us to your home. In order to ensure we arrive politely, and conduct ourselves safely in your home, please answer the following questions:"    1 - Where should we park when we arrive? Park in the [facility]. There is no street parking. 2 - Who else will be present at the visit to support you? Patient Nicole Boo) & his significant other Lissett Giovani)  3 - Do you have any pets or other animals in your home? No Pets  4 - Do you have any firearms or other weapons in the home? No firearms/weapons reported  5 - Please have all medicines prescribed to you set up where they may be reviewed for safety. Lynn Gallegos confirmed medications will be available for review.    6 - If you use marijuana medically, or any recreational drugs, please store these out of sight prior to our arrival.   Reviewed with Lynn Gallegos

## 2023-10-04 ENCOUNTER — CLINICAL SUPPORT (OUTPATIENT)
Age: 88
End: 2023-10-04

## 2023-10-04 ENCOUNTER — TELEPHONE (OUTPATIENT)
Dept: PALLIATIVE MEDICINE | Facility: CLINIC | Age: 88
End: 2023-10-04

## 2023-10-04 ENCOUNTER — OFFICE VISIT (OUTPATIENT)
Age: 88
End: 2023-10-04

## 2023-10-04 VITALS
BODY MASS INDEX: 28.29 KG/M2 | OXYGEN SATURATION: 97 % | TEMPERATURE: 97.2 F | SYSTOLIC BLOOD PRESSURE: 122 MMHG | WEIGHT: 170 LBS | HEART RATE: 89 BPM | RESPIRATION RATE: 16 BRPM | DIASTOLIC BLOOD PRESSURE: 60 MMHG

## 2023-10-04 DIAGNOSIS — G89.3 CANCER-RELATED PAIN: Primary | ICD-10-CM

## 2023-10-04 DIAGNOSIS — R55 PRE-SYNCOPE: ICD-10-CM

## 2023-10-04 DIAGNOSIS — D50.0 IRON DEFICIENCY ANEMIA DUE TO CHRONIC BLOOD LOSS: ICD-10-CM

## 2023-10-04 DIAGNOSIS — C18.9 COLON ADENOCARCINOMA (HCC): ICD-10-CM

## 2023-10-04 DIAGNOSIS — Z71.89 COMPLEX CARE COORDINATION: Primary | ICD-10-CM

## 2023-10-04 DIAGNOSIS — N18.5 STAGE 5 CHRONIC KIDNEY DISEASE NOT ON CHRONIC DIALYSIS (HCC): ICD-10-CM

## 2023-10-04 PROCEDURE — G0180 MD CERTIFICATION HHA PATIENT: HCPCS | Performed by: INTERNAL MEDICINE

## 2023-10-04 NOTE — Clinical Note
We appreciate the referral to [facility]. The patient and his family have instead elected home hospice. A referral is sent with their blessing. Prior to electing hospice, Palliative Care will be happy to help manage symptoms remotely, either Dr. Luigi Dhillon or Dr. Kareem Patel.

## 2023-10-04 NOTE — CASE COMMUNICATION
PT evaluation completed today   needed to adjust RW height as too high for patient  once adjusted patient more relaxed to shoulders neck and states it is much better to use walker. PT recommended to continue but patient declined PT services and when asked pt about OT he also declines this discipline at this time. Patient states that he does not feel physical therapy will help him so does not want to have PT come.   PT OT referrals are  now cancelled and PT is DC.d today

## 2023-10-04 NOTE — TELEPHONE ENCOUNTER
Pt wife called in was seen today by Dr. Perez Dejesus requesting Oxycodone and Prednisone at the [facility] pharmacy in [place] ([place]), [address] [place] [address] .

## 2023-10-05 ENCOUNTER — HOME CARE VISIT (OUTPATIENT)
Dept: HOME HEALTH SERVICES | Facility: HOME HEALTHCARE | Age: 88
End: 2023-10-05
Payer: COMMERCIAL

## 2023-10-05 ENCOUNTER — HOME CARE VISIT (OUTPATIENT)
Dept: HOME HOSPICE | Facility: HOSPICE | Age: 88
End: 2023-10-05
Payer: MEDICARE

## 2023-10-05 ENCOUNTER — HOME CARE VISIT (OUTPATIENT)
Dept: HOME HEALTH SERVICES | Facility: HOME HEALTHCARE | Age: 88
End: 2023-10-05
Payer: MEDICARE

## 2023-10-05 ENCOUNTER — HOSPICE ADMISSION (OUTPATIENT)
Dept: HOME HOSPICE | Facility: HOSPICE | Age: 88
End: 2023-10-05
Payer: MEDICARE

## 2023-10-05 VITALS
OXYGEN SATURATION: 94 % | DIASTOLIC BLOOD PRESSURE: 68 MMHG | RESPIRATION RATE: 14 BRPM | SYSTOLIC BLOOD PRESSURE: 128 MMHG | TEMPERATURE: 98.4 F | HEART RATE: 94 BPM

## 2023-10-05 RX ORDER — HYDROCORTISONE 5 MG/1
10 TABLET ORAL 2 TIMES DAILY
Qty: 60 TABLET | Refills: 0 | Status: SHIPPED | OUTPATIENT
Start: 2023-10-05

## 2023-10-05 RX ORDER — OXYCODONE HYDROCHLORIDE 5 MG/1
2.5 TABLET ORAL EVERY 4 HOURS PRN
Qty: 10 TABLET | Refills: 0 | Status: SHIPPED | OUTPATIENT
Start: 2023-10-05

## 2023-10-05 NOTE — ACP (ADVANCE CARE PLANNING)
Advanced Care Planning Progress Note    Serious Illness Conversation    No data filed         Record of POLST discussion     I completed a POLST form with the patient and/or the patient's designated decision-maker. The pt is not competent for medical decisions today. After discussing the problems addressed during this hospitalization, and prior to this encounter, the following limits and goals were set:    Part A)  DNAR: Do Not Attempt Resuscitation. Part B)  Limited Additional Interventions:   - Transfer to hospital for symptom crisis only   - Do not escalate to ICU   - May use IV medicines or fluids for comfort    Part C)  Determine utility of antibiotics, and consider use. Part D)  No artificial nutrition nor hydration. Additional goals)  Should symptom crisis evolve, please consider admission to [facility] service. Make referral to [phone]. Contacts)   - Partner and Alvaro Patel will act as primary contact and decision-maker for this patient. Form was then signed by myself and Lia Rogers, as the patient's competent Agent. Additional parties in discussion included: none. Copies of form were given to CM/SW for D/C planning, and given to family. Original of form was left in paper chart at nursing station, to be sent home with the patient. Counseling and Coordination of care   I spent 30 total minutes with the patient today, more than 50% of which was spent in counseling, coordination of care, and other face-to-face interactions and cares on the floor. Counseled patient/family regarding goals and means as recorded above.      Jacqulin Klinefelter, MD Jacqulin Klinefelter, MD

## 2023-10-05 NOTE — CASE COMMUNICATION
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 Does not want treatment for either. Other Dx include Chronic Diastolic heart failure with preserved EF, DM2, anemia, Syncope, Afib. Last labs 9.11 HGB 7.2 HCT 24.4 BUN 49 CREAT 3.57 GFR 14. Pt has been progressively weaker. Hx of falls, decreased appetite. Generalized pain controlled w ith Oxy IR 5 mg PRN. PPS 30 ECOG 4 Approve RLOC?     Approved for RLOC 10.5.23 by Dr Ally Damon   Dx Colon Cancer

## 2023-10-05 NOTE — PROGRESS NOTES
Initial Consultation and Intake - [facility] Noonan 82 y.o. male [id number]    Assessment & Plan  Problem List Items Addressed This Visit     Colon adenocarcinoma [facility])    Relevant Orders    Ambulatory Referral to Hospice    Iron deficiency anemia due to chronic blood loss    Relevant Orders    Ambulatory Referral to Hospice    Stage 5 chronic kidney disease not on chronic dialysis [facility])    Relevant Orders    Ambulatory Referral to Hospice   Other Visit Diagnoses     Cancer-related pain    -  Primary    Relevant Medications    hydrocortisone (CORTEF) 5 mg tablet    oxyCODONE (ROXICODONE) 5 immediate release tablet    Other Relevant Orders    Ambulatory Referral to Hospice    Pre-syncope        Relevant Medications    hydrocortisone (CORTEF) 5 mg tablet    Other Relevant Orders    Ambulatory Referral to Hospice          Medications adjusted this encounter:  Requested Prescriptions     Signed Prescriptions Disp Refills   • hydrocortisone (CORTEF) 5 mg tablet 60 tablet 0     Sig: Take 2 tablets (10 mg total) by mouth 2 (two) times a day Breakfast and lunch   • oxyCODONE (ROXICODONE) 5 immediate release tablet 10 tablet 0     Sig: Take 0.5 tablets (2.5 mg total) by mouth every 4 (four) hours as needed (cancer pain) Max Daily Amount: 15 mg     Orders Placed This Encounter   Procedures   • Ambulatory Referral to Hospice     Medications Discontinued During This Encounter   Medication Reason   • potassium chloride (K-DUR,KLOR-CON) 20 mEq tablet      - Trial of oxyIR PRN severe pain  - Trial of steroids BID for severe pain, likely bone involvement/impingement by tumor    PPS: [facility] was seen in their home today for symptoms and care planning related to above diagnoses. Above orders were sent electronically.   I have reviewed the patient's controlled substance dispensing history in the Prescription Drug Monitoring Program in compliance with the [facility] regulations before prescribing any controlled substances. We appreciate the referral, and after consideration of our in-home service to this patient, the patient/family do not wish to continue with [facility]. We will refer to hospice. If there are questions or concerns, please contact us through our clinic/answering service 24 hours a day, seven days a week. Vanessa Prince MD  [facility]  A service provided by [facility]  [phone]        Visit Information    Accompanied By: Family member    Source of History: Family member    History Limitations: mild-moderate dementia    Contacts: see demographics    Chief Complaint  No chief complaint on file. History of Present Illness      Nette King is a 82 y.o. male who presents as a referral from Dr. Lynn Manriquez of PCP for primary palliative diagnosis of heart failure, complicated by cancer. Patient has previously followed with our partner Dr. Edna Lang in clinic. Consultation is requested for: symptom management, goal of care assessment and decisional support, disease process education and discussion of prognosis, advance care planning. The patient is seen in their home due to ambulatory difficulties related to their advanced illness.       At initial consultaiton with Dr. Edna Lang, it was noted:    "Patient is new to [address] [place] [address]; referred by PCP for “colon cancer, doesn't want treatment, wants symptomatic treatment”. He has never before been evaluated by Issac Holter. Plan had included R hemicolectomy in 05/2022 but patient declined surgery and has since declined workup or treatment for malignancy. No Colorectal Surgery notes since 03/2022.      Patient denies pain, denies anxiety/depression, denies N/V, reports a "good" appetite. Sleep is "good". He and Lynn Gallegos are unsure why he has lost 28lb in the past 6 weeks, other than perhaps successful diuresis.  His primary complaint is dyspnea on minimal exertion ("he can barely walk 10 feet without stopping to rest"), along w/ fatigue, decline in exercise capacity, and bilateral leg weakness. He has a Rollator walker but has not been using it. He has been going to PT but it has not improved his condition. He does not have any anxiousness / air hunger when he feels short of breath. Carson Moralez states his breathing is "a little bit worse" than when he was in the hospital (most recent admission was early May 2023, CHF exacerbation).     Patient states quite clearly that he does not wish to receive any further workup or treatment for his metastatic colon cancer. He endorses frequent dark stools, though he feels this is s/t iron supplementation. Patient has been with Carson Moralez for about 14 years; they are not . He has two adult children from a previous union. He has a POA on file naming Carson Moralez as his surrogate healthcare decision-maker. Carson Moralez is very supportive, as is his local community ([place] "        Today in the home, pt is interactive, but does not appear to follow the notes of conversation that specifically involve our visit, his cancer, and his prognosis. He reverts backto discussion of an ulcer that he had in the 68's, which he seems to peg all his medical troubles to. In contrast, his wife is concerned, insightful, and overwhelmed. He will not regularly take pain medicines, though he will often experience very upsetting levels of discomfort and restlessness. He no longer sleeps in bed, but a recliner in the enclose patio. He eats poorly, and he has fallen several times in the past few weeks. He will not accept recs for preventative health, DM management, nor cancer cares. He is not even engaged in the possibility of a blood transfusion to improve his energy or stamina. We discussed freedom-of-choice with home hospice agencies, and the fact that the patient seems NOT to want to engage in any treatments whatsoever in his care.   Wife is supportive of his preferences, and recognizes that his underlying cancer is a terminal illness. She only wishes to stay in their home, and promote his comfort and dignity. She requests [facility]'s hospice. Pertinent Palliative Care Domains    Physical Symptoms:ambulates with difficulty, history of falls, uses walker    Psychological Symptoms:limited insight, some difficulty coping    Social Aspects: support system relies nealry entirely on partner Dorene Susana     In-home services and caregivers: [facility]. Pt's and partner's daughters visit rarely to help restock pantry and fridge. DME inventory: 2w walker (owned).   Glucometer (owned.)    Spiritual Aspects: not addressed today    Advance Directive and Living Will:      Power of : Yes  POLST:  completed and billed in [facility] note today    Historical Data  Past Medical History:   Diagnosis Date   • A-fib ([address])    • Colon cancer ([address])    • Diastolic heart failure ([address])    • Flu-like symptoms 2022   • HTN (hypertension)    • Postviral syndrome 2023     Past Surgical History:   Procedure Laterality Date   • EXPLORATORY LAPAROTOMY      with gastric ulcer repair     Social History     Socioeconomic History   • Marital status: /Civil Union     Spouse name: Not on file   • Number of children: Not on file   • Years of education: Not on file   • Highest education level: Not on file   Occupational History   • Not on file   Tobacco Use   • Smoking status: Former     Types: Cigarettes     Start date: 1953     Quit date: 1966     Years since quittin.7     Passive exposure: Past   • Smokeless tobacco: Never   Vaping Use   • Vaping Use: Never used   Substance and Sexual Activity   • Alcohol use: Yes     Comment: Rarely   • Drug use: Never   • Sexual activity: Not on file   Other Topics Concern   • Not on file   Social History Narrative    From 23  note:    Primary Caregiver: Self    Support Systems: Spouse/significant other    [place] of Residence: [facility] [address] [facility] do you live in?: [place] [facility] entry access options. Select all that apply.: No steps to enter home    Type of Current Residence: [employer]    Living Arrangements: Lives w/ Spouse/significant other    Functional Status: Independent    Completes ADLs independently?: Yes    Ambulates independently?: Yes    Does patient use assisted devices?: No    Does patient currently own DME?: Yes    Income Source: Pension/penitentiary    Does patient have prescription coverage?: Yes    Means of Transport to Appts[de-identified] Family transport     Social Determinants of Health     Financial Resource Strain: Low Risk  (1/26/2023)    Overall Financial Resource Strain (CARDIA)    • Difficulty of Paying Living Expenses: Not hard at all   Food Insecurity: No Food Insecurity (5/5/2023)    Hunger Vital Sign    • Worried About Running Out of Food in the Last Year: Never true    • Ran Out of Food in the Last Year: Never true   Transportation Needs: No Transportation Needs (5/5/2023)    PRAPARE - Transportation    • Lack of Transportation (Medical): No    • Lack of Transportation (Non-Medical): No   Physical Activity: Inactive (1/26/2023)    Exercise Vital Sign    • Days of Exercise per Week: 0 days    • Minutes of Exercise per Session: 0 min   Stress: No Stress Concern Present (1/26/2023)    [address] [facility]    • Feeling of Stress : Not at all   Social Connections: Unknown (1/26/2023)    Social Connection and Isolation Panel [NHANES]    • Frequency of Communication with Friends and Family: Three times a week    • Frequency of Social Gatherings with Friends and Family: Three times a week    • Attends Amish Services: Never    • Active Member of Clubs or Organizations:  Yes    • Attends Club or Organization Meetings: Never    • Marital Status: Not on file   Intimate Partner Violence: Not At Risk (1/26/2023)    Humiliation, Afraid, Rape, and Kick questionnaire    • Fear of Current or Ex-Partner: No    • Emotionally Abused: No    • Physically Abused: No    • Sexually Abused: No   Housing Stability: Low Risk  (5/5/2023)    Housing Stability Vital Sign    • Unable to Pay for Housing in the Last Year: No    • Number of Places Lived in the Last Year: 1    • Unstable Housing in the Last Year: No     Family History   Problem Relation Age of Onset   • Colon cancer Neg Hx      No Known Allergies  Current Outpatient Medications   Medication Sig Dispense Refill   • hydrocortisone (CORTEF) 5 mg tablet Take 2 tablets (10 mg total) by mouth 2 (two) times a day Breakfast and lunch 60 tablet 0   • oxyCODONE (ROXICODONE) 5 immediate release tablet Take 0.5 tablets (2.5 mg total) by mouth every 4 (four) hours as needed (cancer pain) Max Daily Amount: 15 mg 10 tablet 0   • apixaban (ELIQUIS) 2.5 mg Take 1 tablet (2.5 mg total) by mouth 2 (two) times a day 180 tablet 1   • doxazosin (CARDURA) 4 mg tablet Take 1 tablet (4 mg total) by mouth daily at bedtime 90 tablet 1   • ferrous sulfate 324 (65 Fe) mg Take 1 tablet (324 mg total) by mouth every other day 90 tablet 0   • ferrous sulfate 324 MG TBEC Take 324 mg by mouth every other day. Indications: Anemia From Inadequate Iron in the Body     • losartan (COZAAR) 50 mg tablet Take 1 tablet (50 mg total) by mouth daily 90 tablet 1   • metoprolol tartrate (LOPRESSOR) 50 mg tablet TAKE 1 TABLET BY MOUTH EVERY 12 HOURS 60 tablet 2   • torsemide (DEMADEX) 20 mg tablet Take 40 mg by mouth 2 (two) times a day       No current facility-administered medications for this visit. Review of Systems   Unable to perform ROS: dementia         Vital Signs  /68 (BP Location: Right arm, Patient Position: Sitting)   Pulse 94   Temp 98.4 °F (36.9 °C) (Temporal)   Resp 14   SpO2 94%     Physical Exam and Objective Data  Physical Exam  Constitutional:       General: He is not in acute distress. Appearance: He is ill-appearing.  He is not toxic-appearing or diaphoretic. Comments: Frail   HENT:      Head: Normocephalic and atraumatic. Right Ear: External ear normal.      Left Ear: External ear normal.   Eyes:      General:         Right eye: No discharge. Left eye: No discharge. Conjunctiva/sclera: Conjunctivae normal.      Pupils: Pupils are equal, round, and reactive to light. Neck:      Trachea: No tracheal deviation. Cardiovascular:      Rate and Rhythm: Normal rate and regular rhythm. Comments: JVD in seated position, to angle of jaw. Perioral cyanosis is mild  Pulmonary:      Effort: Pulmonary effort is normal. No respiratory distress. Breath sounds: No stridor. Abdominal:      Palpations: Abdomen is soft. Comments: scaphoid   Skin:     General: Skin is warm and dry. Coloration: Skin is jaundiced and pale. Findings: No erythema or rash. Neurological:      General: No focal deficit present. Mental Status: He is alert. He is disoriented. Cranial Nerves: No cranial nerve deficit. Gait: Gait abnormal (stooped and ponderous. Uses walker. ). Comments: Impaired recall of recent events   Psychiatric:      Comments: Poor insight, limited judgment       Radiology and Laboratory:  I personally reviewed and interpreted the following results: reviewed worsening Cr, stable, very low hgb.    85+ minutes was spent face to face with Slade Jacinto and his family with 100% of the that time spent in the home, counseling and coordinating care, including discussions of etiology of diagnosis, prognosis of diagnosis, risks and benefits of treatment, treatment instructions, risk factors and risk reduction of disease and patient and family counseling/involvement in care. review and assessment of decisional apparatus and capacity, applicable legal codes and extant documents; referral to hospice care  All of the patient's questions were answered during this discussion.

## 2023-10-05 NOTE — TELEPHONE ENCOUNTER
Computer challenges prevented filling of these meds as planned. They are sent this AM.  Rx sent; may 'done' task.

## 2023-10-06 ENCOUNTER — HOME CARE VISIT (OUTPATIENT)
Dept: HOME HOSPICE | Facility: HOSPICE | Age: 88
End: 2023-10-06
Payer: MEDICARE

## 2023-10-06 ENCOUNTER — HOME CARE VISIT (OUTPATIENT)
Dept: HOME HEALTH SERVICES | Facility: HOME HEALTHCARE | Age: 88
End: 2023-10-06
Payer: MEDICARE

## 2023-10-06 VITALS
BODY MASS INDEX: 27.32 KG/M2 | WEIGHT: 170 LBS | RESPIRATION RATE: 18 BRPM | TEMPERATURE: 98.7 F | DIASTOLIC BLOOD PRESSURE: 80 MMHG | HEIGHT: 66 IN | HEART RATE: 88 BPM | SYSTOLIC BLOOD PRESSURE: 138 MMHG

## 2023-10-06 PROCEDURE — G0299 HHS/HOSPICE OF RN EA 15 MIN: HCPCS

## 2023-10-06 PROCEDURE — G0155 HHCP-SVS OF CSW,EA 15 MIN: HCPCS

## 2023-10-06 PROCEDURE — 10330057 MEDICATION, GENERAL

## 2023-10-07 ENCOUNTER — HOME CARE VISIT (OUTPATIENT)
Dept: HOME HEALTH SERVICES | Facility: HOME HEALTHCARE | Age: 88
End: 2023-10-07
Payer: COMMERCIAL

## 2023-10-09 ENCOUNTER — HOME CARE VISIT (OUTPATIENT)
Dept: HOME HEALTH SERVICES | Facility: HOME HEALTHCARE | Age: 88
End: 2023-10-09
Payer: COMMERCIAL

## 2023-10-09 ENCOUNTER — HOME CARE VISIT (OUTPATIENT)
Dept: HOME HEALTH SERVICES | Facility: HOME HEALTHCARE | Age: 88
End: 2023-10-09
Payer: MEDICARE

## 2023-10-09 VITALS
TEMPERATURE: 98.7 F | HEART RATE: 82 BPM | RESPIRATION RATE: 18 BRPM | SYSTOLIC BLOOD PRESSURE: 142 MMHG | DIASTOLIC BLOOD PRESSURE: 75 MMHG

## 2023-10-09 PROCEDURE — G0156 HHCP-SVS OF AIDE,EA 15 MIN: HCPCS

## 2023-10-09 PROCEDURE — G0299 HHS/HOSPICE OF RN EA 15 MIN: HCPCS

## 2023-10-10 ENCOUNTER — TELEPHONE (OUTPATIENT)
Dept: CARDIOLOGY CLINIC | Facility: CLINIC | Age: 88
End: 2023-10-10

## 2023-10-10 ENCOUNTER — HOME CARE VISIT (OUTPATIENT)
Dept: HOME HOSPICE | Facility: HOSPICE | Age: 88
End: 2023-10-10
Payer: MEDICARE

## 2023-10-10 NOTE — TELEPHONE ENCOUNTER
Patient is meant to be taking Eliquis 2.5 mg tablets, 1 tablet twice a day and is asking for refill.  This medication is no longer on the patient's medication list.

## 2023-10-11 ENCOUNTER — TELEPHONE (OUTPATIENT)
Dept: INTERNAL MEDICINE CLINIC | Facility: CLINIC | Age: 88
End: 2023-10-11

## 2023-10-11 ENCOUNTER — HOME CARE VISIT (OUTPATIENT)
Dept: HOME HEALTH SERVICES | Facility: HOME HEALTHCARE | Age: 88
End: 2023-10-11
Payer: MEDICARE

## 2023-10-11 PROCEDURE — G0156 HHCP-SVS OF AIDE,EA 15 MIN: HCPCS

## 2023-10-13 ENCOUNTER — HOME CARE VISIT (OUTPATIENT)
Dept: HOME HEALTH SERVICES | Facility: HOME HEALTHCARE | Age: 88
End: 2023-10-13
Payer: MEDICARE

## 2023-10-13 PROCEDURE — G0156 HHCP-SVS OF AIDE,EA 15 MIN: HCPCS

## 2023-10-16 ENCOUNTER — HOME CARE VISIT (OUTPATIENT)
Dept: HOME HEALTH SERVICES | Facility: HOME HEALTHCARE | Age: 88
End: 2023-10-16
Payer: MEDICARE

## 2023-10-16 VITALS
SYSTOLIC BLOOD PRESSURE: 138 MMHG | HEART RATE: 80 BPM | TEMPERATURE: 99 F | DIASTOLIC BLOOD PRESSURE: 78 MMHG | RESPIRATION RATE: 20 BRPM

## 2023-10-16 PROCEDURE — G0299 HHS/HOSPICE OF RN EA 15 MIN: HCPCS

## 2023-10-16 PROCEDURE — G0156 HHCP-SVS OF AIDE,EA 15 MIN: HCPCS

## 2023-10-16 NOTE — PROGRESS NOTES
Palliative Home Program Assessment of Need    [facility] completed an assessment of need with Patient and his partner, Sebas Ayala in their home located in [place], [place]    Relationship status: In a relationship with Sebas Ayala  Duration of relationship: 15 years  Name of significant other: Urban Soares and Ages:  adult son Vernon Larson and daughter Velma Henson as well as Kalpana's  adult daughter Kianna Hargrove  Pets: no  Other important family information: minimal support system, adult children do not live in the immediate area  Living situation (where and whom): Patient resides with Sebas Ayala at [address] [place] in [place], [place]  Patient's primary caregiver:  Sebas Ayala  Any limitations of caregiver: Sebas Ayala reports she is unable to handle increasing physical demands of caring for patient. Environmental concerns or barriers: no   history: no  Employment history/source of income: SSI, Pension  Disability:  N/A  Concerns regarding literacy: no  Spirituality/ Rastafari:  no   Patient's strengths, social supports, and resources:  limited support system  Cultural information:  [facility] current or previous:  patient has cognitive decline  Substance use or history:  no  Sleep: Patient reports napping through out the day but is unable to sleep at night due to pain and discomfort  Exercise: Physical therapist comes to the home 2x per week  Diet/nutrition: Patient reports his appetite varies from day to day, some days he has little appetite. Durable Medical Equipment needs: Uses walker  Transportation: Sindhu Valles concerns: unable to afford self-pay home health care  Advanced Directive: POLST and POA on file  Other medical or social work providers involved: physical therapist 2x per week  Patient/caregiver current level of coping:  Understanding:  Patient is very   Patient/family concerns and areas of need: Sebas Ayala is aware of patient's health needs.  She reports being stressed out because patient has started falling frequently and she cannot physically keep up with the increased demands of care giving. Patient expressed not being interested in taking pain medication or any other any other medications prescribed to him. After discussing health concerns with caregiver, Dr. Steve Light referred patient to Hospice. Patient's interests:  Patient enjoys watching old t.v. shows and westerns. I have spent 30 minutes with patient and familytoday in which greater than 50% of this time was spent in counseling/coordination of care. *All questions may not be answered due to constraints.   Follow-up discussions may need to occur

## 2023-10-18 ENCOUNTER — HOME CARE VISIT (OUTPATIENT)
Dept: HOME HEALTH SERVICES | Facility: HOME HEALTHCARE | Age: 88
End: 2023-10-18
Payer: MEDICARE

## 2023-10-18 PROCEDURE — G0156 HHCP-SVS OF AIDE,EA 15 MIN: HCPCS

## 2023-10-19 ENCOUNTER — HOME CARE VISIT (OUTPATIENT)
Dept: HOME HEALTH SERVICES | Facility: HOME HEALTHCARE | Age: 88
End: 2023-10-19
Payer: MEDICARE

## 2023-10-19 PROCEDURE — G0156 HHCP-SVS OF AIDE,EA 15 MIN: HCPCS

## 2023-10-20 PROCEDURE — 10330057 MEDICATION, GENERAL

## 2023-10-22 ENCOUNTER — HOME CARE VISIT (OUTPATIENT)
Dept: HOME HOSPICE | Facility: HOSPICE | Age: 88
End: 2023-10-22
Payer: MEDICARE

## 2023-10-23 ENCOUNTER — HOME CARE VISIT (OUTPATIENT)
Dept: HOME HEALTH SERVICES | Facility: HOME HEALTHCARE | Age: 88
End: 2023-10-23
Payer: MEDICARE

## 2023-10-23 PROCEDURE — G0156 HHCP-SVS OF AIDE,EA 15 MIN: HCPCS

## 2023-10-24 ENCOUNTER — HOME CARE VISIT (OUTPATIENT)
Dept: HOME HEALTH SERVICES | Facility: HOME HEALTHCARE | Age: 88
End: 2023-10-24
Payer: MEDICARE

## 2023-10-24 PROCEDURE — G0299 HHS/HOSPICE OF RN EA 15 MIN: HCPCS

## 2023-10-25 ENCOUNTER — HOME CARE VISIT (OUTPATIENT)
Dept: HOME HEALTH SERVICES | Facility: HOME HEALTHCARE | Age: 88
End: 2023-10-25
Payer: MEDICARE

## 2023-10-25 PROCEDURE — G0156 HHCP-SVS OF AIDE,EA 15 MIN: HCPCS

## 2023-10-27 ENCOUNTER — HOME CARE VISIT (OUTPATIENT)
Dept: HOME HEALTH SERVICES | Facility: HOME HEALTHCARE | Age: 88
End: 2023-10-27
Payer: MEDICARE

## 2023-10-27 PROCEDURE — G0156 HHCP-SVS OF AIDE,EA 15 MIN: HCPCS

## 2023-10-30 ENCOUNTER — HOME CARE VISIT (OUTPATIENT)
Dept: HOME HEALTH SERVICES | Facility: HOME HEALTHCARE | Age: 88
End: 2023-10-30
Payer: MEDICARE

## 2023-10-30 PROCEDURE — G0156 HHCP-SVS OF AIDE,EA 15 MIN: HCPCS

## 2023-11-01 ENCOUNTER — TELEPHONE (OUTPATIENT)
Dept: INTERNAL MEDICINE CLINIC | Facility: CLINIC | Age: 88
End: 2023-11-01

## 2023-11-01 ENCOUNTER — HOME CARE VISIT (OUTPATIENT)
Dept: HOME HEALTH SERVICES | Facility: HOME HEALTHCARE | Age: 88
End: 2023-11-01
Payer: MEDICARE

## 2023-11-01 PROCEDURE — G0156 HHCP-SVS OF AIDE,EA 15 MIN: HCPCS

## 2023-11-02 ENCOUNTER — HOME CARE VISIT (OUTPATIENT)
Dept: HOME HEALTH SERVICES | Facility: HOME HEALTHCARE | Age: 88
End: 2023-11-02
Payer: MEDICARE

## 2023-11-02 ENCOUNTER — HOME CARE VISIT (OUTPATIENT)
Dept: HOME HOSPICE | Facility: HOSPICE | Age: 88
End: 2023-11-02
Payer: MEDICARE

## 2023-11-02 VITALS
HEART RATE: 80 BPM | TEMPERATURE: 98.5 F | DIASTOLIC BLOOD PRESSURE: 75 MMHG | SYSTOLIC BLOOD PRESSURE: 123 MMHG | RESPIRATION RATE: 20 BRPM

## 2023-11-02 PROCEDURE — G0299 HHS/HOSPICE OF RN EA 15 MIN: HCPCS

## 2023-11-03 ENCOUNTER — HOME CARE VISIT (OUTPATIENT)
Dept: HOME HEALTH SERVICES | Facility: HOME HEALTHCARE | Age: 88
End: 2023-11-03
Payer: MEDICARE

## 2023-11-03 ENCOUNTER — HOME CARE VISIT (OUTPATIENT)
Dept: HOME HOSPICE | Facility: HOSPICE | Age: 88
End: 2023-11-03
Payer: MEDICARE

## 2023-11-03 PROCEDURE — G0156 HHCP-SVS OF AIDE,EA 15 MIN: HCPCS

## 2023-11-05 ENCOUNTER — HOME CARE VISIT (OUTPATIENT)
Dept: HOME HEALTH SERVICES | Facility: HOME HEALTHCARE | Age: 88
End: 2023-11-05
Payer: MEDICARE

## 2023-11-05 PROCEDURE — G0156 HHCP-SVS OF AIDE,EA 15 MIN: HCPCS

## 2023-11-06 ENCOUNTER — HOME CARE VISIT (OUTPATIENT)
Dept: HOME HEALTH SERVICES | Facility: HOME HEALTHCARE | Age: 88
End: 2023-11-06
Payer: MEDICARE

## 2023-11-06 ENCOUNTER — HOME CARE VISIT (OUTPATIENT)
Dept: HOME HOSPICE | Facility: HOSPICE | Age: 88
End: 2023-11-06
Payer: MEDICARE

## 2023-11-06 VITALS
TEMPERATURE: 98.3 F | RESPIRATION RATE: 20 BRPM | SYSTOLIC BLOOD PRESSURE: 128 MMHG | HEART RATE: 80 BPM | DIASTOLIC BLOOD PRESSURE: 75 MMHG

## 2023-11-06 PROCEDURE — G0156 HHCP-SVS OF AIDE,EA 15 MIN: HCPCS

## 2023-11-06 PROCEDURE — G0299 HHS/HOSPICE OF RN EA 15 MIN: HCPCS

## 2023-11-06 PROCEDURE — G0155 HHCP-SVS OF CSW,EA 15 MIN: HCPCS

## 2023-11-07 ENCOUNTER — HOME CARE VISIT (OUTPATIENT)
Dept: HOME HEALTH SERVICES | Facility: HOME HEALTHCARE | Age: 88
End: 2023-11-07
Payer: MEDICARE

## 2023-11-07 PROCEDURE — G0156 HHCP-SVS OF AIDE,EA 15 MIN: HCPCS

## 2023-11-09 ENCOUNTER — HOME CARE VISIT (OUTPATIENT)
Dept: HOME HEALTH SERVICES | Facility: HOME HEALTHCARE | Age: 88
End: 2023-11-09
Payer: MEDICARE

## 2023-11-09 PROCEDURE — G0156 HHCP-SVS OF AIDE,EA 15 MIN: HCPCS

## 2023-11-10 ENCOUNTER — HOME CARE VISIT (OUTPATIENT)
Dept: HOME HEALTH SERVICES | Facility: HOME HEALTHCARE | Age: 88
End: 2023-11-10
Payer: MEDICARE

## 2023-11-10 VITALS — SYSTOLIC BLOOD PRESSURE: 138 MMHG | HEART RATE: 82 BPM | DIASTOLIC BLOOD PRESSURE: 75 MMHG

## 2023-11-10 PROCEDURE — G0299 HHS/HOSPICE OF RN EA 15 MIN: HCPCS

## 2023-11-12 ENCOUNTER — HOME CARE VISIT (OUTPATIENT)
Dept: HOME HEALTH SERVICES | Facility: HOME HEALTHCARE | Age: 88
End: 2023-11-12
Payer: MEDICARE

## 2023-11-12 ENCOUNTER — HOME CARE VISIT (OUTPATIENT)
Dept: HOME HOSPICE | Facility: HOSPICE | Age: 88
End: 2023-11-12
Payer: MEDICARE

## 2023-11-12 PROCEDURE — G0156 HHCP-SVS OF AIDE,EA 15 MIN: HCPCS

## 2023-11-13 ENCOUNTER — HOME CARE VISIT (OUTPATIENT)
Dept: HOME HEALTH SERVICES | Facility: HOME HEALTHCARE | Age: 88
End: 2023-11-13
Payer: MEDICARE

## 2023-11-13 VITALS
SYSTOLIC BLOOD PRESSURE: 160 MMHG | RESPIRATION RATE: 22 BRPM | HEART RATE: 84 BPM | DIASTOLIC BLOOD PRESSURE: 75 MMHG | TEMPERATURE: 97.6 F

## 2023-11-13 PROCEDURE — G0156 HHCP-SVS OF AIDE,EA 15 MIN: HCPCS

## 2023-11-13 PROCEDURE — G0299 HHS/HOSPICE OF RN EA 15 MIN: HCPCS

## 2023-11-14 ENCOUNTER — HOME CARE VISIT (OUTPATIENT)
Dept: HOME HEALTH SERVICES | Facility: HOME HEALTHCARE | Age: 88
End: 2023-11-14
Payer: MEDICARE

## 2023-11-14 ENCOUNTER — HOME CARE VISIT (OUTPATIENT)
Dept: HOME HOSPICE | Facility: HOSPICE | Age: 88
End: 2023-11-14
Payer: MEDICARE

## 2023-11-14 PROCEDURE — G0156 HHCP-SVS OF AIDE,EA 15 MIN: HCPCS

## 2023-11-14 PROCEDURE — G0155 HHCP-SVS OF CSW,EA 15 MIN: HCPCS

## 2023-11-15 ENCOUNTER — HOME CARE VISIT (OUTPATIENT)
Dept: HOME HOSPICE | Facility: HOSPICE | Age: 88
End: 2023-11-15
Payer: MEDICARE

## 2023-11-15 ENCOUNTER — HOME CARE VISIT (OUTPATIENT)
Dept: HOME HEALTH SERVICES | Facility: HOME HEALTHCARE | Age: 88
End: 2023-11-15
Payer: MEDICARE

## 2023-11-15 DIAGNOSIS — I50.32 CHRONIC HEART FAILURE WITH PRESERVED EJECTION FRACTION (HFPEF) (HCC): ICD-10-CM

## 2023-11-15 DIAGNOSIS — E11.9 TYPE 2 DIABETES MELLITUS, WITHOUT LONG-TERM CURRENT USE OF INSULIN (HCC): ICD-10-CM

## 2023-11-15 DIAGNOSIS — Z51.5 HOSPICE CARE: Primary | ICD-10-CM

## 2023-11-15 DIAGNOSIS — I50.32 CHRONIC DIASTOLIC CONGESTIVE HEART FAILURE (HCC): ICD-10-CM

## 2023-11-15 DIAGNOSIS — I48.91 ATRIAL FIBRILLATION, UNSPECIFIED TYPE (HCC): ICD-10-CM

## 2023-11-15 PROCEDURE — G0299 HHS/HOSPICE OF RN EA 15 MIN: HCPCS

## 2023-11-15 PROCEDURE — G0156 HHCP-SVS OF AIDE,EA 15 MIN: HCPCS

## 2023-11-15 RX ORDER — LOSARTAN POTASSIUM 50 MG/1
50 TABLET ORAL DAILY
Qty: 30 TABLET | Refills: 0 | Status: SHIPPED | OUTPATIENT
Start: 2023-11-15 | End: 2023-11-21 | Stop reason: CLARIF

## 2023-11-15 RX ORDER — LACTULOSE 10 G/15ML
20 SOLUTION ORAL DAILY
Qty: 473 ML | Refills: 0 | Status: SHIPPED | OUTPATIENT
Start: 2023-11-15 | End: 2023-11-16 | Stop reason: SDUPTHER

## 2023-11-15 RX ORDER — SENNOSIDES 8.6 MG
8.6 TABLET ORAL DAILY
Qty: 30 TABLET | Refills: 0 | Status: SHIPPED | OUTPATIENT
Start: 2023-11-15 | End: 2023-11-21 | Stop reason: CLARIF

## 2023-11-15 RX ORDER — ZINC OXIDE/PANTHENOL/VITAMIN E 11.3%
1 CREAM (GRAM) TOPICAL 2 TIMES DAILY PRN
Qty: 56 G | Refills: 0 | Status: SHIPPED | OUTPATIENT
Start: 2023-11-15 | End: 2023-11-21 | Stop reason: CLARIF

## 2023-11-15 RX ORDER — METOPROLOL TARTRATE 50 MG/1
50 TABLET, FILM COATED ORAL 2 TIMES DAILY
Qty: 60 TABLET | Refills: 0 | Status: SHIPPED | OUTPATIENT
Start: 2023-11-15 | End: 2023-11-21 | Stop reason: CLARIF

## 2023-11-15 RX ORDER — LORAZEPAM 0.5 MG/1
0.5 TABLET ORAL EVERY 6 HOURS PRN
Qty: 30 TABLET | Refills: 0 | Status: SHIPPED | OUTPATIENT
Start: 2023-11-15 | End: 2023-11-16

## 2023-11-15 RX ORDER — BUMETANIDE 2 MG/1
2 TABLET ORAL DAILY
Qty: 30 TABLET | Refills: 0 | Status: SHIPPED | OUTPATIENT
Start: 2023-11-15 | End: 2023-11-21 | Stop reason: CLARIF

## 2023-11-15 RX ORDER — ASPIRIN 81 MG/1
81 TABLET, CHEWABLE ORAL DAILY
Qty: 30 TABLET | Refills: 0 | Status: SHIPPED | OUTPATIENT
Start: 2023-11-15 | End: 2023-11-21 | Stop reason: CLARIF

## 2023-11-15 RX ORDER — OMEPRAZOLE 20 MG/1
20 CAPSULE, DELAYED RELEASE ORAL DAILY
Qty: 30 CAPSULE | Refills: 0 | Status: SHIPPED | OUTPATIENT
Start: 2023-11-15 | End: 2023-11-21 | Stop reason: CLARIF

## 2023-11-15 RX ORDER — DOXAZOSIN MESYLATE 4 MG/1
4 TABLET ORAL
Qty: 90 TABLET | Refills: 0 | Status: SHIPPED | OUTPATIENT
Start: 2023-11-15 | End: 2023-11-21 | Stop reason: CLARIF

## 2023-11-15 RX ORDER — OXYCODONE HYDROCHLORIDE 10 MG/1
10 TABLET ORAL EVERY 6 HOURS
Qty: 90 TABLET | Refills: 0 | Status: SHIPPED | OUTPATIENT
Start: 2023-11-15 | End: 2023-11-16

## 2023-11-16 ENCOUNTER — HOME CARE VISIT (OUTPATIENT)
Dept: HOME HOSPICE | Facility: HOSPICE | Age: 88
End: 2023-11-16
Payer: MEDICARE

## 2023-11-16 ENCOUNTER — HOME CARE VISIT (OUTPATIENT)
Dept: HOME HEALTH SERVICES | Facility: HOME HEALTHCARE | Age: 88
End: 2023-11-16
Payer: MEDICARE

## 2023-11-16 VITALS
HEART RATE: 112 BPM | RESPIRATION RATE: 22 BRPM | TEMPERATURE: 98.9 F | SYSTOLIC BLOOD PRESSURE: 140 MMHG | DIASTOLIC BLOOD PRESSURE: 65 MMHG

## 2023-11-16 DIAGNOSIS — Z51.5 HOSPICE CARE: Primary | ICD-10-CM

## 2023-11-16 DIAGNOSIS — C18.9 COLON ADENOCARCINOMA (HCC): ICD-10-CM

## 2023-11-16 DIAGNOSIS — G89.3 CANCER-RELATED PAIN: ICD-10-CM

## 2023-11-16 DIAGNOSIS — R06.02 SHORTNESS OF BREATH: ICD-10-CM

## 2023-11-16 PROCEDURE — G0299 HHS/HOSPICE OF RN EA 15 MIN: HCPCS

## 2023-11-16 PROCEDURE — G0156 HHCP-SVS OF AIDE,EA 15 MIN: HCPCS

## 2023-11-16 RX ORDER — LORAZEPAM 1 MG/1
TABLET ORAL
Qty: 75 TABLET | Refills: 0 | Status: SHIPPED | OUTPATIENT
Start: 2023-11-16 | End: 2023-11-21 | Stop reason: CLARIF

## 2023-11-16 RX ORDER — LACTULOSE 10 G/15ML
20 SOLUTION ORAL 2 TIMES DAILY
Qty: 473 ML | Refills: 0 | Status: SHIPPED | OUTPATIENT
Start: 2023-11-16 | End: 2023-11-21 | Stop reason: CLARIF

## 2023-11-16 RX ORDER — MORPHINE SULFATE 100 MG/5ML
SOLUTION, CONCENTRATE ORAL
Qty: 60 ML | Refills: 0 | Status: SHIPPED | OUTPATIENT
Start: 2023-11-16 | End: 2023-11-21 | Stop reason: CLARIF

## 2023-11-16 RX ORDER — BISACODYL 10 MG
10 SUPPOSITORY, RECTAL RECTAL DAILY
Qty: 4 SUPPOSITORY | Refills: 0 | Status: SHIPPED | OUTPATIENT
Start: 2023-11-16 | End: 2023-11-21 | Stop reason: CLARIF

## 2023-11-16 NOTE — TELEPHONE ENCOUNTER
11/16/2023 1:36 PM  [facility] facility patient requests medication adjusted due to change in patient condition. Filled electronically via Epic as per [place] State Law. Requested Prescriptions     Signed Prescriptions Disp Refills    lactulose (CHRONULAC) 10 g/15 mL solution 473 mL 0     Sig: Take 30 mL (20 g total) by mouth 2 (two) times a day     Authorizing Provider: Jaswinder Cosby    LORazepam (ATIVAN) 1 mg tablet 75 tablet 0     Sig: Take 1 tablet (1 mg total) by mouth every 6 (six) hours. May also take 1 tablet (1 mg total) every hour as needed for anxiety or sleep (dyspnea). Medication may be crushed and administered in applesauce, dissolve in a drop of water and give under the tongue or dilute in a few drops of water and administer via oral syringe - NOTIFY HOSPICE if 3 PRN doses are administered in 12 hours without symptom relief. Authorizing Provider: Jaswinder Cosby    Morphine Sulfate, Concentrate, 20 mg/mL concentrated solution 60 mL 0     Sig: Take 1 mL (20 mg total) by mouth every 6 (six) hours. May also take 1 mL (20 mg total) every hour as needed (pain / SOB). NOTIFY HOSPICE if 3 PRN doses are administered in 12 hours without symptom relief. Max Daily Amount: 560 mg. Authorizing Provider: Jaswinder Cosby    bisacodyl (DULCOLAX) 10 mg suppository 4 suppository 0     Sig: Insert 1 suppository (10 mg total) into the rectum daily Please administer one today     Authorizing Provider: Kassie Encarnacion, [address] [facility]  Hospice Answering Service: [phone]  You can find me on [employer]!

## 2023-11-17 ENCOUNTER — HOME CARE VISIT (OUTPATIENT)
Dept: HOME HOSPICE | Facility: HOSPICE | Age: 88
End: 2023-11-17
Payer: MEDICARE

## 2023-11-17 ENCOUNTER — HOME CARE VISIT (OUTPATIENT)
Dept: HOME HEALTH SERVICES | Facility: HOME HEALTHCARE | Age: 88
End: 2023-11-17
Payer: MEDICARE

## 2023-11-17 PROCEDURE — G0156 HHCP-SVS OF AIDE,EA 15 MIN: HCPCS

## 2023-11-18 ENCOUNTER — HOME CARE VISIT (OUTPATIENT)
Dept: HOME HEALTH SERVICES | Facility: HOME HEALTHCARE | Age: 88
End: 2023-11-18
Payer: MEDICARE

## 2023-11-18 VITALS
HEART RATE: 102 BPM | TEMPERATURE: 100.2 F | RESPIRATION RATE: 18 BRPM | SYSTOLIC BLOOD PRESSURE: 90 MMHG | DIASTOLIC BLOOD PRESSURE: 40 MMHG

## 2023-11-18 PROCEDURE — G0299 HHS/HOSPICE OF RN EA 15 MIN: HCPCS

## 2023-11-27 NOTE — PROGRESS NOTES
"Heart Failure Outpatient Care -hospital discharge note: Chart notes reviewed, call made to patient and role explained  Patient receptive to outreach an education  Self-management/education and teach back:  Primary learner: Patient and wife  Following low sodium diet: Yes- wife cooks meals and aware of high sodium foods to avoid  Eat out \"once in a while\"  Following fluid restriction:Yes and reinforced 60 oz  Hospital discharge weight: 176 2#  Weighing daily:   yes   Recording: yes  Weight today: 177#  Monitoring symptoms: somewhat- reinforced symptoms that would alert him/his wife to call the cardiologist   Any current symptoms: denies  Knows when to call provider:reinforced   Medication reviewed and taking all as prescribed: Wife manages medication with pill box  Had med review at PCP and cardiology office  Aware of new increase in diuretic and potassium  Knows name of diuretic: Wife dose  Escalation plan: call cardiology    Care Coordination:  Aware of cardiology follow up appointment: Yes, saw post hospital and scheduled again 5/22  Aware of PCP follow up appointment: Yes saw [address], [address] discharge  Transportation: Wife drives  Social Support:Wife  Insurance/financial concerns:none  Home health care: has outpatient PT for balance issues  Does not use any assistive devices  Health literacy:fair  Engagement:good  Additional comments:Denies concerns or care needs  Will follow    "
continue amox-clavulanate at bedtime once daily  ID to follow patient per son's request

## 2023-11-30 ENCOUNTER — HOME CARE VISIT (OUTPATIENT)
Dept: HOME HOSPICE | Facility: HOSPICE | Age: 88
End: 2023-11-30
Payer: MEDICARE